# Patient Record
Sex: MALE | Race: WHITE | NOT HISPANIC OR LATINO | Employment: OTHER | URBAN - METROPOLITAN AREA
[De-identification: names, ages, dates, MRNs, and addresses within clinical notes are randomized per-mention and may not be internally consistent; named-entity substitution may affect disease eponyms.]

---

## 2017-10-03 ENCOUNTER — APPOINTMENT (OUTPATIENT)
Dept: CT IMAGING | Facility: HOSPITAL | Age: 77
End: 2017-10-03

## 2017-10-03 ENCOUNTER — APPOINTMENT (OUTPATIENT)
Dept: MRI IMAGING | Facility: HOSPITAL | Age: 77
End: 2017-10-03

## 2017-10-03 ENCOUNTER — HOSPITAL ENCOUNTER (OUTPATIENT)
Facility: HOSPITAL | Age: 77
Setting detail: OBSERVATION
Discharge: HOME HEALTH | End: 2017-10-05
Attending: EMERGENCY MEDICINE | Admitting: INTERNAL MEDICINE

## 2017-10-03 ENCOUNTER — APPOINTMENT (OUTPATIENT)
Dept: GENERAL RADIOLOGY | Facility: HOSPITAL | Age: 77
End: 2017-10-03

## 2017-10-03 DIAGNOSIS — R53.1 RIGHT SIDED WEAKNESS: ICD-10-CM

## 2017-10-03 DIAGNOSIS — Z74.09 IMPAIRED MOBILITY AND ADLS: ICD-10-CM

## 2017-10-03 DIAGNOSIS — M51.34 DDD (DEGENERATIVE DISC DISEASE), THORACIC: ICD-10-CM

## 2017-10-03 DIAGNOSIS — S19.80XA BLUNT TRAUMA OF NECK, INITIAL ENCOUNTER: ICD-10-CM

## 2017-10-03 DIAGNOSIS — G20 PARKINSON'S DISEASE (HCC): ICD-10-CM
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
## 2017-10-03 DIAGNOSIS — R53.1 WEAKNESS: ICD-10-CM

## 2017-10-03 DIAGNOSIS — G20 PARKINSON DISEASE (HCC): ICD-10-CM

## 2017-10-03 DIAGNOSIS — R40.4 SPELL OF ALTERED CONSCIOUSNESS: Primary | ICD-10-CM

## 2017-10-03 DIAGNOSIS — Z74.09 IMPAIRED FUNCTIONAL MOBILITY, BALANCE, GAIT, AND ENDURANCE: ICD-10-CM

## 2017-10-03 DIAGNOSIS — M50.30 DDD (DEGENERATIVE DISC DISEASE), CERVICAL: ICD-10-CM

## 2017-10-03 DIAGNOSIS — Z78.9 IMPAIRED MOBILITY AND ADLS: ICD-10-CM

## 2017-10-03 DIAGNOSIS — R55 SYNCOPE AND COLLAPSE: ICD-10-CM

## 2017-10-03 PROBLEM — R00.1 SINUS BRADYCARDIA: Status: ACTIVE | Noted: 2017-10-03

## 2017-10-03 PROBLEM — I44.0 FIRST DEGREE ATRIOVENTRICULAR BLOCK: Status: ACTIVE | Noted: 2017-10-03

## 2017-10-03 PROBLEM — R29.6 FALLS: Status: ACTIVE | Noted: 2017-10-03

## 2017-10-03 PROBLEM — G20.A1 PARKINSON'S DISEASE: Status: ACTIVE | Noted: 2017-10-03

## 2017-10-03 PROBLEM — S61.412S: Status: ACTIVE | Noted: 2017-10-03

## 2017-10-03 PROBLEM — G45.9 TIA (TRANSIENT ISCHEMIC ATTACK): Status: ACTIVE | Noted: 2017-10-03

## 2017-10-03 LAB
ALT SERPL W P-5'-P-CCNC: 22 U/L (ref 7–40)
APTT PPP: 25.4 SECONDS (ref 24–31)
AST SERPL-CCNC: 23 U/L (ref 0–33)
BASOPHILS # BLD AUTO: 0.02 10*3/MM3 (ref 0–0.2)
BASOPHILS NFR BLD AUTO: 0.3 % (ref 0–1)
BUN BLDA-MCNC: 15 MG/DL (ref 8–26)
CA-I BLDA-SCNC: 1.24 MMOL/L (ref 1.2–1.32)
CHLORIDE BLDA-SCNC: 100 MMOL/L (ref 98–109)
CO2 BLDA-SCNC: 29 MMOL/L (ref 24–29)
CREAT BLDA-MCNC: 1.1 MG/DL (ref 0.6–1.3)
DEPRECATED RDW RBC AUTO: 44.8 FL (ref 37–54)
EOSINOPHIL # BLD AUTO: 0.11 10*3/MM3 (ref 0–0.3)
EOSINOPHIL NFR BLD AUTO: 1.7 % (ref 0–3)
ERYTHROCYTE [DISTWIDTH] IN BLOOD BY AUTOMATED COUNT: 13.4 % (ref 11.3–14.5)
GLUCOSE BLDC GLUCOMTR-MCNC: 110 MG/DL (ref 70–130)
GLUCOSE BLDC GLUCOMTR-MCNC: 167 MG/DL (ref 70–130)
HBA1C MFR BLD: 4.8 % (ref 4.8–5.6)
HCT VFR BLD AUTO: 43.3 % (ref 38.9–50.9)
HCT VFR BLDA CALC: 43 % (ref 38–51)
HGB BLD-MCNC: 14.6 G/DL (ref 13.1–17.5)
HGB BLDA-MCNC: 14.6 G/DL (ref 12–17)
HOLD SPECIMEN: NORMAL
HOLD SPECIMEN: NORMAL
IMM GRANULOCYTES # BLD: 0.01 10*3/MM3 (ref 0–0.03)
IMM GRANULOCYTES NFR BLD: 0.2 % (ref 0–0.6)
INR PPP: 1.2 (ref 0.8–1.2)
LYMPHOCYTES # BLD AUTO: 1.52 10*3/MM3 (ref 0.6–4.8)
LYMPHOCYTES NFR BLD AUTO: 24.1 % (ref 24–44)
MCH RBC QN AUTO: 31 PG (ref 27–31)
MCHC RBC AUTO-ENTMCNC: 33.7 G/DL (ref 32–36)
MCV RBC AUTO: 91.9 FL (ref 80–99)
MONOCYTES # BLD AUTO: 0.44 10*3/MM3 (ref 0–1)
MONOCYTES NFR BLD AUTO: 7 % (ref 0–12)
NEUTROPHILS # BLD AUTO: 4.22 10*3/MM3 (ref 1.5–8.3)
NEUTROPHILS NFR BLD AUTO: 66.7 % (ref 41–71)
PLATELET # BLD AUTO: 187 10*3/MM3 (ref 150–450)
PMV BLD AUTO: 9.3 FL (ref 6–12)
POTASSIUM BLDA-SCNC: 3.5 MMOL/L (ref 3.5–4.9)
PROTHROMBIN TIME: 14.1 SECONDS (ref 12.8–15.2)
RBC # BLD AUTO: 4.71 10*6/MM3 (ref 4.2–5.76)
SODIUM BLDA-SCNC: 143 MMOL/L (ref 138–146)
TROPONIN I SERPL-MCNC: 0 NG/ML (ref 0–0.07)
TROPONIN I SERPL-MCNC: 0 NG/ML (ref 0–0.07)
WBC NRBC COR # BLD: 6.32 10*3/MM3 (ref 3.5–10.8)
WHOLE BLOOD HOLD SPECIMEN: NORMAL
WHOLE BLOOD HOLD SPECIMEN: NORMAL

## 2017-10-03 PROCEDURE — 70496 CT ANGIOGRAPHY HEAD: CPT

## 2017-10-03 PROCEDURE — 84443 ASSAY THYROID STIM HORMONE: CPT | Performed by: FAMILY MEDICINE

## 2017-10-03 PROCEDURE — 84484 ASSAY OF TROPONIN QUANT: CPT | Performed by: FAMILY MEDICINE

## 2017-10-03 PROCEDURE — 85610 PROTHROMBIN TIME: CPT

## 2017-10-03 PROCEDURE — 99220 PR INITIAL OBSERVATION CARE/DAY 70 MINUTES: CPT | Performed by: FAMILY MEDICINE

## 2017-10-03 PROCEDURE — 71010 HC CHEST PA OR AP: CPT

## 2017-10-03 PROCEDURE — 83036 HEMOGLOBIN GLYCOSYLATED A1C: CPT | Performed by: FAMILY MEDICINE

## 2017-10-03 PROCEDURE — 83735 ASSAY OF MAGNESIUM: CPT | Performed by: FAMILY MEDICINE

## 2017-10-03 PROCEDURE — 93010 ELECTROCARDIOGRAM REPORT: CPT | Performed by: INTERNAL MEDICINE

## 2017-10-03 PROCEDURE — 84450 TRANSFERASE (AST) (SGOT): CPT | Performed by: EMERGENCY MEDICINE

## 2017-10-03 PROCEDURE — 84460 ALANINE AMINO (ALT) (SGPT): CPT | Performed by: EMERGENCY MEDICINE

## 2017-10-03 PROCEDURE — G0378 HOSPITAL OBSERVATION PER HR: HCPCS

## 2017-10-03 PROCEDURE — 82962 GLUCOSE BLOOD TEST: CPT

## 2017-10-03 PROCEDURE — 70498 CT ANGIOGRAPHY NECK: CPT

## 2017-10-03 PROCEDURE — 93005 ELECTROCARDIOGRAM TRACING: CPT | Performed by: EMERGENCY MEDICINE

## 2017-10-03 PROCEDURE — 84484 ASSAY OF TROPONIN QUANT: CPT

## 2017-10-03 PROCEDURE — 85014 HEMATOCRIT: CPT

## 2017-10-03 PROCEDURE — 80047 BASIC METABLC PNL IONIZED CA: CPT

## 2017-10-03 PROCEDURE — 93005 ELECTROCARDIOGRAM TRACING: CPT | Performed by: FAMILY MEDICINE

## 2017-10-03 PROCEDURE — 72125 CT NECK SPINE W/O DYE: CPT

## 2017-10-03 PROCEDURE — 85730 THROMBOPLASTIN TIME PARTIAL: CPT | Performed by: EMERGENCY MEDICINE

## 2017-10-03 PROCEDURE — 85025 COMPLETE CBC W/AUTO DIFF WBC: CPT | Performed by: EMERGENCY MEDICINE

## 2017-10-03 PROCEDURE — 36415 COLL VENOUS BLD VENIPUNCTURE: CPT

## 2017-10-03 PROCEDURE — 99285 EMERGENCY DEPT VISIT HI MDM: CPT

## 2017-10-03 PROCEDURE — 0042T HC CT CEREBRAL PERFUSION W/WO CONTRAST: CPT

## 2017-10-03 PROCEDURE — 70551 MRI BRAIN STEM W/O DYE: CPT

## 2017-10-03 PROCEDURE — 72128 CT CHEST SPINE W/O DYE: CPT

## 2017-10-03 PROCEDURE — 82550 ASSAY OF CK (CPK): CPT | Performed by: FAMILY MEDICINE

## 2017-10-03 PROCEDURE — 70450 CT HEAD/BRAIN W/O DYE: CPT

## 2017-10-03 PROCEDURE — 0 IOPAMIDOL PER 1 ML: Performed by: EMERGENCY MEDICINE

## 2017-10-03 RX ORDER — LORAZEPAM 2 MG/ML
1 INJECTION INTRAMUSCULAR ONCE
Status: DISCONTINUED | OUTPATIENT
Start: 2017-10-03 | End: 2017-10-05 | Stop reason: HOSPADM

## 2017-10-03 RX ORDER — BACITRACIN ZINC AND POLYMYXIN B SULFATE 500; 10000 [USP'U]/G; [USP'U]/G
OINTMENT TOPICAL EVERY 8 HOURS SCHEDULED
Status: DISCONTINUED | OUTPATIENT
Start: 2017-10-04 | End: 2017-10-05 | Stop reason: HOSPADM

## 2017-10-03 RX ORDER — ASPIRIN 81 MG/1
324 TABLET, CHEWABLE ORAL ONCE
Status: COMPLETED | OUTPATIENT
Start: 2017-10-03 | End: 2017-10-03

## 2017-10-03 RX ORDER — ACETAMINOPHEN 650 MG/1
650 SUPPOSITORY RECTAL EVERY 4 HOURS PRN
Status: DISCONTINUED | OUTPATIENT
Start: 2017-10-03 | End: 2017-10-05 | Stop reason: HOSPADM

## 2017-10-03 RX ORDER — ASPIRIN 81 MG/1
81 TABLET, CHEWABLE ORAL DAILY
Status: DISCONTINUED | OUTPATIENT
Start: 2017-10-04 | End: 2017-10-05 | Stop reason: HOSPADM

## 2017-10-03 RX ORDER — ATORVASTATIN CALCIUM 40 MG/1
80 TABLET, FILM COATED ORAL NIGHTLY
Status: DISCONTINUED | OUTPATIENT
Start: 2017-10-03 | End: 2017-10-05 | Stop reason: HOSPADM

## 2017-10-03 RX ORDER — SODIUM CHLORIDE 0.9 % (FLUSH) 0.9 %
10 SYRINGE (ML) INJECTION AS NEEDED
Status: DISCONTINUED | OUTPATIENT
Start: 2017-10-03 | End: 2017-10-05 | Stop reason: HOSPADM

## 2017-10-03 RX ORDER — SODIUM CHLORIDE 0.9 % (FLUSH) 0.9 %
1-10 SYRINGE (ML) INJECTION AS NEEDED
Status: DISCONTINUED | OUTPATIENT
Start: 2017-10-03 | End: 2017-10-05 | Stop reason: HOSPADM

## 2017-10-03 RX ORDER — ACETAMINOPHEN 325 MG/1
650 TABLET ORAL EVERY 4 HOURS PRN
Status: DISCONTINUED | OUTPATIENT
Start: 2017-10-03 | End: 2017-10-05 | Stop reason: HOSPADM

## 2017-10-03 RX ADMIN — ASPIRIN 81 MG 324 MG: 81 TABLET ORAL at 21:00

## 2017-10-03 RX ADMIN — IOPAMIDOL 115 ML: 755 INJECTION, SOLUTION INTRAVENOUS at 16:10

## 2017-10-03 RX ADMIN — ATORVASTATIN CALCIUM 80 MG: 40 TABLET, FILM COATED ORAL at 23:21

## 2017-10-03 NOTE — ED PROVIDER NOTES
Subjective   HPI Comments: Jhoan Gagnon is a 77 y.o.male who presents to the ED with c/o neurologic problem. His time at last known normal was today at 1449. 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 He called a neighbor for help who reported normal mentation at the time. Upon EMS arrival the patient was on the ground. He was disoriented and confused with significant slurred speech and right sided weakness per EMS. There was evidence of prior emesis on scene. His last recorded blood pressure was 193/127. His heart rate was 68. Pupils were 2 mm. Upon arrival in CT, EMS report that his weakness had improved. It is apparent that the patient recently started new medication for Parkinson's management. He sees Dr. Holley, neurology. He is tremulous at baseline. Other than complaints of neck pain after stumbling out of his truck, there are no other known complaints at this time.      Patient is a 77 y.o. male presenting with neurologic complaint.   History provided by:  Patient and EMS personnel  Neurologic Problem   The patient's primary symptoms include a loss of balance, slurred speech and weakness. This is a new problem. The current episode started today. The neurological problem developed suddenly. The last time the patient was known to be well was 10/3/2017 2:49 PM.  The problem has been gradually improving since onset. There was no focality noted. Associated symptoms include confusion, nausea and neck pain. Pertinent negatives include no abdominal pain, back pain, chest pain, dizziness, fever, headaches, shortness of breath or vomiting. Past treatments include nothing.       Review of Systems   Constitutional: Negative for chills and fever.   HENT: Negative for congestion, rhinorrhea, sore throat and trouble swallowing.    Respiratory: Negative for cough and shortness of breath.    Cardiovascular: Negative for chest pain and leg swelling.   Gastrointestinal: Positive for  nausea. Negative for abdominal pain, diarrhea and vomiting.   Musculoskeletal: Positive for neck pain. Negative for back pain.   Neurological: Positive for tremors, speech difficulty, weakness and loss of balance. Negative for dizziness, numbness and headaches.   Psychiatric/Behavioral: Positive for confusion.   All other systems reviewed and are negative.      Past Medical History:   Diagnosis Date   • Cancer        No Known Allergies    No past surgical history on file.    Family History   Problem Relation Age of Onset   • Heart disease Mother    • Cancer Father    • Stroke Father        Social History     Social History   • Marital status:      Spouse name: N/A   • Number of children: N/A   • Years of education: N/A     Social History Main Topics   • Smoking status: Never Smoker   • Smokeless tobacco: Never Used   • Alcohol use No   • Drug use: No   • Sexual activity: Not on file     Other Topics Concern   • Not on file     Social History Narrative   • No narrative on file         Objective   Physical Exam   Constitutional: He is oriented to person, place, and time. He appears well-developed and well-nourished. No distress.   Alert, oriented. No acute distress until he moved, during which time the patient reports neck pain.   HENT:   Head: Normocephalic and atraumatic.   Nose: Nose normal.   Mouth/Throat: Oropharynx is clear and moist.   Eyes: Conjunctivae are normal. No scleral icterus.   Neck: Normal range of motion. Neck supple.   Cardiovascular: Normal rate, regular rhythm and normal heart sounds.  Exam reveals no gallop and no friction rub.    No murmur heard.  Pulmonary/Chest: Effort normal and breath sounds normal. No respiratory distress. He has no wheezes. He has no rales.   Abdominal: Soft. Bowel sounds are normal. He exhibits no distension and no mass. There is no tenderness. There is no rebound and no guarding.   Musculoskeletal: Normal range of motion. He exhibits tenderness. He exhibits no  edema.   Neck is tender on the posterior aspect with no mass, rash, or stepoff. TL spine is non-tender. No point tenderness.     Neurological: He is alert and oriented to person, place, and time. He displays no tremor. A cranial nerve deficit is present.   Very substle right facial droop which may be the natural state of his face. Moderate tremors consistent with Parkinson's disease. Could lift all extremities against gravity. Very subtle weakness on the right compared to the left. Difficult to assess given Parkinson's tremor.   Skin: Skin is warm and dry. No erythema.   Psychiatric: He has a normal mood and affect. His behavior is normal.   Nursing note and vitals reviewed.      Critical Care  Performed by: LEONARDO BUTT  Authorized by: LEONARDO BUTT   Total critical care time: 45 minutes  Critical care was necessary to treat or prevent imminent or life-threatening deterioration of the following conditions: CNS failure or compromise.               ED Course  ED Course   Comment By Time   Dr. Butt is at the bedside reevaluating the patient. No evidence of cervical fracture on CT. Gwen Jensen 10/03 1640   Dr. Butt is at the bedside reevaluating the patient. CT cervical spine shows degenerative disc disease but no evidence of  acute cervical spine trauma and no gross evidence of cervical spine  stenosis. Gwen Jensen 10/03 1937   Dr. Butt paged the hospitalist on call for admission. Gwen Jensen 10/03 1939   Dr. Butt discussed the case in detail with the hospitalist who will admit. Gwen Jensen 10/03 1943     Recent Results (from the past 24 hour(s))   POC Protime / INR    Collection Time: 10/03/17  4:04 PM   Result Value Ref Range    Protime 14.1 12.8 - 15.2 seconds    INR 1.2 0.8 - 1.2   POC CHEM 8    Collection Time: 10/03/17  4:05 PM   Result Value Ref Range    Glucose 167 (H) 70 - 130 mg/dL    BUN, Arterial 15 8 - 26 mg/dL    Creatinine 1.10 0.60 - 1.30 mg/dL    Sodium 143  138 - 146 mmol/L    Potassium 3.5 3.5 - 4.9 mmol/L    Chloride 100 98 - 109 mmol/L    Total CO2 29 24 - 29 mmol/L    Hemoglobin 14.6 12.0 - 17.0 g/dL    Hematocrit 43 38 - 51 %    Ionized Calcium 1.24 1.20 - 1.32 mmol/L   POC Troponin, Rapid    Collection Time: 10/03/17  4:06 PM   Result Value Ref Range    Troponin I 0.00 0.00 - 0.07 ng/mL   aPTT    Collection Time: 10/03/17  4:12 PM   Result Value Ref Range    PTT 25.4 24.0 - 31.0 seconds   AST    Collection Time: 10/03/17  4:12 PM   Result Value Ref Range    AST (SGOT) 23 0 - 33 U/L   ALT    Collection Time: 10/03/17  4:12 PM   Result Value Ref Range    ALT (SGPT) 22 7 - 40 U/L   Light Blue Top    Collection Time: 10/03/17  4:12 PM   Result Value Ref Range    Extra Tube hold for add-on    Green Top (Gel)    Collection Time: 10/03/17  4:12 PM   Result Value Ref Range    Extra Tube Hold for add-ons.    Lavender Top    Collection Time: 10/03/17  4:12 PM   Result Value Ref Range    Extra Tube hold for add-on    Gold Top - SST    Collection Time: 10/03/17  4:12 PM   Result Value Ref Range    Extra Tube Hold for add-ons.    CBC Auto Differential    Collection Time: 10/03/17  4:12 PM   Result Value Ref Range    WBC 6.32 3.50 - 10.80 10*3/mm3    RBC 4.71 4.20 - 5.76 10*6/mm3    Hemoglobin 14.6 13.1 - 17.5 g/dL    Hematocrit 43.3 38.9 - 50.9 %    MCV 91.9 80.0 - 99.0 fL    MCH 31.0 27.0 - 31.0 pg    MCHC 33.7 32.0 - 36.0 g/dL    RDW 13.4 11.3 - 14.5 %    RDW-SD 44.8 37.0 - 54.0 fl    MPV 9.3 6.0 - 12.0 fL    Platelets 187 150 - 450 10*3/mm3    Neutrophil % 66.7 41.0 - 71.0 %    Lymphocyte % 24.1 24.0 - 44.0 %    Monocyte % 7.0 0.0 - 12.0 %    Eosinophil % 1.7 0.0 - 3.0 %    Basophil % 0.3 0.0 - 1.0 %    Immature Grans % 0.2 0.0 - 0.6 %    Neutrophils, Absolute 4.22 1.50 - 8.30 10*3/mm3    Lymphocytes, Absolute 1.52 0.60 - 4.80 10*3/mm3    Monocytes, Absolute 0.44 0.00 - 1.00 10*3/mm3    Eosinophils, Absolute 0.11 0.00 - 0.30 10*3/mm3    Basophils, Absolute 0.02 0.00 - 0.20  10*3/mm3    Immature Grans, Absolute 0.01 0.00 - 0.03 10*3/mm3   POC Troponin, Rapid    Collection Time: 10/03/17  7:21 PM   Result Value Ref Range    Troponin I 0.00 0.00 - 0.07 ng/mL     Note: In addition to lab results from this visit, the labs listed above may include labs taken at another facility or during a different encounter within the last 24 hours. Please correlate lab times with ED admission and discharge times for further clarification of the services performed during this visit.    MRI Brain Without Contrast   Final Result   Abnormal     Mild chronic microvascular ischemic changes otherwise unremarkable study    without a supra-, infratentorial mass or abnormality; no evidence of    hemorrhagic or ischemic infarct and no hydrocephalus.         THIS DOCUMENT HAS BEEN ELECTRONICALLY SIGNED BY LORNA HUITRON MD      CT Cervical Spine Without Contrast   Preliminary Result   Cervical spine degenerative disc disease but no evidence of   acute cervical spine trauma and no gross evidence of cervical spine   stenosis.       D:  10/03/2017   E:  10/03/2017                  CT Head Without Contrast Stroke Protocol    (Results Pending)   CT Cerebral Perfusion With & Without Contrast    (Results Pending)   CT Angiogram Head With & Without Contrast    (Results Pending)   CT Angiogram Neck With & Without Contrast    (Results Pending)   XR Chest 1 View    (Results Pending)   CT Thoracic Spine Without Contrast    (Results Pending)     Vitals:    10/03/17 1715 10/03/17 1930 10/03/17 1931 10/03/17 1945   BP: 116/83 160/80  158/81   BP Location:       Patient Position:       Pulse: 61 61 58    Resp:       Temp:       TempSrc:       SpO2: 96% 96% 97% 90%   Weight:       Height:         Medications   sodium chloride 0.9 % flush 10 mL (not administered)   LORazepam (ATIVAN) injection 1 mg (1 mg Intravenous Not Given 10/3/17 1922)   aspirin chewable tablet 324 mg (not administered)   iopamidol (ISOVUE-370) 76 % injection 150 mL  (115 mL Intravenous Given 10/3/17 1610)     ECG/EMG Results (last 24 hours)     ** No results found for the last 24 hours. **                       MDM  Number of Diagnoses or Management Options  Blunt trauma of neck, initial encounter:   DDD (degenerative disc disease), cervical:   DDD (degenerative disc disease), thoracic:   Parkinson disease:   Right sided weakness:   Spell of altered consciousness:   Syncope and collapse:   Diagnosis management comments:         I have reviewed all available studies at the bedside with the patient and his family.  I've also personally discussed his CT perfusion plain CT of the head and neck with Dr. Mckeon of radiology and the CT suite.    This is a gentleman who presented with collapse and right-sided weakness that had improved by the time he got here to the hospital.  His called his stroke alert.  His exam was difficult given his fairly pronounced parkinsonism.  Thankfully his perfusion scan and CTA A's were unrevealing the Dr. Mckeon did think he had perhaps some slight decreased perfusion in the watershed area however his symptoms were improving and I did not feel he was a candidate for TPA.  Subsequently he is improved back to his baseline and this was discussed with the daughter at the bedside he feels his face looks the way it normally does and his voice sounds weight normally does and his tremor and strength are at their baseline.    Patient was complaining of neck pain was quite severe when he arrived he was tender in his lower cervical and upper thoracic spine.  CT shows fairly advanced degenerative changes but no fracture.    I suspect this is a neck contusion.    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  He had episodes similar though not as severe a week or 2 ago.  Dr. Andres also increased one of his Parkinson's medicines 1-2 weeks  ago and certainly this could be a parkinsonian motor related issue as well.  He is in sinus rhythm here with a reasonable blood pressure.    I've given the patient aspirin here and I think the best course is to admit him to the hospital for telemetry and serial neurologic exams and perhaps neurologic consultation and echo in the morning.    I spoke Dr. Caballero, on-call hospital medicine, he'll admit the patient.    All are agreeable with the plan       Amount and/or Complexity of Data Reviewed  Clinical lab tests: reviewed  Tests in the radiology section of CPT®: reviewed  Tests in the medicine section of CPT®: reviewed        Final diagnoses:   Spell of altered consciousness   Right sided weakness   Syncope and collapse   Parkinson disease   DDD (degenerative disc disease), cervical   DDD (degenerative disc disease), thoracic   Blunt trauma of neck, initial encounter       Documentation assistance provided by tori Jensen.  Information recorded by the scribe was done at my direction and has been verified and validated by me.     Gwen Jensen  10/03/17 1724       Parker Butt MD  10/03/17 2023

## 2017-10-04 ENCOUNTER — APPOINTMENT (OUTPATIENT)
Dept: GENERAL RADIOLOGY | Facility: HOSPITAL | Age: 77
End: 2017-10-04

## 2017-10-04 ENCOUNTER — APPOINTMENT (OUTPATIENT)
Dept: CARDIOLOGY | Facility: HOSPITAL | Age: 77
End: 2017-10-04

## 2017-10-04 LAB
ANION GAP SERPL CALCULATED.3IONS-SCNC: 5 MMOL/L (ref 3–11)
ARTICHOKE IGE QN: 104 MG/DL (ref 0–130)
BH CV ECHO MEAS - AO ROOT AREA (BSA CORRECTED): 1.8
BH CV ECHO MEAS - AO ROOT AREA: 10.8 CM^2
BH CV ECHO MEAS - AO ROOT DIAM: 3.7 CM
BH CV ECHO MEAS - BSA(HAYCOCK): 2.1 M^2
BH CV ECHO MEAS - BSA: 2.1 M^2
BH CV ECHO MEAS - BZI_BMI: 31 KILOGRAMS/M^2
BH CV ECHO MEAS - BZI_METRIC_HEIGHT: 172.7 CM
BH CV ECHO MEAS - BZI_METRIC_WEIGHT: 92.5 KG
BH CV ECHO MEAS - CONTRAST EF (2CH): 56.4 ML/M^2
BH CV ECHO MEAS - CONTRAST EF 4CH: 56.6 ML/M^2
BH CV ECHO MEAS - EDV(CUBED): 91.1 ML
BH CV ECHO MEAS - EDV(MOD-SP2): 55 ML
BH CV ECHO MEAS - EDV(MOD-SP4): 106 ML
BH CV ECHO MEAS - EDV(TEICH): 92.4 ML
BH CV ECHO MEAS - EF(CUBED): 63 %
BH CV ECHO MEAS - EF(MOD-SP2): 56.4 %
BH CV ECHO MEAS - EF(MOD-SP4): 56.6 %
BH CV ECHO MEAS - EF(TEICH): 54.7 %
BH CV ECHO MEAS - ESV(CUBED): 33.7 ML
BH CV ECHO MEAS - ESV(MOD-SP2): 24 ML
BH CV ECHO MEAS - ESV(MOD-SP4): 46 ML
BH CV ECHO MEAS - ESV(TEICH): 41.9 ML
BH CV ECHO MEAS - FS: 28.2 %
BH CV ECHO MEAS - IVS/LVPW: 0.95
BH CV ECHO MEAS - IVSD: 1.7 CM
BH CV ECHO MEAS - LA DIMENSION: 4.1 CM
BH CV ECHO MEAS - LA/AO: 1.1
BH CV ECHO MEAS - LAT PEAK E' VEL: 5.4 CM/SEC
BH CV ECHO MEAS - LV DIASTOLIC VOL/BSA (35-75): 51.4 ML/M^2
BH CV ECHO MEAS - LV MASS(C)D: 355.6 GRAMS
BH CV ECHO MEAS - LV MASS(C)DI: 172.5 GRAMS/M^2
BH CV ECHO MEAS - LV MAX PG: 7.4 MMHG
BH CV ECHO MEAS - LV MEAN PG: 4 MMHG
BH CV ECHO MEAS - LV SYSTOLIC VOL/BSA (12-30): 22.3 ML/M^2
BH CV ECHO MEAS - LV V1 MAX: 136 CM/SEC
BH CV ECHO MEAS - LV V1 MEAN: 98.2 CM/SEC
BH CV ECHO MEAS - LV V1 VTI: 34.5 CM
BH CV ECHO MEAS - LVIDD: 4.5 CM
BH CV ECHO MEAS - LVIDS: 3.2 CM
BH CV ECHO MEAS - LVLD AP2: 7.5 CM
BH CV ECHO MEAS - LVLD AP4: 8.5 CM
BH CV ECHO MEAS - LVLS AP2: 5.5 CM
BH CV ECHO MEAS - LVLS AP4: 7.8 CM
BH CV ECHO MEAS - LVOT AREA (M): 3.5 CM^2
BH CV ECHO MEAS - LVOT AREA: 3.5 CM^2
BH CV ECHO MEAS - LVOT DIAM: 2.1 CM
BH CV ECHO MEAS - LVPWD: 1.8 CM
BH CV ECHO MEAS - MED PEAK E' VEL: 3.76 CM/SEC
BH CV ECHO MEAS - MV A MAX VEL: 105 CM/SEC
BH CV ECHO MEAS - MV E MAX VEL: 70.1 CM/SEC
BH CV ECHO MEAS - MV E/A: 0.67
BH CV ECHO MEAS - PA ACC SLOPE: 735 CM/SEC^2
BH CV ECHO MEAS - PA ACC TIME: 0.12 SEC
BH CV ECHO MEAS - PA PR(ACCEL): 25.5 MMHG
BH CV ECHO MEAS - RVDD: 2.6 CM
BH CV ECHO MEAS - SI(CUBED): 27.9 ML/M^2
BH CV ECHO MEAS - SI(LVOT): 58 ML/M^2
BH CV ECHO MEAS - SI(MOD-SP2): 15 ML/M^2
BH CV ECHO MEAS - SI(MOD-SP4): 29.1 ML/M^2
BH CV ECHO MEAS - SI(TEICH): 24.5 ML/M^2
BH CV ECHO MEAS - SV(CUBED): 57.4 ML
BH CV ECHO MEAS - SV(LVOT): 119.5 ML
BH CV ECHO MEAS - SV(MOD-SP2): 31 ML
BH CV ECHO MEAS - SV(MOD-SP4): 60 ML
BH CV ECHO MEAS - SV(TEICH): 50.5 ML
BH CV VAS BP RIGHT ARM: NORMAL MMHG
BUN BLD-MCNC: 12 MG/DL (ref 9–23)
BUN/CREAT SERPL: 12 (ref 7–25)
CALCIUM SPEC-SCNC: 9.4 MG/DL (ref 8.7–10.4)
CHLORIDE SERPL-SCNC: 104 MMOL/L (ref 99–109)
CHOLEST SERPL-MCNC: 164 MG/DL (ref 0–200)
CK SERPL-CCNC: 318 U/L (ref 26–174)
CO2 SERPL-SCNC: 29 MMOL/L (ref 20–31)
CREAT BLD-MCNC: 1 MG/DL (ref 0.6–1.3)
DEPRECATED RDW RBC AUTO: 44.6 FL (ref 37–54)
E/E' RATIO: 15
ERYTHROCYTE [DISTWIDTH] IN BLOOD BY AUTOMATED COUNT: 13.4 % (ref 11.3–14.5)
GFR SERPL CREATININE-BSD FRML MDRD: 72 ML/MIN/1.73
GLUCOSE BLD-MCNC: 86 MG/DL (ref 70–100)
HCT VFR BLD AUTO: 42.3 % (ref 38.9–50.9)
HDLC SERPL-MCNC: 46 MG/DL (ref 40–60)
HGB BLD-MCNC: 14.4 G/DL (ref 13.1–17.5)
LEFT ATRIUM VOLUME INDEX: 11.7 ML/M2
LV EF 2D ECHO EST: 60 %
MAGNESIUM SERPL-MCNC: 2.3 MG/DL (ref 1.3–2.7)
MCH RBC QN AUTO: 31.2 PG (ref 27–31)
MCHC RBC AUTO-ENTMCNC: 34 G/DL (ref 32–36)
MCV RBC AUTO: 91.6 FL (ref 80–99)
PLATELET # BLD AUTO: 183 10*3/MM3 (ref 150–450)
PMV BLD AUTO: 9.4 FL (ref 6–12)
POTASSIUM BLD-SCNC: 3.4 MMOL/L (ref 3.5–5.5)
RBC # BLD AUTO: 4.62 10*6/MM3 (ref 4.2–5.76)
SODIUM BLD-SCNC: 138 MMOL/L (ref 132–146)
TRIGL SERPL-MCNC: 74 MG/DL (ref 0–150)
TROPONIN I SERPL-MCNC: 0.01 NG/ML
TSH SERPL DL<=0.05 MIU/L-ACNC: 1 MIU/ML (ref 0.35–5.35)
WBC NRBC COR # BLD: 6.58 10*3/MM3 (ref 3.5–10.8)

## 2017-10-04 PROCEDURE — 73560 X-RAY EXAM OF KNEE 1 OR 2: CPT

## 2017-10-04 PROCEDURE — 93306 TTE W/DOPPLER COMPLETE: CPT

## 2017-10-04 PROCEDURE — G0378 HOSPITAL OBSERVATION PER HR: HCPCS

## 2017-10-04 PROCEDURE — 97165 OT EVAL LOW COMPLEX 30 MIN: CPT

## 2017-10-04 PROCEDURE — G9165 ATTEN CURRENT STATUS: HCPCS

## 2017-10-04 PROCEDURE — G8978 MOBILITY CURRENT STATUS: HCPCS

## 2017-10-04 PROCEDURE — 99202 OFFICE O/P NEW SF 15 MIN: CPT | Performed by: INTERNAL MEDICINE

## 2017-10-04 PROCEDURE — G8987 SELF CARE CURRENT STATUS: HCPCS

## 2017-10-04 PROCEDURE — G9166 ATTEN GOAL STATUS: HCPCS

## 2017-10-04 PROCEDURE — 97161 PT EVAL LOW COMPLEX 20 MIN: CPT

## 2017-10-04 PROCEDURE — G9167 ATTEN D/C STATUS: HCPCS

## 2017-10-04 PROCEDURE — 99226 PR SBSQ OBSERVATION CARE/DAY 35 MINUTES: CPT | Performed by: INTERNAL MEDICINE

## 2017-10-04 PROCEDURE — 85027 COMPLETE CBC AUTOMATED: CPT | Performed by: NURSE PRACTITIONER

## 2017-10-04 PROCEDURE — G8979 MOBILITY GOAL STATUS: HCPCS

## 2017-10-04 PROCEDURE — G8988 SELF CARE GOAL STATUS: HCPCS

## 2017-10-04 PROCEDURE — 92523 SPEECH SOUND LANG COMPREHEN: CPT

## 2017-10-04 PROCEDURE — 80061 LIPID PANEL: CPT | Performed by: NURSE PRACTITIONER

## 2017-10-04 PROCEDURE — 97530 THERAPEUTIC ACTIVITIES: CPT

## 2017-10-04 PROCEDURE — 93005 ELECTROCARDIOGRAM TRACING: CPT | Performed by: NURSE PRACTITIONER

## 2017-10-04 PROCEDURE — 73130 X-RAY EXAM OF HAND: CPT

## 2017-10-04 PROCEDURE — 93306 TTE W/DOPPLER COMPLETE: CPT | Performed by: INTERNAL MEDICINE

## 2017-10-04 PROCEDURE — 80048 BASIC METABOLIC PNL TOTAL CA: CPT | Performed by: NURSE PRACTITIONER

## 2017-10-04 PROCEDURE — 99215 OFFICE O/P EST HI 40 MIN: CPT | Performed by: PSYCHIATRY & NEUROLOGY

## 2017-10-04 RX ORDER — ROPINIROLE 0.5 MG/1
0.5 TABLET, FILM COATED ORAL 3 TIMES DAILY
COMMUNITY
End: 2017-10-05 | Stop reason: HOSPADM

## 2017-10-04 RX ADMIN — BACITRACIN ZINC AND POLYMYXIN B SULFATE 1 APPLICATION: 500; 10000 OINTMENT TOPICAL at 08:34

## 2017-10-04 RX ADMIN — ATORVASTATIN CALCIUM 80 MG: 40 TABLET, FILM COATED ORAL at 21:43

## 2017-10-04 RX ADMIN — ASPIRIN 81 MG 81 MG: 81 TABLET ORAL at 08:34

## 2017-10-04 RX ADMIN — CARBIDOPA AND LEVODOPA 0.5 TABLET: 25; 100 TABLET ORAL at 18:08

## 2017-10-04 RX ADMIN — ACETAMINOPHEN 650 MG: 325 TABLET, FILM COATED ORAL at 18:08

## 2017-10-04 NOTE — PLAN OF CARE
Problem: Patient Care Overview (Adult)  Goal: Plan of Care Review  Outcome: Ongoing (interventions implemented as appropriate)    10/04/17 0117   Coping/Psychosocial Response Interventions   Plan Of Care Reviewed With patient   Patient Care Overview   Progress no change   Outcome Evaluation   Outcome Summary/Follow up Plan pt admitted to ER for altered consciousness. NIH scale completed q 4. pt ao X 4. hx of parkinsons. multiple wounds from fall. will continue to monitor.        Goal: Discharge Needs Assessment  Outcome: Ongoing (interventions implemented as appropriate)    10/03/17 2112 10/04/17 0117   Current Health   Anticipated Changes Related to Illness --  inability to care for self   Discharge Needs Assessment   Current Discharge Risk --  chronically ill;lack of support system/caregiver;lives alone   Discharge Planning Comments --  pt lives alone in basement of house (renting upstairs to another family to supplement income) basement has 13 stairs   Self-Care   Equipment Currently Used at Home walker, rolling --          Problem: Fall Risk (Adult)  Goal: Identify Related Risk Factors and Signs and Symptoms  Outcome: Ongoing (interventions implemented as appropriate)    10/04/17 0117   Fall Risk   Fall Risk: Related Risk Factors age-related changes;culprit medication(s);fatigue/slow reaction;gait/mobility problems;history of falls;impaired vision;neuro disease/injury;sleep pattern alteration   Fall Risk: Signs and Symptoms presence of risk factors

## 2017-10-04 NOTE — PLAN OF CARE
Problem: Patient Care Overview (Adult)  Goal: Plan of Care Review  Outcome: Ongoing (interventions implemented as appropriate)    10/04/17 1507   Coping/Psychosocial Response Interventions   Plan Of Care Reviewed With patient   Patient Care Overview   Progress progress towards functional goals is fair         Problem: Fall Risk (Adult)  Goal: Identify Related Risk Factors and Signs and Symptoms  Outcome: Ongoing (interventions implemented as appropriate)    10/04/17 1507   Fall Risk   Fall Risk: Related Risk Factors age-related changes;culprit medication(s);gait/mobility problems;history of falls   Fall Risk: Signs and Symptoms presence of risk        10/04/17 1507   Fall Risk   Fall Risk: Related Risk Factors age-related changes;culprit medication(s);gait/mobility problems;history of falls   Fall Risk: Signs and Symptoms presence of risk factors   factors       Goal: Absence of Falls  Outcome: Ongoing (interventions implemented as appropriate)    10/04/17 1507   Fall Risk (Adult)   Absence of Falls making progress toward out       10/04/17 1507   Fall Risk (Adult)   Absence of Falls making progress toward outcome   come

## 2017-10-04 NOTE — PLAN OF CARE
Problem: Patient Care Overview (Adult)  Goal: Plan of Care Review  Outcome: Ongoing (interventions implemented as appropriate)    10/04/17 0900   Coping/Psychosocial Response Interventions   Plan Of Care Reviewed With patient   Patient Care Overview   Progress no change   Outcome Evaluation   Outcome Summary/Follow up Plan Per RN, pt has steristrips on his hand and the RN will apply xeroform and a dry dressing- and will change QD. Skin interventions in place and WOCN will s/o. Call with any needs or concerns.

## 2017-10-04 NOTE — THERAPY EVALUATION
Acute Care - Occupational Therapy Initial Evaluation  [facility]     Patient Name: Jhoan Gagnon  : 1940  MRN: [id number]  Today's Date: 10/4/2017  Onset of Illness/Injury or Date of Surgery Date: 10/03/17  Date of Referral to OT: 10/03/14  Referring Physician: YONG Vera    Admit Date: 10/3/2017       ICD-10-CM ICD-9-CM   1. Spell of altered consciousness R40.4 780.09   2. Right sided weakness R53.1 728.87   3. Syncope and collapse R55 780.2   4. Parkinson disease G20 332.0   5. DDD (degenerative disc disease), cervical M50.30 722.4   6. 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 Blunt trauma of neck, initial encounter S19.80XA 959.09   8. Impaired functional mobility, balance, gait, and endurance Z74.09 V49.89   9. Impaired mobility and ADLs Z74.09 799.89     Patient Active Problem List   Diagnosis   • Parkinson's disease   • Falls   • Weakness   • TIA (transient ischemic attack)   • Spell of altered consciousness   • Skin tear of hand without complication, left, sequela   • Sinus bradycardia   • Syncope and collapse   • First degree atrioventricular block     Past Medical History:   Diagnosis Date   • Broken arms    • Cancer     prostate   • Lower back injury    • Parkinson's disease      Past Surgical History:   Procedure Laterality Date   • CHOLECYSTECTOMY     • PROSTATE SURGERY     • REPLACEMENT TOTAL KNEE BILATERAL            OT ASSESSMENT FLOWSHEET (last 72 hours)      OT Evaluation       10/04/17 1442 10/04/17 1000 10/04/17 0851 10/03/17 2300 10/03/17 2249    Rehab Evaluation    Document Type evaluation  -AN  evaluation  -KM      Subjective Information no complaints;agree to therapy  -AN  agree to therapy;no complaints  -KM      Patient Effort, Rehab Treatment good  -AN  excellent  -KM      Symptoms Noted Comment pt stiff and sore  -AN        General Information    Patient Profile Review yes  -AN  yes  -KM      Onset of Illness/Injury or Date of Surgery Date 10/03/17  -AN   10/03/17  -KM      Referring Physician YONG Vera  -YONG Lopez  -MAT      General Observations Pt side lying L in bed  -AN        Pertinent History Of Current Problem 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 Imaging (-) for CVA.  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 Recent Parikinsons med adjustment, no hemmorhage or infarct per imaging.  -KM      Precautions/Limitations fall precautions  -AN  fall precautions  -KM      Prior Level of Function independent:;gait;transfer;bed mobility;ADL's;home management;driving   works on his farm  -AN  independent:;gait;transfer;bed mobility;ADL's;driving   works on his farm, states he has fallen in the past  -KM      Equipment Currently Used at Home grab bar;walker, rolling   use of RW at night if he gets up to go to bathroom  -AN  walker, rolling;grab bar   states he uses only at night  -KM      Plans/Goals Discussed With patient;agreed upon  -AN  patient;agreed upon  -KM      Risks Reviewed patient:;LOB;dizziness;increased discomfort;change in vital signs;nausea/vomiting  -AN  patient:;LOB  -KM      Benefits Reviewed patient:;improve function;increase independence;increase strength;increase balance  -AN  patient:;improve function  -KM      Barriers to Rehab --   Pt wih Parkinsons  -AN  none identified  -KM      Living Environment    Lives With alone  -AN  alone   supportive family and friends  -KM      Living Arrangements house  -AN  house;other (see comments)   lives in basement, rents upstairs for income  -KM  other (see comments);house   in basement, rents primary residence  -    Home Accessibility stairs (2 railings present)  -AN  stairs (2 railings present)  -KM  stairs (2 railings present)   13 stairs to basement  -    Stair Railings at Home   inside, present at both sides  -KM  inside, present on left side;inside, present on right side  -    Type  of Financial/Environmental Concern     none  -    Transportation Available     car;family or friend will provide  -    Living Environment Comment Pt lives in basement of his house and rents out the upstairs. Pt reports he has friends and family in [place] that can assist him.  -AN        Clinical Impression    Date of Referral to OT 10/03/14  -AN        OT Diagnosis Decreased ADL I  -AN        Impairments Found (describe specific impairments) gait, locomotion, and balance;muscle performance  -AN        Patient/Family Goals Statement Agreeable to OT wants to go home  -AN        Criteria for Skilled Therapeutic Interventions Met yes  -AN        Rehab Potential good, to achieve stated therapy goals  -AN        Therapy Frequency daily  -AN        Anticipated Equipment Needs At Discharge tub bench  -AN        Anticipated Discharge Disposition home with 24/7 care;home with home health  -AN        Functional Level Prior    Ambulation     0-->independent  -    Transferring     0-->independent  -    Toileting     0-->independent  -    Bathing     0-->independent  -    Dressing     0-->independent  -    Eating     0-->independent  -    Communication     0-->understands/communicates without difficulty  -    Swallowing     0-->swallows foods/liquids without difficulty  -    Prior Functional Level Comment     baseline  -    Vital Signs    Pre Systolic BP Rehab --   vitals stable  -AN  --   vitals stable per monitor  -KM      Pain Assessment    Pain Assessment 0-10  -AN  0-10  -KM      Pain Score 3  -AN  3  -KM      Post Pain Score 3  -AN  3  -KM      Pain Type Acute pain  -AN  Acute pain  -KM      Pain Location Shoulder  -AN  Knee  -KM      Pain Orientation Right  -AN  Right  -KM      Pain Intervention(s) Repositioned  -AN  Repositioned;Ambulation/increased activity  -KM      Response to Interventions tolerated  -AN  --   tolerated  -KM      Vision Assessment/Intervention    Visual Impairment WFL  -AN         Cognitive Assessment/Intervention    Current Cognitive/Communication Assessment functional  -AN  functional  -KM      Orientation Status oriented x 4  -AN  oriented x 4  -KM      Follows Commands/Answers Questions 100% of the time  -AN  able to follow single-step instructions;100% of the time  -KM      Personal Safety WNL/WFL  -AN  WNL/WFL  -KM      Personal Safety Interventions fall prevention program maintained;gait belt;muscle strengthening facilitated  -AN        ROM (Range of Motion)    General ROM no range of motion deficits identified  -AN  no range of motion deficits identified  -KM      MMT (Manual Muscle Testing)    General MMT Assessment   no strength deficits identified  -KM      General MMT Assessment Detail pt with R shoulder pain, changes in MMT this date  -AN        Muscle Tone Assessment    Muscle Tone Assessment  Bilateral Upper Extremities;Bilateral Lower Extremities  -DG  Bilateral Upper Extremities;Bilateral Lower Extremities  -     Bilateral Upper Extremities Muscle Tone Assessment  not tested  -DG       Bilateral Lower Extremities Muscle Tone Assessment  --   proper strength  -DG       Bed Mobility, Assessment/Treatment    Bed Mobility, Assistive Device head of bed elevated  -AN        Bed Mob, Supine to Sit, [place] contact guard assist  -AN        Bed Mob, Sit to Supine, [place] minimum assist (75% patient effort)  -AN        Bed Mobility, Comment   --   sitting on eob  -KM      Transfer Assessment/Treatment    Transfers, Sit-Stand [place] contact guard assist  -AN  contact guard assist  -KM      Transfers, Stand-Sit [place] contact guard assist  -AN  contact guard assist  -KM      Transfers, Sit-Stand-Sit, Assist Device   rolling walker  -KM      Toilet Transfer, [place] contact guard assist  -AN        Toilet Transfer, Assistive Device --   grab bars  -AN        Functional Mobility    Functional Mobility- Ind. Level contact guard assist  -AN        Functional  Mobility-Distance (Feet) 20  -AN        Functional Mobility- Safety Issues sequencing ability decreased;step length decreased  -AN        ADL Assessment/Intervention    Additional Documentation Self-Feeding Assessment/Training (Group)  -AN        Lower Body Dressing Assessment/Training    LB Dressing Assess/Train, Clothing Type doffing:;donning:   undergarments  -AN        LB Dressing Assess/Train, Position sitting  -AN        LB Dressing Assess/Train, [place] moderate assist (50% patient effort)  -AN        Toileting Assessment/Training    Toileting Assess/Train, Assistive Device grab bars  -AN        Toileting Assess/Train, Position sitting  -AN        Toileting Assess/Train, Indepen Level supervision required  -AN        Grooming Assessment/Training    Grooming Assess/Train, Position standing;sink side  -AN        Grooming Assess/Train, Indepen Level contact guard assist  -AN        Grooming Assess/Train, Comment wash hands  -AN        Self-Feeding Assessment/Training    Self-Feeding Assess/Train, Comment provided weighted utensils for feeding. Pt reports tremor in R hand, eats with L  -AN        Motor Skills/Interventions    Additional Documentation Balance Skills Training (Group);Fine Motor Coordination Training (Group);Gross Motor Coordination Training (Group)  -AN        Balance Skills Training    Sitting-Level of Assistance Independent  -AN  Independent  -KM      Sitting-Balance Support Feet supported  -AN  Feet supported  -KM      Standing-Level of Assistance Contact guard  -AN  Close supervision  -KM      Static Standing Balance Support   assistive device  -KM      Therapy Exercises    Bilateral Lower Extremities   AROM:;10 reps;sitting;hip flexion;LAQ;ankle pumps/circles  -KM      Gross Motor Coordination Training    Gross Motor Skill, Impairments Detail impaired with R UE tremor  -AN        Fine Motor Coordination Training    Detail (Fine Motor Coordination Training) R impaired  -AN         Positioning and Restraints    Pre-Treatment Position in bed  -AN  in bed  -KM      Post Treatment Position bed  -AN  bed  -KM      In Bed side lying right;call light within reach;encouraged to call for assist;exit alarm on  -AN  sitting EOB;call light within reach;encouraged to call for assist  -KM        10/03/17 2112 10/03/17 2110             General Information    Equipment Currently Used at Home walker, rolling  -HUNG        Living Environment    Lives With alone   pt resides in [facility]  -HUNG        Living Arrangements house  -HUNG        Type of Financial/Environmental Concern none  -HUNG        Transportation Available car;family or friend will provide  -HUNG        Functional Level Prior    Ambulation  0-->independent   occasionally uses walker at night  -HUNG       Transferring  0-->independent  -HUNG       Toileting  0-->independent  -HUNG       Bathing  0-->independent  -HUNG       Dressing  0-->independent  -HUNG       Eating  0-->independent  -HUNG       Communication  0-->understands/communicates without difficulty  -HUNG       Swallowing  0-->swallows foods/liquids without difficulty  -HUNG         User Key  (r) = Recorded By, (t) = Taken By, (c) = Cosigned By    Initials Name Effective Dates    MAT Oliviereen AJIT Barnett, PT 06/19/15 -     AN Trang Hurtado, OT 06/22/15 -     HUNG Judy Rodriguez 05/02/16 -     CHEPE Whitley RN 06/16/16 -     RACHAEL Colón RN 10/17/16 -            Occupational Therapy Education     Title: PT OT SLP Therapies (Active)     Topic: Occupational Therapy (Active)     Point: ADL training (Active)    Description: Instruct learner(s) on proper safety adaptation and remediation techniques during self care or transfers.   Instruct in proper use of assistive devices.    Learning Progress Summary    Learner Readiness Method Response Comment Documented by Status   Patient Acceptance E NR Educated pt on current deficits and needs for assist. AN 10/04/17 8134 Active                      User  Key     Initials Effective Dates Name Provider Type Discipline    AN 06/22/15 -  Trang Hurtado OT Occupational Therapist OT                  OT Recommendation and Plan  Anticipated Equipment Needs At Discharge: tub bench  Anticipated Discharge Disposition: home with 24/7 care, home with home health  Therapy Frequency: daily  Plan of Care Review  Plan Of Care Reviewed With: patient  Outcome Summary/Follow up Plan: Pt reports he is very sore. Pt needs CGA for all mobility and mod for LB ADL's. Continue OT to address deficits. Pt  will need 24/7 supv at this time.          OT Goals       10/04/17 1553          Transfer Training OT LTG    Transfer Training OT LTG, Date Established 10/04/17  -AN      Transfer Training OT LTG, Time to Achieve 1 wk  -AN      Transfer Training OT LTG, Activity Type all transfers  -AN      Transfer Training OT LTG, [place] Level set up required  -AN      Dynamic Standing Balance OT LTG    Dynamic Standing Balance OT LTG, Date Established 10/04/17  -AN      Dynamic Standing Balance OT LTG, Time to Achieve 1 wk  -AN      Dynamic Standing Balance OT LTG, [place] Level supervision required  -AN      Dynamic Standing Balance OT LTG, Assist Device assistive Device  -AN      Bathing OT LTG    Bathing Goal OT LTG, Date Established 10/04/17  -AN      Bathing Goal OT LTG, Time to Achieve 1 wk  -AN      Bathing Goal OT LTG, Activity Type simulates;upper body bathing;lower body bathing  -AN      Bathing Goal OT LTG, [place] Level set up required  -AN      LB Dressing OT LTG    LB Dressing Goal OT LTG, Date Established 10/04/17  -AN      LB Dressing Goal OT LTG, Time to Achieve 1 wk  -AN      LB Dressing Goal OT LTG, [place] Level set up required  -AN        User Key  (r) = Recorded By, (t) = Taken By, (c) = Cosigned By    Initials Name Provider Type    AN Trang Hurtado, OT Occupational Therapist                Outcome Measures       10/04/17 1441 10/04/17 0851       How much help  from another person do you currently need...    Turning from your back to your side while in flat bed without using bedrails?  4  -KM     Moving from lying on back to sitting on the side of a flat bed without bedrails?  4  -KM     Moving to and from a bed to a chair (including a wheelchair)?  3  -KM     Standing up from a chair using your arms (e.g., wheelchair, bedside chair)?  3  -KM     Climbing 3-5 steps with a railing?  3  -KM     To walk in hospital room?  3  -KM     AM-PAC 6 Clicks Score  20  -KM     How much help from another is currently needed...    Putting on and taking off regular lower body clothing? 2  -AN      Bathing (including washing, rinsing, and drying) 2  -AN      Toileting (which includes using toilet bed pan or urinal) 3  -AN      Putting on and taking off regular upper body clothing 3  -AN      Taking care of personal grooming (such as brushing teeth) 3  -AN      Eating meals 4  -AN      Score 17  -AN      Functional Assessment    Outcome Measure Options AM-PAC 6 Clicks Daily Activity (OT)  -AN AM-PAC 6 Clicks Basic Mobility (PT)  -KM       User Key  (r) = Recorded By, (t) = Taken By, (c) = Cosigned By    Initials Name Provider Type    MAT Barnett, PT Physical Therapist    ALEX Hurtado OT Occupational Therapist          Time Calculation:   OT Start Time: 1441    Therapy Charges for Today     Code Description Service Date Service Provider Modifiers Qty    [id number]  OT SELFCARE CURRENT 10/4/2017 AKIN Castellanos  1    [id number]  OT SELFCARE PROJECTED 10/4/2017 AKIN Castellanos  1    [id number]  OT EVAL LOW COMPLEXITY 4 10/4/2017 Trang Hurtado OT GO 1          OT G-codes  OT Functional Scales Options: AM-PAC 6 Clicks Daily Activity (OT)  Score: 17  Functional Limitation: Self care  Self Care Current Status (): At least 40 percent but less than 60 percent impaired, limited or restricted  Self Care Goal Status (): At least 20 percent but less  than 40 percent impaired, limited or restricted    Trang Hurtado, OT  10/4/2017

## 2017-10-04 NOTE — H&P
[facility] Medicine Services  HISTORY AND PHYSICAL    Primary Care Physician: Dr. Wilmer Andres    Subjective     Chief Complaint: Falls/Weakness    History of Present Illness:   Patient is a 77-year-old male presents [facility] emergency department after a fall at home.  Patient reports that he fell out of his truck after becoming nauseated.  Patient has had some generalized weakness and was initially called in as a code 19 due to slurred speech and right sided weakness on EMS arrival.  Patient denies any injury after his fall. Patient was diagnosed about a year ago with Parkinson's disease and followed with Dr. Morse initially.  According to the patient Dr. Andres (PCP) has been managing his Parkinson's medications.  Patient reports that he had a similar episode one week ago after his medications had been increased.  While in the emergency department patient was given 324 mg aspirin.  Patient also had a CTA head and neck, CT perfusion, and MRI brain.  Patient will be admitted to the hospital medicine service for further evaluation and treatment.    Review of Systems   Constitutional: Positive for diaphoresis. Negative for chills and fever.   Respiratory: Negative for shortness of breath and wheezing.    Cardiovascular: Negative for chest pain and palpitations.   Gastrointestinal: Positive for nausea. Negative for abdominal pain.   Genitourinary: Negative for dysuria, frequency and urgency.   Musculoskeletal: Negative for arthralgias and myalgias.   Skin: Negative for color change, pallor, rash and wound.   Neurological: Positive for dizziness, tremors, weakness and light-headedness. Negative for seizures and headaches.   Psychiatric/Behavioral: Negative for agitation and confusion.   All other systems reviewed and are negative.     Otherwise complete 10 system ROS performed and negative except as mentioned in the HPI.    Past Medical History:   Diagnosis Date   • Cancer   "      No past surgical history on file.    Family History   Problem Relation Age of Onset   • Heart disease Mother    • Cancer Father    • Stroke Father        Social History     Social History   • Marital status:      Spouse name: N/A   • Number of children: N/A   • Years of education: N/A     Occupational History   • Not on file.     Social History Main Topics   • Smoking status: Never Smoker   • Smokeless tobacco: Never Used   • Alcohol use No   • Drug use: No   • Sexual activity: Not on file     Other Topics Concern   • Not on file     Social History Narrative   • No narrative on file       Medications:  PT DOES NOT KNOW THE NAMES OF HIS MEDS.     Allergies:  No Known Allergies      Objective     Physical Exam:  Vital Signs: /81  Pulse 58  Temp 98 °F (36.7 °C) (Oral)   Resp 16  Ht 68\" (172.7 cm)  Wt 205 lb (93 kg)  SpO2 90%  BMI 31.17 kg/m2  Physical Exam   Constitutional: He appears well-developed and well-nourished.   HENT:   Head: Normocephalic and atraumatic.   Eyes: EOM are normal. Pupils are equal, round, and reactive to light. No scleral icterus.   Neck: Normal range of motion. Neck supple.   Cardiovascular: Normal rate, regular rhythm, normal heart sounds and intact distal pulses.  Exam reveals no gallop and no friction rub.    No murmur heard.  Pulmonary/Chest: Effort normal and breath sounds normal. No respiratory distress. He has no wheezes. He has no rales. He exhibits no tenderness.   Abdominal: Soft. Bowel sounds are normal. He exhibits no distension. There is no tenderness. There is no rebound and no guarding.   Musculoskeletal: Normal range of motion. He exhibits no edema, tenderness or deformity.   Neurological: He is alert. He displays tremor.   Skin: Skin is warm and dry. No rash noted. No erythema. No pallor.   Psychiatric: He has a normal mood and affect. His behavior is normal. Judgment and thought content normal.   Nursing note and vitals reviewed.    Results " Reviewed:    Results from last 7 days  Lab Units 10/03/17  1612   WBC 10*3/mm3 6.32   HEMOGLOBIN g/dL 14.6   PLATELETS 10*3/mm3 187       Results from last 7 days  Lab Units 10/03/17  1605   CREATININE mg/dL 1.10     Imaging Results (last 24 hours)     Procedure Component Value Units Date/Time    CT Head Without Contrast Stroke Protocol [[id number]] Updated:  10/03/17 1601    CT Cerebral Perfusion With & Without Contrast [[id number]] Updated:  10/03/17 1618    CT Angiogram Head With & Without Contrast [[id number]] Updated:  10/03/17 1618    CT Angiogram Neck With & Without Contrast [[id number]] Updated:  10/03/17 1618    CT Cervical Spine Without Contrast [[id number]] Collected:  10/03/17 1619     Updated:  10/03/17 1619    Narrative:       EXAMINATION: CT CERVICAL SPINE WO CONTRAST-10/03/2017:      INDICATION: Code 19, fall.         TECHNIQUE: Spiral acquisition 2 mm axial images through the cervical  spine with sagittal and coronal 2 mm 2-D reconstructions.     The radiation dose reduction device was turned on for each scan per the  ALARA (As Low as Reasonably Achievable) protocol.     COMPARISON: NONE.     FINDINGS: Cervical vertebral body heights are well maintained. Alignment  appears grossly normal on the sagittal imaging series. There is advanced  C4-5, C5-6 and C6-7 degenerative disc disease. Prevertebral soft tissues  appear normal. Posterior elements appear intact. Coronal images show no  evidence of acute trauma. Axial bone window images show no evidence of  skull base fracture. The dens and ring of C1 appear normally aligned. No  vertebral body fracture or posterior element fracture is seen on the  axial series. Soft tissue axial images show no evidence of paraspinous  hematoma and no gross evidence of cervical spinal stenosis.       Impression:       Cervical spine degenerative disc disease but no evidence of  acute cervical spine trauma and no gross evidence of cervical spine  stenosis.     D:  10/03/2017  E:   10/03/2017             XR Chest 1 View [[id number]] Updated:  10/03/17 1656    CT Thoracic Spine Without Contrast [[id number]] Updated:  10/03/17 1735    MRI Brain Without Contrast [[id number]]  (Abnormal) Collected:  10/03/17 1656     Updated:  10/03/17 1957    Narrative:       EXAM:    MR Head Without Intravenous Contrast    CLINICAL HISTORY:    77 years old, male; Signs and symptoms; Weakness, extremity; Right; Patient   HX: Neurologic problem. His time at last known normal was today at 1449. 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   He called a neighbor for help who reported normal mentation at the time. Upon   ems arrival the patient was on the ground. He was disoriented and confused with   significant slurred speech and right sided weakness per ems. There was evidence   of prior emesis on scene. Upon arrival in CT, ems report that his weakness had   improved. It is apparent that the patient recently started new medication for   parkinson's management. He sees dr. Holley, neurology. He is tremulous at   baseline. Patient says he did fall and hit head when he had his syncopal   episode, face is red around left orbit no HX of surgery to head HX of prostate   cancer; Additional info: CVA eval, transient right weakness    TECHNIQUE:    Magnetic resonance images of the head/brain without intravenous contrast in   multiple planes.    COMPARISON:    CT CEREBRAL PERFUSION W WO CONTRAST 10/3/2017 4:13:44 PM    FINDINGS:    A few nonspecific foci of T2 prolongation within the subcortical and   periventricular white matter without evidence of restricted diffusion in the   supra-or infratentorial brain. Remainder of the brain parenchyma demonstrates   normal signal intensity without an intra- or extra-axial mass or abnormality.   No mass effect or midline shift.       Midline brain structures including the corpus callosum, pituitary gland,   pineal region, and craniovertebral  junction are unremarkable. Ventricles and   sulci are mildly prominent without evidence of hydrocephalus. Intracranial   vessels demonstrate a normal flow-void.  Mild bilateral mastoid effusion.      Impression:         Mild chronic microvascular ischemic changes otherwise unremarkable study   without a supra-, infratentorial mass or abnormality; no evidence of   hemorrhagic or ischemic infarct and no hydrocephalus.      THIS DOCUMENT HAS BEEN ELECTRONICALLY SIGNED BY LORNA HUITRON MD        I have personally reviewed and interpreted available lab data, radiology studies and ECG obtained at time of admission.     Assessment / Plan     Problem List:   Hospital Problem List     * (Principal)Syncope and collapse    First degree atrioventricular block    Parkinson's disease    Falls    Weakness    TIA (transient ischemic attack)    Spell of altered consciousness    Skin tear of hand without complication, left, sequela    Sinus bradycardia          Assessment:  -SYNCOPE WITH COLLAPSE  - Parkinson's Disease  - TIA  -FREQUENT FALLS  -SINUS BRADYCARDIA   -FIRST DEGREE AV BLOCK     Plan:  - Admit to telemety  - VS q4h  - Consult to neurology   - Parkinson's vs TIA  - Imaging as above  - Neuro checks  - NIHSS  - ECHO tomorrow  - PT/OT eval  - Case Management   - Discharge planning need  - Will need outpatient follow up with neurology  - AM Labs  - NPO   - May have cardiac diet if passes dysphagia  - Continue home medications as appropriate   - [facility] CLOSED SO WE WILL GET MED LIST IN THE AM.   - CONSULT NEUROLOGY AND CARDIOLOGY TO ASSIST IN EVALUATION OF SYNCOPAL EPISODE   - CONSIDER EEG     DVT prophylaxis: TEDs/SCDs  Code Status: DNI AND NO CPR   Admission Status: Patient will be admitted to INPATIENT status due to the need for care which can only be reasonably provided in an hospital setting such as aggressive/expedited ancillary services and/or consultation services, the necessity for IV medications, close physician  "monitoring and/or the possible need for procedures.  In such, I feel patient’s risk for adverse outcomes and need for care warrant INPATIENT evaluation and predict the patient’s care encounter to likely last beyond 2 midnights.       Margarita LIZ Vera, YONG 10/03/17 8:51 PM      Brief Attending Note       I have seen and examined the patient, performing an independent face-to-face diagnostic evaluation with plan of care reviewed and developed with the advanced practice clinician (APC).      Brief Summary Statement/HPI:     Jhoan Gagnon is a 77 y.o. male with h/o PD (dx in the past year) and remote h/o Prostate Cancer. Pt is  and lives alone in his basement. And rents the upper level of his home. 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 He had just eaten before leaving his house. 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 And noted he became suddenly nauseated and and very diaphoretic. States he pulled his truck over and tried to call the owner of the store and was not able to do so. He had opened the door of his truck to \"get some fresh air to help relieve his nausea\". Pt then woke up on the ground, found himself laying on the ground, laying under his truck. States he did not have Chest pain, no SOA, no abd pain. Only c/o nausea and dizziness. Some pain in his neck and left forehead after fall. Pt denies focal weakness or numbness. States when he woke up he \"hurt all over\". Was lying under his truck and his teeth were several feet away. He had also vomited.     Had similar episode 1 week ago. After PD meds were increased. BECAME VERY NAUSEATED AND HAD NEAR SYNCOPE AT THAT TIME.     After transport to the floor he developed sudden onset of nausea, but denied dizziness. Glucose 110. EKG repeated. This was not assoc with any medication.     PT WAS ADMITTED AS POSSIBLE TIA AND SYNCOPE WORK UP.       The " patient's allergies, problem list, current medications, family history, past medical history, past surgical history and social history have been reviewed.     Attending Physical Exam:  Vitals reviewed-See APC noted  Gen-no acute distress   HEENT-atraumatic, normocephalic, PERRLA, EOMI, moist mucous membranes present  Neck-No Bruit, no thyroidomegaly. Decreased ROM with extension   CV-RRR, S1 S2 normal, No Murmur, No Gallop  Resp-CTAB, no wheezes or rales; decreased BS.   Abd-soft, NT, ND, +BS; no rebound or guarding  Ext-no edema or clubbing, pedal pulses intact, decreased ROM of the left knee and tender over patella. Tenderness of the left 3rd PIP joint.   Skin-significant sun damage. Skin tear on the dorsum of left hand. abrasion to the left frontal region over brow. And superficial abrasion to the left check.   Neuro-A&Ox3, equal strength in upper and lower extremities; no focal deficits, CN II-XII grossly intact. Pill rolling tremor noted   Psych-appropriate behavior, somewhat depressed, became tearful talking about his wife.       Brief Assessment/Plan :  See above for further detailed assessment and plan developed with APC which I have reviewed and/or edited.       Aletha Lundberg DO  10/03/17  10:28 PM

## 2017-10-04 NOTE — THERAPY EVALUATION
Acute Care - Speech Language Pathology Initial Evaluation  [facility]     Patient Name: Jhoan Gagnon  : 1940  MRN: [id number]  Today's Date: 10/4/2017  Onset of Illness/Injury or Date of Surgery Date: 10/03/17            Admit Date: 10/3/2017     Visit Dx:    ICD-10-CM ICD-9-CM   1. Spell of altered consciousness R40.4 780.09   2. Right sided weakness R53.1 728.87   3. Syncope and collapse R55 780.2   4. Parkinson disease G20 332.0   5. DDD (degenerative disc disease), cervical M50.30 722.4   6. 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 Blunt trauma of neck, initial encounter S19.80XA 959.09   8. Impaired functional mobility, balance, gait, and endurance Z74.09 V49.89   9. Impaired mobility and ADLs Z74.09 799.89     Patient Active Problem List   Diagnosis   • Parkinson's disease   • Falls   • Weakness   • TIA (transient ischemic attack)   • Spell of altered consciousness   • Skin tear of hand without complication, left, sequela   • Sinus bradycardia   • Syncope and collapse   • First degree atrioventricular block     Past Medical History:   Diagnosis Date   • Broken arms    • Cancer     prostate   • Lower back injury    • Parkinson's disease      Past Surgical History:   Procedure Laterality Date   • CHOLECYSTECTOMY     • PROSTATE SURGERY     • REPLACEMENT TOTAL KNEE BILATERAL            SLP EVALUATION (last 72 hours)      SLP Evaluation       10/04/17 1545                Rehab Evaluation    Document Type (P)  evaluation  -AS        Subjective Information (P)  no complaints;agree to therapy  -AS        General Information    Patient Profile Review (P)  yes  -AS        Onset of Illness/Injury (P)  10/03/17  -AS        Subjective Patient Observations (P)  Pt alert and cooperative  -AS        Pertinent History Of Current Problem (P)  admit w/ spell of altered consciousness, Parkinson's disease   -AS        Current Diet Limitations (P)  no diet restrictions  -AS         Precautions/Limitations, Vision (P)  WFL;other (see comments)   for puposes of eval   -AS        Precautions/Limitations, Hearing (P)  WFL;other (see comments)   for purposes of eval   -AS        Prior Level of Function- Communication (P)  functional in all spheres;other (comment)   for purposes of eval   -AS        Prior Level of Function- Swallowing (P)  no diet consistency restrictions  -AS        Plans/Goals Discussed With (P)  patient;agreed upon  -AS        Barriers to Rehab (P)  none identified  -AS        Living Environment    Lives With (P)  alone  -AS        Living Arrangements (P)  house  -AS        Clinical Impression    SLP Diagnosis (P)  Communication/cognitive eval complete. Pt alert, cooperative, though in a lot of pain. Functional receptive/expressive language skills. Functional memory, attention, reading, writing, and orientation skills. Mild problem solving deficits noted. Suspect likely to difficulty concentrating 2' pain level. SLP provided education on cognitive therapy focusing on problem solving skills and pt agreed. SLP to f/u w/ cognitive tx.   -AS        Functional Level At Time Of Evaluation (P)  impaired  -AS        Patient's Goals For Discharge (P)  return home  -AS        Criteria for Skilled Therapeutic Interventions Met (P)  skilled criteria for cognitive linguistic intervention met  -AS        Rehab Potential (P)  good, to achieve stated therapy goals  -AS        Therapy Frequency (P)  5 times/wk  -AS        Predicted Duration of Therapy Intervention (days/wks) (P)  until discharge  -AS        Anticipated Discharge Disposition (P)  other (see comments)   unknown at this time  -AS        Pain Assessment    Pain Assessment (P)  Jiménez-Perez FACES  -AS        Jiménez-Baker FACES Pain Rating (P)  6  -AS        Pain Score (P)  6  -AS        Post Pain Score (P)  6  -AS        Pain Type (P)  Acute pain  -AS        Pain Location (P)  Shoulder  -AS        Pain Intervention(s) (P)  Repositioned   -AS        Cognitive Assessment/Intervention    Current Cognitive/Communication Assessment (P)  functional  -AS        Orientation Status (P)  oriented x 4  -AS        Follows Commands/Answers Questions (P)  100% of the time;able to follow multi-step instructions  -AS        Short/Long Term Memory (P)  intact short term memory;intact long term memory  -AS        Additional Documentation (P)  Cognitive Assessment Intervention (Group)  -AS        Cognitive Assessment Intervention    Behavior/Mood Observations (P)  behavior appropriate to situation, WNL/WFL  -AS        Attention (P)  WNL/WFL  -AS        Pragmatics (P)  WNL/WFL  -AS        Problem Solving (P)  mild impairment  -AS        Problem Solving Interventions (P)  Pt presents w/ difficulty completing complex problem solving tasks.   -AS        Executive Function Skills (P)  WNL/WFL  -AS        Reasoning (P)  WNL/WFL  -AS        Sequencing (P)  WNL/WFL  -AS        Organization (P)  WNL/WFL  -AS        Diffuse Language Characteristics (P)  no concerns, diffuse language characteristics  -AS        Communication Assessment/Intervention    Additional Documentation (P)  Auditory Comprehen Assess/Intervention (Group);Reading Assessment/Intervention (Group);Verbal Expression Assess/Intervention (Group);Writing Assessment/Intervention (Group);Motor Speech Assessment/Intervention (Group)  -AS        Auditory Comprehen Assess/Intervention    Auditory Comprehension (P)  WNL/WFL  -AS        Auditory Comprehen Assess/Intervention    Able to Identify Objects (P)  WNL/WFL;successful, field of 2  -AS        Answers Yes/No Questions (P)  WNL/WFL;successful, complex questions  -AS        Able to Follow Commands (P)  WNL/WFL;success, 4 or more step commands  -AS        Verbal Expression Assess/Intervention    Automatic Speech (P)  WNL/WFL;successful, spontaneous greeting  -AS        Speech Repetition (P)  WNL/WFL;successful, sentence  -AS        Speech Fluency (P)  fluent speech   -AS        Conversational Speech (P)  WNL/WFL;successful, sentence  -AS        Initiation of Phonation (P)  WNL/WFL  -AS        Reading Assessment/Intervention    Reading Skills (P)  WNL/WFL  -AS        Writing Assessment/Intervention    Writing Skills (P)  WNL/WFL;other (see comments)   difficult to read 2' tremor; however, content appropriate  -AS        Writing Skills Comment (P)  Pt able to formulate appropriate sentence   -AS        Motor Speech Assess/Intervention    Motor Speech-Apraxia Observations (P)  no concerns  -AS        Motor Speech- Apraxia (P)  WNL/WFL  -AS        Motor Speech-Dysarthria Observations (P)  no concerns  -AS        Communication Treatment Objective and Progress    Cognitive Linguistic Treatment Objectives (P)  Improve functional problem solving  -AS        Improve functional problem solving    Improve functional problem solving through: (P)  answer questions about ADL problems;determine solutions to complex problems;complete functional math task;80%;without cues  -AS        Status: Improve functional problem solving (P)  New  -AS        Functional Problem Solving Progress (P)  continue to address  -AS          User Key  (r) = Recorded By, (t) = Taken By, (c) = Cosigned By    Initials Name Effective Dates    AS Paige Bolden Speech Therapy Student 08/24/17 -            EDUCATION  The patient has been educated in the following areas:   Cognitive Impairment Communication Impairment.    SLP Recommendation and Plan  SLP Diagnosis: (P) Communication/cognitive eval complete. Pt alert, cooperative, though in a lot of pain. Functional receptive/expressive language skills. Functional memory, attention, reading, writing, and orientation skills. Mild problem solving deficits noted. Suspect likely to difficulty concentrating 2' pain level. SLP provided education on cognitive therapy focusing on problem solving skills and pt agreed. SLP to f/u w/ cognitive tx.      Rehab Potential: (P) good, to  achieve stated therapy goals  Criteria for Skilled Therapeutic Interventions Met: (P) skilled criteria for cognitive linguistic intervention met  Anticipated Discharge Disposition: (P) other (see comments) (unknown at this time)     Therapy Frequency: (P) 5 times/wk  Predicted Duration of Therapy Intervention (days/wks): (P) until discharge    Plan of Care Review  Plan Of Care Reviewed With: (P) patient  Progress: (P)  (eval)  Outcome Summary/Follow up Plan: (P) Communication/cognitive eval complete. Pt alert, cooperative, though in a lot of pain. Functional receptive/expressive language skills. Functional memory, attention, reading, writing, and orientation skills. Mild problem solving deficits noted. Suspect likely to difficulty concentrating 2' pain level. SLP provided education on cognitive therapy focusing on problem solving skills and pt agreed. SLP to f/u w/ cognitive tx.           IP SLP Goals       10/04/17 1635          Cognitive Linguistic- Optimal Participation in Care    Cognitive Linguistic Optimal Participation in Care- SLP, Date Established (P)  10/04/17  -AS      Cognitive Linguistic Optimal Participation in Care- SLP, Time to Achieve (P)  by discharge  -AS      Cognitive Linguistic Optimal Participation in Care- SLP, Date Goal Reviewed (P)  10/04/17  -AS        User Key  (r) = Recorded By, (t) = Taken By, (c) = Cosigned By    Initials Name Provider Type    AS Paige Bolden Speech Therapy Student Speech Therapy Student              Time Calculation:         Time Calculation- SLP       10/04/17 1639          Time Calculation- SLP    SLP Start Time (P)  1545  -AS      SLP Received On (P)  10/04/17  -AS        User Key  (r) = Recorded By, (t) = Taken By, (c) = Cosigned By    Initials Name Provider Type    AS Paige Bolden Speech Therapy Student Speech Therapy Student          Therapy Charges for Today     Code Description Service Date Service Provider Modifiers Qty    [id number]  ST EVAL SPEECH AND  PROD W LANG  4 10/4/2017 Paige Bolden, Speech Therapy Student GN 1                     Paige Bolden, Speech Therapy Student  10/4/2017

## 2017-10-04 NOTE — CONSULTS
Neurology    Referring Provider: YONG Ulloa    Reason for Consultation: syncope      Chief complaint: syncope    Subjective .     History of present illness:  Mr. Gagnon is a pleasant 77-year-old male with a past medical history significant for Parkinson's disease who is admitted to the hospitalist service for an episode of syncope.  He reports that he had just finished eating lunch and was driving when he suddenly felt very nauseated as if you're going to vomit.  He also became very diaphoretic and felt as if he may pass out.  He stopped his truck and open his door to try to get some air when he lost consciousness.  Unsure of the exact duration but he reports when he came to he was underneath his truck and that he had vomited.  This has happened one other previous time, again after eating lunch, when he was working outside and leaned over his equipment and vomited but unclear if he lost consciousness at that time.  Of note he does have a history of Parkinson's disease and has previously been followed by Dr. Coffman and treated with ropinirole.  He states that this has not seemed to help him subjectively.  He denies ever being on Sinemet.  Currently he feels back to baseline.    Review of Systems: Positive for nausea and syncope.Otherwise complete review of systems was discussed with the patient and found to be negative except for that mentioned in history of present illness or in the initial H&P dated 10/03/2017    History  Past Medical History:   Diagnosis Date   • Broken arms    • Cancer     prostate   • Lower back injury    • Parkinson's disease    ,   Past Surgical History:   Procedure Laterality Date   • CHOLECYSTECTOMY     • PROSTATE SURGERY     • REPLACEMENT TOTAL KNEE BILATERAL     ,   Family History   Problem Relation Age of Onset   • Heart disease Mother    • Cancer Father    • Stroke Father    • Stroke Maternal Grandmother    • Cancer Paternal Grandfather    ,   Social History   Substance  "Use Topics   • Smoking status: Never Smoker   • Smokeless tobacco: Never Used   • Alcohol use No   ,   Prescriptions Prior to Admission   Medication Sig Dispense Refill Last Dose   • rOPINIRole (REQUIP) 0.5 MG tablet Take 0.5 mg by mouth 3 (Three) Times a Day. Take 1 hour before bedtime.   10/3/2017 at Unknown time    and Allergies:  Review of patient's allergies indicates no known allergies.    Objective     Vital Signs   Blood pressure 117/65, pulse 65, temperature 98 °F (36.7 °C), temperature source Oral, resp. rate 16, height 68\" (172.7 cm), weight 204 lb 8 oz (92.8 kg), SpO2 95 %.    Physical Exam:      Gen: Sitting on edge of bed with eyes open. In NAD. Appears stated age   Eyes: PERRL, conjuntivae/lids normal   ENT: External canals normal bilaterally. Oropharynx normal.    Neck: Supple. No thyroid enlargement noted   Respiratory: CTA bilaterally. Respirations unlabored   CV: RRR, S1 and S2 nml. Radial pulses 2+ bilaterally.    Skin: No rashes noted on exposed skin. Normal tugor.   MSK: Normal bulk and tone. Nml ROM     Neurologic:   Mental status: Awake, alert, oriented x4. Follows commands. Speech fluent.  Hypomimia noted    CN: PERRL, EOM intact, sensation intact in upper/mid/lower face bilaterally, facial movements symmetric, hearing intact to finger rub bilaterally, palate elevates symmetrically, tongue movements and SCMs strong bilaterally    Motor: Strength full (5/5) throughout in BUE and BLE    Reflexes: 2+ in the upper extremities bilaterally.  1+ patellar reflexes bilaterally    Sensory: Intact to LT throughout   Coordination: Resting tremor noted in the right hand with mild cogwheeling at the right wrist   Gait: Not tested        Results Reviewed:     Labs reviewed  MRI brain personally reviewed.  No acute process seen.  Agree with the report  CT perfusion report reviewed  CTA head and neck reports reviewed  TTE report reviewed  EKG report reviewed                 Assessment/Plan     1.  Syncope = " based on the history, suspicious for a vasovagal response.  Possibly postprandial syncope given the timing after eating a meal.  Recommend continue medical workup and supportive care.    2.  Parkinson's disease = recommend discontinuing ropinirole and starting Sinemet one half tablet 3 times a day with titration as tolerated in the outpatient setting    No further recommendations.  Okay from neuro standpoint for discharge when okay with primary team and cardiology.  Recommend following up in neurology clinic.  Patient is interested in being referred to [facility] neurology, Andrade Garcia or [place]. Otherwise if he requests [facility] neurology, refer to Dr. Lopez.      Pretty Roa MD  10/04/17  3:51 PM

## 2017-10-04 NOTE — PLAN OF CARE
Problem: Patient Care Overview (Adult)  Goal: Plan of Care Review  Outcome: Ongoing (interventions implemented as appropriate)    10/04/17 1635   Coping/Psychosocial Response Interventions   Plan Of Care Reviewed With patient   Patient Care Overview   Progress (eval)   Outcome Evaluation   Outcome Summary/Follow up Plan Communication/cognitive eval complete. Pt alert, cooperative, though in a lot of pain. Functional receptive/expressive language skills. Functional memory, attention, reading, writing, and orientation skills. Mild problem solving deficits noted. Suspect likely to difficulty concentrating 2' pain level. SLP provided education on cognitive therapy focusing on problem solving skills and pt agreed. SLP to f/u w/ cognitive tx.          Problem: Inpatient SLP  Goal: Cognitive-linguistic-Patient will improve Cognitive-linguistic skills to allow optimal participation in care  Outcome: Ongoing (interventions implemented as appropriate)    10/04/17 1635   Cognitive Linguistic- Optimal Participation in Care   Cognitive Linguistic Optimal Participation in Care- SLP, Date Established 10/04/17   Cognitive Linguistic Optimal Participation in Care- SLP, Time to Achieve by discharge   Cognitive Linguistic Optimal Participation in Care- SLP, Date Goal Reviewed 10/04/17

## 2017-10-04 NOTE — THERAPY EVALUATION
Acute Care - Physical Therapy Initial Evaluation  [facility]     Patient Name: Jhoan Gagnon  : 1940  MRN: [id number]  Today's Date: 10/4/2017   Onset of Illness/Injury or Date of Surgery Date: 10/03/17  Date of Referral to PT: 10/03/17  Referring Physician: YONG Vera      Admit Date: 10/3/2017     Visit Dx:    ICD-10-CM ICD-9-CM   1. Spell of altered consciousness R40.4 780.09   2. Right sided weakness R53.1 728.87   3. Syncope and collapse R55 780.2   4. Parkinson disease G20 332.0   5. DDD (degenerative disc disease), cervical M50.30 722.4   6. 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 Blunt trauma of neck, initial encounter S19.80XA 959.09   8. Impaired functional mobility, balance, gait, and endurance Z74.09 V49.89     Patient Active Problem List   Diagnosis   • Parkinson's disease   • Falls   • Weakness   • TIA (transient ischemic attack)   • Spell of altered consciousness   • Skin tear of hand without complication, left, sequela   • Sinus bradycardia   • Syncope and collapse   • First degree atrioventricular block     Past Medical History:   Diagnosis Date   • Broken arms    • Cancer     prostate   • Lower back injury    • Parkinson's disease      Past Surgical History:   Procedure Laterality Date   • CHOLECYSTECTOMY     • PROSTATE SURGERY     • REPLACEMENT TOTAL KNEE BILATERAL            PT ASSESSMENT (last 72 hours)      PT Evaluation       10/04/17 0851 10/03/17 2300    Rehab Evaluation    Document Type evaluation  -KM     Subjective Information agree to therapy;no complaints  -KM     Patient Effort, Rehab Treatment excellent  -KM     General Information    Patient Profile Review yes  -KM     Onset of Illness/Injury or Date of Surgery Date 10/03/17  -KM     Referring Physician YONG Vera  -KM     Pertinent History Of Current Problem Pt was working in his field when he developed visual changes and weakness, nausea. Recent Parikinsons med adjustment, no hemmorhage or  infarct per imaging.  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  -KM     Equipment Currently Used at Home walker, rolling;grab bar   states he uses only at night  -KM     Plans/Goals Discussed With patient;agreed upon  -KM     Risks Reviewed patient:;LOB  -KM     Benefits Reviewed patient:;improve function  -KM     Barriers to Rehab none identified  -KM     Living Environment    Lives With alone   supportive family and friends  -KM     Living Arrangements house;other (see comments)   lives in basement, rents upstairs for income  -KM     Home Accessibility stairs (2 railings present)  -KM     Stair Railings at Home inside, present at both sides  -KM     Clinical Impression    Date of Referral to PT 10/03/17  -KM     PT Diagnosis impaired mobility  -KM     Patient/Family Goals Statement return home at PLOF  -KM     Criteria for Skilled Therapeutic Interventions Met yes  -KM     Rehab Potential good, to achieve stated therapy goals  -KM     Vital Signs    Pre Systolic BP Rehab --   vitals stable per monitor  -KM     Pain Assessment    Pain Assessment 0-10  -KM     Pain Score 3  -KM     Post Pain Score 3  -KM     Pain Type Acute pain  -KM     Pain Location Knee  -KM     Pain Orientation Right  -KM     Pain Intervention(s) Repositioned;Ambulation/increased activity  -KM     Response to Interventions --   tolerated  -KM     Cognitive Assessment/Intervention    Current Cognitive/Communication Assessment functional  -KM     Orientation Status oriented x 4  -KM     Follows Commands/Answers Questions able to follow single-step instructions;100% of the time  -KM     Personal Safety WNL/WFL  -KM     ROM (Range of Motion)    General ROM no range of motion deficits identified  -KM     MMT (Manual Muscle Testing)    General MMT Assessment no strength deficits identified  -KM     Muscle  Tone Assessment    Muscle Tone Assessment  Bilateral Upper Extremities;Bilateral Lower Extremities  -    Bed Mobility, Assessment/Treatment    Bed Mobility, Comment --   sitting on eob  -KM     Transfer Assessment/Treatment    Transfers, Sit-Stand [place] contact guard assist  -KM     Transfers, Stand-Sit [place] contact guard assist  -KM     Transfers, Sit-Stand-Sit, Assist Device rolling walker  -KM     Gait Assessment/Treatment    Gait, [place] Level stand by assist  -KM     Gait, Assistive Device rolling walker  -KM     Gait, Distance (Feet) 400  -KM     Gait, Gait Deviations cedric decreased  -KM     Balance Skills Training    Sitting-Level of Assistance Independent  -KM     Sitting-Balance Support Feet supported  -KM     Standing-Level of Assistance Close supervision  -KM     Static Standing Balance Support assistive device  -KM     Therapy Exercises    Bilateral Lower Extremities AROM:;10 reps;sitting;hip flexion;LAQ;ankle pumps/circles  -KM     Positioning and Restraints    Pre-Treatment Position in bed  -KM     Post Treatment Position bed  -KM     In Bed sitting EOB;call light within reach;encouraged to call for assist  -KM       10/03/17 7064 10/03/17 2112    General Information    Equipment Currently Used at Home  walker, rolling  -HUNG    Living Environment    Lives With  alone   pt resides in [facility]  -    Living Arrangements other (see comments);house   in basement, rents primary residence  -[facility]  -    Home Accessibility stairs (2 railings present)   13 stairs to basement  -     Stair Railings at Home inside, present on left side;inside, present on right side  -     Type of Financial/Environmental Concern none  - none  -    Transportation Available car;family or friend will provide  - car;family or friend will provide  -      User Key  (r) = Recorded By, (t) = Taken By, (c) = Cosigned By    Initials Name Provider Type    MAT Barnett, PT Physical  Therapist    HUNG Rodriguez      Shelly Whitley, RN Registered Nurse          Physical Therapy Education     Title: PT OT SLP Therapies (Done)     Topic: Physical Therapy (Done)     Point: Mobility training (Done)    Learning Progress Summary    Learner Readiness Method Response Comment Documented by Status   Patient Eager [facility] [place] 10/04/17 1016 Done               Point: Home exercise program (Done)    Learning Progress Summary    Learner Readiness Method Response Comment Documented by Status   Patient Eager [facility] [place] 10/04/17 1016 Done               Point: Body mechanics (Done)    Learning Progress Summary    Learner Readiness Method Response Comment Documented by Status   Patient Eager [facility] [place] 10/04/17 1016 Done               Point: Precautions (Done)    Learning Progress Summary    Learner Readiness Method Response Comment Documented by Status   Patient Eager [facility] 10/04/17 1016 Done                      User Key     Initials Effective Dates Name Provider Type Discipline     06/19/15 -  Marilu Barnett PT Physical Therapist PT                PT Recommendation and Plan  Anticipated Discharge Disposition: home with home health  Plan of Care Review  Outcome Summary/Follow up Plan: Pt ambulated 400 ft with r wx and SBA, to benefit from skilled svcs to access safety on stairs and endure indepdence prior to return home. Recommend home health PT followup          IP PT Goals       10/04/17 1012          Transfer Training PT LTG    Transfer Training PT LTG, Date Established 10/04/17  -KM      Transfer Training PT LTG, Time to Achieve 2 wks  -KM      Transfer Training PT LTG, Activity Type bed to chair /chair to bed;sit to stand/stand to sit  -KM      Transfer Training PT LTG, [place] Level independent  -KM      Gait Training PT LTG    Gait Training Goal PT LTG, Date Established 10/04/17  -KM      Gait Training Goal PT LTG, Time to Achieve 2 wks  -KM      Gait Training Goal PT LTG,  [place] Level independent  -KM      Gait Training Goal PT LTG, Assist Device cane, straight  -KM      Gait Training Goal PT LTG, Distance to Achieve 400  -KM      Stair Training PT LTG    Stair Training Goal PT LTG, Date Established 10/04/17  -KM      Stair Training Goal PT LTG, Time to Achieve 2 wks  -KM      Stair Training Goal PT LTG, [place] Level independent  -KM      Stair Training Goal PT LTG, Assist Device 2 handrails  -KM      Stair Training Goal PT LTG, Distance to Achieve 12  -KM        User Key  (r) = Recorded By, (t) = Taken By, (c) = Cosigned By    Initials Name Provider Type    MAT Barnett PT Physical Therapist                Outcome Measures       10/04/17 0851          How much help from another person do you currently need...    Turning from your back to your side while in flat bed without using bedrails? 4  -KM      Moving from lying on back to sitting on the side of a flat bed without bedrails? 4  -KM      Moving to and from a bed to a chair (including a wheelchair)? 3  -KM      Standing up from a chair using your arms (e.g., wheelchair, bedside chair)? 3  -KM      Climbing 3-5 steps with a railing? 3  -KM      To walk in hospital room? 3  -KM      AM-PAC 6 Clicks Score 20  -KM      Functional Assessment    Outcome Measure Options AM-PAC 6 Clicks Basic Mobility (PT)  -KM        User Key  (r) = Recorded By, (t) = Taken By, (c) = Cosigned By    Initials Name Provider Type    MAT Barnett PT Physical Therapist           Time Calculation:         PT Charges       10/04/17 1009          Time Calculation    Start Time 0851  -KM      PT Received On 10/04/17  -KM      PT Goal Re-Cert Due Date 10/14/17  -KM        User Key  (r) = Recorded By, (t) = Taken By, (c) = Cosigned By    Initials Name Provider Type    MAT Barnett PT Physical Therapist          Therapy Charges for Today     Code Description Service Date Service Provider Modifiers Qty    57857710400  HC PT EVAL LOW COMPLEXITY 4 10/4/2017 Marilu Barnett, PT GP 1    85212153836 HC PT MOBILITY CURRENT 10/4/2017 Marilu Barnett, PT GP,  1    94203357865 HC PT MOBILITY PROJECTED 10/4/2017 Marilu Barnett, PT GP,  1          PT G-Codes  Outcome Measure Options: AM-PAC 6 Clicks Basic Mobility (PT)  Score: 20  Functional Limitation: Mobility: Walking and moving around  Mobility: Walking and Moving Around Current Status (): At least 20 percent but less than 40 percent impaired, limited or restricted  Mobility: Walking and Moving Around Goal Status (): 0 percent impaired, limited or restricted      Marilu Barnett, PT  10/4/2017

## 2017-10-04 NOTE — PROGRESS NOTES
Discharge Planning Assessment  [facility]     Patient Name: Jhoan Gagnon  MRN: [id number]  Today's Date: 10/3/2017    Admit Date: 10/3/2017          Discharge Needs Assessment       10/03/17 2112    Living Environment    Lives With alone   pt resides in [facility]    Living Arrangements house    Type of Financial/Environmental Concern none    Transportation Available car;family or friend will provide    Living Environment    Provides Primary Care For no one    Quality Of Family Relationships supportive;involved;helpful    Able to Return to Prior Living Arrangements yes    Living Arrangement Comments Pt resiedes in the Basement of his home and rents out the upstairs living area.    Discharge Needs Assessment    Concerns To Be Addressed denies needs/concerns at this time    Readmission Within The Last 30 Days no previous admission in last 30 days    Anticipated Changes Related to Illness none    Equipment Currently Used at Home walker, rolling    Equipment Needed After Discharge other (see comments)   life line/alert button    Discharge Contact Information if Applicable [phone]            Discharge Plan       10/03/17 2114    Case Management/Social Work Plan    Plan home    Patient/Family In Agreement With Plan yes    Additional Comments CM spoke with pt at bedside. Pt plans to return home reports he has a close friend and his daughter that can assist him as needed. Pt is interested in Life line button, message left with [employer] rep to see pt regarding interest in program. Pt denies further needs at this time. CM will continue to follow.        Discharge Placement     No information found                Demographic Summary       10/03/17 2109    Referral Information    Referral Source admission list    Contact Information    Permission Granted to Share Information With     Primary Care Physician Information    Name Devin Andres            Functional Status       10/03/17 2110     Functional Status Current    Current Functional Level Comment unable to assess    Functional Status Prior    Ambulation 0-->independent   occasionally uses walker at night    Transferring 0-->independent    Toileting 0-->independent    Bathing 0-->independent    Dressing 0-->independent    Eating 0-->independent    Communication 0-->understands/communicates without difficulty    Swallowing 0-->swallows foods/liquids without difficulty    IADL    Medications independent   pt confirms he has prescription drug coverage and denies issues obtaining or affording current medciations    Meal Preparation independent   pt eats out, prepares canned or frozen meals    Housekeeping independent    Laundry independent    Shopping independent    Oral Care independent    Activity Tolerance    Current Activity Limitations other (see comments)   recent falls, Parkinsons    Usual Activity Tolerance moderate    Current Activity Tolerance fair    Employment/Financial    Financial Concerns none   pt confirms he has Medicare and [employer] and denies issues or concerns with coverage            Psychosocial     None            Abuse/Neglect     None            Legal     None            Substance Abuse     None            Patient Forms     None          Judy Rodriguez

## 2017-10-04 NOTE — PLAN OF CARE
Problem: Patient Care Overview (Adult)  Goal: Plan of Care Review  Outcome: Ongoing (interventions implemented as appropriate)    10/04/17 1553   Coping/Psychosocial Response Interventions   Plan Of Care Reviewed With patient   Outcome Evaluation   Outcome Summary/Follow up Plan Pt reports he is very sore. Pt needs CGA for all mobility and mod for LB ADL's. Continue OT to address deficits. Pt will need 24/7 supv at this time.         Problem: Inpatient Occupational Therapy  Goal: Transfer Training Goal 1 LTG- OT  Outcome: Ongoing (interventions implemented as appropriate)    10/04/17 1553   Transfer Training OT LTG   Transfer Training OT LTG, Date Established 10/04/17   Transfer Training OT LTG, Time to Achieve 1 wk   Transfer Training OT LTG, Activity Type all transfers   Transfer Training OT LTG, [place] Level set up required       Goal: Dynamic Standing Balance Goal LTG-OT  Outcome: Ongoing (interventions implemented as appropriate)    10/04/17 1553   Dynamic Standing Balance OT LTG   Dynamic Standing Balance OT LTG, Date Established 10/04/17   Dynamic Standing Balance OT LTG, Time to Achieve 1 wk   Dynamic Standing Balance OT LTG, [place] Level supervision required   Dynamic Standing Balance OT LTG, Assist Device assistive Device       Goal: Bathing Goal LTG- OT  Outcome: Ongoing (interventions implemented as appropriate)    10/04/17 1553   Bathing OT LTG   Bathing Goal OT LTG, Date Established 10/04/17   Bathing Goal OT LTG, Time to Achieve 1 wk   Bathing Goal OT LTG, Activity Type simulates;upper body bathing;lower body bathing   Bathing Goal OT LTG, [place] Level set up required       Goal: LB Dressing Goal LTG- OT  Outcome: Ongoing (interventions implemented as appropriate)    10/04/17 1553   LB Dressing OT LTG   LB Dressing Goal OT LTG, Date Established 10/04/17   LB Dressing Goal OT LTG, Time to Achieve 1 wk   LB Dressing Goal OT LTG, [place] Level set up required

## 2017-10-04 NOTE — CONSULTS
[facility] at [facility]  Cardiovascular Consultation Note        Reason for Consult: Syncope         The patient is a 77-year-old gentleman with no cardiac history but a history of Parkinson's disease who presented to [facility] yesterday after having a syncopal episode.  He was having issues with nausea after eating lunch and taking his new Parkinson's medication.  He fell out of his truck and when he really awoke his dentures were several feet away from him and he had bumped his head.  He had a similar episode without syncope several weeks ago.  These symptoms have started after he was recently uptitrated on his Parkinson's medication.  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  He has not had previous syncopal episodes.     Cardiac risk factors: HLD, advanced age, male gender     Past medical and surgical history, social and family history reviewed in EMR.    REVIEW OF SYSTEMS:   H&P ROS reviewed and pertinent CV ROS as noted in HPI.         Vital Sign Min/Max for last 24 hours  Temp  Min: 97.6 °F (36.4 °C)  Max: 98.4 °F (36.9 °C)   BP  Min: 114/78  Max: 165/90   Pulse  Min: 52  Max: 98   Resp  Min: 16  Max: 20   SpO2  Min: 90 %  Max: 99 %   No Data Recorded      Intake/Output Summary (Last 24 hours) at 10/04/17 1205  Last data filed at 10/04/17 1100   Gross per 24 hour   Intake                0 ml   Output              650 ml   Net             -650 ml           Physical Exam     General: No acute distress. Alert and oriented x 3  HEENT: normocephalic.  Abrasion over the left brow with ecchymosis. Moist mucous membranes   NECK: no carotid bruits. No JVD. Supple   HEART: bradycardic rate. Normal rhythm. No murmurs, rubs, or gallops.   LUNG: Clear to auscultation bilaterally with normal respiratory effort.  ABD: Nontender without hepatosplenomegaly.  EXT: No clubbing cyanosis or edema  NEURO: tremor noted on RUE. Unsteady gait. Normal mood  and affect.     EKG: sinus bradycardia. HR= 55 bpm. No acute ST or T wave changes.     Lab Review:   Labs reviewed in the electronic medical record.  Pertinent findings include:  Lab Results   Component Value Date    GLUCOSE 86 10/04/2017    BUN 12 10/04/2017    CREATININE 1.00 10/04/2017    EGFRIFNONA 72 10/04/2017    BCR 12.0 10/04/2017    K 3.4 (L) 10/04/2017    CO2 29.0 10/04/2017    CALCIUM 9.4 10/04/2017    AST 23 10/03/2017    ALT 22 10/03/2017     Lab Results   Component Value Date    WBC 6.58 10/04/2017    HGB 14.4 10/04/2017    HCT 42.3 10/04/2017    MCV 91.6 10/04/2017     10/04/2017         Results from last 7 days  Lab Units 10/03/17  1612   HEMOGLOBIN A1C % 4.80            Hospital Problem List     * (Principal)Syncope and collapse    Overview Addendum 10/4/2017 12:05 PM by CHICA Sanchez     · Presentation of [facility] ED 10/3/17 following prodrome of nausea, vomiting, and diaphoresis followed by LOC. Occurred after recent PD medication increase.          Parkinson's disease    Falls    Weakness    TIA (transient ischemic attack)    Spell of altered consciousness    Skin tear of hand without complication, left, sequela    Sinus bradycardia    Overview Signed 10/4/2017 11:59 AM by CHICA Sanchez     · HRs stable in 50s at rest and increase to 70s with activity.          First degree atrioventricular block        The patient's symptomatology sounds incompatible to cardiogenic syncope.  The prodrome of nausea and recent titration of his anti-Parkinson's medication points to this is the likely culprit for the episode.  He does have some baseline bradycardia but it appears stable and does augment with activities.  There is been no evidence of heart block on monitor.  I do not believe his bradycardia and first-degree AV block can account for his syncope.  I recommend no further cardiac testing at this time.         · Neurology evaluation with possible discontinuation or titration of anti-Parkinson  medication.  · Thank you for this consultation.  We will see as needed.    Scribed for Gen Rogers MD by Fabiola Castro PA-C. 10/4/2017  12:05 PM     Jose Rogers MD  10/4/2017

## 2017-10-04 NOTE — PLAN OF CARE
Problem: Patient Care Overview (Adult)  Goal: Plan of Care Review  Outcome: Ongoing (interventions implemented as appropriate)    10/04/17 1012   Outcome Evaluation   Outcome Summary/Follow up Plan Pt ambulated 400 ft with r wx and SBA, to benefit from skilled svcs to access safety on stairs and ensure indepdence prior to return home. Recommend home health PT followup         Problem: Inpatient Physical Therapy  Goal: Transfer Training Goal 1 LTG- PT  Outcome: Ongoing (interventions implemented as appropriate)    10/04/17 1012   Transfer Training PT LTG   Transfer Training PT LTG, Date Established 10/04/17   Transfer Training PT LTG, Time to Achieve 2 wks   Transfer Training PT LTG, Activity Type bed to chair /chair to bed;sit to stand/stand to sit   Transfer Training PT LTG, [place] Level independent       Goal: Gait Training Goal LTG- PT  Outcome: Ongoing (interventions implemented as appropriate)    10/04/17 1012   Gait Training PT LTG   Gait Training Goal PT LTG, Date Established 10/04/17   Gait Training Goal PT LTG, Time to Achieve 2 wks   Gait Training Goal PT LTG, [place] Level independent   Gait Training Goal PT LTG, Assist Device cane, straight   Gait Training Goal PT LTG, Distance to Achieve 400       Goal: Stair Training Goal LTG- PT  Outcome: Ongoing (interventions implemented as appropriate)    10/04/17 1012   Stair Training PT LTG   Stair Training Goal PT LTG, Date Established 10/04/17   Stair Training Goal PT LTG, Time to Achieve 2 wks   Stair Training Goal PT LTG, [place] Level independent   Stair Training Goal PT LTG, Assist Device 2 handrails   Stair Training Goal PT LTG, Distance to Achieve 12

## 2017-10-04 NOTE — PROGRESS NOTES
"      HOSPITALIST DAILY PROGRESS NOTE    Chief Complaint: fall and weakness    Subjective   SUBJECTIVE/OVERNIGHT EVENTS   No acute events overnight, patient states that he is just feeling weak, does endorse some soreness on his head. Otherwise has no new complain    Review of Systems:  Gen-no fevers, no chills  CV-no chest pain, no palpitations  Resp-no cough, no dyspnea  GI-no N/V/D, no abd pain    Otherwise complete ROS is negative except as mentioned in the HPI.    Objective   OBJECTIVE   I have reviewed the vital signs.  /87 (BP Location: Right arm, Patient Position: Lying)  Pulse 64  Temp 97.8 °F (36.6 °C) (Oral)   Resp 18  Ht 68\" (172.7 cm)  Wt 204 lb 8 oz (92.8 kg)  SpO2 96%  BMI 31.09 kg/m2    Physical Exam:  Gen-elderly male, in no acute distress, seated in bed comfortably  Head; left sided contusion and brusing  CV-RRR, S1 S2 normal, no m/r/g  Resp-CTAB, no wheezes  Abd-obese, soft, NT, ND, +BS  Ext-no edema  Neuro-A&Ox3, no focal deficits, tremors  Psych-appropriate mood    Results:  I have reviewed the labs, radiology results, and diagnostic studies.      Results from last 7 days  Lab Units 10/03/17  1612 10/03/17  1605   WBC 10*3/mm3 6.32  --    HEMOGLOBIN g/dL 14.6  --    HEMOGLOBIN, POC g/dL  --  14.6   HEMATOCRIT % 43.3  --    HEMATOCRIT POC %  --  43   PLATELETS 10*3/mm3 187  --        Results from last 7 days  Lab Units 10/03/17  1605   CREATININE mg/dL 1.10     Radiology Results:  Imaging Results (last 24 hours)     Procedure Component Value Units Date/Time    CT Head Without Contrast Stroke Protocol [[id number]] Updated:  10/03/17 1601    CT Cerebral Perfusion With & Without Contrast [[id number]] Updated:  10/03/17 1618    CT Angiogram Head With & Without Contrast [[id number]] Updated:  10/03/17 1618    CT Angiogram Neck With & Without Contrast [[id number]] Updated:  10/03/17 1618    XR Chest 1 View [[id number]] Updated:  10/03/17 1656    CT Thoracic Spine Without Contrast [[id number]] " Updated:  10/03/17 1735    MRI Brain Without Contrast [[id number]]  (Abnormal) Collected:  10/03/17 1656     Updated:  10/03/17 1957    Narrative:       EXAM:    MR Head Without Intravenous Contrast    CLINICAL HISTORY:    77 years old, male; Signs and symptoms; Weakness, extremity; Right; Patient   HX: Neurologic problem. His time at last known normal was today at 1449. 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   He called a neighbor for help who reported normal mentation at the time. Upon   ems arrival the patient was on the ground. He was disoriented and confused with   significant slurred speech and right sided weakness per ems. There was evidence   of prior emesis on scene. Upon arrival in CT, ems report that his weakness had   improved. It is apparent that the patient recently started new medication for   parkinson's management. He sees dr. Holley, neurology. He is tremulous at   baseline. Patient says he did fall and hit head when he had his syncopal   episode, face is red around left orbit no HX of surgery to head HX of prostate   cancer; Additional info: CVA eval, transient right weakness    TECHNIQUE:    Magnetic resonance images of the head/brain without intravenous contrast in   multiple planes.    COMPARISON:    CT CEREBRAL PERFUSION W WO CONTRAST 10/3/2017 4:13:44 PM    FINDINGS:    A few nonspecific foci of T2 prolongation within the subcortical and   periventricular white matter without evidence of restricted diffusion in the   supra-or infratentorial brain. Remainder of the brain parenchyma demonstrates   normal signal intensity without an intra- or extra-axial mass or abnormality.   No mass effect or midline shift.       Midline brain structures including the corpus callosum, pituitary gland,   pineal region, and craniovertebral junction are unremarkable. Ventricles and   sulci are mildly prominent without evidence of hydrocephalus. Intracranial   vessels  demonstrate a normal flow-void.  Mild bilateral mastoid effusion.      Impression:         Mild chronic microvascular ischemic changes otherwise unremarkable study   without a supra-, infratentorial mass or abnormality; no evidence of   hemorrhagic or ischemic infarct and no hydrocephalus.      THIS DOCUMENT HAS BEEN ELECTRONICALLY SIGNED BY LORNA HUITRON MD    CT Cervical Spine Without Contrast [[id number]] Collected:  10/03/17 1619     Updated:  10/03/17 2226    Narrative:       EXAMINATION: CT CERVICAL SPINE WO CONTRAST-10/03/2017:      INDICATION: Code 19, fall.         TECHNIQUE: Spiral acquisition 2 mm axial images through the cervical  spine with sagittal and coronal 2 mm 2-D reconstructions.     The radiation dose reduction device was turned on for each scan per the  ALARA (As Low as Reasonably Achievable) protocol.     COMPARISON: NONE.     FINDINGS: Cervical vertebral body heights are well maintained. Alignment  appears grossly normal on the sagittal imaging series. There is advanced  C4-5, C5-6 and C6-7 degenerative disc disease. Prevertebral soft tissues  appear normal. Posterior elements appear intact. Coronal images show no  evidence of acute trauma. Axial bone window images show no evidence of  skull base fracture. The dens and ring of C1 appear normally aligned. No  vertebral body fracture or posterior element fracture is seen on the  axial series. Soft tissue axial images show no evidence of paraspinous  hematoma and no gross evidence of cervical spinal stenosis.       Impression:       Cervical spine degenerative disc disease but no evidence of  acute cervical spine trauma and no gross evidence of cervical spine  stenosis.     D:  10/03/2017  E:  10/03/2017           This report was finalized on 10/3/2017 10:24 PM by DR. Tavares Mckeon MD.       XR Hand 3+ View Left [[id number]]  (Abnormal) Collected:  10/03/17 2246     Updated:  10/04/17 0653    Narrative:       EXAM:    XR Left Hand Complete, 3 or More  Views    CLINICAL HISTORY:    77 years old, male; Parkinson's disease; Other cervical disc degeneration,   unspecified cervical region; Other intervertebral disc degeneration, thoracic   region; Transient alteration of awareness; Weakness; Syncope and collapse;   Other specified injuries of unspecified part of neck, initial encounter; Pain;   Hand; Left; Additional info: Trauma during fall    TECHNIQUE:    Frontal, lateral and oblique views of the left hand.    COMPARISON:    No relevant prior studies available.    FINDINGS:    Diffuse osteopenia, chronic degenerative changes. Otherwise unremarkable   appearing bones and joints without evidence of a fracture line.  No discrete   lytic or blastic lesion. Visualized soft tissues are normal.      Impression:         Osteopenia, chronic degenerative changes without acute bony abnormality or   injury.     THIS DOCUMENT HAS BEEN ELECTRONICALLY SIGNED BY LORNA HUITRON MD    XR Knee 1 or 2 View Left [[id number]]  (Abnormal) Collected:  10/03/17 2246     Updated:  10/04/17 0654    Narrative:       EXAM:    XR Left Knee, 1 or 2 views    CLINICAL HISTORY:    77 years old, male; Parkinson's disease; Other cervical disc degeneration,   unspecified cervical region; Other intervertebral disc degeneration, thoracic   region; Transient alteration of awareness; Weakness; Syncope and collapse;   Other specified injuries of unspecified part of neck, initial encounter; Signs   and symptoms; Other: See notes; Prior surgery; Surgery type: Replacement;   Additional info: Decreased rom and pain after fall.    TECHNIQUE:    Frontal and/or lateral views of the left knee.    COMPARISON:    No relevant prior studies available.    FINDINGS:    Diffuse osteopenia, LEFT knee arthroplasty without hardware complications.    Linear hyperdensities within the cutaneous soft tissues suggestive of varicose   veins.       Remainder of the visualized bones and joints are unremarkable. No discrete   lytic or  blastic lesion.       Impression:         Chronic postoperative changes without acute bony abnormality or injury.     THIS DOCUMENT HAS BEEN ELECTRONICALLY SIGNED BY LORNA HUITRON MD          I have reviewed the medications.    Assessment/Plan   ASSESSMENT/PLAN    Principal Problem:    Syncope and collapse  Active Problems:    Parkinson's disease    Falls    Weakness    TIA (transient ischemic attack)    Spell of altered consciousness    Skin tear of hand without complication, left, sequela    Sinus bradycardia    First degree atrioventricular block      77 yoM with recent dx of parkinson's, presented s/p fall, admitted with suspected syncope.Mechanism and etiology of fall is unknown, TIA  Vs syncope. syncope orthostatic vs cardiogenic, patient has some nausea and vomiting prior, he has been found to be bradycardic with 1st degree AV block.    Plan  - Suspicion for TIA is low, however neurology is consulted, continue stroke w/u, continue ASA and statin  - Cardiology consulted for bradycardia and 1st AV block  - Patient with recent diagnosis of parkinson's, current regimen unknown, nausea and vomiting likely related to rx  - DVT ppx; TEDs/SCDs    Dispo: TBD- He lives alone in his basement, concern for his safety, CM/SW consulted    Davon Wooten MD  10/04/17  8:49 AM

## 2017-10-05 VITALS
RESPIRATION RATE: 16 BRPM | WEIGHT: 204.5 LBS | BODY MASS INDEX: 30.99 KG/M2 | TEMPERATURE: 97.3 F | DIASTOLIC BLOOD PRESSURE: 79 MMHG | HEIGHT: 68 IN | SYSTOLIC BLOOD PRESSURE: 147 MMHG | HEART RATE: 55 BPM | OXYGEN SATURATION: 95 %

## 2017-10-05 PROBLEM — I44.0 FIRST DEGREE ATRIOVENTRICULAR BLOCK: Status: RESOLVED | Noted: 2017-10-03 | Resolved: 2017-10-05

## 2017-10-05 PROBLEM — R40.4 SPELL OF ALTERED CONSCIOUSNESS: Status: RESOLVED | Noted: 2017-10-03 | Resolved: 2017-10-05

## 2017-10-05 PROBLEM — G45.9 TIA (TRANSIENT ISCHEMIC ATTACK): Status: RESOLVED | Noted: 2017-10-03 | Resolved: 2017-10-05

## 2017-10-05 PROBLEM — R55 SYNCOPE AND COLLAPSE: Status: RESOLVED | Noted: 2017-10-03 | Resolved: 2017-10-05

## 2017-10-05 PROCEDURE — 97110 THERAPEUTIC EXERCISES: CPT

## 2017-10-05 PROCEDURE — G0378 HOSPITAL OBSERVATION PER HR: HCPCS

## 2017-10-05 PROCEDURE — 97116 GAIT TRAINING THERAPY: CPT

## 2017-10-05 PROCEDURE — 99217 PR OBSERVATION CARE DISCHARGE MANAGEMENT: CPT | Performed by: NURSE PRACTITIONER

## 2017-10-05 RX ADMIN — ACETAMINOPHEN 650 MG: 325 TABLET, FILM COATED ORAL at 02:13

## 2017-10-05 RX ADMIN — CARBIDOPA AND LEVODOPA 0.5 TABLET: 25; 100 TABLET ORAL at 13:13

## 2017-10-05 RX ADMIN — ASPIRIN 81 MG 81 MG: 81 TABLET ORAL at 08:59

## 2017-10-05 RX ADMIN — ACETAMINOPHEN 650 MG: 325 TABLET, FILM COATED ORAL at 13:12

## 2017-10-05 RX ADMIN — CARBIDOPA AND LEVODOPA 0.5 TABLET: 25; 100 TABLET ORAL at 08:58

## 2017-10-05 RX ADMIN — BACITRACIN ZINC AND POLYMYXIN B SULFATE: 500; 10000 OINTMENT TOPICAL at 08:59

## 2017-10-05 NOTE — PLAN OF CARE
Problem: Patient Care Overview (Adult)  Goal: Plan of Care Review  Outcome: Ongoing (interventions implemented as appropriate)    10/05/17 0564   Coping/Psychosocial Response Interventions   Plan Of Care Reviewed With patient   Patient Care Overview   Progress improving   Outcome Evaluation   Outcome Summary/Follow up Plan pt ambulated in hallway with 1 assist during shift. neuro checks remain stable. will contiinue to monitor

## 2017-10-05 NOTE — THERAPY DISCHARGE NOTE
Acute Care - Occupational Therapy Discharge Summary  [facility]     Patient Name: Jhoan Gagnon  : 1940  MRN: [id number]    Today's Date: 10/5/2017  Onset of Illness/Injury or Date of Surgery Date: 10/03/17    Date of Referral to OT: 10/03/14  Referring Physician: YONG Vera      Admit Date: 10/3/2017        OT Recommendation and Plan    Visit Dx:    ICD-10-CM ICD-9-CM   1. Spell of altered consciousness R40.4 780.09   2. Right sided weakness R53.1 728.87   3. Syncope and collapse R55 780.2   4. Parkinson disease G20 332.0   5. DDD (degenerative disc disease), cervical M50.30 722.4   6. 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 Blunt trauma of neck, initial encounter S19.80XA 959.09   8. Impaired functional mobility, balance, gait, and endurance Z74.09 V49.89   9. Impaired mobility and ADLs Z74.09 799.89   10. Parkinson's disease G20 332.0   11. Fall, initial encounter W19.XXXA E888.9   12. Weakness R53.1 780.79                     OT Goals       10/04/17 1553          Transfer Training OT LTG    Transfer Training OT LTG, Date Established 10/04/17  -AN      Transfer Training OT LTG, Time to Achieve 1 wk  -AN      Transfer Training OT LTG, Activity Type all transfers  -AN      Transfer Training OT LTG, [place] Level set up required  -AN      Dynamic Standing Balance OT LTG    Dynamic Standing Balance OT LTG, Date Established 10/04/17  -AN      Dynamic Standing Balance OT LTG, Time to Achieve 1 wk  -AN      Dynamic Standing Balance OT LTG, [place] Level supervision required  -AN      Dynamic Standing Balance OT LTG, Assist Device assistive Device  -AN      Bathing OT LTG    Bathing Goal OT LTG, Date Established 10/04/17  -AN      Bathing Goal OT LTG, Time to Achieve 1 wk  -AN      Bathing Goal OT LTG, Activity Type simulates;upper body bathing;lower body bathing  -AN      Bathing Goal OT LTG, [place] Level set up required  -AN      LB Dressing OT LTG    LB Dressing Goal  OT LTG, Date Established 10/04/17  -AN      LB Dressing Goal OT LTG, Time to Achieve 1 wk  -AN      LB Dressing Goal OT LTG, [place] Level set up required  -AN        User Key  (r) = Recorded By, (t) = Taken By, (c) = Cosigned By    Initials Name Provider Type    ALEX Hurtado OT Occupational Therapist                Outcome Measures       10/05/17 0953 10/04/17 1441 10/04/17 0851    How much help from another person do you currently need...    Turning from your back to your side while in flat bed without using bedrails? 4  -KM  4  -KM    Moving from lying on back to sitting on the side of a flat bed without bedrails? 4  -KM  4  -KM    Moving to and from a bed to a chair (including a wheelchair)? 4  -KM  3  -KM    Standing up from a chair using your arms (e.g., wheelchair, bedside chair)? 4  -KM  3  -KM    Climbing 3-5 steps with a railing? 4  -KM  3  -KM    To walk in hospital room? 3  -KM  3  -KM    AM-PAC 6 Clicks Score 23  -KM  20  -KM    How much help from another is currently needed...    Putting on and taking off regular lower body clothing?  2  -AN     Bathing (including washing, rinsing, and drying)  2  -AN     Toileting (which includes using toilet bed pan or urinal)  3  -AN     Putting on and taking off regular upper body clothing  3  -AN     Taking care of personal grooming (such as brushing teeth)  3  -AN     Eating meals  4  -AN     Score  17  -AN     Functional Assessment    Outcome Measure Options AM-PAC 6 Clicks Basic Mobility (PT)  -KM AM-PAC 6 Clicks Daily Activity (OT)  -AN AM-PAC 6 Clicks Basic Mobility (PT)  -KM      User Key  (r) = Recorded By, (t) = Taken By, (c) = Cosigned By    Initials Name Provider Type    MAT Barnett, HILLARY Physical Therapist    ALEX Hurtado OT Occupational Therapist              OT Discharge Summary  Reason for Discharge: Discharge from facility  Outcomes Achieved: Refer to plan of care for updates on goals achieved  Discharge Destination: Home  with home health      Judy Burgos, OT  10/5/2017

## 2017-10-05 NOTE — PROGRESS NOTES
Continued Stay Note  [facility]     Patient Name: Jhoan Gagnon  MRN: [id number]  Today's Date: 10/5/2017    Admit Date: 10/3/2017          Discharge Plan       10/05/17 1111    Case Management/Social Work Plan    Plan home with home health    Patient/Family In Agreement With Plan yes    Additional Comments Orders received for home health. Mr. Gagnon has elected to use [facility] for skilled nsg, PT and life alert while under care of home health. PT has assessed Mr. Gagnon on the stairs and have stated he did well. Called referral to [place] with [facility], they anticipate they will see Mr. Gagnon on Saturday. No other needs noted at this time.               Discharge Codes       10/05/17 1111    Discharge Codes    Discharge Codes CC  Home with HHA with [facility]        Expected Discharge Date and Time     Expected Discharge Date Expected Discharge Time    Oct 5, 2017             Martha Byrne RN

## 2017-10-05 NOTE — DISCHARGE SUMMARY
[facility] Medicine Services  DISCHARGE SUMMARY       Date of Admission: 10/3/2017  Date of Discharge:  10/5/2017  Primary Care Physician: Taj De Leon MD  Consulting Physician(s)     Provider Relationship Specialty    Jose Jhoan Rogers MD Consulting Physician Cardiology    Alvaro Chamorro MD Consulting Physician Cardiology    Pretty Roa MD Consulting Physician Neurology        Discharge Diagnoses:  Active Hospital Problems (** Indicates Principal Problem)    Diagnosis Date Noted   • Parkinson's disease [G20] 10/03/2017   • Falls [W19.XXXA] 10/03/2017   • Weakness [R53.1] 10/03/2017   • Skin tear of hand without complication, left, sequela [S61.412S] 10/03/2017   • Sinus bradycardia [R00.1] 10/03/2017     · HRs stable in 50s at rest and increase to 70s with activity.         Resolved Hospital Problems    Diagnosis Date Noted Date Resolved   • **Syncope and collapse [R55] 10/03/2017 10/05/2017     · Presentation of [facility] ED 10/3/17 following prodrome of nausea, vomiting, and diaphoresis followed by LOC. Occurred after recent PD medication increase.      • TIA (transient ischemic attack) [G45.9] 10/03/2017 10/05/2017   • Spell of altered consciousness [R40.4] 10/03/2017 10/05/2017   • First degree atrioventricular block [I44.0] 10/03/2017 10/05/2017     Presenting Problem/History of Present Illness  Spell of altered consciousness [R40.4]     Chief Complaint on Day of Discharge:  Falls and weakness    History of Present Illness on Day of Discharge:   Patient without any new complaints or issues today.  States he feels better and is ready to go home.  No further syncopal episodes.  Denies lightheadedness or dizziness.  No chest pain, palpitations, shortness breath or abdominal pain.  Hospital Course  Patient is a 77 y.o. male without significant past medical history.  Patient presented to [facility] ED after falling at home.  Patient reportedly fell out of his truck after becoming  nauseated.  Patient has had some generalized weakness and was initially called in as a code 19 due to slurred speech and right-sided weakness on EMS arrival.  The patient was diagnosed about a year ago with Parkinson's disease and is followed by Dr. Morse.  According to patient, Dr. Andres (PCP) has been managing his Parkinson's medications.  In ED, CTA head and neck, CT perfusion and MRI brain were unremarkable.  Patient was admitted to the hospital medicine service for further evaluation and management.  Neurology was consulted and stated based on history suspicious for vasovagal response or possibly postprandial syncope given the timing after eating a meal.  For his Parkinson's disease, they recommend discontinuing ropinirole  and starting Sinemet half a tablet 3 times a day with titration as tolerated in the outpatient setting.  Cleared for discharge from neurology, recommended following up with Dr. Lopez in neurology clinic.  Cardiology was consulted for bradycardia and possible first-degree AV block.  They report symptomatology sounds incompatible to cardiogenic syncope.  Cardiology does not feel that these symptoms can account for his syncope.  No further cardiac testing recommended at this time.  Patient has improved clinically and is medically stable and ready for discharge home today.  He does live alone and has stairs in his home.  Physical therapy has worked with patient and made sure he was able to ambulate on stairs.  Patient will be set up with [facility] and they will provide a life alert for patient.  Discharge plans and instructions were reviewed with patient and he verbalized an understanding.  He will follow up with his PCP within 1 week for hospital follow up.      Procedures Performed:  none       Consults:   Consults     Date and Time Order Name Status Description    10/3/2017 0572 Inpatient Consult to Cardiology Completed     10/3/2017 3355 Inpatient Consult to Neurology Completed      10/3/2017 1605 Consult Interventional Neurologist and/or Stroke Team Completed         Pertinent Test Results:    Results from last 7 days  Lab Units 10/04/17  0816 10/03/17  1612 10/03/17  1605   WBC 10*3/mm3 6.58 6.32  --    HEMOGLOBIN g/dL 14.4 14.6  --    HEMOGLOBIN, POC g/dL  --   --  14.6   HEMATOCRIT % 42.3 43.3  --    HEMATOCRIT POC %  --   --  43   PLATELETS 10*3/mm3 183 187  --        Results from last 7 days  Lab Units 10/04/17  0816 10/03/17  1605   SODIUM mmol/L 138  --    POTASSIUM mmol/L 3.4*  --    CHLORIDE mmol/L 104  --    CO2 mmol/L 29.0  --    BUN mg/dL 12  --    CREATININE mg/dL 1.00 1.10   GLUCOSE mg/dL 86  --    CALCIUM mg/dL 9.4  --    Results for JENELLE BAE (MRN [id number]) as of 10/5/2017 11:17   Ref. Range 10/4/2017 08:16   Total Cholesterol Latest Ref Range: 0 - 200 mg/dL 164   HDL Cholesterol Latest Ref Range: 40 - 60 mg/dL 46   LDL Cholesterol  Latest Ref Range: 0 - 130 mg/dL 104   Triglycerides Latest Ref Range: 0 - 150 mg/dL 74   Results for JENELLE BAE (MRN [id number]) as of 10/5/2017 11:17   Ref. Range 10/3/2017 16:12   Hemoglobin A1C Latest Ref Range: 4.80 - 5.60 % 4.80     Imaging Results (last 72 hours)     Procedure Component Value Units Date/Time    MRI Brain Without Contrast [[id number]]  (Abnormal) Collected:  10/03/17 1656     Updated:  10/03/17 1957    Narrative:       EXAM:    MR Head Without Intravenous Contrast    CLINICAL HISTORY:    77 years old, male; Signs and symptoms; Weakness, extremity; Right; Patient   HX: Neurologic problem. His time at last known normal was today at 1449. 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   He called a neighbor for help who reported normal mentation at the time. Upon   ems arrival the patient was on the ground. He was disoriented and confused with   significant slurred speech and right sided weakness per ems. There was evidence   of prior emesis on scene. Upon arrival in CT, ems  report that his weakness had   improved. It is apparent that the patient recently started new medication for   parkinson's management. He sees dr. Holley, neurology. He is tremulous at   baseline. Patient says he did fall and hit head when he had his syncopal   episode, face is red around left orbit no HX of surgery to head HX of prostate   cancer; Additional info: CVA eval, transient right weakness    TECHNIQUE:    Magnetic resonance images of the head/brain without intravenous contrast in   multiple planes.    COMPARISON:    CT CEREBRAL PERFUSION W WO CONTRAST 10/3/2017 4:13:44 PM    FINDINGS:    A few nonspecific foci of T2 prolongation within the subcortical and   periventricular white matter without evidence of restricted diffusion in the   supra-or infratentorial brain. Remainder of the brain parenchyma demonstrates   normal signal intensity without an intra- or extra-axial mass or abnormality.   No mass effect or midline shift.       Midline brain structures including the corpus callosum, pituitary gland,   pineal region, and craniovertebral junction are unremarkable. Ventricles and   sulci are mildly prominent without evidence of hydrocephalus. Intracranial   vessels demonstrate a normal flow-void.  Mild bilateral mastoid effusion.      Impression:         Mild chronic microvascular ischemic changes otherwise unremarkable study   without a supra-, infratentorial mass or abnormality; no evidence of   hemorrhagic or ischemic infarct and no hydrocephalus.      THIS DOCUMENT HAS BEEN ELECTRONICALLY SIGNED BY LORNA HUITRON MD    CT Cervical Spine Without Contrast [[id number]] Collected:  10/03/17 1619     Updated:  10/03/17 2226    Narrative:       EXAMINATION: CT CERVICAL SPINE WO CONTRAST-10/03/2017:      INDICATION: Code 19, fall.         TECHNIQUE: Spiral acquisition 2 mm axial images through the cervical  spine with sagittal and coronal 2 mm 2-D reconstructions.     The radiation dose reduction device was  turned on for each scan per the  ALARA (As Low as Reasonably Achievable) protocol.     COMPARISON: NONE.     FINDINGS: Cervical vertebral body heights are well maintained. Alignment  appears grossly normal on the sagittal imaging series. There is advanced  C4-5, C5-6 and C6-7 degenerative disc disease. Prevertebral soft tissues  appear normal. Posterior elements appear intact. Coronal images show no  evidence of acute trauma. Axial bone window images show no evidence of  skull base fracture. The dens and ring of C1 appear normally aligned. No  vertebral body fracture or posterior element fracture is seen on the  axial series. Soft tissue axial images show no evidence of paraspinous  hematoma and no gross evidence of cervical spinal stenosis.       Impression:       Cervical spine degenerative disc disease but no evidence of  acute cervical spine trauma and no gross evidence of cervical spine  stenosis.     D:  10/03/2017  E:  10/03/2017           This report was finalized on 10/3/2017 10:24 PM by DR. Tavares Mckeon MD.       XR Hand 3+ View Left [[id number]]  (Abnormal) Collected:  10/03/17 2246     Updated:  10/04/17 0653    Narrative:       EXAM:    XR Left Hand Complete, 3 or More Views    CLINICAL HISTORY:    77 years old, male; Parkinson's disease; Other cervical disc degeneration,   unspecified cervical region; Other intervertebral disc degeneration, thoracic   region; Transient alteration of awareness; Weakness; Syncope and collapse;   Other specified injuries of unspecified part of neck, initial encounter; Pain;   Hand; Left; Additional info: Trauma during fall    TECHNIQUE:    Frontal, lateral and oblique views of the left hand.    COMPARISON:    No relevant prior studies available.    FINDINGS:    Diffuse osteopenia, chronic degenerative changes. Otherwise unremarkable   appearing bones and joints without evidence of a fracture line.  No discrete   lytic or blastic lesion. Visualized soft tissues are normal.       Impression:         Osteopenia, chronic degenerative changes without acute bony abnormality or   injury.     THIS DOCUMENT HAS BEEN ELECTRONICALLY SIGNED BY LORNA HUITRON MD    XR Knee 1 or 2 View Left [[id number]]  (Abnormal) Collected:  10/03/17 2246     Updated:  10/04/17 0654    Narrative:       EXAM:    XR Left Knee, 1 or 2 views    CLINICAL HISTORY:    77 years old, male; Parkinson's disease; Other cervical disc degeneration,   unspecified cervical region; Other intervertebral disc degeneration, thoracic   region; Transient alteration of awareness; Weakness; Syncope and collapse;   Other specified injuries of unspecified part of neck, initial encounter; Signs   and symptoms; Other: See notes; Prior surgery; Surgery type: Replacement;   Additional info: Decreased rom and pain after fall.    TECHNIQUE:    Frontal and/or lateral views of the left knee.    COMPARISON:    No relevant prior studies available.    FINDINGS:    Diffuse osteopenia, LEFT knee arthroplasty without hardware complications.    Linear hyperdensities within the cutaneous soft tissues suggestive of varicose   veins.       Remainder of the visualized bones and joints are unremarkable. No discrete   lytic or blastic lesion.       Impression:         Chronic postoperative changes without acute bony abnormality or injury.     THIS DOCUMENT HAS BEEN ELECTRONICALLY SIGNED BY LORNA HUITRON MD    CT Thoracic Spine Without Contrast [[id number]] Collected:  10/04/17 0943     Updated:  10/04/17 1118    Narrative:       EXAMINATION: CT SCAN OF THE THORACIC SPINE - 10/03/2017     HISTORY: Back pain, trauma     TECHNIQUE: CT scan of the thoracic spine was performed at 2 mm intervals  in the axial planes with images reconstructed in the sagittal coronal  projections. The radiation dose reduction device was turned on for each  scan per the ALARA (As Low as Reasonably Achievable) protocol.      COMPARISON: None]      FINDINGS: There is mild diffuse osteopenia.  There are moderate  degenerative changes of the mid and lower thoracic spine. However, there  is no compression fracture. There is no subluxation. There is no blastic  or lytic process. The facets articulate normally. The spinous processes  are intact.       Impression:       There are no acute findings. There is osteopenia and  degenerative change.     D:  10/03/2017  E:  10/04/2017     This report was finalized on 10/4/2017 11:16 AM by Dr. David Le MD.       XR Chest 1 View [[id number]] Collected:  10/04/17 0926     Updated:  10/04/17 2130    Narrative:       EXAMINATION: XR CHEST 1 VW-10/03/2017:      INDICATION: Acute Stroke Protocol (onset < 12 hrs).      COMPARISON: NONE.     FINDINGS: The heart shadow is in the upper range of normal size for  portable film technique. The vasculature appears slightly cephalized as  well. There is no evidence of overt pulmonary edema, lung consolidation,  or pneumothorax. A couple of eventrations are noted at the right  hemidiaphragm.           Impression:       Heart and vasculature in the upper range of normal size. No  clearly acute chest disease is seen.     D:  10/03/2017  E:  10/04/2017     This report was finalized on 10/4/2017 9:28 PM by DR. Tavares Mckeon MD.       CT Head Without Contrast Stroke Protocol [[id number]] Collected:  10/04/17 0917     Updated:  10/04/17 2138    Narrative:       EXAMINATION: CT HEAD WO CONTRAST STROKE PROTOCOL- 10/03/2017     INDICATION: Code 19         TECHNIQUE: 5 mm unenhanced images through the brain     The radiation dose reduction device was turned on for each scan per the  ALARA (As Low as Reasonably Achievable) protocol.     COMPARISON: NONE     FINDINGS: The calvarium appears intact. Included paranasal sinuses and  mastoids appear clear. Soft tissue window images show no evidence of  hemorrhage, contusion, edema, mass or mass effect, acute infarct,  hydrocephalus or abnormal extra-axial collection. A prominent right  parietal sulcus  on axial image 24 may represent a small old infarct, but  may be incidental. No potential old infarct is seen elsewhere.       Impression:       No evidence of acute infarct or other acute intracranial  disease.     NOTE: Exam time is shown as 1557. Study was reviewed and discussed with  Dr. Butt at 1559.     D:  10/03/2017  E:  10/04/2017        This report was finalized on 10/4/2017 9:36 PM by DR. Tavares Mckeon MD.       CT Cerebral Perfusion With & Without Contrast [[id number]] Collected:  10/04/17 0940     Updated:  10/04/17 2149    Narrative:       EXAMINATION: CT CEREBRAL PERFUSION WITH AND WITHOUT CONTRAST-10/03/2017:        INDICATION: Code 19.         TECHNIQUE: Cerebral perfusion analysis was performed using computed  tomography with contrast administration including post processing of  parametric maps with determination of cerebral blood flow, cerebral  blood volume and mean transit time.     The radiation dose reduction device was turned on for each scan per the  ALARA (As Low as Reasonably Achievable) protocol.     COMPARISON: Unenhanced head CT scan of same date.     FINDINGS: Perfusion images show no consistent abnormality of transit  time or time to drain. There may be slightly increased time to drain in  the anterior left watershed region, where there is also slightly  decreased cerebral blood flow. No cerebral blood volume reduction is  seen in this area. This is a minimal, and questionable finding however.       Impression:       No definite perfusion abnormality. Questionable focal slow  flow in the anterior left watershed region, but barely above the level  of artifact. Please correlate with symptoms.     D:  10/03/2017  E:  10/04/2017  1.    This report was finalized on 10/4/2017 9:47 PM by DR. Tavares Mckeon MD.       CT Angiogram Neck With & Without Contrast [[id number]] Collected:  10/04/17 0924     Updated:  10/04/17 2150    Narrative:       EXAMINATION: CT ANGIOGRAM NECK WITH AND WITHOUT  CONTRAST-10/03/2017:      INDICATION: Code 19.         TECHNIQUE: Pre and post-contrast 0.75 mm axial images through the neck  with sagittal and coronal 2-D reconstructions of the carotid arteries.     The radiation dose reduction device was turned on for each scan per the  ALARA (As Low as Reasonably Achievable) protocol.     COMPARISON: NONE.     FINDINGS: Brachiocephalic vessels appear to arise normally from the  aortic arch. Proximal subclavian arteries appear grossly normal.     On the right, no evidence of significant common internal or external  carotid artery stenosis is seen.     On the left, no evidence of hemodynamically significant common internal  or external carotid artery stenosis is seen.     Vertebral arteries appear symmetric and grossly normal. No significant  incidental soft tissue mass or inflammatory change is seen in the neck.       Impression:       No evidence of hemodynamically significant carotid stenosis  in the neck.     D:  10/03/2017  E:  10/04/2017           This report was finalized on 10/4/2017 9:48 PM by DR. Tavares Mckeon MD.       CT Angiogram Head With & Without Contrast [[id number]] Collected:  10/04/17 0922     Updated:  10/04/17 2150    Narrative:       EXAMINATION: CT ANGIOGRAM HEAD WITH AND WITHOUT CONTRAST-10/03/2017:      INDICATION: Code 19.         TECHNIQUE: Pre and post-contrast 0.75 mm axial images through the brain  with axial, sagittal and coronal 2-D reconstructions.     The radiation dose reduction device was turned on for each scan per the  ALARA (As Low as Reasonably Achievable) protocol.     COMPARISON: NONE.     FINDINGS: The distal vertebral arteries, basilar artery, and posterior  cerebral arteries appear normal.     The intracranial portions of the internal carotid arteries, the anterior  middle and cerebral arteries and proximal branches appear normal. There  is no evidence of significant stenosis and no gross evidence of vascular  malformation.        "Impression:       Brain CTA appears within normal limits.     D:  10/03/2017  E:  10/04/2017           This report was finalized on 10/4/2017 9:48 PM by DR. Tavares Mckeon MD.           Condition on Discharge:  stable    Physical Exam on Discharge:/79 (BP Location: Left arm, Patient Position: Lying)  Pulse 55  Temp 97.3 °F (36.3 °C) (Oral)   Resp 16  Ht 68\" (172.7 cm)  Wt 204 lb 8 oz (92.8 kg)  SpO2 95%  BMI 31.09 kg/m2  Physical Exam  Gen-no acute distress  Head-left sided contusion and bruising  CV-RRR, S1 S2 normal, no m/r/g  Resp-CTAB, no wheezes  Abd-soft, NT, ND, +BS  Ext-no edema  Neuro-A&Ox3, no focal deficits  Psych-appropriate mood    Discharge Disposition  Home-Health Care Svc    Discharge Medications   Jhoan Gagnon   Home Medication Instructions AARON:[id number]    Printed on:10/05/17 1136   Medication Information                      carbidopa-levodopa (SINEMET)  MG per tablet  Take 0.5 tablets by mouth 3 (Three) Times a Day With Meals.               Discharge Diet:   Diet Instructions     Diet: Regular       Discharge Diet:  Regular               Discharge Care Plan / Instructions:    Activity at Discharge:   Activity Instructions     Activity as Tolerated                   Follow-up Appointments  No future appointments.  Additional Instructions for the Follow-ups that You Need to Schedule     Ambulatory Referral to Home Health    As directed    Face to Face Visit Date:  10/5/2017   Follow-up Provider for Plan of Care?:  I treated the patient in an acute care facility and will not continue treatment after discharge.   Follow-up Provider:  LM BLANDON   Reason/Clinical Findings:  s/p hospitalization for syncope/collapse. Lives alone.   Describe mobility limitations that make leaving home difficult:  s/p hospitalization for syncope/collapse. Lives alone.   Nursing/Therapeutic Services Requested:   Skilled Nursing Comment - Life Alert while under management of home health  Physical " Therapy  Other      Skilled nursing orders:   Other Comment - life Alert while under management of home health  Cardiopulmonary assessments  Neurovascular assessments      PT orders:   Home safety assessment  Strengthening  Therapeutic exercise      Frequency:  1 Week 1       Discharge Follow-Up With Specified Provider    As directed    To:  Dr. Lopez   Follow Up Details:  1-2 weeks       Discharge Follow-up with PCP    As directed    Follow Up Details:  within 1 week for hospital follow up                 Test Results Pending at Discharge       YONG Miller 10/05/17 11:36 AM    Time: Discharge 40 min    Please note that portions of this note may have been completed with a voice recognition program. Efforts were made to edit the dictations, but occasionally words are mistranscribed.

## 2017-10-05 NOTE — THERAPY TREATMENT NOTE
Acute Care - Physical Therapy Treatment Note  [facility]     Patient Name: Jhoan Gagnon  : 1940  MRN: [id number]  Today's Date: 10/5/2017  Onset of Illness/Injury or Date of Surgery Date: 10/03/17  Date of Referral to PT: 10/03/17  Referring Physician: YONG Vera    Admit Date: 10/3/2017    Visit Dx:    ICD-10-CM ICD-9-CM   1. Spell of altered consciousness R40.4 780.09   2. Right sided weakness R53.1 728.87   3. Syncope and collapse R55 780.2   4. Parkinson disease G20 332.0   5. DDD (degenerative disc disease), cervical M50.30 722.4   6. 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 Blunt trauma of neck, initial encounter S19.80XA 959.09   8. Impaired functional mobility, balance, gait, and endurance Z74.09 V49.89   9. Impaired mobility and ADLs Z74.09 799.89   10. Parkinson's disease G20 332.0   11. Fall, initial encounter W19.XXXA E888.9   12. Weakness R53.1 780.79     Patient Active Problem List   Diagnosis   • Parkinson's disease   • Falls   • Weakness   • Skin tear of hand without complication, left, sequela   • Sinus bradycardia               Adult Rehabilitation Note       10/05/17 0953          Rehab Assessment/Intervention    Discipline physical therapist  -KM      Document Type therapy note (daily note)  -KM      Subjective Information agree to therapy  -KM      Patient Effort, Rehab Treatment good  -KM      Precautions/Limitations fall precautions  -KM      Recorded by [KM] Marilu Barnett, PT      Pain Assessment    Pain Assessment No/denies pain  -KM      Recorded by [KM] Marilu Barnett, PT      Cognitive Assessment/Intervention    Current Cognitive/Communication Assessment functional  -KM      Orientation Status oriented x 4  -KM      Follows Commands/Answers Questions able to follow single-step instructions;100% of the time  -KM      Personal Safety WNL/WFL  -KM      Personal Safety Interventions fall prevention program maintained  -KM      Recorded by  [KM] Marilu Barnett, PT      Bed Mobility, Assessment/Treatment    Bed Mobility, Assistive Device bed rails  -KM      Bed Mob, Supine to Sit, [place] independent  -KM      Recorded by [KM] Marilu Barnett PT      Transfer Assessment/Treatment    Transfers, Sit-Stand [place] supervision required  -KM      Transfers, Stand-Sit [place] supervision required  -KM      Transfers, Sit-Stand-Sit, Assist Device rolling walker  -KM      Recorded by [KM] Marilu Barnett, HILLARY      Gait Assessment/Treatment    Gait, [place] Level stand by assist  -KM      Gait, Assistive Device rolling walker  -KM      Gait, Distance (Feet) 300  -KM      Gait, Gait Deviations cedric decreased  -KM      Recorded by [KM] Marilu Barnett PT      Stairs Assessment/Treatment    Number of Stairs 12  -KM      Stairs, Handrail Location both sides  -KM      Stairs, [place] Level independent  -KM      Stairs, Technique Used step over step (ascending);step to step (descending)  -KM      Recorded by [KM] Marilu Barnett PT      Therapy Exercises    Bilateral Lower Extremities AROM:;10 reps;sitting;ankle pumps/circles;hip flexion;LAQ  -KM      Recorded by [KM] Marilu Barnett PT      Positioning and Restraints    Pre-Treatment Position in bed  -KM      Post Treatment Position bathroom  -KM      Bathroom notified nsg;call light within reach;sitting  -KM      Recorded by [KM] Marilu Barnett, PT        User Key  (r) = Recorded By, (t) = Taken By, (c) = Cosigned By    Initials Name Effective Dates    KM Marilu Barnett PT 06/19/15 -                 IP PT Goals       10/05/17 1027 10/04/17 1012       Transfer Training PT LTG    Transfer Training PT LTG, Date Established  10/04/17  -KM     Transfer Training PT LTG, Time to Achieve  2 wks  -KM     Transfer Training PT LTG, Activity Type  bed to chair /chair to bed;sit to stand/stand to sit  -KM     Transfer Training PT LTG, [place]  Level  independent  -KM     Transfer Training PT LTG, Outcome goal met  -KM      Gait Training PT LTG    Gait Training Goal PT LTG, Date Established  10/04/17  -KM     Gait Training Goal PT LTG, Time to Achieve  2 wks  -KM     Gait Training Goal PT LTG, [place] Level  independent  -KM     Gait Training Goal PT LTG, Assist Device  cane, straight  -KM     Gait Training Goal PT LTG, Distance to Achieve  400  -KM     Gait Training Goal PT LTG, Outcome goal ongoing  -KM      Stair Training PT LTG    Stair Training Goal PT LTG, Date Established  10/04/17  -KM     Stair Training Goal PT LTG, Time to Achieve  2 wks  -KM     Stair Training Goal PT LTG, [place] Level  independent  -KM     Stair Training Goal PT LTG, Assist Device  2 handrails  -KM     Stair Training Goal PT LTG, Distance to Achieve  12  -KM     Stair Training Goal PT LTG, Outcome goal met  -KM        User Key  (r) = Recorded By, (t) = Taken By, (c) = Cosigned By    Initials Name Provider Type    MAT Barnett, PT Physical Therapist          Physical Therapy Education     Title: PT OT SLP Therapies (Active)     Topic: Physical Therapy (Done)     Point: Mobility training (Done)    Learning Progress Summary    Learner Readiness Method Response Comment Documented by Status   Patient Acceptance E [facility] 10/05/17 1030 Done    Eager E [facility] 10/04/17 1016 Done               Point: Home exercise program (Done)    Learning Progress Summary    Learner Readiness Method Response Comment Documented by Status   Patient Acceptance E   KM 10/05/17 1030 Done    Eager E   KM 10/04/17 1016 Done               Point: Body mechanics (Done)    Learning Progress Summary    Learner Readiness Method Response Comment Documented by Status   Patient Acceptance E [facility] 10/05/17 1030 Done    Eager E [facility] 10/04/17 1016 Done               Point: Precautions (Done)    Learning Progress Summary    Learner Readiness Method Response Comment Documented by Status    Patient Acceptance E [facility] 10/05/17 1030 Done    Eager E [facility] 10/04/17 1016 Done                      User Key     Initials Effective Dates Name Provider Type Discipline     06/19/15 -  Marilu Barnett PT Physical Therapist PT                    PT Recommendation and Plan  Anticipated Discharge Disposition: home with home health  Plan of Care Review  Plan Of Care Reviewed With: patient  Progress: improving  Outcome Summary/Follow up Plan: Pt has met stair and transfer goal, approaching d/c status. To benefit from skillee svcs to transition from r wx to st cane use, recommend HHPT          Outcome Measures       10/05/17 0953 10/04/17 1441 10/04/17 0851    How much help from another person do you currently need...    Turning from your back to your side while in flat bed without using bedrails? 4  -KM  4  -KM    Moving from lying on back to sitting on the side of a flat bed without bedrails? 4  -KM  4  -KM    Moving to and from a bed to a chair (including a wheelchair)? 4  -KM  3  -KM    Standing up from a chair using your arms (e.g., wheelchair, bedside chair)? 4  -KM  3  -KM    Climbing 3-5 steps with a railing? 4  -KM  3  -KM    To walk in hospital room? 3  -KM  3  -KM    AM-PAC 6 Clicks Score 23  -KM  20  -KM    How much help from another is currently needed...    Putting on and taking off regular lower body clothing?  2  -AN     Bathing (including washing, rinsing, and drying)  2  -AN     Toileting (which includes using toilet bed pan or urinal)  3  -AN     Putting on and taking off regular upper body clothing  3  -AN     Taking care of personal grooming (such as brushing teeth)  3  -AN     Eating meals  4  -AN     Score  17  -AN     Functional Assessment    Outcome Measure Options AM-PAC 6 Clicks Basic Mobility (PT)  -KM AM-PAC 6 Clicks Daily Activity (OT)  -AN AM-PAC 6 Clicks Basic Mobility (PT)  -KM      User Key  (r) = Recorded By, (t) = Taken By, (c) = Cosigned By    Initials Name Provider Type     KM Marilu Barnett, PT Physical Therapist    ALEX Hurtado, OT Occupational Therapist           Time Calculation:         PT Charges       10/05/17 1142          Time Calculation    Start Time 0953  -KM      PT Received On 10/05/17  -KM      PT Goal Re-Cert Due Date 10/14/17  -KM      Time Calculation- PT    Total Timed Code Minutes- PT 23 minute(s)  -KM        User Key  (r) = Recorded By, (t) = Taken By, (c) = Cosigned By    Initials Name Provider Type    MAT Barnett, PT Physical Therapist          Therapy Charges for Today     Code Description Service Date Service Provider Modifiers Qty    42009807882 HC PT EVAL LOW COMPLEXITY 4 10/4/2017 Marilu Barnett, PT GP 1    72595217335 HC PT MOBILITY CURRENT 10/4/2017 Marilu Barnett, PT GP,  1    21136901905 HC PT MOBILITY PROJECTED 10/4/2017 Marilu Barnett, PT GP, CH 1    26971688654 HC PT THER PROC EA 15 MIN 10/5/2017 Marilu Barnett, PT GP 1    22839705939 HC GAIT TRAINING EA 15 MIN 10/5/2017 Marilu Barnett, PT GP 1          PT G-Codes  Outcome Measure Options: AM-PAC 6 Clicks Basic Mobility (PT)  Score: 20  Functional Limitation: Mobility: Walking and moving around  Mobility: Walking and Moving Around Current Status (): At least 20 percent but less than 40 percent impaired, limited or restricted  Mobility: Walking and Moving Around Goal Status (): 0 percent impaired, limited or restricted    Marilu Barnett, PT  10/5/2017

## 2017-10-05 NOTE — PLAN OF CARE
Problem: Patient Care Overview (Adult)  Goal: Plan of Care Review  Outcome: Ongoing (interventions implemented as appropriate)    10/05/17 1027   Coping/Psychosocial Response Interventions   Plan Of Care Reviewed With patient   Patient Care Overview   Progress improving   Outcome Evaluation   Outcome Summary/Follow up Plan Pt has met stair and transfer goal, approaching d/c status. To benefit from skillee svcs to transition from r wx to st cane use, recommend HHPT         Problem: Inpatient Physical Therapy  Goal: Transfer Training Goal 1 LTG- PT  Outcome: Outcome(s) achieved Date Met:  10/05/17    10/05/17 1027   Transfer Training PT LTG   Transfer Training PT LTG, Outcome goal met       Goal: Gait Training Goal LTG- PT  Outcome: Ongoing (interventions implemented as appropriate)    10/05/17 1027   Gait Training PT LTG   Gait Training Goal PT LTG, Outcome goal ongoing       Goal: Stair Training Goal LTG- PT  Outcome: Outcome(s) achieved Date Met:  10/05/17    10/05/17 1027   Stair Training PT LTG   Stair Training Goal PT LTG, Outcome goal met

## 2017-10-12 ENCOUNTER — OFFICE VISIT (OUTPATIENT)
Dept: NEUROLOGY | Facility: CLINIC | Age: 77
End: 2017-10-12

## 2017-10-12 VITALS
WEIGHT: 204 LBS | DIASTOLIC BLOOD PRESSURE: 72 MMHG | SYSTOLIC BLOOD PRESSURE: 124 MMHG | HEIGHT: 68 IN | BODY MASS INDEX: 30.92 KG/M2

## 2017-10-12 DIAGNOSIS — R55 SYNCOPE AND COLLAPSE: ICD-10-CM

## 2017-10-12 DIAGNOSIS — G20 PARKINSON'S DISEASE (HCC): Primary | ICD-10-CM

## 2017-10-12 DIAGNOSIS — R11.0 NAUSEA: ICD-10-CM

## 2017-10-12 DIAGNOSIS — R27.0 ATAXIA: ICD-10-CM

## 2017-10-12 DIAGNOSIS — R53.1 WEAKNESS: ICD-10-CM

## 2017-10-12 PROCEDURE — 99214 OFFICE O/P EST MOD 30 MIN: CPT | Performed by: PSYCHIATRY & NEUROLOGY

## 2017-10-12 RX ORDER — ONDANSETRON 4 MG/1
4 TABLET, FILM COATED ORAL DAILY PRN
Qty: 30 TABLET | Refills: 1 | Status: SHIPPED | OUTPATIENT
Start: 2017-10-12 | End: 2018-01-25

## 2017-10-12 RX ORDER — ROPINIROLE 0.25 MG/1
TABLET, FILM COATED ORAL
Refills: 0 | COMMUNITY
Start: 2017-08-24 | End: 2017-10-12 | Stop reason: SINTOL

## 2017-10-12 RX ORDER — CARBIDOPA AND LEVODOPA 25; 100 MG/1; MG/1
1 TABLET, EXTENDED RELEASE ORAL 2 TIMES DAILY
Qty: 60 TABLET | Refills: 11 | Status: SHIPPED | OUTPATIENT
Start: 2017-10-12 | End: 2018-01-25 | Stop reason: SINTOL

## 2017-10-12 NOTE — PROGRESS NOTES
Subjective:     Patient ID: Jhoan Gagnon is a 77 y.o. male.    CC:   Chief Complaint   Patient presents with   • Parkinson's Disease       HPI:   History of Present Illness  The following portions of the patient's history were reviewed and updated as appropriate: allergies, current medications, past family history, past medical history, past social history, past surgical history and problem list.     78 y/o WM with several years of progressive stiffness and tremor, worse on the right. Diagnosed with PD by Dr. Coffman last year. He has tolerated Sinemet and ropinirole poorly because of nausea, recently admitted for evaluation of syncope preceded by nausea and emesis, felt to be vasovagal, possibly nedication related. MRI of brain was normal for age. He was seen by neurology and cardiology, discharged on regular Sinemet 25/100 1/2 tab tid that may not have been taking regularly. Concerned about tremor and stiffness. Denies further syncope, focal symptoms.    Past Medical History:   Diagnosis Date   • Broken arms    • Cancer     prostate   • Lower back injury    • Parkinson's disease        Past Surgical History:   Procedure Laterality Date   • CHOLECYSTECTOMY     • PROSTATE SURGERY     • REPLACEMENT TOTAL KNEE BILATERAL         Social History     Social History   • Marital status:      Spouse name: N/A   • Number of children: N/A   • Years of education: N/A     Occupational History   • Not on file.     Social History Main Topics   • Smoking status: Never Smoker   • Smokeless tobacco: Never Used   • Alcohol use No   • Drug use: No   • Sexual activity: Defer      Comment: . wife passed away in 2008     Other Topics Concern   • Not on file     Social History Narrative    Pt Lives Alone        Family History   Problem Relation Age of Onset   • Heart disease Mother    • Cancer Father    • Stroke Father    • Stroke Maternal Grandmother    • Cancer Paternal Grandfather         Review of Systems    Constitutional: Negative for chills, fatigue, fever and unexpected weight change.   HENT: Negative for ear pain, hearing loss, nosebleeds, rhinorrhea and sore throat.    Eyes: Negative for photophobia, pain, discharge, itching and visual disturbance.   Respiratory: Negative for cough, chest tightness, shortness of breath and wheezing.    Cardiovascular: Negative for chest pain, palpitations and leg swelling.   Gastrointestinal: Negative for abdominal pain, blood in stool, constipation, diarrhea, nausea and vomiting.   Genitourinary: Negative for dysuria, frequency, hematuria and urgency.   Musculoskeletal: Positive for gait problem. Negative for arthralgias, back pain, joint swelling, myalgias, neck pain and neck stiffness.   Skin: Negative for rash and wound.   Allergic/Immunologic: Negative for environmental allergies and food allergies.   Neurological: Positive for tremors. Negative for dizziness, seizures, syncope, speech difficulty, weakness, light-headedness, numbness and headaches.   Hematological: Negative for adenopathy. Does not bruise/bleed easily.   Psychiatric/Behavioral: Negative for agitation, confusion, decreased concentration, hallucinations, sleep disturbance and suicidal ideas. The patient is not nervous/anxious.         Objective:    Neurologic Exam     Mental Status   Oriented to person, place, and time.     Cranial Nerves     CN III, IV, VI   Pupils are equal, round, and reactive to light.  Extraocular motions are normal.     Motor Exam     Strength   Strength 5/5 throughout.       Physical Exam   Constitutional: He is oriented to person, place, and time. He appears well-developed and well-nourished.   HENT:   Head: Normocephalic and atraumatic.   Eyes: EOM are normal. Pupils are equal, round, and reactive to light.   Neck: Normal range of motion. Neck supple. Carotid bruit is not present.   Cardiovascular: Normal rate, regular rhythm, normal heart sounds and intact distal pulses.     Pulmonary/Chest: Effort normal and breath sounds normal.   Abdominal: Soft. Bowel sounds are normal.   Musculoskeletal: Normal range of motion.   Neurological: He is alert and oriented to person, place, and time. He has normal strength and normal reflexes. No cranial nerve deficit or sensory deficit. He displays a negative Romberg sign. Coordination and gait normal. He displays no Babinski's sign on the right side. He displays no Babinski's sign on the left side.   Mild rigidity, resting hand tremor worse on the right, slow to get up, gait a bit wide based, hypomimic   Skin: Skin is warm.   Psychiatric: He has a normal mood and affect. His behavior is normal. Thought content normal. Cognition and memory are normal.       Assessment/Plan:       Jhoan was seen today for parkinson's disease.    Diagnoses and all orders for this visit:    Parkinson's disease  -     carbidopa-levodopa ER (SINEMET CR)  MG per tablet; Take 1 tablet by mouth 2 (Two) Times a Day.  -     Ambulatory Referral to Physical Therapy    Ataxia  -     Ambulatory Referral to Physical Therapy    Weakness  -     Ambulatory Referral to Physical Therapy    Syncope and collapse    Nausea  -     ondansetron (ZOFRAN) 4 MG tablet; Take 1 tablet by mouth Daily As Needed for Nausea or Vomiting.           Jay Lopez MD  10/22/2017      \

## 2018-01-25 ENCOUNTER — OFFICE VISIT (OUTPATIENT)
Dept: NEUROLOGY | Facility: CLINIC | Age: 78
End: 2018-01-25

## 2018-01-25 VITALS
HEART RATE: 50 BPM | WEIGHT: 200 LBS | HEIGHT: 68 IN | DIASTOLIC BLOOD PRESSURE: 76 MMHG | BODY MASS INDEX: 30.31 KG/M2 | SYSTOLIC BLOOD PRESSURE: 146 MMHG

## 2018-01-25 DIAGNOSIS — R55 SYNCOPE AND COLLAPSE: ICD-10-CM

## 2018-01-25 DIAGNOSIS — G20 PARKINSON'S DISEASE (HCC): Primary | ICD-10-CM

## 2018-01-25 DIAGNOSIS — R11.0 NAUSEA: ICD-10-CM

## 2018-01-25 PROCEDURE — 99213 OFFICE O/P EST LOW 20 MIN: CPT | Performed by: PSYCHIATRY & NEUROLOGY

## 2018-01-25 RX ORDER — ROPINIROLE 0.5 MG/1
TABLET, FILM COATED ORAL
Refills: 1 | COMMUNITY
Start: 2017-12-19 | End: 2018-01-25 | Stop reason: SINTOL

## 2018-03-05 ENCOUNTER — TELEPHONE (OUTPATIENT)
Dept: NEUROLOGY | Facility: CLINIC | Age: 78
End: 2018-03-05

## 2018-03-05 NOTE — TELEPHONE ENCOUNTER
[facility] FROM DR. BRISENO OFFICE CALLED AND STATED THAT THEY SEEN THE PATIENT TODAY AND THE PATIENT TOOK 3 DOSES OF THE NEUPRO AND QUIT TAKING IT BECAUSE IT MADE HIM SICK. THEY ARE SENDING THE OFFICE NOTE AND WONDERING IF HE SHOULD BE SEEN SOONER THAN BASHIR

## 2018-03-13 ENCOUNTER — OFFICE VISIT (OUTPATIENT)
Dept: NEUROLOGY | Facility: CLINIC | Age: 78
End: 2018-03-13

## 2018-03-13 VITALS
SYSTOLIC BLOOD PRESSURE: 138 MMHG | WEIGHT: 210 LBS | DIASTOLIC BLOOD PRESSURE: 78 MMHG | BODY MASS INDEX: 31.83 KG/M2 | HEART RATE: 70 BPM | OXYGEN SATURATION: 97 % | HEIGHT: 68 IN

## 2018-03-13 DIAGNOSIS — G20 PARKINSON'S DISEASE (HCC): Primary | ICD-10-CM

## 2018-03-13 PROCEDURE — 99214 OFFICE O/P EST MOD 30 MIN: CPT | Performed by: NURSE PRACTITIONER

## 2018-03-13 RX ORDER — RASAGILINE 0.5 MG/1
0.5 TABLET ORAL DAILY
Qty: 30 TABLET | Refills: 5 | Status: SHIPPED | OUTPATIENT
Start: 2018-03-13 | End: 2018-06-13

## 2018-03-13 NOTE — PROGRESS NOTES
Subjective:     Patient ID: Jhoan Gagnon is a 77 y.o. male.    CC:   Chief Complaint   Patient presents with   • Parkinson's Disease       HPI:   History of Present Illness     Mr Gagnon Is here today for follow-up on Parkinson's disease.  When he was seen last a few weeks ago he was placed on Neupro patches due to intolerance to Sinemet and Requip in the past with severe nausea and vomiting.  He tells me today he was unable to tolerate the new medication as well, immediate symptoms included nausea and vomiting which was unbearable to him.  He would like to discuss his prognosis and whether he needs to be on any medication at this time.  He feels [place].  He has tremor in bilateral upper extremities right greater than left however he reports his gait is stable, he denies swallowing difficulties or constipation.  He has no recent falls.  The following portions of the patient's history were reviewed and updated as appropriate: allergies, current medications, past family history, past medical history, past social history, past surgical history and problem list.    Past Medical History:   Diagnosis Date   • Broken arms    • Cancer     prostate   • Lower back injury    • Parkinson's disease        Past Surgical History:   Procedure Laterality Date   • CHOLECYSTECTOMY     • PROSTATE SURGERY     • REPLACEMENT TOTAL KNEE BILATERAL         Social History     Social History   • Marital status:      Spouse name: N/A   • Number of children: N/A   • Years of education: N/A     Occupational History   • Not on file.     Social History Main Topics   • Smoking status: Never Smoker   • Smokeless tobacco: Never Used   • Alcohol use No   • Drug use: No   • Sexual activity: Defer      Comment: . wife passed away in 2008     Other Topics Concern   • Not on file     Social History Narrative    Pt Lives Alone        Family History   Problem Relation Age of Onset   • Heart  disease Mother    • Cancer Father    • Stroke Father    • Stroke Maternal Grandmother    • Cancer Paternal Grandfather         Review of Systems   Constitutional: Negative.  Negative for activity change, chills, fatigue, fever and unexpected weight change.   HENT: Negative.    Respiratory: Negative.  Negative for cough, chest tightness, shortness of breath and wheezing.    Cardiovascular: Negative.  Negative for chest pain, palpitations and leg swelling.   Gastrointestinal: Negative.  Negative for abdominal pain, constipation, diarrhea, nausea and vomiting.        Nausea and vomiting only with medications, resolved after stopping neupro patches   Endocrine: Negative.    Genitourinary: Negative.  Negative for dysuria, frequency, hematuria and urgency.   Musculoskeletal: Negative.    Skin: Negative.    Allergic/Immunologic: Negative.  Negative for environmental allergies and food allergies.   Neurological: Positive for tremors. Negative for dizziness, seizures, syncope, facial asymmetry, speech difficulty, weakness, light-headedness, numbness and headaches.   Hematological: Negative.    Psychiatric/Behavioral: Negative.         Objective:    Neurologic Exam     Mental Status   Oriented to person, place, and time.   Attention: normal. Concentration: normal.   Speech: speech is normal   Level of consciousness: alert  Knowledge: consistent with education.   Able to name object. Able to read. Able to repeat. Able to write. Normal comprehension.     Cranial Nerves   Cranial nerves II through XII intact.     CN III, IV, VI   Extraocular motions are normal.     Motor Exam     Strength   Strength 5/5 throughout. resting hand tremor with mild rigidity     Gait, Coordination, and Reflexes     Tremor   Resting tremor: presentSlightly wide based, no shuffling         Physical Exam   Constitutional: He is oriented to person, place, and time. He appears well-developed and well-nourished. No distress.   HENT:   Head: Normocephalic  and atraumatic.   Eyes: EOM are normal.   Neck: Normal range of motion. Neck supple.   Neurological: He is alert and oriented to person, place, and time. He has normal strength.   Psychiatric: He has a normal mood and affect. His speech is normal and behavior is normal. Judgment and thought content normal.   Vitals reviewed.      Assessment/Plan:       Jhoan was seen today for parkinson's disease.    Diagnoses and all orders for this visit:    Parkinson's disease  -     rasagiline (AZILECT) 0.5 MG tablet; Take 1 tablet by mouth Daily.    I had a long discussion with Mr Gagnon about medications and progession of Parkinson's disease.  He is willing to try another medication; we will start azilect at 0.5 mg daily, if he tolerates this without side effects we may increase to 1 mg if needed.  He will call me with any concerns or problems with the medications.  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  If there any changes before his follow up in 3 months he will notify the office.         Cheri Valerio, APRN  3/14/2018

## 2018-06-13 ENCOUNTER — OFFICE VISIT (OUTPATIENT)
Dept: NEUROLOGY | Facility: CLINIC | Age: 78
End: 2018-06-13

## 2018-06-13 VITALS
WEIGHT: 209 LBS | BODY MASS INDEX: 31.78 KG/M2 | SYSTOLIC BLOOD PRESSURE: 132 MMHG | HEART RATE: 58 BPM | DIASTOLIC BLOOD PRESSURE: 72 MMHG | OXYGEN SATURATION: 98 %

## 2018-06-13 DIAGNOSIS — G20 PARKINSON'S DISEASE (HCC): Primary | ICD-10-CM

## 2018-06-13 PROCEDURE — 99213 OFFICE O/P EST LOW 20 MIN: CPT | Performed by: NURSE PRACTITIONER

## 2018-06-13 NOTE — PROGRESS NOTES
Subjective:     Patient ID: Jhoan Gagnon is a 77 y.o. male.    CC:   Chief Complaint   Patient presents with   • Tremors       HPI:   History of Present Illness     Mr. Gagnon is a 77-year-old patient here today for follow-up on Parkinson's disease.  This was first diagnosed several years ago by his prior neurologist.  In the past year and a half he has tried and failed several medications for his tremor.  He was first placed on Sinemet, he was unable to take this due to nausea which actually caused dehydration and syncope.  On Subsequent follow-up visits he was trialed on ropinirole, Neupro and again on Sinemet which he was unable to tolerate.  When I saw him last we trialed Azilect, he reports he took this for several days and was unable to take this as well due to nausea and vomiting.  He has tried Zofran with the medication as well.  He continues to have stiffness, problems walking and resting tremor right greater than left.  He has had no further syncopal episodes.  The following portions of the patient's history were reviewed and updated as appropriate: allergies, current medications, past family history, past medical history, past social history, past surgical history and problem list.    Past Medical History:   Diagnosis Date   • Broken arms    • Cancer     prostate   • Lower back injury    • Parkinson's disease        Past Surgical History:   Procedure Laterality Date   • CHOLECYSTECTOMY     • PROSTATE SURGERY     • REPLACEMENT TOTAL KNEE BILATERAL         Social History     Social History   • Marital status:      Spouse name: N/A   • Number of children: N/A   • Years of education: N/A     Occupational History   • Not on file.     Social History Main Topics   • Smoking status: Never Smoker   • Smokeless tobacco: Never Used   • Alcohol use No   • Drug use: No   • Sexual activity: Defer      Comment: . wife passed away in 2008     Other Topics Concern   • Not on file     Social History  Narrative    Pt Lives Alone        Family History   Problem Relation Age of Onset   • Heart disease Mother    • Cancer Father    • Stroke Father    • Stroke Maternal Grandmother    • Cancer Paternal Grandfather         Review of Systems   Constitutional: Positive for fatigue.   HENT: Negative.    Eyes: Negative.    Respiratory: Negative.    Cardiovascular: Negative.    Gastrointestinal: Negative.    Endocrine: Negative.    Genitourinary: Negative.    Musculoskeletal: Positive for myalgias.   Skin: Negative.    Allergic/Immunologic: Negative.    Neurological: Positive for tremors. Negative for dizziness, seizures, syncope, facial asymmetry, speech difficulty, weakness, light-headedness, numbness and headaches.   Hematological: Negative.    Psychiatric/Behavioral: Negative.         Objective:    Neurologic Exam     Mental Status   Oriented to person, place, and time.   Attention: normal. Concentration: normal.   Speech: speech is normal   Level of consciousness: alert  Knowledge: consistent with education.   Normal comprehension.   Unable to write today due to tremor     Cranial Nerves   Cranial nerves II through XII intact.     CN III, IV, VI   Pupils are equal, round, and reactive to light.  Extraocular motions are normal.     Motor Exam     Strength   Strength 5/5 throughout. Cogwheel rigidity on the right     Gait, Coordination, and Reflexes     Gait  Gait: shuffling    Coordination   Finger to nose coordination: normal    Tremor   Resting tremor: present    Reflexes   Right brachioradialis: 2+  Left brachioradialis: 2+  Right biceps: 2+  Left biceps: 2+  Right triceps: 2+  Left triceps: 2+  Right patellar: 2+  Left patellar: 2+  Right achilles: 2+  Left achilles: 2+  Right : 2+  Left : 2+      Physical Exam   Constitutional: He is oriented to person, place, and time. He appears well-developed and well-nourished.   HENT:   Head: Normocephalic and atraumatic.   Eyes: EOM are normal. Pupils are equal, round,  and reactive to light.   Neurological: He is alert and oriented to person, place, and time. He has normal strength. He has a normal Finger-Nose-Finger Test. Coordination abnormal.   Reflex Scores:       Tricep reflexes are 2+ on the right side and 2+ on the left side.       Bicep reflexes are 2+ on the right side and 2+ on the left side.       Brachioradialis reflexes are 2+ on the right side and 2+ on the left side.       Patellar reflexes are 2+ on the right side and 2+ on the left side.       Achilles reflexes are 2+ on the right side and 2+ on the left side.  Psychiatric: His speech is normal.   Flat affect   Vitals reviewed.      Assessment/Plan:       Jhoan was seen today for tremors.    Diagnoses and all orders for this visit:    Parkinson's disease  -     Ambulatory Referral to Neurology    Mr. Gagnon has unfortunately been intolerant of several medications used for his Parkinson's disease.  He continues to have symptoms of tremor, shuffling gait and stiffness, this is affecting his daily living.  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  We've had a long discussion about further treatment, I do suggest that he be evaluated at  [facility] with Dr. Bashir, he is agreeable.  We actually called today to get him an appointment and they can see him on June 25 at 10 AM.  Reviewed medications, potential side effects and signs and symptoms to report. Discussed risk versus benefits of treatment plan with patient and/or family-including medications, labs and radiology that may be ordered. Addressed questions and concerns during visit. Patient and/or family verbalized understanding and agree with plan.  EMR Dragon/Transcription Disclaimer:  Much of this encounter note is an electronic transcription of spoken language to printed text. Electronic transcription of spoken language may permit erroneous words or phrases to be inadvertently transcribed. Although I have reviewed the note for  such errors, some may still exist in this documentation.           Cheri Valerio, APRN  6/13/2018

## 2018-08-13 ENCOUNTER — OFFICE VISIT (OUTPATIENT)
Dept: NEUROLOGY | Facility: CLINIC | Age: 78
End: 2018-08-13

## 2018-08-13 VITALS
DIASTOLIC BLOOD PRESSURE: 88 MMHG | BODY MASS INDEX: 33.27 KG/M2 | OXYGEN SATURATION: 98 % | HEIGHT: 67 IN | SYSTOLIC BLOOD PRESSURE: 152 MMHG | WEIGHT: 212 LBS | HEART RATE: 48 BPM

## 2018-08-13 DIAGNOSIS — G20 PARKINSON'S DISEASE (HCC): Primary | ICD-10-CM

## 2018-08-13 PROCEDURE — 99212 OFFICE O/P EST SF 10 MIN: CPT | Performed by: NURSE PRACTITIONER

## 2018-08-13 RX ORDER — BENZTROPINE MESYLATE 2 MG/1
1 TABLET ORAL 3 TIMES DAILY
Refills: 6 | Status: ON HOLD | COMMUNITY
Start: 2018-06-27 | End: 2021-06-30

## 2018-08-13 NOTE — PROGRESS NOTES
Subjective:     Patient ID: Jhoan Gagnon is a 78 y.o. male.    CC:   Chief Complaint   Patient presents with   • Tremors       HPI:   History of Present Illness     Mr Gagnon is here today for follow-up on his Parkinson's disease.  He has been unable to tolerate several medications in the past due to severe GI upset.  When I saw him last we did refer him to Dr. Garcia at  [facility].  He did start him on Cogentin 2 mg 3 times a day, and this was titrated over 2 weeks up to therapeutic dose of 3 times a day.  He has been on this medication for a little over a month, he has not noticed much of an improvement in his tremor however he is been able to tolerate the medicine without GI upset.  He has had no falls, he is otherwise stable    The following portions of the patient's history were reviewed and updated as appropriate: allergies, current medications, past family history, past medical history, past social history, past surgical history and problem list.    Past Medical History:   Diagnosis Date   • Broken arms    • Cancer (CMS/HCC)     prostate   • Lower back injury    • Parkinson's disease (CMS/HCC)        Past Surgical History:   Procedure Laterality Date   • CHOLECYSTECTOMY     • PROSTATE SURGERY     • REPLACEMENT TOTAL KNEE BILATERAL         Social History     Social History   • Marital status:      Spouse name: N/A   • Number of children: N/A   • Years of education: N/A     Occupational History   • Not on file.     Social History Main Topics   • Smoking status: Never Smoker   • Smokeless tobacco: Never Used   • Alcohol use No   • Drug use: No   • Sexual activity: Defer      Comment: . wife passed away in 2008     Other Topics Concern   • Not on file     Social History Narrative    Pt Lives Alone        Family History   Problem Relation Age of Onset   • Heart disease Mother    • Cancer Father    • Stroke Father    • Stroke Maternal Grandmother    • Cancer Paternal Grandfather          Review of Systems   Constitutional: Negative.    HENT: Positive for hearing loss.    Eyes: Negative.    Respiratory: Negative.    Cardiovascular: Negative.    Gastrointestinal: Negative.    Endocrine: Negative.    Genitourinary: Negative.    Musculoskeletal: Negative.    Skin: Negative.    Allergic/Immunologic: Negative.    Neurological: Positive for tremors. Negative for dizziness, seizures, syncope, facial asymmetry, speech difficulty, weakness, light-headedness, numbness and headaches.   Hematological: Negative.    Psychiatric/Behavioral: Negative.         Objective:    Neurologic Exam     Mental Status   Oriented to person, place, and time.   Attention: normal. Concentration: normal.   Speech: speech is normal   Level of consciousness: alert  Knowledge: consistent with education.   Able to read. Able to write. Normal comprehension.     Cranial Nerves   Cranial nerves II through XII intact.     CN III, IV, VI   Pupils are equal, round, and reactive to light.  Extraocular motions are normal.     Motor Exam   Muscle bulk: normal  Overall muscle tone: normal    Strength   Strength 5/5 throughout. No rigidity     Gait, Coordination, and Reflexes     Gait  Gait: normal    Tremor   Resting tremor: present (right hand/arm)Slight stooped gait       Physical Exam   Constitutional: He is oriented to person, place, and time. He appears well-developed and well-nourished.   HENT:   Head: Normocephalic and atraumatic.   Eyes: Pupils are equal, round, and reactive to light. EOM are normal.   Neck: Normal range of motion.   Pulmonary/Chest: Effort normal. No respiratory distress.   Neurological: He is alert and oriented to person, place, and time. He has normal strength. Gait normal.   Psychiatric: He has a normal mood and affect. His speech is normal and behavior is normal. Judgment and thought content normal.   Vitals reviewed.      Assessment/Plan:       Jhoan was seen today for tremors.    Diagnoses and all orders for  this visit:    Parkinson's disease (CMS/[facility])  Comments:  cont cogentin, f/u with Radha    Mr. Gagnon is a very pleasant gentleman here today for follow-up.  He has been seen in the  [facility] clinic, he was started on Cogentin 2 mg 3 times a day, he has been in the therapeutic dose for about 3-4 weeks now.  He reports he's not noticed much of an improvement in his tremor however he's tolerating the medicine.  He has follow-up with Dr. Garcia in 5 months.  He will continue follow-up in  [facility]ment clinic, he does wish to continue follow-up as well.  He will let me know if he has problems with medication, I do recommend he continue current dose.  Reviewed medications, potential side effects and signs and symptoms to report. Discussed risk versus benefits of treatment plan with patient and/or family-including medications, labs and radiology that may be ordered. Addressed questions and concerns during visit. Patient and/or family verbalized understanding and agree with plan.  EMR Dragon/Transcription Disclaimer:  Much of this encounter note is an electronic transcription of spoken language to printed text. Electronic transcription of spoken language may permit erroneous words or phrases to be inadvertently transcribed. Although I have reviewed the note for such errors, some may still exist in this documentation.           Cheri Valerio, APRN  8/13/2018

## 2019-03-18 ENCOUNTER — TRANSCRIBE ORDERS (OUTPATIENT)
Dept: ADMINISTRATIVE | Facility: HOSPITAL | Age: 79
End: 2019-03-18

## 2019-03-18 ENCOUNTER — HOSPITAL ENCOUNTER (OUTPATIENT)
Dept: GENERAL RADIOLOGY | Facility: HOSPITAL | Age: 79
Discharge: HOME | End: 2019-03-18
Admitting: INTERNAL MEDICINE

## 2019-03-18 DIAGNOSIS — M25.511 RIGHT SHOULDER PAIN, UNSPECIFIED CHRONICITY: Primary | ICD-10-CM

## 2019-03-18 PROCEDURE — 73030 X-RAY EXAM OF SHOULDER: CPT

## 2019-05-30 ENCOUNTER — HOSPITAL ENCOUNTER (OUTPATIENT)
Dept: GENERAL RADIOLOGY | Facility: HOSPITAL | Age: 79
Discharge: HOME | End: 2019-05-30
Admitting: INTERNAL MEDICINE

## 2019-05-30 ENCOUNTER — TRANSCRIBE ORDERS (OUTPATIENT)
Dept: ADMINISTRATIVE | Facility: HOSPITAL | Age: 79
End: 2019-05-30

## 2019-05-30 DIAGNOSIS — M25.521 PAIN IN JOINT OF RIGHT ELBOW: Primary | ICD-10-CM

## 2019-05-30 PROCEDURE — 73070 X-RAY EXAM OF ELBOW: CPT

## 2019-11-13 ENCOUNTER — HOSPITAL ENCOUNTER (EMERGENCY)
Facility: HOSPITAL | Age: 79
Discharge: HOME | End: 2019-11-13
Attending: EMERGENCY MEDICINE | Admitting: EMERGENCY MEDICINE

## 2019-11-13 ENCOUNTER — APPOINTMENT (OUTPATIENT)
Dept: GENERAL RADIOLOGY | Facility: HOSPITAL | Age: 79
End: 2019-11-13

## 2019-11-13 ENCOUNTER — APPOINTMENT (OUTPATIENT)
Dept: MRI IMAGING | Facility: HOSPITAL | Age: 79
End: 2019-11-13

## 2019-11-13 VITALS
TEMPERATURE: 97.5 F | OXYGEN SATURATION: 96 % | DIASTOLIC BLOOD PRESSURE: 118 MMHG | BODY MASS INDEX: 29.73 KG/M2 | HEART RATE: 62 BPM | HEIGHT: 66 IN | RESPIRATION RATE: 16 BRPM | SYSTOLIC BLOOD PRESSURE: 134 MMHG | WEIGHT: 185 LBS

## 2019-11-13 DIAGNOSIS — R53.81 DECLINING FUNCTIONAL STATUS: ICD-10-CM

## 2019-11-13 DIAGNOSIS — R53.1 WEAKNESS: ICD-10-CM

## 2019-11-13 DIAGNOSIS — G20 PARKINSON DISEASE (HCC): Primary | ICD-10-CM

## 2019-11-13 DIAGNOSIS — R29.898 LEFT ARM WEAKNESS: ICD-10-CM

## 2019-11-13 LAB
ALBUMIN SERPL-MCNC: 3.7 G/DL (ref 3.5–5.2)
ALBUMIN/GLOB SERPL: 1.1 G/DL
ALP SERPL-CCNC: 90 U/L (ref 39–117)
ALT SERPL W P-5'-P-CCNC: 13 U/L (ref 1–41)
ANION GAP SERPL CALCULATED.3IONS-SCNC: 12 MMOL/L (ref 5–15)
AST SERPL-CCNC: 15 U/L (ref 1–40)
BASOPHILS # BLD AUTO: 0.02 10*3/MM3 (ref 0–0.2)
BASOPHILS NFR BLD AUTO: 0.4 % (ref 0–1.5)
BILIRUB SERPL-MCNC: 2.7 MG/DL (ref 0.2–1.2)
BILIRUB UR QL STRIP: NEGATIVE
BUN BLD-MCNC: 24 MG/DL (ref 8–23)
BUN/CREAT SERPL: 27 (ref 7–25)
CALCIUM SPEC-SCNC: 9.2 MG/DL (ref 8.6–10.5)
CHLORIDE SERPL-SCNC: 103 MMOL/L (ref 98–107)
CLARITY UR: CLEAR
CO2 SERPL-SCNC: 25 MMOL/L (ref 22–29)
COLOR UR: YELLOW
CREAT BLD-MCNC: 0.89 MG/DL (ref 0.76–1.27)
DEPRECATED RDW RBC AUTO: 45.1 FL (ref 37–54)
EOSINOPHIL # BLD AUTO: 0.02 10*3/MM3 (ref 0–0.4)
EOSINOPHIL NFR BLD AUTO: 0.4 % (ref 0.3–6.2)
ERYTHROCYTE [DISTWIDTH] IN BLOOD BY AUTOMATED COUNT: 12.8 % (ref 12.3–15.4)
GFR SERPL CREATININE-BSD FRML MDRD: 82 ML/MIN/1.73
GLOBULIN UR ELPH-MCNC: 3.3 GM/DL
GLUCOSE BLD-MCNC: 90 MG/DL (ref 65–99)
GLUCOSE UR STRIP-MCNC: NEGATIVE MG/DL
HCT VFR BLD AUTO: 43.3 % (ref 37.5–51)
HGB BLD-MCNC: 14.3 G/DL (ref 13–17.7)
HGB UR QL STRIP.AUTO: NEGATIVE
IMM GRANULOCYTES # BLD AUTO: 0.02 10*3/MM3 (ref 0–0.05)
IMM GRANULOCYTES NFR BLD AUTO: 0.4 % (ref 0–0.5)
KETONES UR QL STRIP: ABNORMAL
LEUKOCYTE ESTERASE UR QL STRIP.AUTO: NEGATIVE
LYMPHOCYTES # BLD AUTO: 0.81 10*3/MM3 (ref 0.7–3.1)
LYMPHOCYTES NFR BLD AUTO: 14.4 % (ref 19.6–45.3)
MCH RBC QN AUTO: 31.4 PG (ref 26.6–33)
MCHC RBC AUTO-ENTMCNC: 33 G/DL (ref 31.5–35.7)
MCV RBC AUTO: 95 FL (ref 79–97)
MONOCYTES # BLD AUTO: 0.54 10*3/MM3 (ref 0.1–0.9)
MONOCYTES NFR BLD AUTO: 9.6 % (ref 5–12)
NEUTROPHILS # BLD AUTO: 4.23 10*3/MM3 (ref 1.7–7)
NEUTROPHILS NFR BLD AUTO: 74.8 % (ref 42.7–76)
NITRITE UR QL STRIP: NEGATIVE
NRBC BLD AUTO-RTO: 0 /100 WBC (ref 0–0.2)
NT-PROBNP SERPL-MCNC: 520.6 PG/ML (ref 5–1800)
PH UR STRIP.AUTO: 5.5 [PH] (ref 5–8)
PLATELET # BLD AUTO: 175 10*3/MM3 (ref 140–450)
PMV BLD AUTO: 9.2 FL (ref 6–12)
POTASSIUM BLD-SCNC: 3.9 MMOL/L (ref 3.5–5.2)
PROT SERPL-MCNC: 7 G/DL (ref 6–8.5)
PROT UR QL STRIP: ABNORMAL
RBC # BLD AUTO: 4.56 10*6/MM3 (ref 4.14–5.8)
SODIUM BLD-SCNC: 140 MMOL/L (ref 136–145)
SP GR UR STRIP: >=1.03 (ref 1–1.03)
TROPONIN T SERPL-MCNC: <0.01 NG/ML (ref 0–0.03)
UROBILINOGEN UR QL STRIP: ABNORMAL
WBC NRBC COR # BLD: 5.64 10*3/MM3 (ref 3.4–10.8)

## 2019-11-13 PROCEDURE — 73030 X-RAY EXAM OF SHOULDER: CPT

## 2019-11-13 PROCEDURE — 83880 ASSAY OF NATRIURETIC PEPTIDE: CPT | Performed by: NURSE PRACTITIONER

## 2019-11-13 PROCEDURE — 71045 X-RAY EXAM CHEST 1 VIEW: CPT

## 2019-11-13 PROCEDURE — 93005 ELECTROCARDIOGRAM TRACING: CPT | Performed by: EMERGENCY MEDICINE

## 2019-11-13 PROCEDURE — 81003 URINALYSIS AUTO W/O SCOPE: CPT | Performed by: NURSE PRACTITIONER

## 2019-11-13 PROCEDURE — 73070 X-RAY EXAM OF ELBOW: CPT

## 2019-11-13 PROCEDURE — 99285 EMERGENCY DEPT VISIT HI MDM: CPT

## 2019-11-13 PROCEDURE — 85025 COMPLETE CBC W/AUTO DIFF WBC: CPT | Performed by: NURSE PRACTITIONER

## 2019-11-13 PROCEDURE — 70551 MRI BRAIN STEM W/O DYE: CPT

## 2019-11-13 PROCEDURE — 84484 ASSAY OF TROPONIN QUANT: CPT | Performed by: NURSE PRACTITIONER

## 2019-11-13 PROCEDURE — 80053 COMPREHEN METABOLIC PANEL: CPT | Performed by: NURSE PRACTITIONER

## 2019-11-13 RX ORDER — AMLODIPINE BESYLATE 5 MG/1
5 TABLET ORAL DAILY
COMMUNITY
End: 2022-08-20 | Stop reason: HOSPADM

## 2019-11-13 RX ORDER — AMOXICILLIN 500 MG/1
1000 CAPSULE ORAL AS NEEDED
Status: ON HOLD | COMMUNITY
End: 2021-06-30

## 2019-11-13 RX ORDER — RAMIPRIL 5 MG/1
5 CAPSULE ORAL DAILY
Status: ON HOLD | COMMUNITY
End: 2021-06-30

## 2019-11-13 RX ORDER — DIAZEPAM 5 MG/1
5 TABLET ORAL ONCE
Status: COMPLETED | OUTPATIENT
Start: 2019-11-13 | End: 2019-11-13

## 2019-11-13 RX ORDER — DONEPEZIL HYDROCHLORIDE 10 MG/1
10 TABLET, FILM COATED ORAL NIGHTLY
COMMUNITY

## 2019-11-13 RX ADMIN — DIAZEPAM 5 MG: 5 TABLET ORAL at 11:30

## 2019-11-13 NOTE — DISCHARGE PLACEMENT REQUEST
"Jenelle Gagnon (79 y.o. Male)     Date of Birth Social Security Number Address Home Phone MRN    [id number]  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        Admission Date Admission Type Admitting Provider Attending Provider Department, Room/Bed    11/13/19 Emergency  Pamela Brewer MD [facility], 21/21    Discharge Date Discharge Disposition Discharge Destination                       Attending Provider:  Pamela Brewer MD    Allergies:  No Known Allergies    Isolation:  None   Infection:  None   Code Status:  Prior    Ht:  167.6 cm (66\")   Wt:  83.9 kg (185 lb)    Admission Cmt:  None   Principal Problem:  None                Active Insurance as of 11/13/2019     Primary Coverage     Payor Plan Insurance Group Employer/Plan Group    MEDICARE MEDICARE A & B      Payor Plan Address Payor Plan Phone Number Payor Plan Fax Number Effective Dates    [address] [phone]  6/1/2003 - None Entered    [facility] [id number]       Subscriber Name Subscriber Birth Date Member ID       JENELLE GAGNON 1940 [id number]           Secondary Coverage     Payor Plan Insurance Group Employer/Plan Group    ANTHEM BLUE CROSS [facility] [facility] SUPP [id number]     Payor Plan Address Payor Plan Phone Number Payor Plan Fax Number Effective Dates    [address]   12/1/2016 - None Entered    [facility] [id number]       Subscriber Name Subscriber Birth Date Member ID       JENELLE GAGNON 1940 [id number]                 Emergency Contacts      (Rel.) Home Phone Work Phone Mobile Phone    Anamaria Keenan (Daughter) [phone] -- --    ShlomoAdrianAna (Grandchild) -- -- [phone]            Insurance Information                MEDICARE/MEDICARE A & B Phone: [phone]    Subscriber: Jenelle Gagnon Subscriber#: [id number]    Group#:  Precert#:         LORY SIDHU/LORY GARCIA  SUPP Phone: "     Subscriber: Jhoan Gagnon Subscriber#: [id number]    Group#: [id number] Precert#:           History & Physical     No notes of this type exist for this encounter.        Emergency Department Notes     No notes of this type exist for this encounter.        Hospital Medications (all)       Dose Frequency Start End    diazePAM (VALIUM) tablet 5 mg 5 mg Once 11/13/2019 11/13/2019    Sig - Route: Take 1 tablet by mouth 1 (One) Time. - Oral          Orders (last 24 hrs)     Start     Ordered    11/13/19 1508  Please ambulate pt  [facility]  Once     Comments:  Please ambulate pt    11/13/19 1507    11/13/19 1125  diazePAM (VALIUM) tablet 5 mg  Once      11/13/19 1123    11/13/19 0959  XR Chest 1 View  1 Time Imaging      11/13/19 0959    11/13/19 0956  CBC & Differential  Once      11/13/19 0959    11/13/19 0956  Comprehensive Metabolic Panel  Once      11/13/19 0959    11/13/19 0956  Urinalysis With Microscopic If Indicated (No Culture) - Urine, Clean Catch  Once      11/13/19 0959    11/13/19 0956  BNP  Once      11/13/19 0959    11/13/19 0956  Troponin  Once      11/13/19 0959    11/13/19 0956  MRI Brain Without Contrast  1 Time Imaging      11/13/19 0959    11/13/19 0956  CBC Auto Differential  PROCEDURE ONCE      11/13/19 0959    11/13/19 0954  XR Shoulder 2+ View Left  1 Time Imaging      11/13/19 0959    11/13/19 0937  ECG 12 Lead  Once      11/13/19 0937    11/13/19 0937  XR Elbow 2 View Left  1 Time Imaging      11/13/19 0937    --  amLODIPine (NORVASC) 5 MG tablet  Daily      11/13/19 0952    --  amoxicillin (AMOXIL) 500 MG capsule  As Needed      11/13/19 0952    --  Cyanocobalamin (VITAMIN B-12 IJ)      11/13/19 0953    --  donepezil (ARICEPT) 10 MG tablet  Nightly      11/13/19 0953    --  ramipril (ALTACE) 5 MG capsule  Daily      11/13/19 0953

## 2019-11-13 NOTE — ED PROVIDER NOTES
Subjective   Dr. Andres's office for further evaluation of left arm weakness.  Patient reports that his left arm just does not feel like it wants to work.  He has a history of Parkinson's with a fairly constant tremor on the right side and he uses his left arm for most activities.  He tells me his left arm was weaker yesterday however much weaker this morning when he was trying to shave.  Denies any chest pain or trouble breathing.  He follows Dr. Figueroa gonzalez for his Parkinson's.  He tells me he also has some pain in his left elbow and shoulder.        Cerebrovascular Accident   Primary symptoms do not include headaches, loss of consciousness, fever, nausea or vomiting. Primary symptoms comment: left arm pain and weakness. The symptoms began yesterday.   The symptoms are worsening. Focality: left arm.   Additional symptoms do not include neck stiffness. Medical issues also include hypertension.       Review of Systems   Constitutional: Negative for chills, diaphoresis and fever.   HENT: Negative for congestion and sore throat.    Respiratory: Negative for cough, choking, chest tightness, shortness of breath and wheezing.    Cardiovascular: Negative for chest pain and leg swelling.   Gastrointestinal: Negative for abdominal distention, abdominal pain, anal bleeding, blood in stool, constipation, diarrhea, nausea and vomiting.   Genitourinary: Negative for difficulty urinating, dysuria, flank pain, frequency, hematuria and urgency.   Musculoskeletal: Negative for neck stiffness.   Neurological: Negative for loss of consciousness and headaches.   All other systems reviewed and are negative.      Past Medical History:   Diagnosis Date   • Broken arms    • Cancer (CMS/HCC)     prostate   • Lower back injury    • Parkinson's disease (CMS/HCC)        No Known Allergies    Past Surgical History:   Procedure Laterality Date   • CHOLECYSTECTOMY     • PROSTATE SURGERY     • REPLACEMENT TOTAL KNEE BILATERAL         Family  History   Problem Relation Age of Onset   • Heart disease Mother    • Cancer Father    • Stroke Father    • Stroke Maternal Grandmother    • Cancer Paternal Grandfather        Social History     Socioeconomic History   • Marital status:      Spouse name: Not on file   • Number of children: Not on file   • Years of education: Not on file   • Highest education level: Not on file   Tobacco Use   • Smoking status: Never Smoker   • Smokeless tobacco: Never Used   Substance and Sexual Activity   • Alcohol use: No   • Drug use: No   • Sexual activity: Defer     Partners: Female     Comment: . wife passed away in 2008   Social History Narrative    Pt Lives Alone            Objective   Physical Exam   Constitutional: He is oriented to person, place, and time. He appears well-developed and well-nourished.   HENT:   Head: Normocephalic and atraumatic.   Right Ear: External ear normal.   Left Ear: External ear normal.   Nose: Nose normal.   Mouth/Throat: Oropharynx is clear and moist.   Eyes: Conjunctivae and EOM are normal. Pupils are equal, round, and reactive to light.   Neck: Normal range of motion. Neck supple.   Cardiovascular: Normal rate, regular rhythm, normal heart sounds and intact distal pulses.   Pulmonary/Chest: Effort normal and breath sounds normal.   Abdominal: Soft. Bowel sounds are normal.   Musculoskeletal:   Patient has a fairly constant right arm tremor that is his baseline.  Finger-to-nose is very difficult.  He has limited range of motion in his left arm cannot get it above parallel related to he reports feels stiff or locked up.  There is some pain throughout his left arm.  Pulses intact.   Neurological: He is alert and oriented to person, place, and time.   Skin: Skin is warm and dry.   Psychiatric: He has a normal mood and affect. His behavior is normal. Judgment normal.       Procedures           ED Course  ED Course as of Nov 13 1736 Wed Nov 13, 2019   1206 Awaiting MRI.  [JM]   1419  Patient still has left arm weakness.  And a pretty significant right arm tremor.  He lives alone.  He has difficulty getting around would benefit from admission.  I have paged the hospitalist.  [JM]   1500 POC with [facility]. I spoke with Dr. Hunt who advised me pt does not meet criteria for admission. I have asked the  to see the pt. I have paged Neurology.  [JM]   1508 I spoke with Dr. Cassidy and advised on the MR findings and pt presentation.  [JM]   1511 I spoke with Dr. Andres and he advised pt has an insurance that can get him into a rehab facilitary [facility]. We may be able to send him there today. He or one of his staff will call me back.  [JM]   1522 Nurse attempted to get the patient up to walk.  He is in assist x1.  If he would have tried to walk by himself he would have fallen  [JM]   1610 I spoke with  Bozena.  Since he is an [facility] patient his 3-day waiver is waived.  We are attempting to get him into a facility [facility].  [JM]   1726 Spoke to the family at the bedside made aware the plan of care.  2 things will happen one all will go as planned and the patient will be discharged from the emergency department and will be taken at [facility] by his family.    The less desirable plan patient will be admitted to observation as he cannot walk by himself and he has no one to help take care of him.  Spoke with the family at bedside she is unable to stay with him.    [facility]    Plan B patient is admitted to observation then [facility].    I am assuming that plan a will go through and I have the patient typed up for discharge.  [JM]      ED Course User Index  [JM] Shay Paul APRN        MRI Brain Without Contrast   Final Result   1. No acute intracranial abnormality identified. Specifically, no   convincing evidence of acute infarct.       2. Chronic appearing changes of the brain including mild/moderate burden   of chronic microvascular ischemic change,  similar to 10/03/2017.       3. Nonspecific mild small-volume mastoid fluid noted slightly increased   from prior exam performed in 2017. Clinical correlation required.        D:  11/13/2019   E:  11/13/2019       This report was finalized on 11/13/2019 3:29 PM by Dr. Wilmer Gomez MD.          XR Shoulder 2+ View Left   Preliminary Result   1.  No acute osseous abnormality.       2.  There is mild irregularity to the inferior glenoid with possible   small osseous loose bodies favored degenerative. Consider follow-up   imaging if clinically appropriate.       D:  11/13/2019   E:  11/13/2019                  XR Chest 1 View   Preliminary Result   Chronic changes of the lungs without evidence for active   airspace disease.       D:  11/13/2019   E:  11/13/2019                  XR Elbow 2 View Left   Preliminary Result   Advanced degenerative joint disease with a small joint   effusion noted. There is no convincing evidence for acute osseous   abnormality.       D:  11/13/2019   E:  11/13/2019                  MRI Brain Without Contrast   Final Result   1. No acute intracranial abnormality identified. Specifically, no   convincing evidence of acute infarct.       2. Chronic appearing changes of the brain including mild/moderate burden   of chronic microvascular ischemic change, similar to 10/03/2017.       3. Nonspecific mild small-volume mastoid fluid noted slightly increased   from prior exam performed in 2017. Clinical correlation required.        D:  11/13/2019   E:  11/13/2019       This report was finalized on 11/13/2019 3:29 PM by Dr. Wilmer Gomez MD.          XR Shoulder 2+ View Left   Preliminary Result   1.  No acute osseous abnormality.       2.  There is mild irregularity to the inferior glenoid with possible   small osseous loose bodies favored degenerative. Consider follow-up   imaging if clinically appropriate.       D:  11/13/2019   E:  11/13/2019                  XR Chest 1 View    Preliminary Result   Chronic changes of the lungs without evidence for active   airspace disease.       D:  11/13/2019   E:  11/13/2019                  XR Elbow 2 View Left   Preliminary Result   Advanced degenerative joint disease with a small joint   effusion noted. There is no convincing evidence for acute osseous   abnormality.       D:  11/13/2019   E:  11/13/2019                        [facility]    Final diagnoses:   Parkinson disease (CMS/[facility])   Left arm weakness   Declining functional status   Weakness              Shay Paul APRN  11/13/19 1734       Shay Paul APRN  11/13/19 1736

## 2019-11-13 NOTE — PROGRESS NOTES
Discharge Planning Assessment  [facility]     Patient Name: Jhoan Gagnon  MRN: [id number]  Today's Date: 11/13/2019    Admit Date: 11/13/2019    Discharge Needs Assessment    No documentation.       Discharge Plan     Row Name 11/13/19 1551       Plan    Plan  INITIAL    Plan Comments  PT PRESENTS TO ER FROM MD OFFICE TODAY. DR RAHMAN IS HOPING FOR PLACEMENT FOR THIS PT. HE WILL BE ASSESSED IN ER. PT STATES HE LIVES ALONE WITH NO ASSISTANCE- HE HAS PARKINSONS AND SYMPTOMS ARE BECOMING MORE PRONOUNCED. CM CAN GIVE PRIVATE SITTER LIST AND SET-UP HH. PTS STEP-DAUGHTER, LUCY, ARRIVES AND PROVIDES MORE INFO TO SITUATION. SHE STATES HIS LIVING SITUATION IS NOT MANAGIBLE BY HIMSELF; HE HAS THE FINANCIAL ABILITY TO PAY FOR ASSISTED LIVING AND FAMILY HAS BEEN ENCOURAGING HIM TO GO. CM CALLED SIGNATURE, [phone] TO INQUIRE ABOUT RESPITE CARE AT [facility]; THEN RECEIVED A PC FROM DR. BINGHAM OFFICE, TORIN BOGGS WHO INFORMS CM THAT THIS PT IS ACO AND THEY HAVE A WAIVER TO ENTER SNF QUICKLY. SHE WILL WORK FROM HER END TO SECURE HIS PLACEMENT. THEN RECIEVED CALL BACK FROM LAURYN JUAREZ @[facility] WHO STATES THEY HAVE RESPITE BED UNTIL FRIDAY AT WHICH TIME THEY WILL HAVE AN AVAILABLE BED FOR PT FOR ASSISTED LIVING OR SHORT TERM REHAB. CLINICAL IFO WAS FAXED [phone], AWAITING APPROVAL PT AND LUCY UPDATED AND THEY AGREE TO RESPITE FOR TODAY.    1807- FINALIZED SEVERAL STEPS TO CONFIRM TRANSPORT. FAXED H & P WITH MEDS FROM DR. BINGHAM OFFICE (TORIN MELLO); PT APPROVED BY BRIAN SPOKE WITH CHARGE NURSE TO CONFIRM PT COMING; UPDATED [facility] SNYDER WITH INFO AND DAUGHTER WILL TRANSPORT BY PRIVATE VEHICLE        Destination      No service coordination in this encounter.      Durable Medical Equipment      No service coordination in this encounter.      Dialysis/Infusion      No service coordination in this encounter.      Home Medical Care      No service coordination in this encounter.      Therapy      No service  coordination in this encounter.      Community Resources      No service coordination in this encounter.          Demographic Summary    No documentation.       Functional Status    No documentation.       Psychosocial    No documentation.       Abuse/Neglect    No documentation.       Legal    No documentation.       Substance Abuse    No documentation.       Patient Forms    No documentation.           Bozena Dumont RN

## 2020-01-14 DIAGNOSIS — I48.92 ATRIAL FLUTTER WITH RAPID VENTRICULAR RESPONSE (HCC): Primary | ICD-10-CM

## 2020-01-14 DIAGNOSIS — I48.4 ATYPICAL ATRIAL FLUTTER (HCC): ICD-10-CM

## 2020-03-02 ENCOUNTER — TRANSCRIBE ORDERS (OUTPATIENT)
Dept: ADMINISTRATIVE | Facility: HOSPITAL | Age: 80
End: 2020-03-02

## 2020-03-02 DIAGNOSIS — R94.31 ABNORMAL EKG: Primary | ICD-10-CM

## 2020-03-02 DIAGNOSIS — G45.1 CAROTID ARTERY SYNDROME: ICD-10-CM

## 2021-04-14 ENCOUNTER — TRANSCRIBE ORDERS (OUTPATIENT)
Dept: ADMINISTRATIVE | Facility: HOSPITAL | Age: 81
End: 2021-04-14

## 2021-04-14 DIAGNOSIS — R11.0 NAUSEA: Primary | ICD-10-CM

## 2021-06-01 ENCOUNTER — HOSPITAL ENCOUNTER (OUTPATIENT)
Dept: ULTRASOUND IMAGING | Facility: HOSPITAL | Age: 81
Discharge: HOME | End: 2021-06-01
Admitting: INTERNAL MEDICINE

## 2021-06-01 DIAGNOSIS — R11.0 NAUSEA: ICD-10-CM

## 2021-06-01 PROCEDURE — 76700 US EXAM ABDOM COMPLETE: CPT

## 2021-06-02 ENCOUNTER — TRANSCRIBE ORDERS (OUTPATIENT)
Dept: ADMINISTRATIVE | Facility: HOSPITAL | Age: 81
End: 2021-06-02

## 2021-06-02 DIAGNOSIS — R19.00 INTRA-ABDOMINAL AND PELVIC SWELLING, MASS AND LUMP, UNSPECIFIED SITE: Primary | ICD-10-CM

## 2021-06-02 DIAGNOSIS — R93.5 ABNORMAL FINDINGS ON DIAGNOSTIC IMAGING OF OTHER ABDOMINAL REGIONS, INCLUDING RETROPERITONEUM: ICD-10-CM

## 2021-06-08 DIAGNOSIS — Z12.11 SCREENING FOR COLON CANCER: Primary | ICD-10-CM

## 2021-06-08 RX ORDER — SODIUM, POTASSIUM,MAG SULFATES 17.5-3.13G
1 SOLUTION, RECONSTITUTED, ORAL ORAL TAKE AS DIRECTED
Qty: 354 ML | Refills: 0 | Status: ON HOLD | OUTPATIENT
Start: 2021-06-08 | End: 2021-06-30

## 2021-06-14 ENCOUNTER — HOSPITAL ENCOUNTER (OUTPATIENT)
Dept: CT IMAGING | Facility: HOSPITAL | Age: 81
Discharge: HOME | End: 2021-06-14
Admitting: INTERNAL MEDICINE

## 2021-06-14 DIAGNOSIS — R19.00 INTRA-ABDOMINAL AND PELVIC SWELLING, MASS AND LUMP, UNSPECIFIED SITE: ICD-10-CM

## 2021-06-14 DIAGNOSIS — R93.5 ABNORMAL FINDINGS ON DIAGNOSTIC IMAGING OF OTHER ABDOMINAL REGIONS, INCLUDING RETROPERITONEUM: ICD-10-CM

## 2021-06-14 LAB — CREAT BLDA-MCNC: 1.3 MG/DL (ref 0.6–1.3)

## 2021-06-14 PROCEDURE — 82565 ASSAY OF CREATININE: CPT

## 2021-06-14 PROCEDURE — 25010000002 IOPAMIDOL 61 % SOLUTION: Performed by: INTERNAL MEDICINE

## 2021-06-14 PROCEDURE — 74177 CT ABD & PELVIS W/CONTRAST: CPT

## 2021-06-14 RX ADMIN — IOPAMIDOL 80 ML: 612 INJECTION, SOLUTION INTRAVENOUS at 09:56

## 2021-06-16 ENCOUNTER — OUTSIDE FACILITY SERVICE (OUTPATIENT)
Dept: GASTROENTEROLOGY | Facility: CLINIC | Age: 81
End: 2021-06-16

## 2021-06-16 PROCEDURE — 45378 DIAGNOSTIC COLONOSCOPY: CPT | Performed by: INTERNAL MEDICINE

## 2021-06-22 ENCOUNTER — TELEPHONE (OUTPATIENT)
Dept: GASTROENTEROLOGY | Facility: CLINIC | Age: 81
End: 2021-06-22

## 2021-06-29 ENCOUNTER — APPOINTMENT (OUTPATIENT)
Dept: CT IMAGING | Facility: HOSPITAL | Age: 81
End: 2021-06-29

## 2021-06-29 ENCOUNTER — HOSPITAL ENCOUNTER (INPATIENT)
Facility: HOSPITAL | Age: 81
LOS: 1 days | Discharge: HOME HEALTH | End: 2021-07-03
Attending: EMERGENCY MEDICINE | Admitting: INTERNAL MEDICINE

## 2021-06-29 ENCOUNTER — APPOINTMENT (OUTPATIENT)
Dept: GENERAL RADIOLOGY | Facility: HOSPITAL | Age: 81
End: 2021-06-29

## 2021-06-29 DIAGNOSIS — G20 PARKINSON'S DISEASE (HCC): ICD-10-CM

## 2021-06-29 DIAGNOSIS — K63.89 MESENTERIC MASS: ICD-10-CM

## 2021-06-29 DIAGNOSIS — R55 SYNCOPE, UNSPECIFIED SYNCOPE TYPE: ICD-10-CM

## 2021-06-29 DIAGNOSIS — K92.0 HEMATEMESIS WITH NAUSEA: Primary | ICD-10-CM

## 2021-06-29 DIAGNOSIS — Z74.09 IMPAIRED FUNCTIONAL MOBILITY, BALANCE, GAIT, AND ENDURANCE: ICD-10-CM

## 2021-06-29 DIAGNOSIS — R53.1 WEAKNESS: ICD-10-CM

## 2021-06-29 DIAGNOSIS — R53.1 GENERALIZED WEAKNESS: ICD-10-CM

## 2021-06-29 LAB
ALBUMIN SERPL-MCNC: 3.6 G/DL (ref 3.5–5.2)
ALBUMIN/GLOB SERPL: 1.3 G/DL
ALP SERPL-CCNC: 79 U/L (ref 39–117)
ALT SERPL W P-5'-P-CCNC: 9 U/L (ref 1–41)
ANION GAP SERPL CALCULATED.3IONS-SCNC: 8 MMOL/L (ref 5–15)
APTT PPP: 20.3 SECONDS (ref 22–39)
AST SERPL-CCNC: 18 U/L (ref 1–40)
BASOPHILS # BLD AUTO: 0.02 10*3/MM3 (ref 0–0.2)
BASOPHILS NFR BLD AUTO: 0.6 % (ref 0–1.5)
BILIRUB SERPL-MCNC: 1.6 MG/DL (ref 0–1.2)
BILIRUB UR QL STRIP: NEGATIVE
BUN SERPL-MCNC: 17 MG/DL (ref 8–23)
BUN/CREAT SERPL: 16.8 (ref 7–25)
CALCIUM SPEC-SCNC: 8.4 MG/DL (ref 8.6–10.5)
CHLORIDE SERPL-SCNC: 104 MMOL/L (ref 98–107)
CLARITY UR: CLEAR
CO2 SERPL-SCNC: 25 MMOL/L (ref 22–29)
COLOR UR: ABNORMAL
CREAT SERPL-MCNC: 1.01 MG/DL (ref 0.76–1.27)
D-LACTATE SERPL-SCNC: 1.6 MMOL/L (ref 0.5–2)
DEPRECATED RDW RBC AUTO: 44.3 FL (ref 37–54)
EOSINOPHIL # BLD AUTO: 0.06 10*3/MM3 (ref 0–0.4)
EOSINOPHIL NFR BLD AUTO: 1.7 % (ref 0.3–6.2)
ERYTHROCYTE [DISTWIDTH] IN BLOOD BY AUTOMATED COUNT: 13.2 % (ref 12.3–15.4)
GFR SERPL CREATININE-BSD FRML MDRD: 71 ML/MIN/1.73
GLOBULIN UR ELPH-MCNC: 2.7 GM/DL
GLUCOSE SERPL-MCNC: 109 MG/DL (ref 65–99)
GLUCOSE UR STRIP-MCNC: NEGATIVE MG/DL
HCT VFR BLD AUTO: 39.5 % (ref 37.5–51)
HGB BLD-MCNC: 13 G/DL (ref 13–17.7)
HGB UR QL STRIP.AUTO: NEGATIVE
IMM GRANULOCYTES # BLD AUTO: 0 10*3/MM3 (ref 0–0.05)
IMM GRANULOCYTES NFR BLD AUTO: 0 % (ref 0–0.5)
INR PPP: 1.01 (ref 0.85–1.16)
KETONES UR QL STRIP: ABNORMAL
LEUKOCYTE ESTERASE UR QL STRIP.AUTO: NEGATIVE
LIPASE SERPL-CCNC: 18 U/L (ref 13–60)
LYMPHOCYTES # BLD AUTO: 1.16 10*3/MM3 (ref 0.7–3.1)
LYMPHOCYTES NFR BLD AUTO: 33.8 % (ref 19.6–45.3)
MCH RBC QN AUTO: 30.2 PG (ref 26.6–33)
MCHC RBC AUTO-ENTMCNC: 32.9 G/DL (ref 31.5–35.7)
MCV RBC AUTO: 91.9 FL (ref 79–97)
MONOCYTES # BLD AUTO: 0.3 10*3/MM3 (ref 0.1–0.9)
MONOCYTES NFR BLD AUTO: 8.7 % (ref 5–12)
NEUTROPHILS NFR BLD AUTO: 1.89 10*3/MM3 (ref 1.7–7)
NEUTROPHILS NFR BLD AUTO: 55.2 % (ref 42.7–76)
NITRITE UR QL STRIP: NEGATIVE
NRBC BLD AUTO-RTO: 0 /100 WBC (ref 0–0.2)
NT-PROBNP SERPL-MCNC: 1525 PG/ML (ref 0–1800)
PH UR STRIP.AUTO: 6 [PH] (ref 5–8)
PLATELET # BLD AUTO: 169 10*3/MM3 (ref 140–450)
PMV BLD AUTO: 9.6 FL (ref 6–12)
POTASSIUM SERPL-SCNC: 3.6 MMOL/L (ref 3.5–5.2)
PROCALCITONIN SERPL-MCNC: 0.06 NG/ML (ref 0–0.25)
PROT SERPL-MCNC: 6.3 G/DL (ref 6–8.5)
PROT UR QL STRIP: ABNORMAL
PROTHROMBIN TIME: 13 SECONDS (ref 11.4–14.4)
RBC # BLD AUTO: 4.3 10*6/MM3 (ref 4.14–5.8)
SARS-COV-2 RNA RESP QL NAA+PROBE: NOT DETECTED
SODIUM SERPL-SCNC: 137 MMOL/L (ref 136–145)
SP GR UR STRIP: 1.02 (ref 1–1.03)
TROPONIN T SERPL-MCNC: <0.01 NG/ML (ref 0–0.03)
TROPONIN T SERPL-MCNC: <0.01 NG/ML (ref 0–0.03)
UROBILINOGEN UR QL STRIP: ABNORMAL
WBC # BLD AUTO: 3.43 10*3/MM3 (ref 3.4–10.8)

## 2021-06-29 PROCEDURE — 87040 BLOOD CULTURE FOR BACTERIA: CPT | Performed by: PHYSICIAN ASSISTANT

## 2021-06-29 PROCEDURE — G0378 HOSPITAL OBSERVATION PER HR: HCPCS

## 2021-06-29 PROCEDURE — 87186 SC STD MICRODIL/AGAR DIL: CPT | Performed by: PHYSICIAN ASSISTANT

## 2021-06-29 PROCEDURE — 87150 DNA/RNA AMPLIFIED PROBE: CPT | Performed by: PHYSICIAN ASSISTANT

## 2021-06-29 PROCEDURE — 85730 THROMBOPLASTIN TIME PARTIAL: CPT | Performed by: PHYSICIAN ASSISTANT

## 2021-06-29 PROCEDURE — 83605 ASSAY OF LACTIC ACID: CPT | Performed by: PHYSICIAN ASSISTANT

## 2021-06-29 PROCEDURE — 25010000002 IOPAMIDOL 61 % SOLUTION: Performed by: EMERGENCY MEDICINE

## 2021-06-29 PROCEDURE — U0005 INFEC AGEN DETEC AMPLI PROBE: HCPCS | Performed by: INTERNAL MEDICINE

## 2021-06-29 PROCEDURE — 87077 CULTURE AEROBIC IDENTIFY: CPT | Performed by: PHYSICIAN ASSISTANT

## 2021-06-29 PROCEDURE — 80053 COMPREHEN METABOLIC PANEL: CPT | Performed by: PHYSICIAN ASSISTANT

## 2021-06-29 PROCEDURE — 85610 PROTHROMBIN TIME: CPT | Performed by: PHYSICIAN ASSISTANT

## 2021-06-29 PROCEDURE — 83690 ASSAY OF LIPASE: CPT | Performed by: PHYSICIAN ASSISTANT

## 2021-06-29 PROCEDURE — U0003 INFECTIOUS AGENT DETECTION BY NUCLEIC ACID (DNA OR RNA); SEVERE ACUTE RESPIRATORY SYNDROME CORONAVIRUS 2 (SARS-COV-2) (CORONAVIRUS DISEASE [COVID-19]), AMPLIFIED PROBE TECHNIQUE, MAKING USE OF HIGH THROUGHPUT TECHNOLOGIES AS DESCRIBED BY CMS-2020-01-R: HCPCS | Performed by: INTERNAL MEDICINE

## 2021-06-29 PROCEDURE — 99284 EMERGENCY DEPT VISIT MOD MDM: CPT

## 2021-06-29 PROCEDURE — 84145 PROCALCITONIN (PCT): CPT | Performed by: PHYSICIAN ASSISTANT

## 2021-06-29 PROCEDURE — 83880 ASSAY OF NATRIURETIC PEPTIDE: CPT | Performed by: PHYSICIAN ASSISTANT

## 2021-06-29 PROCEDURE — 93005 ELECTROCARDIOGRAM TRACING: CPT | Performed by: EMERGENCY MEDICINE

## 2021-06-29 PROCEDURE — 74177 CT ABD & PELVIS W/CONTRAST: CPT

## 2021-06-29 PROCEDURE — 85025 COMPLETE CBC W/AUTO DIFF WBC: CPT | Performed by: PHYSICIAN ASSISTANT

## 2021-06-29 PROCEDURE — 71045 X-RAY EXAM CHEST 1 VIEW: CPT

## 2021-06-29 PROCEDURE — 99222 1ST HOSP IP/OBS MODERATE 55: CPT | Performed by: PHYSICIAN ASSISTANT

## 2021-06-29 PROCEDURE — 25010000002 ONDANSETRON PER 1 MG: Performed by: PHYSICIAN ASSISTANT

## 2021-06-29 PROCEDURE — 81003 URINALYSIS AUTO W/O SCOPE: CPT | Performed by: PHYSICIAN ASSISTANT

## 2021-06-29 PROCEDURE — 84484 ASSAY OF TROPONIN QUANT: CPT | Performed by: PHYSICIAN ASSISTANT

## 2021-06-29 RX ORDER — ONDANSETRON 2 MG/ML
4 INJECTION INTRAMUSCULAR; INTRAVENOUS EVERY 6 HOURS PRN
Status: DISCONTINUED | OUTPATIENT
Start: 2021-06-29 | End: 2021-07-03 | Stop reason: HOSPADM

## 2021-06-29 RX ORDER — BENZTROPINE MESYLATE 1 MG/1
2 TABLET ORAL 3 TIMES DAILY
Status: DISCONTINUED | OUTPATIENT
Start: 2021-06-29 | End: 2021-07-03 | Stop reason: HOSPADM

## 2021-06-29 RX ORDER — AMLODIPINE BESYLATE 5 MG/1
5 TABLET ORAL DAILY
Status: DISCONTINUED | OUTPATIENT
Start: 2021-06-29 | End: 2021-06-30

## 2021-06-29 RX ORDER — ACETAMINOPHEN 325 MG/1
650 TABLET ORAL EVERY 4 HOURS PRN
Status: DISCONTINUED | OUTPATIENT
Start: 2021-06-29 | End: 2021-07-03 | Stop reason: HOSPADM

## 2021-06-29 RX ORDER — PANTOPRAZOLE SODIUM 40 MG/10ML
80 INJECTION, POWDER, LYOPHILIZED, FOR SOLUTION INTRAVENOUS ONCE
Status: COMPLETED | OUTPATIENT
Start: 2021-06-29 | End: 2021-06-29

## 2021-06-29 RX ORDER — SODIUM CHLORIDE 9 MG/ML
100 INJECTION, SOLUTION INTRAVENOUS CONTINUOUS
Status: DISCONTINUED | OUTPATIENT
Start: 2021-06-29 | End: 2021-07-01

## 2021-06-29 RX ORDER — DONEPEZIL HYDROCHLORIDE 5 MG/1
10 TABLET, FILM COATED ORAL NIGHTLY
Status: DISCONTINUED | OUTPATIENT
Start: 2021-06-29 | End: 2021-07-03 | Stop reason: HOSPADM

## 2021-06-29 RX ORDER — RAMIPRIL 5 MG/1
5 CAPSULE ORAL DAILY
Status: DISCONTINUED | OUTPATIENT
Start: 2021-06-29 | End: 2021-06-30

## 2021-06-29 RX ORDER — ONDANSETRON 2 MG/ML
4 INJECTION INTRAMUSCULAR; INTRAVENOUS ONCE
Status: COMPLETED | OUTPATIENT
Start: 2021-06-29 | End: 2021-06-29

## 2021-06-29 RX ORDER — SODIUM CHLORIDE 0.9 % (FLUSH) 0.9 %
10 SYRINGE (ML) INJECTION AS NEEDED
Status: DISCONTINUED | OUTPATIENT
Start: 2021-06-29 | End: 2021-07-03 | Stop reason: HOSPADM

## 2021-06-29 RX ORDER — SODIUM CHLORIDE 0.9 % (FLUSH) 0.9 %
10 SYRINGE (ML) INJECTION EVERY 12 HOURS SCHEDULED
Status: DISCONTINUED | OUTPATIENT
Start: 2021-06-29 | End: 2021-07-01

## 2021-06-29 RX ADMIN — SODIUM CHLORIDE 1000 ML: 9 INJECTION, SOLUTION INTRAVENOUS at 13:15

## 2021-06-29 RX ADMIN — RAMIPRIL 5 MG: 5 CAPSULE ORAL at 18:23

## 2021-06-29 RX ADMIN — AMLODIPINE BESYLATE 5 MG: 5 TABLET ORAL at 18:00

## 2021-06-29 RX ADMIN — PANTOPRAZOLE SODIUM 80 MG: 40 INJECTION, POWDER, FOR SOLUTION INTRAVENOUS at 15:52

## 2021-06-29 RX ADMIN — SODIUM CHLORIDE 100 ML/HR: 9 INJECTION, SOLUTION INTRAVENOUS at 18:22

## 2021-06-29 RX ADMIN — DONEPEZIL HYDROCHLORIDE 10 MG: 5 TABLET ORAL at 20:14

## 2021-06-29 RX ADMIN — ONDANSETRON 4 MG: 2 INJECTION INTRAMUSCULAR; INTRAVENOUS at 13:15

## 2021-06-29 RX ADMIN — BENZTROPINE MESYLATE 2 MG: 1 TABLET ORAL at 20:14

## 2021-06-29 RX ADMIN — PANTOPRAZOLE SODIUM 8 MG/HR: 40 INJECTION, POWDER, FOR SOLUTION INTRAVENOUS at 17:59

## 2021-06-29 RX ADMIN — IOPAMIDOL 100 ML: 612 INJECTION, SOLUTION INTRAVENOUS at 14:53

## 2021-06-29 RX ADMIN — PANTOPRAZOLE SODIUM 8 MG/HR: 40 INJECTION, POWDER, FOR SOLUTION INTRAVENOUS at 22:50

## 2021-06-30 LAB
ANION GAP SERPL CALCULATED.3IONS-SCNC: 7 MMOL/L (ref 5–15)
BACTERIA BLD CULT: ABNORMAL
BACTERIA ID TEST ISLT QL CULT: ABNORMAL
BASOPHILS # BLD AUTO: 0.03 10*3/MM3 (ref 0–0.2)
BASOPHILS NFR BLD AUTO: 0.7 % (ref 0–1.5)
BUN SERPL-MCNC: 14 MG/DL (ref 8–23)
BUN/CREAT SERPL: 13.9 (ref 7–25)
CALCIUM SPEC-SCNC: 8.7 MG/DL (ref 8.6–10.5)
CHLORIDE SERPL-SCNC: 109 MMOL/L (ref 98–107)
CO2 SERPL-SCNC: 25 MMOL/L (ref 22–29)
CREAT SERPL-MCNC: 1.01 MG/DL (ref 0.76–1.27)
D-LACTATE SERPL-SCNC: 1 MMOL/L (ref 0.5–2)
DEPRECATED RDW RBC AUTO: 44.9 FL (ref 37–54)
EOSINOPHIL # BLD AUTO: 0.1 10*3/MM3 (ref 0–0.4)
EOSINOPHIL NFR BLD AUTO: 2.2 % (ref 0.3–6.2)
ERYTHROCYTE [DISTWIDTH] IN BLOOD BY AUTOMATED COUNT: 13.2 % (ref 12.3–15.4)
GFR SERPL CREATININE-BSD FRML MDRD: 71 ML/MIN/1.73
GLUCOSE SERPL-MCNC: 80 MG/DL (ref 65–99)
HCT VFR BLD AUTO: 38.8 % (ref 37.5–51)
HCT VFR BLD AUTO: 39.7 % (ref 37.5–51)
HGB BLD-MCNC: 13 G/DL (ref 13–17.7)
HGB BLD-MCNC: 13.2 G/DL (ref 13–17.7)
IMM GRANULOCYTES # BLD AUTO: 0.01 10*3/MM3 (ref 0–0.05)
IMM GRANULOCYTES NFR BLD AUTO: 0.2 % (ref 0–0.5)
LYMPHOCYTES # BLD AUTO: 1.28 10*3/MM3 (ref 0.7–3.1)
LYMPHOCYTES NFR BLD AUTO: 27.8 % (ref 19.6–45.3)
MCH RBC QN AUTO: 30.8 PG (ref 26.6–33)
MCHC RBC AUTO-ENTMCNC: 33.2 G/DL (ref 31.5–35.7)
MCV RBC AUTO: 92.8 FL (ref 79–97)
MONOCYTES # BLD AUTO: 0.41 10*3/MM3 (ref 0.1–0.9)
MONOCYTES NFR BLD AUTO: 8.9 % (ref 5–12)
NEUTROPHILS NFR BLD AUTO: 2.78 10*3/MM3 (ref 1.7–7)
NEUTROPHILS NFR BLD AUTO: 60.2 % (ref 42.7–76)
NRBC BLD AUTO-RTO: 0 /100 WBC (ref 0–0.2)
PLATELET # BLD AUTO: 148 10*3/MM3 (ref 140–450)
PMV BLD AUTO: 9.4 FL (ref 6–12)
POTASSIUM SERPL-SCNC: 3.9 MMOL/L (ref 3.5–5.2)
QT INTERVAL: 460 MS
QT INTERVAL: 470 MS
QTC INTERVAL: 455 MS
QTC INTERVAL: 480 MS
RBC # BLD AUTO: 4.28 10*6/MM3 (ref 4.14–5.8)
SODIUM SERPL-SCNC: 141 MMOL/L (ref 136–145)
WBC # BLD AUTO: 4.61 10*3/MM3 (ref 3.4–10.8)

## 2021-06-30 PROCEDURE — 99233 SBSQ HOSP IP/OBS HIGH 50: CPT | Performed by: INTERNAL MEDICINE

## 2021-06-30 PROCEDURE — 25010000002 CEFTRIAXONE PER 250 MG: Performed by: NURSE PRACTITIONER

## 2021-06-30 PROCEDURE — 97166 OT EVAL MOD COMPLEX 45 MIN: CPT

## 2021-06-30 PROCEDURE — 80048 BASIC METABOLIC PNL TOTAL CA: CPT | Performed by: PHYSICIAN ASSISTANT

## 2021-06-30 PROCEDURE — 85014 HEMATOCRIT: CPT | Performed by: INTERNAL MEDICINE

## 2021-06-30 PROCEDURE — 87040 BLOOD CULTURE FOR BACTERIA: CPT | Performed by: SURGERY

## 2021-06-30 PROCEDURE — 25010000002 VANCOMYCIN 10 G RECONSTITUTED SOLUTION: Performed by: INTERNAL MEDICINE

## 2021-06-30 PROCEDURE — 97535 SELF CARE MNGMENT TRAINING: CPT

## 2021-06-30 PROCEDURE — 97162 PT EVAL MOD COMPLEX 30 MIN: CPT

## 2021-06-30 PROCEDURE — G0378 HOSPITAL OBSERVATION PER HR: HCPCS

## 2021-06-30 PROCEDURE — 93005 ELECTROCARDIOGRAM TRACING: CPT | Performed by: INTERNAL MEDICINE

## 2021-06-30 PROCEDURE — 85018 HEMOGLOBIN: CPT | Performed by: INTERNAL MEDICINE

## 2021-06-30 PROCEDURE — 83605 ASSAY OF LACTIC ACID: CPT | Performed by: SURGERY

## 2021-06-30 PROCEDURE — 85025 COMPLETE CBC W/AUTO DIFF WBC: CPT | Performed by: PHYSICIAN ASSISTANT

## 2021-06-30 PROCEDURE — 93010 ELECTROCARDIOGRAM REPORT: CPT | Performed by: INTERNAL MEDICINE

## 2021-06-30 RX ORDER — TRAZODONE HYDROCHLORIDE 100 MG/1
100 TABLET ORAL NIGHTLY
COMMUNITY
End: 2022-08-20 | Stop reason: HOSPADM

## 2021-06-30 RX ORDER — ROPINIROLE 6 MG/1
6 TABLET, FILM COATED, EXTENDED RELEASE ORAL NIGHTLY
COMMUNITY
End: 2022-07-14 | Stop reason: HOSPADM

## 2021-06-30 RX ORDER — PANTOPRAZOLE SODIUM 40 MG/10ML
40 INJECTION, POWDER, LYOPHILIZED, FOR SOLUTION INTRAVENOUS
Status: DISCONTINUED | OUTPATIENT
Start: 2021-06-30 | End: 2021-07-01

## 2021-06-30 RX ORDER — CYANOCOBALAMIN 1000 UG/ML
1000 INJECTION, SOLUTION INTRAMUSCULAR; SUBCUTANEOUS
COMMUNITY

## 2021-06-30 RX ORDER — MEMANTINE HYDROCHLORIDE 5 MG/1
5 TABLET ORAL 2 TIMES DAILY
COMMUNITY

## 2021-06-30 RX ORDER — ROSUVASTATIN CALCIUM 20 MG/1
20 TABLET, COATED ORAL NIGHTLY
COMMUNITY

## 2021-06-30 RX ORDER — ASPIRIN 81 MG/1
81 TABLET ORAL DAILY
COMMUNITY

## 2021-06-30 RX ORDER — ATROPINE SULFATE 10 MG/ML
1 SOLUTION/ DROPS OPHTHALMIC DAILY PRN
COMMUNITY

## 2021-06-30 RX ADMIN — SODIUM CHLORIDE 100 ML/HR: 9 INJECTION, SOLUTION INTRAVENOUS at 08:28

## 2021-06-30 RX ADMIN — PANTOPRAZOLE SODIUM 8 MG/HR: 40 INJECTION, POWDER, FOR SOLUTION INTRAVENOUS at 03:32

## 2021-06-30 RX ADMIN — SODIUM CHLORIDE 1 G: 900 INJECTION INTRAVENOUS at 05:51

## 2021-06-30 RX ADMIN — PANTOPRAZOLE SODIUM 40 MG: 40 INJECTION, POWDER, FOR SOLUTION INTRAVENOUS at 20:54

## 2021-06-30 RX ADMIN — BENZTROPINE MESYLATE 2 MG: 1 TABLET ORAL at 20:54

## 2021-06-30 RX ADMIN — SODIUM CHLORIDE, PRESERVATIVE FREE 10 ML: 5 INJECTION INTRAVENOUS at 20:55

## 2021-06-30 RX ADMIN — DONEPEZIL HYDROCHLORIDE 10 MG: 5 TABLET ORAL at 20:54

## 2021-06-30 RX ADMIN — SODIUM CHLORIDE, PRESERVATIVE FREE 10 ML: 5 INJECTION INTRAVENOUS at 08:28

## 2021-06-30 RX ADMIN — VANCOMYCIN HYDROCHLORIDE 1750 MG: 100 INJECTION, POWDER, LYOPHILIZED, FOR SOLUTION INTRAVENOUS at 20:57

## 2021-06-30 RX ADMIN — RAMIPRIL 5 MG: 5 CAPSULE ORAL at 08:28

## 2021-06-30 RX ADMIN — AMLODIPINE BESYLATE 5 MG: 5 TABLET ORAL at 08:28

## 2021-06-30 RX ADMIN — BENZTROPINE MESYLATE 2 MG: 1 TABLET ORAL at 08:28

## 2021-07-01 ENCOUNTER — APPOINTMENT (OUTPATIENT)
Dept: CARDIOLOGY | Facility: HOSPITAL | Age: 81
End: 2021-07-01

## 2021-07-01 LAB
ANION GAP SERPL CALCULATED.3IONS-SCNC: 7 MMOL/L (ref 5–15)
ASCENDING AORTA: 4.4 CM
BH CV ECHO MEAS - AO ROOT AREA (BSA CORRECTED): 1.9
BH CV ECHO MEAS - AO ROOT AREA: 10.9 CM^2
BH CV ECHO MEAS - AO ROOT DIAM: 3.7 CM
BH CV ECHO MEAS - ASC AORTA: 4.4 CM
BH CV ECHO MEAS - BSA(HAYCOCK): 2.1 M^2
BH CV ECHO MEAS - BSA: 2 M^2
BH CV ECHO MEAS - BZI_BMI: 29.7 KILOGRAMS/M^2
BH CV ECHO MEAS - BZI_METRIC_HEIGHT: 172 CM
BH CV ECHO MEAS - BZI_METRIC_WEIGHT: 88 KG
BH CV ECHO MEAS - EDV(CUBED): 167.2 ML
BH CV ECHO MEAS - EDV(MOD-SP2): 80.6 ML
BH CV ECHO MEAS - EDV(MOD-SP4): 70.9 ML
BH CV ECHO MEAS - EDV(TEICH): 148 ML
BH CV ECHO MEAS - EF(CUBED): 79.8 %
BH CV ECHO MEAS - EF(MOD-SP2): 54.2 %
BH CV ECHO MEAS - EF(MOD-SP4): 54.3 %
BH CV ECHO MEAS - EF(TEICH): 71.6 %
BH CV ECHO MEAS - ESV(CUBED): 33.8 ML
BH CV ECHO MEAS - ESV(MOD-SP2): 36.9 ML
BH CV ECHO MEAS - ESV(MOD-SP4): 32.4 ML
BH CV ECHO MEAS - ESV(TEICH): 42 ML
BH CV ECHO MEAS - FS: 41.3 %
BH CV ECHO MEAS - IVS/LVPW: 0.95
BH CV ECHO MEAS - IVSD: 1 CM
BH CV ECHO MEAS - LA DIMENSION: 4 CM
BH CV ECHO MEAS - LA/AO: 1.1
BH CV ECHO MEAS - LAD MAJOR: 6.2 CM
BH CV ECHO MEAS - LAT PEAK E' VEL: 7.7 CM/SEC
BH CV ECHO MEAS - LATERAL E/E' RATIO: 9.9
BH CV ECHO MEAS - LV DIASTOLIC VOL/BSA (35-75): 35.2 ML/M^2
BH CV ECHO MEAS - LV MASS(C)D: 229.8 GRAMS
BH CV ECHO MEAS - LV MASS(C)DI: 114.3 GRAMS/M^2
BH CV ECHO MEAS - LV SYSTOLIC VOL/BSA (12-30): 16.1 ML/M^2
BH CV ECHO MEAS - LVIDD: 5.5 CM
BH CV ECHO MEAS - LVIDS: 3.2 CM
BH CV ECHO MEAS - LVLD AP2: 7.2 CM
BH CV ECHO MEAS - LVLD AP4: 8.1 CM
BH CV ECHO MEAS - LVLS AP2: 6.6 CM
BH CV ECHO MEAS - LVLS AP4: 8 CM
BH CV ECHO MEAS - LVOT AREA (M): 3.8 CM^2
BH CV ECHO MEAS - LVOT AREA: 3.8 CM^2
BH CV ECHO MEAS - LVOT DIAM: 2.2 CM
BH CV ECHO MEAS - LVPWD: 1.1 CM
BH CV ECHO MEAS - MED PEAK E' VEL: 5.1 CM/SEC
BH CV ECHO MEAS - MEDIAL E/E' RATIO: 14.9
BH CV ECHO MEAS - MV A MAX VEL: 103.3 CM/SEC
BH CV ECHO MEAS - MV DEC SLOPE: 152.5 CM/SEC^2
BH CV ECHO MEAS - MV DEC TIME: 0.5 SEC
BH CV ECHO MEAS - MV E MAX VEL: 76.3 CM/SEC
BH CV ECHO MEAS - MV E/A: 0.74
BH CV ECHO MEAS - MV MAX PG: 3.4 MMHG
BH CV ECHO MEAS - MV MEAN PG: 1.1 MMHG
BH CV ECHO MEAS - MV V2 MAX: 91.7 CM/SEC
BH CV ECHO MEAS - MV V2 MEAN: 48 CM/SEC
BH CV ECHO MEAS - MV V2 VTI: 38.9 CM
BH CV ECHO MEAS - PA ACC TIME: 0.14 SEC
BH CV ECHO MEAS - PA MAX PG: 2.9 MMHG
BH CV ECHO MEAS - PA PR(ACCEL): 15.6 MMHG
BH CV ECHO MEAS - PA V2 MAX: 85.7 CM/SEC
BH CV ECHO MEAS - RAP SYSTOLE: 3 MMHG
BH CV ECHO MEAS - RVSP: 30 MMHG
BH CV ECHO MEAS - SI(CUBED): 66.3 ML/M^2
BH CV ECHO MEAS - SI(MOD-SP2): 21.7 ML/M^2
BH CV ECHO MEAS - SI(MOD-SP4): 19.1 ML/M^2
BH CV ECHO MEAS - SI(TEICH): 52.7 ML/M^2
BH CV ECHO MEAS - SV(CUBED): 133.4 ML
BH CV ECHO MEAS - SV(MOD-SP2): 43.7 ML
BH CV ECHO MEAS - SV(MOD-SP4): 38.5 ML
BH CV ECHO MEAS - SV(TEICH): 106 ML
BH CV ECHO MEAS - TAPSE (>1.6): 2.4 CM
BH CV ECHO MEAS - TR MAX PG: 27 MMHG
BH CV ECHO MEAS - TR MAX VEL: 257.8 CM/SEC
BH CV ECHO MEASUREMENTS AVERAGE E/E' RATIO: 11.92
BH CV VAS BP LEFT ARM: NORMAL MMHG
BH CV XLRA - RV BASE: 3.6 CM
BH CV XLRA - RV LENGTH: 8.6 CM
BH CV XLRA - RV MID: 3.2 CM
BH CV XLRA - TDI S': 10.8 CM/SEC
BH CV XLRA MEAS LEFT DIST CCA EDV: 18 CM/SEC
BH CV XLRA MEAS LEFT DIST CCA PSV: 81 CM/SEC
BH CV XLRA MEAS LEFT DIST ICA EDV: 28 CM/SEC
BH CV XLRA MEAS LEFT DIST ICA PSV: 116 CM/SEC
BH CV XLRA MEAS LEFT ICA/CCA RATIO: 0.73
BH CV XLRA MEAS LEFT MID CCA EDV: 16 CM/SEC
BH CV XLRA MEAS LEFT MID CCA PSV: 93 CM/SEC
BH CV XLRA MEAS LEFT MID ICA EDV: 15 CM/SEC
BH CV XLRA MEAS LEFT MID ICA PSV: 59 CM/SEC
BH CV XLRA MEAS LEFT PROX CCA EDV: 24 CM/SEC
BH CV XLRA MEAS LEFT PROX CCA PSV: 127 CM/SEC
BH CV XLRA MEAS LEFT PROX ECA EDV: 20 CM/SEC
BH CV XLRA MEAS LEFT PROX ECA PSV: 168 CM/SEC
BH CV XLRA MEAS LEFT PROX ICA EDV: 18 CM/SEC
BH CV XLRA MEAS LEFT PROX ICA PSV: 75 CM/SEC
BH CV XLRA MEAS LEFT PROX SCLA EDV: 13 CM/SEC
BH CV XLRA MEAS LEFT PROX SCLA PSV: 144 CM/SEC
BH CV XLRA MEAS LEFT VERTEBRAL A EDV: 12 CM/SEC
BH CV XLRA MEAS LEFT VERTEBRAL A PSV: 48 CM/SEC
BH CV XLRA MEAS RIGHT DIST CCA EDV: 21 CM/SEC
BH CV XLRA MEAS RIGHT DIST CCA PSV: 96 CM/SEC
BH CV XLRA MEAS RIGHT DIST ICA EDV: 31 CM/SEC
BH CV XLRA MEAS RIGHT DIST ICA PSV: 99 CM/SEC
BH CV XLRA MEAS RIGHT ICA/CCA RATIO: 1.07
BH CV XLRA MEAS RIGHT MID CCA EDV: 22 CM/SEC
BH CV XLRA MEAS RIGHT MID CCA PSV: 103 CM/SEC
BH CV XLRA MEAS RIGHT MID ICA EDV: 18 CM/SEC
BH CV XLRA MEAS RIGHT MID ICA PSV: 78 CM/SEC
BH CV XLRA MEAS RIGHT PROX CCA EDV: 21.6 CM/SEC
BH CV XLRA MEAS RIGHT PROX CCA PSV: 105 CM/SEC
BH CV XLRA MEAS RIGHT PROX ECA EDV: 20 CM/SEC
BH CV XLRA MEAS RIGHT PROX ECA PSV: 126 CM/SEC
BH CV XLRA MEAS RIGHT PROX ICA EDV: 24 CM/SEC
BH CV XLRA MEAS RIGHT PROX ICA PSV: 103 CM/SEC
BH CV XLRA MEAS RIGHT PROX SCLA PSV: 70 CM/SEC
BH CV XLRA MEAS RIGHT VERTEBRAL A EDV: 12 CM/SEC
BH CV XLRA MEAS RIGHT VERTEBRAL A PSV: 42 CM/SEC
BUN SERPL-MCNC: 11 MG/DL (ref 8–23)
BUN/CREAT SERPL: 10.9 (ref 7–25)
CALCIUM SPEC-SCNC: 8.7 MG/DL (ref 8.6–10.5)
CHLORIDE SERPL-SCNC: 111 MMOL/L (ref 98–107)
CO2 SERPL-SCNC: 24 MMOL/L (ref 22–29)
CREAT SERPL-MCNC: 1.01 MG/DL (ref 0.76–1.27)
D-LACTATE SERPL-SCNC: 1.6 MMOL/L (ref 0.5–2)
DEPRECATED RDW RBC AUTO: 44 FL (ref 37–54)
ERYTHROCYTE [DISTWIDTH] IN BLOOD BY AUTOMATED COUNT: 13.2 % (ref 12.3–15.4)
GFR SERPL CREATININE-BSD FRML MDRD: 71 ML/MIN/1.73
GLUCOSE SERPL-MCNC: 90 MG/DL (ref 65–99)
HCT VFR BLD AUTO: 37.3 % (ref 37.5–51)
HCT VFR BLD AUTO: 38.9 % (ref 37.5–51)
HCT VFR BLD AUTO: 41.5 % (ref 37.5–51)
HGB BLD-MCNC: 12.7 G/DL (ref 13–17.7)
HGB BLD-MCNC: 13 G/DL (ref 13–17.7)
HGB BLD-MCNC: 13.4 G/DL (ref 13–17.7)
LEFT ATRIUM VOLUME INDEX: 31.7 ML/M^2
LEFT ATRIUM VOLUME: 63.8 ML
MAXIMAL PREDICTED HEART RATE: 137 BPM
MAXIMAL PREDICTED HEART RATE: 137 BPM
MCH RBC QN AUTO: 30.8 PG (ref 26.6–33)
MCHC RBC AUTO-ENTMCNC: 34 G/DL (ref 31.5–35.7)
MCV RBC AUTO: 90.5 FL (ref 79–97)
PLATELET # BLD AUTO: 143 10*3/MM3 (ref 140–450)
PMV BLD AUTO: 9.4 FL (ref 6–12)
POTASSIUM SERPL-SCNC: 3.8 MMOL/L (ref 3.5–5.2)
QT INTERVAL: 426 MS
QTC INTERVAL: 403 MS
RBC # BLD AUTO: 4.12 10*6/MM3 (ref 4.14–5.8)
RIGHT ARM BP: NORMAL MMHG
SODIUM SERPL-SCNC: 142 MMOL/L (ref 136–145)
STRESS TARGET HR: 116 BPM
STRESS TARGET HR: 116 BPM
WBC # BLD AUTO: 3.87 10*3/MM3 (ref 3.4–10.8)

## 2021-07-01 PROCEDURE — 25010000002 VANCOMYCIN PER 500 MG: Performed by: INTERNAL MEDICINE

## 2021-07-01 PROCEDURE — 93306 TTE W/DOPPLER COMPLETE: CPT | Performed by: INTERNAL MEDICINE

## 2021-07-01 PROCEDURE — G0378 HOSPITAL OBSERVATION PER HR: HCPCS

## 2021-07-01 PROCEDURE — 93306 TTE W/DOPPLER COMPLETE: CPT

## 2021-07-01 PROCEDURE — 83605 ASSAY OF LACTIC ACID: CPT | Performed by: INTERNAL MEDICINE

## 2021-07-01 PROCEDURE — 85014 HEMATOCRIT: CPT | Performed by: INTERNAL MEDICINE

## 2021-07-01 PROCEDURE — 93880 EXTRACRANIAL BILAT STUDY: CPT | Performed by: INTERNAL MEDICINE

## 2021-07-01 PROCEDURE — 87040 BLOOD CULTURE FOR BACTERIA: CPT | Performed by: INTERNAL MEDICINE

## 2021-07-01 PROCEDURE — 85018 HEMOGLOBIN: CPT | Performed by: INTERNAL MEDICINE

## 2021-07-01 PROCEDURE — 80048 BASIC METABOLIC PNL TOTAL CA: CPT | Performed by: INTERNAL MEDICINE

## 2021-07-01 PROCEDURE — 93880 EXTRACRANIAL BILAT STUDY: CPT

## 2021-07-01 PROCEDURE — 85027 COMPLETE CBC AUTOMATED: CPT | Performed by: INTERNAL MEDICINE

## 2021-07-01 PROCEDURE — 25010000002 VANCOMYCIN 10 G RECONSTITUTED SOLUTION

## 2021-07-01 PROCEDURE — 99233 SBSQ HOSP IP/OBS HIGH 50: CPT | Performed by: INTERNAL MEDICINE

## 2021-07-01 RX ORDER — PANTOPRAZOLE SODIUM 40 MG/1
40 TABLET, DELAYED RELEASE ORAL
Status: DISCONTINUED | OUTPATIENT
Start: 2021-07-01 | End: 2021-07-03 | Stop reason: HOSPADM

## 2021-07-01 RX ORDER — PANTOPRAZOLE SODIUM 40 MG/1
40 TABLET, DELAYED RELEASE ORAL 2 TIMES DAILY
Qty: 60 TABLET | Refills: 0 | Status: CANCELLED | OUTPATIENT
Start: 2021-07-01 | End: 2021-07-31

## 2021-07-01 RX ADMIN — SODIUM CHLORIDE, PRESERVATIVE FREE 10 ML: 5 INJECTION INTRAVENOUS at 08:37

## 2021-07-01 RX ADMIN — VANCOMYCIN HYDROCHLORIDE 750 MG: 750 INJECTION, SOLUTION INTRAVENOUS at 09:44

## 2021-07-01 RX ADMIN — DONEPEZIL HYDROCHLORIDE 10 MG: 5 TABLET ORAL at 20:36

## 2021-07-01 RX ADMIN — VANCOMYCIN HYDROCHLORIDE 1250 MG: 100 INJECTION, POWDER, LYOPHILIZED, FOR SOLUTION INTRAVENOUS at 21:56

## 2021-07-01 RX ADMIN — PANTOPRAZOLE SODIUM 40 MG: 40 INJECTION, POWDER, FOR SOLUTION INTRAVENOUS at 08:36

## 2021-07-01 RX ADMIN — BENZTROPINE MESYLATE 2 MG: 1 TABLET ORAL at 20:36

## 2021-07-01 RX ADMIN — BENZTROPINE MESYLATE 2 MG: 1 TABLET ORAL at 08:37

## 2021-07-01 RX ADMIN — BENZTROPINE MESYLATE 2 MG: 1 TABLET ORAL at 16:32

## 2021-07-01 RX ADMIN — PANTOPRAZOLE SODIUM 40 MG: 40 TABLET, DELAYED RELEASE ORAL at 16:32

## 2021-07-01 RX ADMIN — SODIUM CHLORIDE, PRESERVATIVE FREE 10 ML: 5 INJECTION INTRAVENOUS at 20:36

## 2021-07-01 NOTE — DISCHARGE SUMMARY
[facility] Medicine Services  DISCHARGE SUMMARY    Patient Name: Jhoan Gagnon  : 1940  MRN: [id number]    Date of Admission: 2021 11:54 AM  Date of Discharge:  7/3/2021  Primary Care Physician: Wilmer Andres MD    Consults     Date and Time Order Name Status Description    2021 12:34 AM Inpatient Infectious Diseases Consult Completed     2021 12:34 AM Inpatient General Surgery Consult Completed           Hospital Course     Presenting Problem:   Hematemesis with nausea [K92.0]    Active Hospital Problems    Diagnosis  POA   • Positive blood culture [R78.81]  Yes   • Hematemesis with nausea [K92.0]  Yes   • Syncope [R55]  Yes      Resolved Hospital Problems   No resolved problems to display.          Hospital Course:  Jhoan Gagnon is a 81 y.o. male With a PMH of Parkinson disease, prostate cancer s/p surgery, and chronic low back pain who was admitted on  from his PCP's office for nausea, vomiting, lethargy, and syncopal episode.  Echocardiogram showed EF of 52%, no mention of diastolic dysfunction, and no mention of significant valvular heart disease.  Carotid duplex with no significant plaques visualized.   His PCP Dr. Andres contacted me via Epic chat on  and was concerned about hematemesis prior to admission and recommended GI consult and EGD. Given that he was tolerating a regular diet, did not have any more episodes of vomiting or hematemesis, had 2 bowel movements with no report of melena or bright red blood per rectum,  H&H was stable at 13.0 and increased to 13.8 with stable vital signs I did not feel that patient was actively bleeding and required a GI evaluation at the time he was under my care. I will start him on a PPI daily and he can follow up outpatient with his primary care doctor.   He had been complaining of cough and had CXR that did not show any acute intrathoracic abnormality.   His blood cultures from  were growing  strep mitis x2. He had NATALY on July 2 that as negative for vegetations. He was afebrile, without leukocytosis, and normal procalcitonin. Repeat blood cultures from 6/30 were NGTD. I discussed with Dr. Castle and he will be discharged with amoxicillin 500 mg TID x2 weeks. I called his daughter, Anamaria, and discussed plan of care with her.    ADDENDUM: The patient was scheduled to discharge home on 7/2 but it was cancelled because the patient's family was concerned that he may need rehab.  Patient adamant today that he is going to discharge home and has no interest in rehab.  He is alert and oriented x 4 and able to make his own decisions.  Case management spoke with the patient as well as his granddaughter about the current plan and all verbalized understanding.      Discharge Follow Up Recommendations for outpatient labs/diagnostics:  Follow-up with PCP in 1 to 2 weeks for PUD  Follow up with Dr. Castle in 2 weeks     Day of Discharge     HPI:   No acute events overnight. Has cough, has been ongoing for 6 months, denies fever or chills overnight. Denies n/v. Hasn't eaten since dinner last night, but tolerated that well.      Review of Systems  Gen- No fevers, chills  CV- No chest pain, palpitations  Resp- No cough, dyspnea    Vital Signs:   Temp:  [97.9 °F (36.6 °C)-98.7 °F (37.1 °C)] 98.4 °F (36.9 °C)  Heart Rate:  [46-64] 60  Resp:  [18-20] 18  BP: (122-191)/() 144/78     Physical Exam:  Constitutional: No acute distress, awake, alert  HENT: NCAT, mucous membranes moist  Respiratory: Clear to auscultation bilaterally, respiratory effort normal on room air   Cardiovascular: RRR, no murmurs  Gastrointestinal: Positive bowel sounds, soft, nontender, nondistended  Psychiatric: Appropriate affect, cooperative  Neurologic: Oriented x 3, strength symmetric in all extremities, Cranial Nerves grossly intact to confrontation, speech clear  Skin: No rashes    Pertinent  and/or Most Recent Results     LAB RESULTS:      Lab  07/02/21  0907 07/02/21  0005 07/01/21  1609 07/01/21  0827 07/01/21 0519 06/30/21 2009 06/30/21  0328 06/29/21  1201   WBC 3.95  --   --   --  3.87  --  4.61 3.43   HEMOGLOBIN 13.8 13.2 13.4 13.0 12.7*  --  13.2 13.0   HEMATOCRIT 40.2 39.3 41.5 38.9 37.3*  --  39.7 39.5   PLATELETS 160  --   --   --  143  --  148 169   NEUTROS ABS 2.28  --   --   --   --   --  2.78 1.89   IMMATURE GRANS (ABS) 0.01  --   --   --   --   --  0.01 0.00   LYMPHS ABS 1.11  --   --   --   --   --  1.28 1.16   MONOS ABS 0.41  --   --   --   --   --  0.41 0.30   EOS ABS 0.11  --   --   --   --   --  0.10 0.06   MCV 89.7  --   --   --  90.5  --  92.8 91.9   PROCALCITONIN  --   --   --   --   --   --   --  0.06   LACTATE  --   --  1.6  --   --  1.0  --  1.6   PROTIME  --   --   --   --   --   --   --  13.0   APTT  --   --   --   --   --   --   --  20.3*         Lab 07/02/21  1405 07/01/21 0519 06/30/21  0328 06/29/21  1201   SODIUM 143 142 141 137   POTASSIUM 4.1 3.8 3.9 3.6   CHLORIDE 109* 111* 109* 104   CO2 25.0 24.0 25.0 25.0   ANION GAP 9.0 7.0 7.0 8.0   BUN 13 11 14 17   CREATININE 1.03 1.01 1.01 1.01   GLUCOSE 83 90 80 109*   CALCIUM 8.7 8.7 8.7 8.4*         Lab 06/29/21  1201   TOTAL PROTEIN 6.3   ALBUMIN 3.60   GLOBULIN 2.7   ALT (SGPT) 9   AST (SGOT) 18   BILIRUBIN 1.6*   ALK PHOS 79   LIPASE 18         Lab 06/29/21  1508 06/29/21  1201   PROBNP  --  1,525.0   TROPONIN T <0.010 <0.010   PROTIME  --  13.0   INR  --  1.01                 Brief Urine Lab Results  (Last result in the past 365 days)      Color   Clarity   Blood   Leuk Est   Nitrite   Protein   CREAT   Urine HCG        06/29/21 1418 Dark Yellow Clear Negative Negative Negative Trace             Microbiology Results (last 10 days)     Procedure Component Value - Date/Time    Blood Culture - Blood, Hand, Right [[id number]] Collected: 06/30/21 2011    Lab Status: Preliminary result Specimen: Blood from Hand, Right Updated: 07/01/21 2030     Blood Culture No growth at 24  hours    Blood Culture - Blood, Arm, Right [[id number]] Collected: 06/30/21 2010    Lab Status: Preliminary result Specimen: Blood from Arm, Right Updated: 07/01/21 2030     Blood Culture No growth at 24 hours    COVID PRE-OP / PRE-PROCEDURE SCREENING ORDER (NO ISOLATION) - Swab, Nasopharynx [[id number]]  (Normal) Collected: 06/29/21 2247    Lab Status: Final result Specimen: Swab from Nasopharynx Updated: 06/29/21 2349    Narrative:      The following orders were created for panel order COVID PRE-OP / PRE-PROCEDURE SCREENING ORDER (NO ISOLATION) - Swab, Nasopharynx.  Procedure                               Abnormality         Status                     ---------                               -----------         ------                     COVID-19,CEPHEID,CHRISTINE IN-...[[id number]]  Normal              Final result                 Please view results for these tests on the individual orders.    COVID-19,CEPHEID,CHRISTINE IN-HOUSE(OR EMERGENT/ADD-ON),NP SWAB IN TRANSPORT MEDIA 3-4 HR TAT - Swab, Nasopharynx [[id number]]  (Normal) Collected: 06/29/21 2247    Lab Status: Final result Specimen: Swab from Nasopharynx Updated: 06/29/21 2349     COVID19 Not Detected    Narrative:      Fact sheet for providers: https://www.fda.gov/media/204037/download     Fact sheet for patients: https://www.fda.gov/media/849661/download  Fact sheet for providers: https://www.fda.gov/media/274167/download     Fact sheet for patients: https://www.fda.gov/media/611434/download    Blood Culture - Blood, Arm, Right [[id number]]  (Abnormal)  (Susceptibility) Collected: 06/29/21 1210    Lab Status: Final result Specimen: Blood from Arm, Right Updated: 07/02/21 0710     Blood Culture Staphylococcus epidermidis     Isolated from Anaerobic Bottle     Blood Culture Streptococcus mitis / oralis     Isolated from Anaerobic Bottle     Gram Stain Anaerobic Bottle Gram positive cocci in chains      Anaerobic Bottle Gram positive cocci in groups    Susceptibility       Staphylococcus epidermidis Streptococcus mitis / oralis      AMY AMY      Ceftriaxone  Susceptible      Oxacillin Resistant       Penicillin G  Susceptible      Vancomycin Susceptible Susceptible                 Linear View                   Blood Culture - Blood, Arm, Left [[id number]]  (Abnormal) Collected: 06/29/21 1210    Lab Status: Final result Specimen: Blood from Arm, Left Updated: 07/02/21 0710     Blood Culture Streptococcus mitis / oralis     Gram Stain Anaerobic Bottle Gram positive cocci in chains    Narrative:      Refer to previous blood culture collected on 6/29/2021 at 1210 for AMY's.       Blood Culture ID, PCR - Blood, Arm, Right [[id number]]  (Abnormal) Collected: 06/29/21 1210    Lab Status: Final result Specimen: Blood from Arm, Right Updated: 06/30/21 0508     BCID, PCR Staphylococcus spp, not aureus. Identification by BCID PCR.     BCID, PCR 2 Streptococcus spp, not A, B, or pneumoniae. Identification by BCID PCR.          Adult Transesophageal Echo (NATALY) W/ Cont if Necessary Per Protocol    Result Date: 7/2/2021  · Left ventricular ejection fraction appears to be 56 - 60%. Left ventricular systolic function is normal. · Left ventricular wall thickness is consistent with mild concentric hypertrophy. · No echocardiographic findings concerning for endocarditis.      Adult Transthoracic Echo Complete W/ Cont if Necessary Per Protocol    Result Date: 7/1/2021  · Left ventricular ejection fraction appears to be greater than 70%. Left ventricular systolic function is hyperdynamic (EF > 70%). · Left ventricular diastolic function is consistent with (grade Ia w/high LAP) impaired relaxation. · Estimated right ventricular systolic pressure from tricuspid regurgitation is normal (<35 mmHg). Calculated right ventricular systolic pressure from tricuspid regurgitation is 30 mmHg. · Mild dilation of the ascending aorta is present. Ascending aorta = 4.4 cm      CT Abdomen Pelvis With Contrast    Result Date:  6/29/2021  EXAMINATION: CT ABDOMEN PELVIS W CONTRAST-  INDICATION: nausea, vomiting, recent concern of mesenteric mass on CT  TECHNIQUE: Axial IV contrast-enhanced CT of the abdomen and pelvis with multiplanar reconstruction  The radiation dose reduction device was turned on for each scan per the ALARA (As Low as Reasonably Achievable) protocol.  COMPARISON: 6/14/2021  FINDINGS: The lung bases are grossly clear. The body wall soft tissues are unremarkable. There is no evidence of acute fracture or aggressive osseous lesion. The liver, spleen, pancreas and bilateral adrenal glands demonstrate homogeneous enhancement without evidence of suspicious focal lesion. The kidneys demonstrate symmetric nephrogram and contrast excretion without evidence of hydronephrosis or contour deforming mass. 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 There is associated displacement of bowel segments and also compatible with malrotation, there is a preponderance of small bowel loops on the right, with somewhat abnormal course of the duodenum. There is no evidence of associated mechanical bowel obstruction or suspicious bowel wall thickening. There is no new solid enhancing component to suggest definite neoplastic process. There is no free fluid or pneumoperitoneum. Normal caliber atherosclerotic abdominal aorta. No bulky retroperitoneal adenopathy. The pelvic viscera are unremarkable.      Redemonstrated and grossly unchanged large area of somewhat walled off and edematous mesenteric fat in the mid abdomen. 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 There is no new associated inflammatory change or focal fluid collection to suggest progression or abscess development and there is no solid enhancing component to suggest progressive neoplastic involvement. There is no evidence of mechanical bowel obstruction.   This report was finalized on 6/29/2021 3:24 PM by Juan Ramon Ortega.      XR Chest 1 View    Result Date: 7/2/2021   EXAMINATION: XR CHEST 1 VW-  INDICATION: cough; K92.0-Hematemesis; R53.1-Weakness; K63.89-Other specified diseases of intestine; Z74.09-Other reduced mobility  COMPARISON: 06/29/2021  FINDINGS: Lateral chest reveals cardiac silhouette to be within normal limits. The lung fields are grossly clear. No focal right opacification present. No pleural effusion or pneumothorax. Degenerative changes seen within the spine. Pulmonary vascularity is within normal limits. Mild elevation seen of the right hemidiaphragm.         Chronic changes in with the lung fields with no evidence of acute parenchymal disease.       XR Chest 1 View    Result Date: 6/29/2021  EXAMINATION: XR CHEST 1 VW-  INDICATION: generalized weakness  COMPARISON: 11/13/2019  FINDINGS: Unchanged aeration with no new focal opacity. No effusion or pneumothorax. Unchanged degree of cardiac enlargement.      Mild chronic changes of the lung fields without evidence of acute cardiopulmonary abnormality.  This report was finalized on 6/29/2021 1:50 PM by Juan Ramon Ortega.      Duplex Carotid Ultrasound CAR    Result Date: 7/1/2021  · Proximal right internal carotid artery plaque without significant stenosis. · Right internal carotid artery stenosis of 0-49%. · Proximal left internal carotid artery plaque without significant stenosis. · Left internal carotid artery stenosis of 0-49%.        Results for orders placed during the hospital encounter of 06/29/21    Duplex Carotid Ultrasound CAR    Interpretation Summary  · Proximal right internal carotid  artery plaque without significant stenosis.  · Right internal carotid artery stenosis of 0-49%.  · Proximal left internal carotid artery plaque without significant stenosis.  · Left internal carotid artery stenosis of 0-49%.      Results for orders placed during the hospital encounter of 06/29/21    Duplex Carotid Ultrasound CAR    Interpretation Summary  · Proximal right internal carotid artery plaque without significant stenosis.  · Right internal carotid artery stenosis of 0-49%.  · Proximal left internal carotid artery plaque without significant stenosis.  · Left internal carotid artery stenosis of 0-49%.      Results for orders placed during the hospital encounter of 06/29/21    Adult Transesophageal Echo (NATALY) W/ Cont if Necessary Per Protocol    Interpretation Summary  · Left ventricular ejection fraction appears to be 56 - 60%. Left ventricular systolic function is normal.  · Left ventricular wall thickness is consistent with mild concentric hypertrophy.  · No echocardiographic findings concerning for endocarditis.      Plan for Follow-up of Pending Labs/Results:   Pending Labs     Order Current Status    Blood Culture - Blood, Arm, Right In process    Blood Culture - Blood, Hand, Right In process    Blood Culture - Blood, Arm, Right Preliminary result    Blood Culture - Blood, Hand, Right Preliminary result        Discharge Details        Discharge Medications      New Medications      Instructions Start Date   amoxicillin 500 MG capsule  Commonly known as: AMOXIL   1,000 mg, Oral, 3 Times Daily      pantoprazole 40 MG EC tablet  Commonly known as: PROTONIX   40 mg, Oral, Daily         Continue These Medications      Instructions Start Date   amLODIPine 5 MG tablet  Commonly known as: NORVASC   5 mg, Oral, Daily      aspirin 81 MG EC tablet   81 mg, Oral, Daily      atropine 1 % ophthalmic solution   1 drop, Daily PRN, Under tongue as needed       cyanocobalamin 1000 MCG/ML injection   1,000 mcg,  Intramuscular, Every 28 Days      donepezil 10 MG tablet  Commonly known as: ARICEPT   10 mg, Oral, Nightly      memantine 5 MG tablet  Commonly known as: NAMENDA   5 mg, Oral, 2 Times Daily      rOPINIRole XL 6 MG tablet sustained-release 24 hour 24 hr tablet  Commonly known as: REQUIP XL   6 mg, Oral, Nightly      rosuvastatin 20 MG tablet  Commonly known as: CRESTOR   20 mg, Oral, Nightly      traZODone 100 MG tablet  Commonly known as: DESYREL   100 mg, Oral, Nightly             No Known Allergies      Discharge Disposition:Home or Self Care Home     Diet:  Hospital:  Diet Order   Procedures   • Diet Regular          CODE STATUS:    Code Status and Medical Interventions:   Ordered at: 06/29/21 1741     Limited Support to NOT Include:    Intubation     Level Of Support Discussed With:    Patient     Code Status:    No CPR     Medical Interventions (Level of Support Prior to Arrest):    Limited       No future appointments.    Additional Instructions for the Follow-ups that You Need to Schedule     Discharge Follow-up with PCP   As directed       Currently Documented PCP:    Wilmer Andres MD    PCP Phone Number:    [phone]     Follow Up Details: in 1-2 weeks if needed         Discharge Follow-up with Specified Provider: Dr. Castle; 2 Weeks   As directed      To: Dr. Castle    Follow Up: 2 Weeks    Follow Up Details: positive blood cultures                     Susie Centeno MD  07/02/21      Time Spent on Discharge:  I spent  35  minutes on this discharge activity which included: face-to-face encounter with the patient, reviewing the data in the system, coordination of the care with the nursing staff as well as consultants, documentation, and entering orders.

## 2021-07-01 NOTE — PLAN OF CARE
VSS on RA, SB on tele, no c/o pain, no c/o NV, no episodes of emesis, rested well during shift, will continue to monitor

## 2021-07-01 NOTE — CONSULTS
INFECTIOUS DISEASE CONSULT/INITIAL HOSPITAL VISIT    Jhoan Gagnon  1940  [id number]    Date of Consult: 7/1/2021    Admission Date: 6/29/2021      Requesting Provider: No ref. provider found  Evaluating Physician: Jhoan Castle MD    Reason for Consultation: Positive blood cultures    History of present illness:    Patient is a 81 y.o. 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 Andres's office patient had extreme nausea patient had diaphoresis and passed out EMS was activated and patient presents emergency room was found to have hematemesis.  Patient had CT scan of abdomen pelvis concerning for edematous mesenteric fat.  General surgery was consulted for the abnormality patient not found to have an acute abdomen and is being managed medically.    Patient has a right-sided pacemaker as well as bilateral total knee replacements.    Blood cultures from admission are positive requesting of coagulase-negative staph, streptococcal species.    Patient was started on vancomycin.    Had a pacemaker placed at  this year. Denies pain in generator site.        Past Medical History:   Diagnosis Date   • Broken arms    • Cancer (CMS/HCC)     prostate   • Lower back injury    • Parkinson's disease (CMS/HCC)        Past Surgical History:   Procedure Laterality Date   • CHOLECYSTECTOMY     • PROSTATE SURGERY     • REPLACEMENT TOTAL KNEE BILATERAL         Family History   Problem Relation Age of Onset   • Heart disease Mother    • Cancer Father    • Stroke Father    • Stroke Maternal Grandmother    • Cancer Paternal Grandfather        Social History     Socioeconomic History   • Marital status:      Spouse name: Not on file   • Number of children: Not on file   • Years of education: Not on file   • Highest education level: Not on file   Tobacco Use   • Smoking status: Never Smoker   • Smokeless tobacco: Never Used   Substance and Sexual Activity   • Alcohol use: No   • Drug use: No    • Sexual activity: Defer     Partners: Female     Comment: . wife passed away in 2008       No Known Allergies      Medication:    Current Facility-Administered Medications:   •  [START ON 7/2/2021] !vancomycin trough 7/2 0730.Hold 0800 dose until pharmacist reviews result., , Does not apply, Once, Kartik Verdugo MD  •  acetaminophen (TYLENOL) tablet 650 mg, 650 mg, Oral, Q4H PRN, Harper Murillo PA-C  •  benztropine (COGENTIN) tablet 2 mg, 2 mg, Oral, TID, Harper Murillo PA-C, 2 mg at 07/01/21 0837  •  donepezil (ARICEPT) tablet 10 mg, 10 mg, Oral, Nightly, Harper Murillo PA-C, 10 mg at 06/30/21 2054  •  ondansetron (ZOFRAN) injection 4 mg, 4 mg, Intravenous, Q6H PRN, Harper Murillo PA-C  •  pantoprazole (PROTONIX) injection 40 mg, 40 mg, Intravenous, BID AC, Kartik Verdugo MD, 40 mg at 07/01/21 0836  •  Pharmacy to dose vancomycin, , Does not apply, Continuous PRN, Kartik Verdugo MD  •  [COMPLETED] Insert peripheral IV, , , Once **AND** sodium chloride 0.9 % flush 10 mL, 10 mL, Intravenous, PRN, Gal Wong PA  •  sodium chloride 0.9 % flush 10 mL, 10 mL, Intravenous, Q12H, Harper Murillo PA-C, 10 mL at 07/01/21 0837  •  sodium chloride 0.9 % flush 10 mL, 10 mL, Intravenous, PRN, Harper Murillo PA-C  •  sodium chloride 0.9 % infusion, 100 mL/hr, Intravenous, Continuous, Harper Murillo PA-C, Last Rate: 100 mL/hr at 06/30/21 0828, 100 mL/hr at 06/30/21 0828  •  vancomycin in dextrose 5% 150 mL (VANCOCIN) IVPB 750 mg, 750 mg, Intravenous, Q12H, Kartik Verdugo MD    Antibiotics:  Anti-Infectives (From admission, onward)    Ordered     Dose/Rate Route Frequency Start Stop    06/30/21 1815  vancomycin in dextrose 5% 150 mL (VANCOCIN) IVPB 750 mg     Ordering Provider: Kartik Verdugo MD    750 mg  over 60 Minutes Intravenous Every 12 Hours 07/01/21 0800 07/08/21 0759    06/30/21 1815  vancomycin 1750 mg/500 mL 0.9% NS IVPB ([facility])     Ordering Provider: Kartik Verdugo MD    20 mg/kg × 88.1 kg  over 120  Minutes Intravenous Once 21  Pharmacy to dose vancomycin     Ordering Provider: Kartik Verdugo MD     Does not apply Continuous PRN 21            Review of Systems:  Remarkable for nausea, vomiting, urgency, hematemesis      Physical Exam:   Vital Signs  Temp (24hrs), Av.2 °F (36.8 °C), Min:97.7 °F (36.5 °C), Max:98.5 °F (36.9 °C)    Temp  Min: 97.7 °F (36.5 °C)  Max: 98.5 °F (36.9 °C)  BP  Min: 96/61  Max: 140/75  Pulse  Min: 41  Max: 58  Resp  Min: 16  Max: 16  SpO2  Min: 98 %  Max: 100 %    GENERAL: Awake and alert, in no acute distress.   HEENT: Normocephalic, atraumatic.  PERRL. EOMI. No conjunctival injection. No icterus. Oropharynx clear without evidence of thrush or exudate. No evidence of peridontal disease.      HEART: RRR; No murmur  LUNGS: Clear to auscultation bilaterally   ABDOMEN: Soft, nontender, nondistended.   EXT:  No cyanosis, clubbing or edema. No cord.  :  Without Stapleton catheter.  MSK: Bilateral total knee replacements incision clean dry and intact  SKIN: Warm and dry without cutaneous eruptions on Inspection/palpation.    NEURO: Oriented to PPT. Shaking of right hand at rest  PSYCHIATRIC: Normal insight and judgement. Cooperative with PE    Laboratory Data    Results from last 7 days   Lab Units 21  0827 21  0519 21  2320 21  0328 21  1201   WBC 10*3/mm3  --  3.87  --  4.61 3.43   HEMOGLOBIN g/dL 13.0 12.7* 13.0 13.2 13.0   HEMATOCRIT % 38.9 37.3* 38.8 39.7 39.5   PLATELETS 10*3/mm3  --  143  --  148 169     Results from last 7 days   Lab Units 21  0519   SODIUM mmol/L 142   POTASSIUM mmol/L 3.8   CHLORIDE mmol/L 111*   CO2 mmol/L 24.0   BUN mg/dL 11   CREATININE mg/dL 1.01   GLUCOSE mg/dL 90   CALCIUM mg/dL 8.7     Results from last 7 days   Lab Units 21  1201   ALK PHOS U/L 79   BILIRUBIN mg/dL 1.6*   ALT (SGPT) U/L 9   AST (SGOT) U/L 18             Results from last 7 days    Lab Units 06/30/21 2009   LACTATE mmol/L 1.0             Estimated Creatinine Clearance: 59.8 mL/min (by C-G formula based on SCr of 1.01 mg/dL).      Microbiology:  Blood Culture   Date Value Ref Range Status   06/29/2021 Gram Positive Cocci (C)  Preliminary   06/29/2021 Gram Positive Cocci (C)  Preliminary   06/29/2021 Gram Positive Cocci (C)  Preliminary     BCID, PCR   Date Value Ref Range Status   06/29/2021 (C) No organism detected by BCID PCR. Final    Staphylococcus spp, not aureus. Identification by BCID PCR.     BCID, PCR 2   Date Value Ref Range Status   06/29/2021 (C) No organism detected by BCID PCR. Final    Streptococcus spp, not A, B, or pneumoniae. Identification by BCID PCR.     No results found for: CULTURES, HSVCX, URCX  No results found for: EYECULTURE, GCCX, HSVCULTURE, LABHSV  No results found for: LEGIONELLA, MRSACX, MUMPSCX, MYCOPLASCX  No results found for: NOCARDIACX, STOOLCX  No results found for: THROATCX, UNSTIMCULT, URINECX, CULTURE, VZVCULTUR  No results found for: VIRALCULTU, WOUNDCX        Radiology:  Imaging Results (Last 72 Hours)     Procedure Component Value Units Date/Time    CT Abdomen Pelvis With Contrast [[id number]] Collected: 06/29/21 1511     Updated: 06/29/21 1527    Narrative:      EXAMINATION: CT ABDOMEN PELVIS W CONTRAST-      INDICATION: nausea, vomiting, recent concern of mesenteric mass on CT     TECHNIQUE: Axial IV contrast-enhanced CT of the abdomen and pelvis with  multiplanar reconstruction     The radiation dose reduction device was turned on for each scan per the  ALARA (As Low as Reasonably Achievable) protocol.     COMPARISON: 6/14/2021     FINDINGS: The lung bases are grossly clear. The body wall soft tissues  are unremarkable. There is no evidence of acute fracture or aggressive  osseous lesion. The liver, spleen, pancreas and bilateral adrenal glands  demonstrate homogeneous enhancement without evidence of suspicious focal  lesion. The kidneys  demonstrate symmetric nephrogram and contrast  excretion without evidence of hydronephrosis or contour deforming mass.  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 There is  associated displacement of bowel segments and also compatible with  malrotation, there is a preponderance of small bowel loops on the right,  with somewhat abnormal course of the duodenum. There is no evidence of  associated mechanical bowel obstruction or suspicious bowel wall  thickening. There is no new solid enhancing component to suggest  definite neoplastic process. There is no free fluid or pneumoperitoneum.  Normal caliber atherosclerotic abdominal aorta. No bulky retroperitoneal  adenopathy. The pelvic viscera are unremarkable.       Impression:      Redemonstrated and grossly unchanged large area of somewhat  walled off and edematous mesenteric fat in the mid abdomen. 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 There is no new associated  inflammatory change or focal fluid collection to suggest progression or  abscess development and there is no solid enhancing component to suggest  progressive neoplastic involvement. There is no evidence of mechanical  bowel obstruction.        This report was finalized on 6/29/2021 3:24 PM by Juan Ramon Ortega.       XR Chest 1 View [[id number]] Collected: 06/29/21 1349     Updated: 06/29/21 1353    Narrative:      EXAMINATION: XR CHEST 1 VW-      INDICATION: generalized weakness      COMPARISON: 11/13/2019     FINDINGS: Unchanged aeration with no new focal opacity.  No effusion or  pneumothorax. Unchanged degree of cardiac enlargement.       Impression:      Mild chronic changes of the lung fields without evidence of  acute cardiopulmonary abnormality.     This report was finalized on 6/29/2021 1:50 PM by Juan Ramon Ortega.               Impression:   Polymicrobial bacteremia of unclear significance  Endovascular hardware including pacemaker  Bilateral total knee replacements  Hematemesis    PLAN/RECOMMENDATIONS:   Thank you for asking us to see Jhoan Gagnon, I recommend the following:  Vancomycin or daptomycin would be reasonable at this time covering for colitis negative staph, streptococcal species.    In setting of endovascular hardware I would repeat blood cultures and obtain transesophageal echocardiogram looking for vegetation of intracardiac leads.    I would continue IV antibiotics at this time    Cont vanco per pharm dosing aim for AUC of 400-600           Jhoan Castle MD  7/1/2021  09:32 EDT

## 2021-07-01 NOTE — PLAN OF CARE
Goal Outcome Evaluation:  Plan of Care Reviewed With: patient         Pt VSS on RA. No c/o pain this shift. Echo and carotid duplex completed this AM. Pt eating dinner at this time with no complaints. Call light in reach.

## 2021-07-01 NOTE — PROGRESS NOTES
"Pharmacy Consult-Vancomycin Dosing  Jhoan Gagnon is a  81 y.o. male receiving vancomycin therapy.     Indication:bacteremia  Consulting Provider: Hospital medicine  ID Consult: yes, Dr. Castle    Goal AUC: 400 - 600 mg/L*hr    Current Antimicrobial Therapy  Anti-Infectives (From admission, onward)      Ordered     Dose/Rate Route Frequency Start Stop    07/01/21 1203  vancomycin 1250 mg/250 mL 0.9% NS IVPB ([facility])     Ordering Provider: Ana Braswell, PharmD    15 mg/kg × 88 kg  over 90 Minutes Intravenous Every 24 Hours Scheduled 07/01/21 2200 07/08/21 2159 06/30/21 1815  vancomycin 1750 mg/500 mL 0.9% NS IVPB ([facility])     Ordering Provider: Kartik Verdugo MD    20 mg/kg × 88.1 kg  over 120 Minutes Intravenous Once 06/30/21 2000 06/30/21 2257 06/30/21 1759  Pharmacy to dose vancomycin     Ana Braswell, PharmD reviewed the order on 07/01/21 1204.   Ordering Provider: Kartik Verdugo MD     Does not apply Continuous PRN 06/30/21 1757 07/07/21 1756          Allergies  Allergies as of 06/29/2021    (No Known Allergies)     Labs    Results from last 7 days   Lab Units 07/01/21  0519 06/30/21  0328 06/29/21  1201   BUN mg/dL 11 14 17   CREATININE mg/dL 1.01 1.01 1.01     Results from last 7 days   Lab Units 07/01/21  0519 06/30/21  0328 06/29/21  1201   WBC 10*3/mm3 3.87 4.61 3.43     Evaluation of Dosing     Last Dose Received in the ED/Outside Facility:   Is Patient on Dialysis or Renal Replacement:     Ht - 172.7 cm (67.99\")  Wt - 88 kg (194 lb)    Estimated Creatinine Clearance: 59.7 mL/min (by C-G formula based on SCr of 1.01 mg/dL).    Intake & Output (last 3 days)         06/28 0701 - 06/29 0700 06/29 0701 - 06/30 0700 06/30 0701 - 07/01 0700 07/01 0701 - 07/02 0700    P.O.  597 592     Total Intake(mL/kg)  597 (6.8) 592 (6.7)     Urine (mL/kg/hr)  975 1375 (0.7)     Total Output  975 1375     Net  -122 -300             Urine Unmeasured Occurrence    1 x    Stool Unmeasured Occurrence    1 x      "     Microbiology and Radiology  Microbiology Results (last 10 days)       Procedure Component Value - Date/Time    COVID PRE-OP / PRE-PROCEDURE SCREENING ORDER (NO ISOLATION) - Swab, Nasopharynx [[id number]]  (Normal) Collected: 06/29/21 2247    Lab Status: Final result Specimen: Swab from Nasopharynx Updated: 06/29/21 2349    Narrative:      The following orders were created for panel order COVID PRE-OP / PRE-PROCEDURE SCREENING ORDER (NO ISOLATION) - Swab, Nasopharynx.  Procedure                               Abnormality         Status                     ---------                               -----------         ------                     COVID-19,CEPHEID,CHRISTINE IN-...[[id number]]  Normal              Final result                 Please view results for these tests on the individual orders.    COVID-19,CEPHEID,CHRISTINE IN-HOUSE(OR EMERGENT/ADD-ON),NP SWAB IN TRANSPORT MEDIA 3-4 HR TAT - Swab, Nasopharynx [[id number]]  (Normal) Collected: 06/29/21 2247    Lab Status: Final result Specimen: Swab from Nasopharynx Updated: 06/29/21 2349     COVID19 Not Detected    Narrative:      Fact sheet for providers: https://www.fda.gov/media/550874/download     Fact sheet for patients: https://www.fda.gov/media/155742/download  Fact sheet for providers: https://www.fda.gov/media/417428/download     Fact sheet for patients: https://www.fda.gov/media/000489/download    Blood Culture - Blood, Arm, Right [[id number]]  (Abnormal) Collected: 06/29/21 1210    Lab Status: Preliminary result Specimen: Blood from Arm, Right Updated: 07/01/21 0852     Blood Culture Gram Positive Cocci     Isolated from Anaerobic Bottle     Blood Culture Gram Positive Cocci     Isolated from Anaerobic Bottle     Gram Stain Anaerobic Bottle Gram positive cocci in chains      Anaerobic Bottle Gram positive cocci in groups    Blood Culture - Blood, Arm, Left [[id number]]  (Abnormal) Collected: 06/29/21 1210    Lab Status: Preliminary result Specimen: Blood from Arm, Left  "Updated: 07/01/21 0854     Blood Culture Gram Positive Cocci     Gram Stain Anaerobic Bottle Gram positive cocci in chains    Blood Culture ID, PCR - Blood, Arm, Right [[id number]]  (Abnormal) Collected: 06/29/21 1210    Lab Status: Final result Specimen: Blood from Arm, Right Updated: 06/30/21 0508     BCID, PCR Staphylococcus spp, not aureus. Identification by BCID PCR.     BCID, PCR 2 Streptococcus spp, not A, B, or pneumoniae. Identification by BCID PCR.          Reported Vancomycin Levels           [facility] AUC Calculation:    Current AUC:    369 mg/L*hr    Predicted Steady State AUC on Current Dose: 513 mg/L*hr  _________________________________    Predicted Steady State AUC on New Dose:   441 mg/L*hr    Assessment/Plan:     Pharmacy to dose vancomycin for bacteremia in 80 yo male, crcl 60, ht 68\", wt 88 kg.  Will load with 20mg/kg and follow with 750 mg q12h(per [facility]). A trough is ordered for 7/2 prior to 4th dose.    1. Pharmacy to dose vancomycin for bacteremia - colitis negative staph, streptococcal species in setting of endovascular hardware.  ID plans to repeat blood cultures and obtain transesophageal echocardiogram looking for vegetation of intracardiac leads.  2. Patient received a loading dose of vancomycin 1750 mg IV x1 on 6/30 at 2057, then vancomycin 750 mg IV on 7/1 at 0944.  3. Patient is 81 years of age.  Recommend to update maintenance dose to vancomycin 1250 mg IV q24h (~ 14.2 mg/kg).  4. Vancomycin trough scheduled for 7/2 at 2100 prior to the fourth dose due 7/2 at 2200. (Goal AUC = 400 to 600 mg/L*hr).  5. Pharmacy will monitor renal function, cultures and sensitivities, and clinical status, and adjust regimen as necessary.    Thank you for consult,    Ana Braswell, PharmD   07/01/21  12:15 EDT      "

## 2021-07-01 NOTE — PROGRESS NOTES
[facility] Medicine Services  PROGRESS NOTE    Patient Name: Jhoan Gagnon  : 1940  MRN: [id number]    Date of Admission: 2021  Primary Care Physician: Wilmer Andres MD    Subjective   Subjective     CC:  Follow up for blood cultures     HPI:  No acute events overnight.  Patient denies any vomiting, nausea, he has about to eat lunch, and feels ready to go home.       ROS:  Gen- No fevers, chills  CV- No chest pain, palpitations  Resp- No cough, dyspnea    Objective   Objective     Vital Signs:   Temp:  [97.7 °F (36.5 °C)-98.5 °F (36.9 °C)] 98 °F (36.7 °C)  Heart Rate:  [47-58] 47  Resp:  [16] 16  BP: (118-160)/(62-81) 149/81  Total (NIH Stroke Scale): 2     Physical Exam:  Constitutional: No acute distress, awake, alert  HENT: NCAT, mucous membranes moist  Respiratory: Clear to auscultation bilaterally, respiratory effort normal on room air  Cardiovascular: RRR, no murmurs  Gastrointestinal: Positive bowel sounds, soft, nontender, nondistended  Psychiatric: Appropriate affect, cooperative  Neurologic: Oriented x 3, strength symmetric in all extremities, Cranial Nerves grossly intact to confrontation, speech clear, has tremors right hand  Skin: No rashes    Results Reviewed:  Results from last 7 days   Lab Units 21  0827 21  0519 21  2320 21  0328 21  1201   WBC 10*3/mm3  --  3.87  --  4.61 3.43   HEMOGLOBIN g/dL 13.0 12.7* 13.0 13.2 13.0   HEMATOCRIT % 38.9 37.3* 38.8 39.7 39.5   PLATELETS 10*3/mm3  --  143  --  148 169   INR   --   --   --   --  1.01   PROCALCITONIN ng/mL  --   --   --   --  0.06     Results from last 7 days   Lab Units 21  0519 21  0328 21  1508 21  1201   SODIUM mmol/L 142 141  --  137   POTASSIUM mmol/L 3.8 3.9  --  3.6   CHLORIDE mmol/L 111* 109*  --  104   CO2 mmol/L 24.0 25.0  --  25.0   BUN mg/dL 11 14  --  17   CREATININE mg/dL 1.01 1.01  --  1.01   GLUCOSE mg/dL 90 80  --  109*   CALCIUM  mg/dL 8.7 8.7  --  8.4*   ALT (SGPT) U/L  --   --   --  9   AST (SGOT) U/L  --   --   --  18   TROPONIN T ng/mL  --   --  <0.010 <0.010   PROBNP pg/mL  --   --   --  1,525.0     Estimated Creatinine Clearance: 59.7 mL/min (by C-G formula based on SCr of 1.01 mg/dL).    Microbiology Results Abnormal     Procedure Component Value - Date/Time    Blood Culture - Blood, Arm, Left [[id number]]  (Abnormal) Collected: 06/29/21 1210    Lab Status: Preliminary result Specimen: Blood from Arm, Left Updated: 07/01/21 0854     Blood Culture Gram Positive Cocci     Gram Stain Anaerobic Bottle Gram positive cocci in chains    Blood Culture - Blood, Arm, Right [[id number]]  (Abnormal) Collected: 06/29/21 1210    Lab Status: Preliminary result Specimen: Blood from Arm, Right Updated: 07/01/21 0852     Blood Culture Gram Positive Cocci     Isolated from Anaerobic Bottle     Blood Culture Gram Positive Cocci     Isolated from Anaerobic Bottle     Gram Stain Anaerobic Bottle Gram positive cocci in chains      Anaerobic Bottle Gram positive cocci in groups    Blood Culture ID, PCR - Blood, Arm, Right [[id number]]  (Abnormal) Collected: 06/29/21 1210    Lab Status: Final result Specimen: Blood from Arm, Right Updated: 06/30/21 0508     BCID, PCR Staphylococcus spp, not aureus. Identification by BCID PCR.     BCID, PCR 2 Streptococcus spp, not A, B, or pneumoniae. Identification by BCID PCR.    COVID PRE-OP / PRE-PROCEDURE SCREENING ORDER (NO ISOLATION) - Swab, Nasopharynx [[id number]]  (Normal) Collected: 06/29/21 2247    Lab Status: Final result Specimen: Swab from Nasopharynx Updated: 06/29/21 6123    Narrative:      The following orders were created for panel order COVID PRE-OP / PRE-PROCEDURE SCREENING ORDER (NO ISOLATION) - Swab, Nasopharynx.  Procedure                               Abnormality         Status                     ---------                               -----------         ------                     COVID-19,CEPHEID,CHRISTINE  IN-...[[id number]]  Normal              Final result                 Please view results for these tests on the individual orders.    COVID-19,CEPHEID,CHRISTINE IN-HOUSE(OR EMERGENT/ADD-ON),NP SWAB IN TRANSPORT MEDIA 3-4 HR TAT - Swab, Nasopharynx [[id number]]  (Normal) Collected: 06/29/21 2247    Lab Status: Final result Specimen: Swab from Nasopharynx Updated: 06/29/21 8122     COVID19 Not Detected    Narrative:      Fact sheet for providers: https://www.fda.gov/media/595894/download     Fact sheet for patients: https://www.fda.gov/media/688042/download  Fact sheet for providers: https://www.fda.gov/media/003144/download     Fact sheet for patients: https://www.fda.gov/media/617680/download          Imaging Results (Last 24 Hours)     ** No results found for the last 24 hours. **          Results for orders placed during the hospital encounter of 10/03/17    Adult Transthoracic Echo Complete W/ Cont if Necessary Per Protocol (With Agitated Saline)    Interpretation Summary  · Left atrial cavity size is borderline dilated.  · Left ventricular wall thickness is consistent with mild concentric hypertrophy.  · Left ventricular systolic function is normal. Estimated EF = 60%.  · Left ventricular diastolic dysfunction (grade I) consistent with impaired relaxation.  · Trace mitral regurgitation, trace tricuspid regurgitation.  · Minimally abnormal bubble study with delayed finding of very few bubbles, this may be due to a tiny PFO or pulmonary AV malformations, clinical correlation is recommended.      I have reviewed the medications:  Scheduled Meds:benztropine, 2 mg, Oral, TID  donepezil, 10 mg, Oral, Nightly  pantoprazole, 40 mg, Oral, BID AC      Continuous Infusions:Pharmacy to dose vancomycin,       PRN Meds:.•  acetaminophen  •  ondansetron  •  Pharmacy to dose vancomycin  •  [COMPLETED] Insert peripheral IV **AND** sodium chloride  •  sodium chloride    Assessment/Plan   Assessment & Plan     Active Hospital Problems     Diagnosis  POA   • Hematemesis with nausea [K92.0]  Yes   • Syncope [R55]  Unknown      Resolved Hospital Problems   No resolved problems to display.        Brief Hospital Course to date:  Jhoan Gagnon is a 81 y.o. male Jhoan Gagnon is a 81 y.o. male With a PMH of Parkinson disease, prostate cancer s/p surgery, and chronic low back pain who was admitted on June 29 from his PCP's office for nausea, vomiting, and lethargy.    Patient and problems new to me today    Syncope  -Echocardiogram showed EF of 52%, no mention of diastolic dysfunction, and no mention of significant valvular heart disease.   -Carotid duplex with no significant plaques visualized  -Orthostatic blood pressure negative     Hematemesis, nausea, vomiting  -Has not had any episodes here, had 2 polyps morning, unclear if he had any melena or bright red blood per rectum  -H&H stable at 13  -Continue PPI twice daily continue regular diet    Blood cultures positive  -ID following, continue Vanco in the setting of patient with hardware  -Follow-up ID recommendations    Parkinson disease-continue Aricept and Cogentin  Hypertension-holding meds setting of syncope  Bradycardia-holding Norvasc      DVT prophylaxis:  Mechanical DVT prophylaxis orders are present.       Disposition: I expect the patient to be discharged pending work-up for positive blood cultures    CODE STATUS:   Code Status and Medical Interventions:   Ordered at: 06/29/21 3202     Limited Support to NOT Include:    Intubation     Level Of Support Discussed With:    Patient     Code Status:    No CPR     Medical Interventions (Level of Support Prior to Arrest):    Limited       Susie Centeno MD  07/01/21

## 2021-07-01 NOTE — CONSULTS
Patient Name:  Jhoan Gagnon  YOB: 1940  [id number]       Patient Care Team:  Wilmer Andres MD as PCP - General (Internal Medicine)  Wilmer Garcia MD as Consulting Physician (Gastroenterology)      General Surgery Consult Note     Date of Consultation: 06/30/21    Consulting Physician : Kartik Verdugo MD    Reason for Consult : Abnormality on CT scan    Subjective     I have been asked to see  Jhoan Gagnon , a 81 y.o. male in consultation for abnormality on CT scan. He has a known history of Parkinson's disease, prostate cancer, low back pain who presented to our hospital from his primary care physician's office.  The patient states he was in for a routine appointment and he felt extremely nauseated and sick to his stomach but did not have a vomiting.  He had an episode of diaphoresis and passed out.  911 was called and he was taken to the hospital.  He had a small amount of hematemesis with a small amount of bright red blood by report.    During his ER evaluation he was found to have a large mesenteric fat collection in the mid abdomen.  There is some question of congenital mild malrotation.  He was started on Protonix and admitted to the floor.  Since that time he has been feeling better.  Due to the abnormality on his CT scan we have been asked to see him for possible surgical management.      Allergy: No Known Allergies    Medications:  [START ON 7/2/2021] Pharmacy Consult, , Does not apply, Once  benztropine, 2 mg, Oral, TID  donepezil, 10 mg, Oral, Nightly  pantoprazole, 40 mg, Intravenous, BID AC  sodium chloride, 10 mL, Intravenous, Q12H  vancomycin, 20 mg/kg, Intravenous, Once  [START ON 7/1/2021] vancomycin, 750 mg, Intravenous, Q12H      Pharmacy to dose vancomycin,   sodium chloride, 100 mL/hr, Last Rate: 100 mL/hr (06/30/21 0828)      No current facility-administered medications on file prior to encounter.     Current Outpatient Medications on File Prior to  Encounter   Medication Sig   • amLODIPine (NORVASC) 5 MG tablet Take 5 mg by mouth Daily.   • aspirin 81 MG EC tablet Take 81 mg by mouth Daily.   • atropine 1 % ophthalmic solution 1 drop Daily As Needed. Under tongue as needed   • cyanocobalamin 1000 MCG/ML injection Inject 1,000 mcg into the appropriate muscle as directed by prescriber Every 28 (Twenty-Eight) Days.   • donepezil (ARICEPT) 10 MG tablet Take 10 mg by mouth Every Night.   • memantine (NAMENDA) 5 MG tablet Take 5 mg by mouth 2 (Two) Times a Day.   • rOPINIRole XL (REQUIP XL) 6 MG tablet sustained-release 24 hour 24 hr tablet Take 6 mg by mouth Every Night.   • rosuvastatin (CRESTOR) 20 MG tablet Take 20 mg by mouth Every Night.   • traZODone (DESYREL) 100 MG tablet Take 100 mg by mouth Every Night.   • [DISCONTINUED] amoxicillin (AMOXIL) 500 MG capsule Take 1,000 mg by mouth As Needed (prior to dentist).   • [DISCONTINUED] benztropine (COGENTIN) 2 MG tablet Take 1 tablet by mouth 3 (Three) Times a Day.   • [DISCONTINUED] Cyanocobalamin (VITAMIN B-12 IJ) Inject 1,000 mcg as directed.   • [DISCONTINUED] ramipril (ALTACE) 5 MG capsule Take 5 mg by mouth Daily.   • [DISCONTINUED] sodium-potassium-magnesium sulfates (Suprep Bowel Prep Kit) 17.5-3.13-1.6 GM/177ML solution oral solution Take 1 bottle by mouth Take As Directed. Follow instructions that were mailed to your home. If you didn't receive these call [phone].       PMHx:   Past Medical History:   Diagnosis Date   • Broken arms    • Cancer (CMS/HCC)     prostate   • Lower back injury    • Parkinson's disease (CMS/HCC)        Past Surgical History:  Past Surgical History:   Procedure Laterality Date   • CHOLECYSTECTOMY     • PROSTATE SURGERY     • REPLACEMENT TOTAL KNEE BILATERAL     - Placement of a Deep Brain Stimulator      Family History: Noncontributory     Social History: Pt lives in [place] .    Tobacco use: Denies     EtOH use : Denies    Illicit drug use: Denies      Review of  "Systems        Constitutional: No fevers, chills or malaise   Eyes: Denies visual changes    Cardiovascular: Denies chest pain, palpitations   Pulmonary: Denies cough or shortness of breath   Abdominal/ GI: See HPI    Genitourinary: Denies dysuria or hematuria   Musculoskeletal: Denies any but chronic joint aches, pains or deformities   Psychiatric: No recent mood changes   Neurologic: No paresthesias or loss of function          Objective     Physical Exam:      Vital Signs  /62 (BP Location: Right arm, Patient Position: Lying)   Pulse 50   Temp 98.1 °F (36.7 °C) (Oral)   Resp 16   Ht 172.7 cm (68\")   Wt 88.1 kg (194 lb 3.2 oz)   SpO2 99%   BMI 29.53 kg/m²     Intake/Output Summary (Last 24 hours) at 6/30/2021 2009  Last data filed at 6/30/2021 1800  Gross per 24 hour   Intake 711 ml   Output 1275 ml   Net -564 ml         Physical Exam:    Head: Normocephalic, atraumatic.   Eyes: Pupils equal, round, react to light and accommodation.   Mouth: Oral mucosa without lesions,   Neck: No masses, lymphadenopathy or carotid bruits bilaterally   CV: Rhythm  and rate regular , no  murmurs, rubs or gallops  Lungs: Clear  to auscultation bilaterally   Abdomen: Bowel sounds positive  , soft, nontender nondistended  Groin : No obvious hernias bilaterally   Extremities:  No cyanosis, clubbing or edema bilaterally   Lymphatics: No abnormal lymphadenopathy appreciated   Neurologic: No gross deficits .  Mild resting tremor      Results Review: I have personally reviewed all of the recent lab and imaging results available at this time.  Laboratory exam reveals white count of 4000 with a hemoglobin of 13.2 and a platelet count of 148 patient metabolic panel essentially normal.    CT scan of the abdomen pelvis was personally viewed by me as well as a final dictated and edited report.  Is also compared to the previous CT scan from 6/14/2021.  In essence this shows fatty infiltration of the mesentery of some of the small " bowel with widely patent vessels.  There is no evidence of congenital malrotation as the cecum is in the appropriate position in the duodenum traverses the midline.  There is no bowel obstruction.  There is no free air or free fluid.  There is no evidence of focal mass.  No free air or free fluid.  There are no abdominal wall hernias of note.           Assessment/Plan     Assessment and Plan:    Problem List Items Addressed This Visit        Gastrointestinal Abdominal     Hematemesis with nausea - Primary      Other Visit Diagnoses     Generalized weakness        Mesenteric mass    - This is NOT a mesenteric mass nor does represent omental infarction.  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  This is a benign finding and poses no risk to the patient whatsoever.  He does not have malrotation.  There is no need for surgical intervention, and likely has nothing to do with his presenting symptoms.  He does not require surgical follow-up for this.  He may be discharged home when felt safe by the medical staff.  I will sign off, please feel free to call with any questions you may have.    Impaired functional mobility, balance, gait, and endurance               Active Hospital Problems    Diagnosis  POA   • Hematemesis with nausea [K92.0]  Yes   • Syncope [R55]  Unknown      Resolved Hospital Problems   No resolved problems to display.            I discussed the patient's findings and my recommendations with the patient and/or family, as well as the primary team     Ron Myrick MD  06/30/21  20:09 EDT

## 2021-07-02 ENCOUNTER — APPOINTMENT (OUTPATIENT)
Dept: CARDIOLOGY | Facility: HOSPITAL | Age: 81
End: 2021-07-02

## 2021-07-02 ENCOUNTER — APPOINTMENT (OUTPATIENT)
Dept: GENERAL RADIOLOGY | Facility: HOSPITAL | Age: 81
End: 2021-07-02

## 2021-07-02 PROBLEM — R78.81 POSITIVE BLOOD CULTURE: Status: ACTIVE | Noted: 2021-07-02

## 2021-07-02 LAB
ANION GAP SERPL CALCULATED.3IONS-SCNC: 9 MMOL/L (ref 5–15)
BACTERIA SPEC AEROBE CULT: ABNORMAL
BASOPHILS # BLD AUTO: 0.03 10*3/MM3 (ref 0–0.2)
BASOPHILS NFR BLD AUTO: 0.8 % (ref 0–1.5)
BH CV ECHO MEAS - BSA(HAYCOCK): 2.1 M^2
BH CV ECHO MEAS - BSA: 2 M^2
BH CV ECHO MEAS - BZI_BMI: 29.5 KILOGRAMS/M^2
BH CV ECHO MEAS - BZI_METRIC_HEIGHT: 172.7 CM
BH CV ECHO MEAS - BZI_METRIC_WEIGHT: 88 KG
BH CV VAS BP LEFT ARM: NORMAL MMHG
BUN SERPL-MCNC: 13 MG/DL (ref 8–23)
BUN/CREAT SERPL: 12.6 (ref 7–25)
CALCIUM SPEC-SCNC: 8.7 MG/DL (ref 8.6–10.5)
CHLORIDE SERPL-SCNC: 109 MMOL/L (ref 98–107)
CO2 SERPL-SCNC: 25 MMOL/L (ref 22–29)
CREAT SERPL-MCNC: 1.03 MG/DL (ref 0.76–1.27)
DEPRECATED RDW RBC AUTO: 43.3 FL (ref 37–54)
EOSINOPHIL # BLD AUTO: 0.11 10*3/MM3 (ref 0–0.4)
EOSINOPHIL NFR BLD AUTO: 2.8 % (ref 0.3–6.2)
ERYTHROCYTE [DISTWIDTH] IN BLOOD BY AUTOMATED COUNT: 13.2 % (ref 12.3–15.4)
GFR SERPL CREATININE-BSD FRML MDRD: 69 ML/MIN/1.73
GLUCOSE SERPL-MCNC: 83 MG/DL (ref 65–99)
GRAM STN SPEC: ABNORMAL
HCT VFR BLD AUTO: 39.3 % (ref 37.5–51)
HCT VFR BLD AUTO: 40.2 % (ref 37.5–51)
HGB BLD-MCNC: 13.2 G/DL (ref 13–17.7)
HGB BLD-MCNC: 13.8 G/DL (ref 13–17.7)
IMM GRANULOCYTES # BLD AUTO: 0.01 10*3/MM3 (ref 0–0.05)
IMM GRANULOCYTES NFR BLD AUTO: 0.3 % (ref 0–0.5)
ISOLATED FROM: ABNORMAL
ISOLATED FROM: ABNORMAL
LYMPHOCYTES # BLD AUTO: 1.11 10*3/MM3 (ref 0.7–3.1)
LYMPHOCYTES NFR BLD AUTO: 28.1 % (ref 19.6–45.3)
MAXIMAL PREDICTED HEART RATE: 137 BPM
MCH RBC QN AUTO: 30.8 PG (ref 26.6–33)
MCHC RBC AUTO-ENTMCNC: 34.3 G/DL (ref 31.5–35.7)
MCV RBC AUTO: 89.7 FL (ref 79–97)
MONOCYTES # BLD AUTO: 0.41 10*3/MM3 (ref 0.1–0.9)
MONOCYTES NFR BLD AUTO: 10.4 % (ref 5–12)
NEUTROPHILS NFR BLD AUTO: 2.28 10*3/MM3 (ref 1.7–7)
NEUTROPHILS NFR BLD AUTO: 57.6 % (ref 42.7–76)
NRBC BLD AUTO-RTO: 0 /100 WBC (ref 0–0.2)
PLATELET # BLD AUTO: 160 10*3/MM3 (ref 140–450)
PMV BLD AUTO: 9.6 FL (ref 6–12)
POTASSIUM SERPL-SCNC: 4.1 MMOL/L (ref 3.5–5.2)
RBC # BLD AUTO: 4.48 10*6/MM3 (ref 4.14–5.8)
SODIUM SERPL-SCNC: 143 MMOL/L (ref 136–145)
STRESS TARGET HR: 116 BPM
VANCOMYCIN TROUGH SERPL-MCNC: 9.9 MCG/ML (ref 5–20)
WBC # BLD AUTO: 3.95 10*3/MM3 (ref 3.4–10.8)

## 2021-07-02 PROCEDURE — 99232 SBSQ HOSP IP/OBS MODERATE 35: CPT | Performed by: INTERNAL MEDICINE

## 2021-07-02 PROCEDURE — 85025 COMPLETE CBC W/AUTO DIFF WBC: CPT | Performed by: INTERNAL MEDICINE

## 2021-07-02 PROCEDURE — 93321 DOPPLER ECHO F-UP/LMTD STD: CPT | Performed by: INTERNAL MEDICINE

## 2021-07-02 PROCEDURE — 93312 ECHO TRANSESOPHAGEAL: CPT

## 2021-07-02 PROCEDURE — 93321 DOPPLER ECHO F-UP/LMTD STD: CPT

## 2021-07-02 PROCEDURE — 85018 HEMOGLOBIN: CPT | Performed by: INTERNAL MEDICINE

## 2021-07-02 PROCEDURE — 85014 HEMATOCRIT: CPT | Performed by: INTERNAL MEDICINE

## 2021-07-02 PROCEDURE — 93325 DOPPLER ECHO COLOR FLOW MAPG: CPT

## 2021-07-02 PROCEDURE — 80202 ASSAY OF VANCOMYCIN: CPT

## 2021-07-02 PROCEDURE — 25010000002 MIDAZOLAM PER 1 MG: Performed by: INTERNAL MEDICINE

## 2021-07-02 PROCEDURE — 25010000002 VANCOMYCIN 10 G RECONSTITUTED SOLUTION: Performed by: INTERNAL MEDICINE

## 2021-07-02 PROCEDURE — 93312 ECHO TRANSESOPHAGEAL: CPT | Performed by: INTERNAL MEDICINE

## 2021-07-02 PROCEDURE — 93325 DOPPLER ECHO COLOR FLOW MAPG: CPT | Performed by: INTERNAL MEDICINE

## 2021-07-02 PROCEDURE — 71045 X-RAY EXAM CHEST 1 VIEW: CPT

## 2021-07-02 PROCEDURE — 80048 BASIC METABOLIC PNL TOTAL CA: CPT | Performed by: INTERNAL MEDICINE

## 2021-07-02 RX ORDER — NALOXONE HYDROCHLORIDE 0.4 MG/ML
INJECTION, SOLUTION INTRAMUSCULAR; INTRAVENOUS; SUBCUTANEOUS
Status: DISCONTINUED
Start: 2021-07-02 | End: 2021-07-02 | Stop reason: WASHOUT

## 2021-07-02 RX ORDER — PANTOPRAZOLE SODIUM 40 MG/1
40 TABLET, DELAYED RELEASE ORAL DAILY
Qty: 30 TABLET | Refills: 0 | Status: SHIPPED | OUTPATIENT
Start: 2021-07-02 | End: 2021-08-01

## 2021-07-02 RX ORDER — MIDAZOLAM HYDROCHLORIDE 1 MG/ML
INJECTION INTRAMUSCULAR; INTRAVENOUS
Status: DISCONTINUED
Start: 2021-07-02 | End: 2021-07-03 | Stop reason: HOSPADM

## 2021-07-02 RX ORDER — AMOXICILLIN 500 MG/1
1000 CAPSULE ORAL 3 TIMES DAILY
Qty: 84 CAPSULE | Refills: 0 | Status: SHIPPED | OUTPATIENT
Start: 2021-07-02 | End: 2021-07-16

## 2021-07-02 RX ORDER — MIDAZOLAM HYDROCHLORIDE 1 MG/ML
INJECTION INTRAMUSCULAR; INTRAVENOUS
Status: COMPLETED | OUTPATIENT
Start: 2021-07-02 | End: 2021-07-02

## 2021-07-02 RX ORDER — FENTANYL CITRATE 50 UG/ML
INJECTION, SOLUTION INTRAMUSCULAR; INTRAVENOUS
Status: DISCONTINUED
Start: 2021-07-02 | End: 2021-07-02 | Stop reason: WASHOUT

## 2021-07-02 RX ORDER — GUAIFENESIN 600 MG/1
600 TABLET, EXTENDED RELEASE ORAL EVERY 12 HOURS SCHEDULED
Status: DISCONTINUED | OUTPATIENT
Start: 2021-07-02 | End: 2021-07-03 | Stop reason: HOSPADM

## 2021-07-02 RX ORDER — FLUMAZENIL 0.1 MG/ML
INJECTION INTRAVENOUS
Status: DISCONTINUED
Start: 2021-07-02 | End: 2021-07-02 | Stop reason: WASHOUT

## 2021-07-02 RX ADMIN — GUAIFENESIN 600 MG: 600 TABLET, EXTENDED RELEASE ORAL at 20:57

## 2021-07-02 RX ADMIN — PANTOPRAZOLE SODIUM 40 MG: 40 TABLET, DELAYED RELEASE ORAL at 06:22

## 2021-07-02 RX ADMIN — METHOHEXITAL SODIUM 30 MG: 500 INJECTION, POWDER, LYOPHILIZED, FOR SOLUTION INTRAMUSCULAR; INTRAVENOUS; RECTAL at 11:55

## 2021-07-02 RX ADMIN — VANCOMYCIN HYDROCHLORIDE 1500 MG: 100 INJECTION, POWDER, LYOPHILIZED, FOR SOLUTION INTRAVENOUS at 23:13

## 2021-07-02 RX ADMIN — BENZTROPINE MESYLATE 2 MG: 1 TABLET ORAL at 08:21

## 2021-07-02 RX ADMIN — MIDAZOLAM 2 MG: 1 INJECTION INTRAMUSCULAR; INTRAVENOUS at 12:01

## 2021-07-02 RX ADMIN — METHOHEXITAL SODIUM 20 MG: 500 INJECTION, POWDER, LYOPHILIZED, FOR SOLUTION INTRAMUSCULAR; INTRAVENOUS; RECTAL at 11:56

## 2021-07-02 RX ADMIN — GUAIFENESIN 600 MG: 600 TABLET, EXTENDED RELEASE ORAL at 11:13

## 2021-07-02 RX ADMIN — BENZTROPINE MESYLATE 2 MG: 1 TABLET ORAL at 20:57

## 2021-07-02 RX ADMIN — PANTOPRAZOLE SODIUM 40 MG: 40 TABLET, DELAYED RELEASE ORAL at 16:53

## 2021-07-02 RX ADMIN — DONEPEZIL HYDROCHLORIDE 10 MG: 5 TABLET ORAL at 20:57

## 2021-07-02 RX ADMIN — MIDAZOLAM 2 MG: 1 INJECTION INTRAMUSCULAR; INTRAVENOUS at 11:55

## 2021-07-02 RX ADMIN — BENZTROPINE MESYLATE 2 MG: 1 TABLET ORAL at 16:53

## 2021-07-02 NOTE — CASE MANAGEMENT/SOCIAL WORK
Continued Stay Note  [facility]     Patient Name: Jhoan Gagnon  MRN: [id number]  Today's Date: 7/2/2021    Admit Date: 6/29/2021    Discharge Plan     Row Name 07/02/21 1636       Plan    Plan  Home with home health vs skilled rehab    Patient/Family in Agreement with Plan  yes    Plan Comments  Spoke with patient's daughter by phone to get information for discharge planning. She states patient has 14 steps to go up and down at home. He has been to [facility] in the past and done well, and she would like to see him go there again. Patient states he prefers to go home, but his granddaughter/POA has called and is trying to convince him to go to rehab. If the plan is rehab, CM will make a referral to [facility] on Monday. Otherwise, CM will arrange HH. CM will continue to follow.    Final Discharge Disposition Code  30 - still a patient    Row Name 07/02/21 1542       Plan    Plan  TBD    Patient/Family in Agreement with Plan  yes    Plan Comments  Met with patient in the room to discuss the discharge plan. Patient had a TTE today and still seemed groggy from anasthesia. PT and OT have recommended home with home health. Patient states he does not have a preference for a HH provider. Observed patient in the [place] getting onto the [facility], and he looks like he might benefit from rehab. He met inpatient status today under Medicare guidelines. Patient has ID following for positive BC. Will await recommendations of ID. CM will continue to follow.    Final Discharge Disposition Code  30 - still a patient        Discharge Codes    No documentation.       Expected Discharge Date and Time     Expected Discharge Date Expected Discharge Time    Jul 2, 2021             Carmela Montejo RN

## 2021-07-02 NOTE — PLAN OF CARE
Goal Outcome Evaluation:  Plan of Care Reviewed With: patient           Outcome Summary: Pt VSS on RA. NATALY performed today. No c/o pain this shift. IV removed per protocol.

## 2021-07-02 NOTE — CASE MANAGEMENT/SOCIAL WORK
Continued Stay Note  [facility]     Patient Name: Jhoan Gagnon  MRN: [id number]  Today's Date: 7/2/2021    Admit Date: 6/29/2021    Discharge Plan     Row Name 07/02/21 1542       Plan    Plan  TBD    Patient/Family in Agreement with Plan  yes    Plan Comments  Met with patient in the room to discuss the discharge plan. Patient had a TTE today and still seemed groggy from anasthesia. PT and OT have recommended home with home health. Patient states he does not have a preference for a  provider. Observed patient in the [place] getting onto the [facility] p/t TTE, and he looks like he might benefit from rehab. He does have known Parkinson's. Patient met inpatient status today under Medicare guidelines. Patient has ID following for positive BC. Will await recommendations of ID. CM will continue to follow.    Final Discharge Disposition Code  30 - still a patient        Discharge Codes    No documentation.       Expected Discharge Date and Time     Expected Discharge Date Expected Discharge Time    Jul 3, 2021             Carmela Montejo RN

## 2021-07-02 NOTE — PROGRESS NOTES
[facility] Medicine Services  PROGRESS NOTE    Patient Name: Jhoan Gagnon  : 1940  MRN: [id number]    Date of Admission: 2021  Primary Care Physician: Wilmer Andres MD    Subjective   Subjective     CC:  Follow up for positive blood cultures     HPI:  No acute events overnight. Has cough, has been ongoing for 6 months, denies fever or chills overnight. Denies n/v.    ROS:  CV- No chest pain, palpitations  Resp- No cough, dyspnea    Objective   Objective     Vital Signs:   Temp:  [97.9 °F (36.6 °C)-98.8 °F (37.1 °C)] 98.4 °F (36.9 °C)  Heart Rate:  [46-62] 46  Resp:  [16-20] 18  BP: (135-154)/(67-80) 135/69  Total (NIH Stroke Scale): 2     Physical Exam:  Constitutional: No acute distress, awake, alert  HENT: NCAT, mucous membranes moist  Respiratory: Clear to auscultation bilaterally, respiratory effort normal   Cardiovascular: RRR, no murmurs, rubs, or gallops  Gastrointestinal: Positive bowel sounds, soft, nontender, nondistended  Psychiatric: Appropriate affect, cooperative  Neurologic: Oriented x 3, strength symmetric in all extremities, Cranial Nerves grossly intact to confrontation, speech clear  Skin: No rashes    Results Reviewed:  Results from last 7 days   Lab Units 21  0907 21  0005 21  1609 21  0519 21  0328 21  1201   WBC 10*3/mm3 3.95  --   --  3.87 4.61 3.43   HEMOGLOBIN g/dL 13.8 13.2 13.4 12.7* 13.2 13.0   HEMATOCRIT % 40.2 39.3 41.5 37.3* 39.7 39.5   PLATELETS 10*3/mm3 160  --   --  143 148 169   INR   --   --   --   --   --  1.01   PROCALCITONIN ng/mL  --   --   --   --   --  0.06     Results from last 7 days   Lab Units 21  0519 21  0328 21  1508 21  1201   SODIUM mmol/L 142 141  --  137   POTASSIUM mmol/L 3.8 3.9  --  3.6   CHLORIDE mmol/L 111* 109*  --  104   CO2 mmol/L 24.0 25.0  --  25.0   BUN mg/dL 11 14  --  17   CREATININE mg/dL 1.01 1.01  --  1.01   GLUCOSE mg/dL 90 80  --  109*    CALCIUM mg/dL 8.7 8.7  --  8.4*   ALT (SGPT) U/L  --   --   --  9   AST (SGOT) U/L  --   --   --  18   TROPONIN T ng/mL  --   --  <0.010 <0.010   PROBNP pg/mL  --   --   --  1,525.0     Estimated Creatinine Clearance: 59.7 mL/min (by C-G formula based on SCr of 1.01 mg/dL).    Microbiology Results Abnormal     Procedure Component Value - Date/Time    Blood Culture - Blood, Arm, Left [[id number]]  (Abnormal) Collected: 06/29/21 1210    Lab Status: Final result Specimen: Blood from Arm, Left Updated: 07/02/21 0710     Blood Culture Streptococcus mitis / oralis     Gram Stain Anaerobic Bottle Gram positive cocci in chains    Narrative:      Refer to previous blood culture collected on 6/29/2021 at 1210 for AMY's.       Blood Culture - Blood, Arm, Right [[id number]]  (Abnormal)  (Susceptibility) Collected: 06/29/21 1210    Lab Status: Final result Specimen: Blood from Arm, Right Updated: 07/02/21 0710     Blood Culture Staphylococcus epidermidis     Isolated from Anaerobic Bottle     Blood Culture Streptococcus mitis / oralis     Isolated from Anaerobic Bottle     Gram Stain Anaerobic Bottle Gram positive cocci in chains      Anaerobic Bottle Gram positive cocci in groups    Susceptibility      Staphylococcus epidermidis Streptococcus mitis / oralis      AMY AMY      Ceftriaxone  Susceptible      Oxacillin Resistant       Penicillin G  Susceptible      Vancomycin Susceptible Susceptible                 Linear View                   Blood Culture - Blood, Hand, Right [[id number]] Collected: 06/30/21 2011    Lab Status: Preliminary result Specimen: Blood from Hand, Right Updated: 07/01/21 2030     Blood Culture No growth at 24 hours    Blood Culture - Blood, Arm, Right [[id number]] Collected: 06/30/21 2010    Lab Status: Preliminary result Specimen: Blood from Arm, Right Updated: 07/01/21 2030     Blood Culture No growth at 24 hours    Blood Culture ID, PCR - Blood, Arm, Right [[id number]]  (Abnormal) Collected: 06/29/21  1210    Lab Status: Final result Specimen: Blood from Arm, Right Updated: 06/30/21 0508     BCID, PCR Staphylococcus spp, not aureus. Identification by BCID PCR.     BCID, PCR 2 Streptococcus spp, not A, B, or pneumoniae. Identification by BCID PCR.    COVID PRE-OP / PRE-PROCEDURE SCREENING ORDER (NO ISOLATION) - Swab, Nasopharynx [[id number]]  (Normal) Collected: 06/29/21 2247    Lab Status: Final result Specimen: Swab from Nasopharynx Updated: 06/29/21 2349    Narrative:      The following orders were created for panel order COVID PRE-OP / PRE-PROCEDURE SCREENING ORDER (NO ISOLATION) - Swab, Nasopharynx.  Procedure                               Abnormality         Status                     ---------                               -----------         ------                     COVID-19,CEPHEID,CHRISTINE IN-...[[id number]]  Normal              Final result                 Please view results for these tests on the individual orders.    COVID-19,CEPHEID,CHRISTINE IN-HOUSE(OR EMERGENT/ADD-ON),NP SWAB IN TRANSPORT MEDIA 3-4 HR TAT - Swab, Nasopharynx [[id number]]  (Normal) Collected: 06/29/21 2247    Lab Status: Final result Specimen: Swab from Nasopharynx Updated: 06/29/21 2349     COVID19 Not Detected    Narrative:      Fact sheet for providers: https://www.fda.gov/media/513821/download     Fact sheet for patients: https://www.fda.gov/media/035729/download  Fact sheet for providers: https://www.fda.gov/media/692839/download     Fact sheet for patients: https://www.fda.gov/media/573683/download          Imaging Results (Last 24 Hours)     ** No results found for the last 24 hours. **          Results for orders placed during the hospital encounter of 06/29/21    Adult Transthoracic Echo Complete W/ Cont if Necessary Per Protocol    Interpretation Summary  · Left ventricular ejection fraction appears to be greater than 70%. Left ventricular systolic function is hyperdynamic (EF > 70%).  · Left ventricular diastolic function is  consistent with (grade Ia w/high LAP) impaired relaxation.  · Estimated right ventricular systolic pressure from tricuspid regurgitation is normal (<35 mmHg). Calculated right ventricular systolic pressure from tricuspid regurgitation is 30 mmHg.  · Mild dilation of the ascending aorta is present. Ascending aorta = 4.4 cm      I have reviewed the medications:  Scheduled Meds:Pharmacy Consult, , Does not apply, Once  benztropine, 2 mg, Oral, TID  donepezil, 10 mg, Oral, Nightly  pantoprazole, 40 mg, Oral, BID AC  vancomycin, 15 mg/kg, Intravenous, Q24H      Continuous Infusions:Pharmacy to dose vancomycin,       PRN Meds:.acetaminophen  •  ondansetron  •  Pharmacy to dose vancomycin  •  [COMPLETED] Insert peripheral IV **AND** sodium chloride  •  sodium chloride    Assessment/Plan   Assessment & Plan     Active Hospital Problems    Diagnosis  POA   • Hematemesis with nausea [K92.0]  Yes   • Syncope [R55]  Unknown      Resolved Hospital Problems   No resolved problems to display.        Brief Hospital Course to date:  Jhoan Gagnon is a 81 y.o. male  With a PMH of Parkinson disease, prostate cancer s/p surgery, and chronic low back pain who was admitted on June 29 from his PCP's office for nausea, vomiting, and lethargy.    Blood cultures positive for staph epidermidis/strep mitis/oralis    -had teeth cleaned 10 days ago   -ID following, continue Vanco in the setting of patient with hardware  -ID recommendations - NATALY ordered   -repeat blood cultures from 6/30 are NG at 24 hours, repeat blood cultures pending      Cough   -check CXR     Syncope  -likely vasovagal, Echocardiogram showed EF of 52%, no mention of diastolic dysfunction, and no mention of significant valvular heart disease.   -Carotid duplex with no significant plaques visualized  -Orthostatic blood pressure negative      Hematemesis, nausea, vomiting - resolved   -Has not had any episodes here, had 2 polyps morning, unclear if he had any melena or  bright red blood per rectum  -H&H stable at 11 d/c H&H  -Continue PPI twice daily continue regular diet    Parkinson disease-continue Aricept and Cogentin  Hypertension-holding meds setting of syncope  Bradycardia-holding Norvasc        DVT prophylaxis:  Mechanical DVT prophylaxis orders are present.         Disposition: I expect the patient to be discharged pending work-up for positive blood cultures        CODE STATUS:   Code Status and Medical Interventions:   Ordered at: 06/29/21 1744     Limited Support to NOT Include:    Intubation     Level Of Support Discussed With:    Patient     Code Status:    No CPR     Medical Interventions (Level of Support Prior to Arrest):    Limited       Susie Centeno MD  07/02/21

## 2021-07-02 NOTE — CONSULTS
INFECTIOUS DISEASE CONSULT/INITIAL HOSPITAL VISIT    Jhoan Gagnon  1940  [id number]    Date of Consult: 7/2/2021    Admission Date: 6/29/2021      Requesting Provider: No ref. 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 Andres's office patient had extreme nausea patient had diaphoresis and passed out EMS was activated and patient presents emergency room was found to have hematemesis.  Patient had CT scan of abdomen pelvis concerning for edematous mesenteric fat.  General surgery was consulted for the abnormality patient not found to have an acute abdomen and is being managed medically.    Patient has a right-sided pacemaker as well as bilateral total knee replacements.    Blood cultures from admission are positive requesting of coagulase-negative staph, streptococcal species.    Patient was started on vancomycin.    Had a neurostimulator placed at  this year. Denies pain in generator site.    7/2/2021 patient is afebrile hemodynamic stable feels better had NATALY today shows no endocarditis patient resented some dental work has upper dentures denies any active dental pain.  Again denies pain in the neurostimulator pocket site.  Is not systemically ill wants to go home            Past Medical History:   Diagnosis Date   • Broken arms    • Cancer (CMS/HCC)     prostate   • Lower back injury    • Parkinson's disease (CMS/HCC)        Past Surgical History:   Procedure Laterality Date   • CHOLECYSTECTOMY     • PROSTATE SURGERY     • REPLACEMENT TOTAL KNEE BILATERAL         Family History   Problem Relation Age of Onset   • Heart disease Mother    • Cancer Father    • Stroke Father    • Stroke Maternal Grandmother    • Cancer Paternal Grandfather        Social History     Socioeconomic History   • Marital status:       Spouse name: Not on file   • Number of children: Not on file   • Years of education: Not on file   • Highest education level: Not on file   Tobacco Use   • Smoking status: Never Smoker   • Smokeless tobacco: Never Used   Substance and Sexual Activity   • Alcohol use: No   • Drug use: No   • Sexual activity: Defer     Partners: Female     Comment: . wife passed away in 2008       No Known Allergies      Medication:    Current Facility-Administered Medications:   •  ! Nursing: Vancomycin trough due on 7/2 at 2100. Please hold 7/2 2200 dose until notified by Pharmacy., , Does not apply, Once, Ana Braswell, PharmD  •  acetaminophen (TYLENOL) tablet 650 mg, 650 mg, Oral, Q4H PRN, Harper Murillo PA-C  •  benztropine (COGENTIN) tablet 2 mg, 2 mg, Oral, TID, Harper Murillo PA-C, 2 mg at 07/02/21 0821  •  donepezil (ARICEPT) tablet 10 mg, 10 mg, Oral, Nightly, Harper Murillo PA-C, 10 mg at 07/01/21 2036  •  guaiFENesin (MUCINEX) 12 hr tablet 600 mg, 600 mg, Oral, Q12H, Susie Centeno MD, 600 mg at 07/02/21 1113  •  methohexital (BREVITAL) 50 MG/5ML injection solution prefilled syringe  - ADS Override Pull, , , ,   •  midazolam (VERSED) 2 MG/2ML injection  - ADS Override Pull, , , ,   •  ondansetron (ZOFRAN) injection 4 mg, 4 mg, Intravenous, Q6H PRN, Harper Murillo PA-C  •  pantoprazole (PROTONIX) EC tablet 40 mg, 40 mg, Oral, BID AC, Susie Centeno MD, 40 mg at 07/02/21 0622  •  Pharmacy to dose vancomycin, , Does not apply, Continuous PRN, Kartik Verdugo MD  •  [COMPLETED] Insert peripheral IV, , , Once **AND** sodium chloride 0.9 % flush 10 mL, 10 mL, Intravenous, PRN, Gal Wong, PA  •  sodium chloride 0.9 % flush 10 mL, 10 mL, Intravenous, PRN, Harper Murillo PA-C, 10 mL at 07/01/21 2036  •  vancomycin 1250 mg/250 mL 0.9% NS IVPB ([facility]), 15 mg/kg, Intravenous, Q24H, Ana Braswell, PharmD, Stopped at 07/02/21 0257    Antibiotics:  Anti-Infectives (From admission, onward)    Ordered     Dose/Rate Route  Frequency Start Stop    21 1203  vancomycin 1250 mg/250 mL 0.9% NS IVPB ([facility])     Ordering Provider: Ana Braswell PharmD    15 mg/kg × 88 kg  over 90 Minutes Intravenous Every 24 Hours Scheduled 21 2200 21 21521 1815  vancomycin 1750 mg/500 mL 0.9% NS IVPB ([facility])     Ordering Provider: Kartik Verdugo MD    20 mg/kg × 88.1 kg  over 120 Minutes Intravenous Once 21 175  Pharmacy to dose vancomycin     Ana Braswell PharmD reviewed the order on 21 1204.   Ordering Provider: Kartik Verdugo MD     Does not apply Continuous PRN 21 175            Review of Systems:  Remarkable for nausea, vomiting, urgency, hematemesis      Physical Exam:   Vital Signs  Temp (24hrs), Av.3 °F (36.8 °C), Min:97.9 °F (36.6 °C), Max:98.7 °F (37.1 °C)    Temp  Min: 97.9 °F (36.6 °C)  Max: 98.7 °F (37.1 °C)  BP  Min: 122/101  Max: 191/117  Pulse  Min: 46  Max: 64  Resp  Min: 18  Max: 20  SpO2  Min: 97 %  Max: 100 %    GENERAL: Awake and alert, in no acute distress.   HEENT: Normocephalic, atraumatic.  PERRL. EOMI. No conjunctival injection. No icterus. Oropharynx clear without evidence of thrush or exudate. No evidence of peridontal disease.      HEART: RRR; No murmur  LUNGS: Clear to auscultation bilaterally   ABDOMEN: Soft, nontender, nondistended.   EXT:  No cyanosis, clubbing or edema. No cord.  :  Without Stapleton catheter.  MSK: Bilateral total knee replacements incision clean dry and intact  SKIN: Generator site in right chest wall is clean dry intact without erythema or tenderness  NEURO: Oriented to PPT. Shaking of right hand at rest  PSYCHIATRIC: Normal insight and judgement. Cooperative with PE    Laboratory Data    Results from last 7 days   Lab Units 21  0907 21  0005 21  1609 21  0519 21  0328   WBC 10*3/mm3 3.95  --   --  3.87 4.61   HEMOGLOBIN g/dL 13.8 13.2 13.4 12.7* 13.2   HEMATOCRIT % 40.2  39.3 41.5 37.3* 39.7   PLATELETS 10*3/mm3 160  --   --  143 148     Results from last 7 days   Lab Units 07/02/21  1405   SODIUM mmol/L 143   POTASSIUM mmol/L 4.1   CHLORIDE mmol/L 109*   CO2 mmol/L 25.0   BUN mg/dL 13   CREATININE mg/dL 1.03   GLUCOSE mg/dL 83   CALCIUM mg/dL 8.7     Results from last 7 days   Lab Units 06/29/21  1201   ALK PHOS U/L 79   BILIRUBIN mg/dL 1.6*   ALT (SGPT) U/L 9   AST (SGOT) U/L 18             Results from last 7 days   Lab Units 07/01/21  1609   LACTATE mmol/L 1.6             Estimated Creatinine Clearance: 58.6 mL/min (by C-G formula based on SCr of 1.03 mg/dL).      Microbiology:  Blood Culture   Date Value Ref Range Status   06/29/2021 Gram Positive Cocci (C)  Preliminary   06/29/2021 Gram Positive Cocci (C)  Preliminary   06/29/2021 Gram Positive Cocci (C)  Preliminary     BCID, PCR   Date Value Ref Range Status   06/29/2021 (C) No organism detected by BCID PCR. Final    Staphylococcus spp, not aureus. Identification by BCID PCR.     BCID, PCR 2   Date Value Ref Range Status   06/29/2021 (C) No organism detected by BCID PCR. Final    Streptococcus spp, not A, B, or pneumoniae. Identification by BCID PCR.     No results found for: CULTURES, HSVCX, URCX  No results found for: EYECULTURE, GCCX, HSVCULTURE, LABHSV  No results found for: LEGIONELLA, MRSACX, MUMPSCX, MYCOPLASCX  No results found for: NOCARDIACX, STOOLCX  No results found for: THROATCX, UNSTIMCULT, URINECX, CULTURE, VZVCULTUR  No results found for: VIRALCULTU, WOUNDCX        Radiology:  Imaging Results (Last 72 Hours)     Procedure Component Value Units Date/Time    XR Chest 1 View [[id number]] Collected: 07/02/21 1453     Updated: 07/02/21 1454    Narrative:         EXAMINATION: XR CHEST 1 VW-      INDICATION: cough; K92.0-Hematemesis; R53.1-Weakness; K63.89-Other  specified diseases of intestine; Z74.09-Other reduced mobility      COMPARISON: 06/29/2021     FINDINGS: Lateral chest reveals cardiac silhouette to be  within normal  limits. The lung fields are grossly clear. No focal right opacification  present. No pleural effusion or pneumothorax. Degenerative changes seen  within the spine. Pulmonary vascularity is within normal limits. Mild  elevation seen of the right hemidiaphragm.           Impression:      Chronic changes in with the lung fields with no evidence of  acute parenchymal disease.                  Impression:   Polymicrobial bacteremia of unclear significance  Neurostimulator hardware   Bilateral total knee replacements  Hematemesis    PLAN/RECOMMENDATIONS:   Thank you for asking us to see Jhoan Gagnon, I recommend the following:     Absent fever and near normal procalcitonin level suggest against active infection however strep mitis and strep paralysis are oral inocencio and can conceivably cause endocarditis and probably aspiration pneumonia.      Reasonable to offer oral antibiotics with amoxicillin 500 mg 3 times a day for a 2-week course    And to follow-up patient as an outpatient in office    Follow-up repeat blood cultures yesterday        Discussed with hospitalist  DC/CM time 20 minutes  Discussed with [facility] office    Jhoan Castle MD  7/2/2021  15:38 EDT

## 2021-07-02 NOTE — PLAN OF CARE
Goal Outcome Evaluation:  Plan of Care Reviewed With: patient         Pt VSS on RA. NATALY performed today. Pt now fully A/O from anesthesia post NATALY. Pt discharge orders canceled due to pt needing possible home health or considering going to [facility] for rehab. Pt sitting up in bed, eating his dinner with no complaints at this time.

## 2021-07-03 VITALS
HEART RATE: 55 BPM | BODY MASS INDEX: 29.4 KG/M2 | OXYGEN SATURATION: 94 % | HEIGHT: 68 IN | SYSTOLIC BLOOD PRESSURE: 132 MMHG | TEMPERATURE: 97.6 F | WEIGHT: 194 LBS | DIASTOLIC BLOOD PRESSURE: 79 MMHG | RESPIRATION RATE: 17 BRPM

## 2021-07-03 PROCEDURE — 97110 THERAPEUTIC EXERCISES: CPT

## 2021-07-03 PROCEDURE — 99232 SBSQ HOSP IP/OBS MODERATE 35: CPT | Performed by: NURSE PRACTITIONER

## 2021-07-03 PROCEDURE — 97530 THERAPEUTIC ACTIVITIES: CPT

## 2021-07-03 RX ADMIN — GUAIFENESIN 600 MG: 600 TABLET, EXTENDED RELEASE ORAL at 08:18

## 2021-07-03 RX ADMIN — PANTOPRAZOLE SODIUM 40 MG: 40 TABLET, DELAYED RELEASE ORAL at 08:18

## 2021-07-03 RX ADMIN — BENZTROPINE MESYLATE 2 MG: 1 TABLET ORAL at 08:18

## 2021-07-03 NOTE — DISCHARGE INSTR - APPOINTMENTS
Patient will need to go for a discharge appointment at [facility] on 7/9 at 9:00 am.

## 2021-07-03 NOTE — PLAN OF CARE
Goal Outcome Evaluation:  Patient resting comfortably at this time no c/o dizziness. No hematemesis noted.     NSR per telemetry. VSS

## 2021-07-03 NOTE — THERAPY TREATMENT NOTE
Patient Name: Jhoan Gagnon  : 1940    MRN: [id number]                              Today's Date: 7/3/2021       Admit Date: 2021    Visit Dx:     ICD-10-CM ICD-9-CM   1. Hematemesis with nausea  K92.0 578.0     787.02   2. Generalized weakness  R53.1 780.79   3. 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 Impaired functional mobility, balance, gait, and endurance  Z74.09 V49.89     Patient Active Problem List   Diagnosis   • Parkinson's disease (CMS/HCC)   • Falls   • Weakness   • Skin tear of hand without complication, left, sequela   • Sinus bradycardia   • Ataxia   • Syncope   • Nausea   • Hematemesis with nausea   • Positive blood culture     Past Medical History:   Diagnosis Date   • Broken arms    • Cancer (CMS/HCC)     prostate   • Lower back injury    • Parkinson's disease (CMS/HCC)      Past Surgical History:   Procedure Laterality Date   • CHOLECYSTECTOMY     • PROSTATE SURGERY     • REPLACEMENT TOTAL KNEE BILATERAL       General Information     Row Name 21 1300          Physical Therapy Time and Intention    Document Type  therapy note (daily note)  -     Mode of Treatment  physical therapy  -     Row Name 21 1300          General Information    Patient Profile Reviewed  yes  -       User Key  (r) = Recorded By, (t) = Taken By, (c) = Cosigned By    Initials Name Provider Type    LS Carmencita Brasher, PT Physical Therapist        Mobility     Row Name 21 1300          Bed Mobility    Bed Mobility  supine-sit  -LS     Supine-Sit [place] (Bed Mobility)  independent  -LS     Comment (Bed Mobility)  indep supine to sit from flat bed surface without bedrail  -     Row Name 21 1300          Sit-Stand Transfer    Sit-Stand [place] (Transfers)  supervision  -     Assistive Device (Sit-Stand Transfers)  cane, straight  -     Row Name 21 1300          Gait/Stairs (Locomotion)    [place] Level (Gait)  standby assist  -     Assistive Device (Gait)   cane, straight  -LS     Distance in Feet (Gait)  580  -LS     Deviations/Abnormal Patterns (Gait)  gait speed decreased;bilateral deviations;stride length decreased;base of support, narrow  -LS       User Key  (r) = Recorded By, (t) = Taken By, (c) = Cosigned By    Initials Name Provider Type    Carmencita Ospina PT Physical Therapist        Obj/Interventions     Row Name 07/03/21 1300          Motor Skills    Therapeutic Exercise  shoulder;knee;hip;ankle  -LS     Row Name 07/03/21 1300          Shoulder (Therapeutic Exercise)    Shoulder (Therapeutic Exercise)  AROM (active range of motion)  -LS     Shoulder AROM (Therapeutic Exercise)  bilateral;flexion;aBduction;10 repetitions;2 sets  -     Row Name 07/03/21 1300          Hip (Therapeutic Exercise)    Hip (Therapeutic Exercise)  strengthening exercise  -LS     Hip Strengthening (Therapeutic Exercise)  right;left;marching while seated;10 repetitions;2 sets  -     Row Name 07/03/21 1300          Knee (Therapeutic Exercise)    Knee (Therapeutic Exercise)  strengthening exercise  -LS     Knee Strengthening (Therapeutic Exercise)  right;left;SLR (straight leg raise);LAQ (long arc quad);10 repetitions;2 sets  -     Row Name 07/03/21 1300          Ankle (Therapeutic Exercise)    Ankle (Therapeutic Exercise)  AROM (active range of motion)  -LS     Ankle AROM (Therapeutic Exercise)  bilateral;dorsiflexion;plantarflexion;10 repetitions;2 sets  -     Row Name 07/03/21 1300          Balance    Balance Interventions  standing;supported;weight shifting activity standing with SC  -LS       User Key  (r) = Recorded By, (t) = Taken By, (c) = Cosigned By    Initials Name Provider Type    Carmencita Ospina PT Physical Therapist        Goals/Plan     Row Name 07/03/21 1300          Bed Mobility Goal 1 (PT)    Activity/Assistive Device (Bed Mobility Goal 1, PT)  sit to supine;supine to sit  -LS     [place] Level/Cues Needed (Bed Mobility Goal 1, PT)   modified independence  -LS     Time Frame (Bed Mobility Goal 1, PT)  long term goal (LTG);2 weeks  -LS     Row Name 07/03/21 1300          Transfer Goal 1 (PT)    Activity/Assistive Device (Transfer Goal 1, PT)  sit-to-stand/stand-to-sit;bed-to-chair/chair-to-bed;walker, rolling  -LS     [place] Level/Cues Needed (Transfer Goal 1, PT)  set-up required  -LS     Time Frame (Transfer Goal 1, PT)  long term goal (LTG);2 weeks  -LS     Row Name 07/03/21 1300          Gait Training Goal 1 (PT)    Activity/Assistive Device (Gait Training Goal 1, PT)  gait (walking locomotion);decrease fall risk;improve balance and speed;walker, rolling  -LS     [place] Level (Gait Training Goal 1, PT)  modified independence  -LS     Distance (Gait Training Goal 1, PT)  400  -LS     Time Frame (Gait Training Goal 1, PT)  long term goal (LTG);2 weeks  -LS     Row Name 07/03/21 1341          Stairs Goal 1 (PT)    Activity/Assistive Device (Stairs Goal 1, PT)  ascending stairs;descending stairs with a HR  -LS     [place] Level/Cues Needed (Stairs Goal 1, PT)  modified independence  -LS     Number of Stairs (Stairs Goal 1, PT)  10  -LS     Time Frame (Stairs Goal 1, PT)  long term goal (LTG);10 days  -LS     Progress/Outcome (Stairs Goal 1, PT)  goal ongoing  -LS       User Key  (r) = Recorded By, (t) = Taken By, (c) = Cosigned By    Initials Name Provider Type    Carmencita Ospina, PT Physical Therapist        Clinical Impression     Row Name 07/03/21 1300          Pain    Additional Documentation  Pain Scale: Numbers Pre/Post-Treatment (Group)  -LS     Row Name 07/03/21 1300          Pain Scale: Numbers Pre/Post-Treatment    Pretreatment Pain Rating  0/10 - no pain  -LS     Posttreatment Pain Rating  0/10 - no pain  -     Row Name 07/03/21 1300          Plan of Care Review    Plan of Care Reviewed With  patient  -LS     Outcome Summary  Improving mobility. Pt increased walking distance and walked with less assistance.  Assess on stairs.  -     Row Name 07/03/21 1300          Positioning and Restraints    Pre-Treatment Position  in bed  -LS     Post Treatment Position  chair  -LS     In Chair  notified nsg;sitting;call light within reach;encouraged to call for assist;waffle cushion;legs elevated  -LS       User Key  (r) = Recorded By, (t) = Taken By, (c) = Cosigned By    Initials Name Provider Type    Carmencita Ospina, HILLARY Physical Therapist        Outcome Measures     Row Name 07/03/21 1300          How much help from another person do you currently need...    Moving from lying on back to sitting on the side of a flat bed without bedrails?  4  -LS     Moving to and from a bed to a chair (including a wheelchair)?  4  -LS     Standing up from a chair using your arms (e.g., wheelchair, bedside chair)?  3  -LS     Climbing 3-5 steps with a railing?  3  -LS     To walk in hospital room?  3  -LS     Row Name 07/03/21 1300          Functional Assessment    Outcome Measure Options  AM-PAC 6 Clicks Basic Mobility (PT)  -       User Key  (r) = Recorded By, (t) = Taken By, (c) = Cosigned By    Initials Name Provider Type    Carmencita Ospina, HILLARY Physical Therapist        Physical Therapy Education                 Title: PT OT SLP Therapies (Done)     Topic: Physical Therapy (Done)     Point: Mobility training (Resolved)     Learning Progress Summary           Patient Acceptance, E, VU,DU by  at 6/30/2021 1124                   Point: Home exercise program (Done)     Learning Progress Summary           Patient Acceptance, E, VU,NR by ELIZA at 7/3/2021 1300    Comment: benefits of activity    Acceptance, E, VU,DU by  at 6/30/2021 1124                   Point: Body mechanics (Resolved)     Learning Progress Summary           Patient Acceptance, E, VU,DU by  at 6/30/2021 1124                   Point: Precautions (Resolved)     Learning Progress Summary           Patient Acceptance, E, VU,DU by  at 6/30/2021 1124                                User Key     Initials Effective Dates Name Provider Type Discipline    [facility] 06/16/21 -  Rebekah Chávez PT Physical Therapist PT     06/16/21 -  Carmencita Brasher PT Physical Therapist PT              PT Recommendation and Plan     Plan of Care Reviewed With: patient  Outcome Summary: Improving mobility. Pt increased walking distance and walked with less assistance. Assess on stairs.     Time Calculation:   PT Charges     Row Name 07/03/21 1300             Time Calculation    Start Time  1300  -LS      PT Received On  07/03/21  -      PT Goal Re-Cert Due Date  07/10/21  -         Timed Charges    66207 - PT Therapeutic Exercise Minutes  10  -LS      06652 - PT Therapeutic Activity Minutes  15  -LS         Total Minutes    Timed Charges Total Minutes  25  -LS       Total Minutes  25  -LS        User Key  (r) = Recorded By, (t) = Taken By, (c) = Cosigned By    Initials Name Provider Type    LS Carmencita Brasher, H[place]ARY Physical Therapist        Therapy Charges for Today     Code Description Service Date Service Provider Modifiers Qty    10564951073 HC PT THER PROC EA 15 MIN 7/3/2021 Carmencita Brasher, PT GP 1    33814788316 HC PT THERAPEUTIC ACT EA 15 MIN 7/3/2021 Carmencita Brasher, PT GP 1          PT G-Codes  Outcome Measure Options: AM-PAC 6 Clicks Basic Mobility (PT)  AM-PAC 6 Clicks Score (PT): 17  AM-PAC 6 Clicks Score (OT): 21    Carmencita Brasher PT  7/3/2021

## 2021-07-03 NOTE — CASE MANAGEMENT/SOCIAL WORK
Case Management Discharge Note      Final Note: I have met with Mr. Gagnon today at his bedside to discuss his ongoing discharge plan which includes recommendation for short term rehab for best likelihood of returning to his previous level of functioning.  Mr. Gagnon verbalizes understanding however declines rehab at this time.  He states that he wishes to return home and has a friend that may transport him when he is ready to be discharged.  I have placed orders for Home Health services.  Mr. Gagnon is agreeable to this.  I have called and faxed a referral to [facility] who state they are reviewing the referral.  I have spoken to Ana Keenan, Ms. Gagnon's granddaughter and given her an update on the discharge plan.  She is tearful and states she knows he needs rehab.  Case Management will follow up on this pending home health referral.  Mr. Gagnon denies having any other discharge needs at this time.      07/04/21  08:09 EDT  I received a call this morning from Marylin with [facility] who states that the referral was forwarded to [facility] and that it has been accepted.     Selected Continued Care - Admitted Since 6/29/2021     Destination    No services have been selected for the patient.              Durable Medical Equipment    No services have been selected for the patient.              Dialysis/Infusion    No services have been selected for the patient.              Home Medical Care    No services have been selected for the patient.              Therapy    No services have been selected for the patient.              Community Resources    No services have been selected for the patient.              Community & DME    No services have been selected for the patient.                       Final Discharge Disposition Code: 06 - home with home health care

## 2021-07-03 NOTE — DISCHARGE PLACEMENT REQUEST
"[employer]  [phone]      Jenelle Gagnon (81 y.o. Male)     Date of Birth Social Security Number Address Home Phone MRN    1940  [address] [facility] [phone] [id number]    [employer]abapt[employer] Marital Status          Jew        Admission Date Admission Type Admitting Provider Attending Provider Department, Room/Bed    6/29/21 Emergency Susie Centeno MD Anciro, Audree, MD [address], [address]/1    Discharge Date Discharge Disposition Discharge Destination         Home-Health Care [facility]              Attending Provider: Susie Centeno MD    Allergies: No Known Allergies    Isolation: None   Infection: None   Code Status: No CPR    Ht: 172.7 cm (67.99\")   Wt: 88 kg (194 lb)    Admission Cmt: None   Principal Problem: None                Active Insurance as of 6/29/2021     Primary Coverage     Payor Plan Insurance Group Employer/Plan Group    MEDICARE MEDICARE A & B      Payor Plan Address Payor Plan Phone Number Payor Plan Fax Number Effective Dates    [address] [phone]  6/1/2003 - None Entered    [facility] [id number]       Subscriber Name Subscriber Birth Date Member ID       JENELLE GAGNON 1940 [id number]           Secondary Coverage     Payor Plan Insurance Group Employer/Plan Group    [facility] SUPP [id number]     Payor Plan Address Payor Plan Phone Number Payor Plan Fax Number Effective Dates    [address]   12/1/2016 - None Entered    [facility] [id number]       Subscriber Name Subscriber Birth Date Member ID       JENELLE GAGNON 1940 [id number]                 Emergency Contacts      (Rel.) Home Phone Work Phone Mobile Phone    Anamaria Keenan (Daughter) [phone] -- [phone]    Ana Keenan (Grandchild) -- -- [phone]               History & Physical      Harper Murillo PA-C at 06/29/21 1746              [facility] Medicine Services  HISTORY AND " PHYSICAL    Patient Name: Jhoan Gagnon  : 1940  MRN: [id number]  Primary Care Physician: Wilmer Andres MD  Date of admission: 2021    Subjective   Subjective     Chief Complaint:  nausea    HPI:  Jhoan Gagnon is a 80 y.o. male with past medical history significant for Parkinson's disease, prostate cancer status post prostate surgery, lower back pain who presented to [facility] today from primary care doctor's office, Dr. Andres.  According to the patient the patient was in clinic for routine appointment when he was feeling extremely nauseated.  He states that the entire day he felt nauseated and sick to his stomach however the pain was most severe at his doctor's appointment.  He was sitting in the chair and Dr. Andres stated he wanted to get blood work.  At that time the patient felt very diaphoretic and passed out.  Doctor who called 911 and the paramedics arrived.  He also had an episode of hematemesis with a small amount of bright red blood.    According to ER note patient had recent colonoscopy.  Also had recent CT abdomen which showed ill-defined mass.  CT scan in the ER revealed grossly unchanged large walled off edematous mesenteric fat in the mid abdomen.  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 No inflammatory signs, no fluid collection and no solid component to suggest neoplasm.  He was started on Protonix in ER and admitted for further.      COVID Details:    Symptoms:    [x] NONE [] Fever []  Cough [] Shortness of breath [] Change in taste/smell      The patient has a COVID HM Topic on their chart, and they are fully vaccinated.      Review of Systems   Constitutional: Negative for chills, fatigue and fever.   Respiratory: Negative for cough and shortness of breath.    Cardiovascular: Negative for chest pain, palpitations and leg swelling.   Gastrointestinal: Positive for nausea and vomiting. Negative for  abdominal pain, anal bleeding, constipation, diarrhea and rectal pain.   Genitourinary: Positive for urgency. Negative for discharge, dysuria, hematuria, penile swelling and scrotal swelling.   Musculoskeletal: Negative for back pain and myalgias.   Neurological: Positive for syncope. Negative for dizziness, tremors, speech difficulty, weakness and numbness.   All other systems reviewed and are negative.         Personal History     Past Medical History:   Diagnosis Date   • Broken arms    • Cancer (CMS/HCC)     prostate   • Lower back injury    • Parkinson's disease (CMS/HCC)        Past Surgical History:   Procedure Laterality Date   • CHOLECYSTECTOMY     • PROSTATE SURGERY     • REPLACEMENT TOTAL KNEE BILATERAL         Family History: family history includes Cancer in his father and paternal grandfather; Heart disease in his mother; Stroke in his father and maternal grandmother. Otherwise pertinent FHx was reviewed and unremarkable.     Social History:  reports that he has never smoked. He has never used smokeless tobacco. He reports that he does not drink alcohol and does not use drugs.  Social History     Social History Narrative    Pt Lives Alone        Medications:  Cyanocobalamin, amLODIPine, amoxicillin, benztropine, donepezil, ramipril, and sodium-potassium-magnesium sulfates    No Known Allergies    Objective   Objective     Vital Signs:   Temp:  [97.5 °F (36.4 °C)] 97.5 °F (36.4 °C)  Heart Rate:  [50-63] 50  Resp:  [16-18] 16  BP: (113-135)/(65-84) 135/84    Physical Exam  Vitals and nursing note reviewed.   Constitutional:       Appearance: Normal appearance.   HENT:      Head: Normocephalic.      Right Ear: External ear normal.      Left Ear: External ear normal.   Cardiovascular:      Rate and Rhythm: Normal rate and regular rhythm.      Pulses: Normal pulses.   Pulmonary:      Effort: Pulmonary effort is normal.      Breath sounds: No wheezing or rhonchi.   Abdominal:      General: Abdomen is flat.       Tenderness: There is no abdominal tenderness. There is no guarding or rebound.      Hernia: No hernia is present.   Musculoskeletal:         General: Normal range of motion.   Skin:     General: Skin is warm and dry.   Neurological:      General: No focal deficit present.      Mental Status: He is alert.      Comments: 5/5 strength in bilateral upper and lower extremities.   Psychiatric:         Mood and Affect: Mood normal.         Thought Content: Thought content normal.            Results Reviewed:  I have personally reviewed most recent indicated data and agree with findings including:  [x]  Laboratory  [x]  Radiology  []  EKG/Telemetry  []  Pathology  []  Cardiac/Vascular Studies  []  Old records  []  Other:    Most pertinent findings include:      LAB RESULTS:      Lab 06/29/21  1201   WBC 3.43   HEMOGLOBIN 13.0   HEMATOCRIT 39.5   PLATELETS 169   NEUTROS ABS 1.89   IMMATURE GRANS (ABS) 0.00   LYMPHS ABS 1.16   MONOS ABS 0.30   EOS ABS 0.06   MCV 91.9   PROCALCITONIN 0.06   LACTATE 1.6   PROTIME 13.0   APTT 20.3*         Lab 06/29/21  1201   SODIUM 137   POTASSIUM 3.6   CHLORIDE 104   CO2 25.0   ANION GAP 8.0   BUN 17   CREATININE 1.01   GLUCOSE 109*   CALCIUM 8.4*         Lab 06/29/21  1201   TOTAL PROTEIN 6.3   ALBUMIN 3.60   GLOBULIN 2.7   ALT (SGPT) 9   AST (SGOT) 18   BILIRUBIN 1.6*   ALK PHOS 79   LIPASE 18         Lab 06/29/21  1508 06/29/21  1201   PROBNP  --  1,525.0   TROPONIN T <0.010 <0.010   PROTIME  --  13.0   INR  --  1.01                 Brief Urine Lab Results  (Last result in the past 365 days)      Color   Clarity   Blood   Leuk Est   Nitrite   Protein   CREAT   Urine HCG        06/29/21 1418 Dark Yellow Clear Negative Negative Negative Trace             Microbiology Results (last 10 days)     ** No results found for the last 240 hours. **          CT Abdomen Pelvis With Contrast    Result Date: 6/29/2021  EXAMINATION: CT ABDOMEN PELVIS W CONTRAST-  INDICATION: nausea, vomiting, recent  concern of mesenteric mass on CT  TECHNIQUE: Axial IV contrast-enhanced CT of the abdomen and pelvis with multiplanar reconstruction  The radiation dose reduction device was turned on for each scan per the ALARA (As Low as Reasonably Achievable) protocol.  COMPARISON: 6/14/2021  FINDINGS: The lung bases are grossly clear. The body wall soft tissues are unremarkable. There is no evidence of acute fracture or aggressive osseous lesion. The liver, spleen, pancreas and bilateral adrenal glands demonstrate homogeneous enhancement without evidence of suspicious focal lesion. The kidneys demonstrate symmetric nephrogram and contrast excretion without evidence of hydronephrosis or contour deforming mass. 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 There is associated displacement of bowel segments and also compatible with malrotation, there is a preponderance of small bowel loops on the right, with somewhat abnormal course of the duodenum. There is no evidence of associated mechanical bowel obstruction or suspicious bowel wall thickening. There is no new solid enhancing component to suggest definite neoplastic process. There is no free fluid or pneumoperitoneum. Normal caliber atherosclerotic abdominal aorta. No bulky retroperitoneal adenopathy. The pelvic viscera are unremarkable.      Impression: Redemonstrated and grossly unchanged large area of somewhat walled off and edematous mesenteric fat in the mid abdomen. 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 There is no new associated  inflammatory change or focal fluid collection to suggest progression or abscess development and there is no solid enhancing component to suggest progressive neoplastic involvement. There is no evidence of mechanical bowel obstruction.   This report was finalized on 6/29/2021 3:24 PM by Juan Ramon Ortega.      XR Chest 1 View    Result Date: 6/29/2021  EXAMINATION: XR CHEST 1 VW-  INDICATION: generalized weakness  COMPARISON: 11/13/2019  FINDINGS: Unchanged aeration with no new focal opacity. No effusion or pneumothorax. Unchanged degree of cardiac enlargement.      Impression: Mild chronic changes of the lung fields without evidence of acute cardiopulmonary abnormality.  This report was finalized on 6/29/2021 1:50 PM by Juan Ramon Ortega.        Results for orders placed during the hospital encounter of 10/03/17    Adult Transthoracic Echo Complete W/ Cont if Necessary Per Protocol (With Agitated Saline)    Interpretation Summary  · Left atrial cavity size is borderline dilated.  · Left ventricular wall thickness is consistent with mild concentric hypertrophy.  · Left ventricular systolic function is normal. Estimated EF = 60%.  · Left ventricular diastolic dysfunction (grade I) consistent with impaired relaxation.  · Trace mitral regurgitation, trace tricuspid regurgitation.  · Minimally abnormal bubble study with delayed finding of very few bubbles, this may be due to a tiny PFO or pulmonary AV malformations, clinical correlation is recommended.      Assessment/Plan   Assessment & Plan       Syncope    Hematemesis with nausea        Jhoan Gagnon is a 80 y.o. male with past medical history significant for Parkinson's disease, prostate cancer status post prostate surgery, lower back pain who presented to [facility] today from primary care doctor's office, Dr. Andres.  According to the patient the patient was in clinic for routine appointment when he was feeling extremely nauseated, vomited and had  syncope. CT scan in the ER revealed grossly unchanged large walled off edematous mesenteric fat in the mid abdomen.  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 No inflammatory signs, no fluid collection and no solid component to suggest neoplasm.    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  - Zofran and phenergan  - fluids  - Dr. Myrick in AM  -obtain recent colonoscopy report    Parkinson's Disease  -Aricept 10mg   -Cogentin 2mg TID    HTN  -Norvasc 5mg   -Ramipril    Self reported Dysphagia  -SLP, PT, OT    Vasovagal syncope  -possible orthostatic hypotension as well    Patient clearly states he is DNR and DNI.      Updated daughter Anamaria.    DVT prophylaxis:  mechanical    CODE STATUS:    Code Status and Medical Interventions:   Ordered at: 21 1744     Limited Support to NOT Include:    Intubation     Level Of Support Discussed With:    Patient     Code Status:    No CPR     Medical Interventions (Level of Support Prior to Arrest):    Limited       This note has been completed as part of a split-shared workflow.     Signature: Electronically signed by Harper Murillo PA-C, 21, 5:54 PM EDT                Electronically signed by Harper Murillo PA-C at 21 1826          Physical Therapy Notes (most recent note)      Carmencita Brasher, PT at 21 1343  Version 1 of 1       Goal Outcome Evaluation:  Plan of Care Reviewed With: patient           Outcome Summary: Improving mobility. Pt increased walking distance and walked with less assistance. Assess on stairs.    Electronically signed by Carmencita Brasher PT at 21 1343          Occupational Therapy Notes (most recent note)      Malena Ferguson, OT at 21 0805          Patient Name: Jhoan Gagnon  : 1940    MRN: [id number]                              Today's Date:  6/30/2021       Admit Date: 6/29/2021    Visit Dx:     ICD-10-CM ICD-9-CM   1. Hematemesis with nausea  K92.0 578.0     787.02   2. Generalized weakness  R53.1 780.79   3. Mesenteric mass  K63.89 568.89     Patient Active Problem List   Diagnosis   • Parkinson's disease (CMS/HCC)   • Falls   • Weakness   • Skin tear of hand without complication, left, sequela   • Sinus bradycardia   • Ataxia   • Syncope   • Nausea   • Hematemesis with nausea     Past Medical History:   Diagnosis Date   • Broken arms    • Cancer (CMS/HCC)     prostate   • Lower back injury    • Parkinson's disease (CMS/HCC)      Past Surgical History:   Procedure Laterality Date   • CHOLECYSTECTOMY     • PROSTATE SURGERY     • REPLACEMENT TOTAL KNEE BILATERAL       General Information     Row Name 06/30/21 0901          OT Time and Intention    Document Type  evaluation  -KF     Mode of Treatment  occupational therapy  -     Row Name 06/30/21 0901          General Information    Patient Profile Reviewed  yes  -KF     Prior Level of Function  independent:;all household mobility;transfer;bed mobility;ADL's;driving;shopping;yard work with adaptive environmental strategies, FWW long distances and PM, SPC in AM in home  -KF     Existing Precautions/Restrictions  fall  -KF     Barriers to Rehab  previous functional deficit  -     Row Name 06/30/21 0901          Living Environment    Lives With  alone dtr lives 3 miles away  -     Row Name 06/30/21 0901          Home Main Entrance    Number of Stairs, Main Entrance  other (see comments) 13  -KF     Stair Railings, Main Entrance  railings on both sides of stairs;railings safe and in good condition  -     Row Name 06/30/21 0901          Stairs Within Home, Primary    Stairs, Within Home, Primary  states uses walk in shower, has grab bars throughout bathroom and high commode for toileting  -KF     Number of Stairs, Within Home, Primary  none  -     Row Name 06/30/21 0901          Cognition     Orientation Status (Cognition)  oriented x 4  -     Row Name 06/30/21 0901          Safety Issues, Functional Mobility    Safety Issues Affecting Function (Mobility)  insight into deficits/self-awareness;awareness of need for assistance  -     Impairments Affecting Function (Mobility)  motor control;coordination;balance  -       User Key  (r) = Recorded By, (t) = Taken By, (c) = Cosigned By    Initials Name Provider Type     Malena Ferguson, OT Occupational Therapist          Mobility/ADL's     Row Name 06/30/21 0904          Bed Mobility    Comment (Bed Mobility)  sitting EOB upon arrival  -     Row Name 06/30/21 0904          Transfers    Transfers  sit-stand transfer;bed-chair transfer  -     Comment (Transfers)  no dizziness throughout STS x5 reps for dressing and toileting tasks once to chair; CGA initially progressed to SBA without LOB  -     Bed-Chair [place] (Transfers)  contact guard;standby assist  -     Assistive Device (Bed-Chair Transfers)  walker, front-wheeled  -     Sit-Stand [place] (Transfers)  standby assist  -     Row Name 06/30/21 0904          Sit-Stand Transfer    Assistive Device (Sit-Stand Transfers)  walker, front-wheeled  -     Row Name 06/30/21 0904          Functional Mobility    Functional Mobility- Ind. Level  contact guard assist  -     Functional Mobility- Device  rolling walker  -     Functional Mobility-Distance (Feet)  14  -     Functional Mobility- Safety Issues  step length decreased  -     Functional Mobility- Comment  no LOB throughout even with backwards walking in room to navigate smaller environment and barriers within room, no dizziness throughout shuffling gait which Pt states is baseline  -     Row Name 06/30/21 0904          Activities of Daily Living    BADL Assessment/Intervention  lower body dressing;feeding;grooming;toileting  -[facility] Name 06/30/21 0904          Lower Body Dressing Assessment/Training    [place]  Level (Lower Body Dressing)  doff;pants/bottoms;don;minimum assist (75% patient effort)  -KF     Position (Lower Body Dressing)  unsupported sitting;supported standing  -KF     Comment (Lower Body Dressing)  doffed pullup brief and donned brief good seq cues and assist only for adjustment of velcro in standing, uses pullup depends at home  -KF     Row Name 06/30/21 0904          Self-Feeding Assessment/Training    [place] Level (Feeding)  feeding skills;prepare tray/open items;scoop food and bring to mouth;set up  -KF     Position (Self-Feeding)  edge of bed sitting  -KF     Comment (Feeding)  no spillage with regular utensils with RUE despite mild tremors which are baseline per Pt report  -KF     Row Name 06/30/21 0904          Grooming Assessment/Training    [place] Level (Grooming)  wash face, hands;set up  -KF     Position (Grooming)  supported standing  -KF     Comment (Grooming)  able to reach across midline and reach ADL items during standing at chair on both R and L sides for hygiene tasks no LOB  -KF     Row Name 06/30/21 0904          Toileting Assessment/Training    [place] Level (Toileting)  adjust/manage clothing;change pad/brief;minimum assist (75% patient effort);perform perineal hygiene;standby assist  -KF     Position (Toileting)  supported standing  -KF       User Key  (r) = Recorded By, (t) = Taken By, (c) = Cosigned By    Initials Name Provider Type    KF Malena Ferguson, OT Occupational Therapist        Obj/Interventions     Row Name 06/30/21 0908          Sensory Assessment (Somatosensory)    Sensory Assessment (Somatosensory)  UE sensation intact  -KF     Row Name 06/30/21 0908          Vision Assessment/Intervention    Visual Impairment/Limitations  WFL;corrective lenses for reading  -KF     Row Name 06/30/21 0908          Range of Motion Comprehensive    General Range of Motion  bilateral upper extremity ROM WFL  -KF     Row Name 06/30/21 0908          Strength  Comprehensive (MMT)    General Manual Muscle Testing (MMT) Assessment  upper extremity strength deficits identified  -KF     Comment, General Manual Muscle Testing (MMT) Assessment  grossly WFL for ADL completion 4/ 5  -KF     Row Name 06/30/21 0908          Motor Skills    Motor Skills  coordination  -KF     Coordination  minimal impairment;bilateral;upper extremity;fine motor deficit;gross motor deficit greater in R vs L UE  -KF     Row Name 06/30/21 0908          Balance    Balance Assessment  sitting static balance;sitting dynamic balance;standing static balance;standing dynamic balance  -KF     Static Sitting Balance  WFL;unsupported  -KF     Dynamic Sitting Balance  WFL;unsupported  -KF     Static Standing Balance  WFL;supported  -KF     Dynamic Standing Balance  mild impairment;supported  -KF     Balance Interventions  standing;sit to stand;dynamic reaching;occupation based/functional task;weight shifting activity;UE activity with balance activity  -KF     Comment, Balance  good balance despite multiple balance challenges in standing for ADL completion  -KF       User Key  (r) = Recorded By, (t) = Taken By, (c) = Cosigned By    Initials Name Provider Type    KF Malena Ferguson, OT Occupational Therapist        Goals/Plan     Row Name 06/30/21 0914          Transfer Goal 1 (OT)    Activity/Assistive Device (Transfer Goal 1, OT)  commode;walker, rolling  -KF     [place] Level/Cues Needed (Transfer Goal 1, OT)  supervision required  -KF     Time Frame (Transfer Goal 1, OT)  long term goal (LTG);10 days  -KF     Progress/Outcome (Transfer Goal 1, OT)  goal ongoing  -KF     Row Name 06/30/21 0914          Grooming Goal 1 (OT)    Activity/Device (Grooming Goal 1, OT)  hair care;oral care;wash face, hands  -KF     [place] (Grooming Goal 1, OT)  modified independence  -KF     Time Frame (Grooming Goal 1, OT)  long term goal (LTG);10 days  -KF     Progress/Outcome (Grooming Goal 1, OT)  goal ongoing  -KF      Row Name 06/30/21 0914          Therapy Assessment/Plan (OT)    Planned Therapy Interventions (OT)  activity tolerance training;BADL retraining;ROM/therapeutic exercise;patient/caregiver education/training;occupation/activity based interventions;transfer/mobility retraining;functional balance retraining;neuromuscular control/coordination retraining  -KF       User Key  (r) = Recorded By, (t) = Taken By, (c) = Cosigned By    Initials Name Provider Type    KF Malena Ferguson, OT Occupational Therapist        Clinical Impression     Row Name 06/30/21 0910          Pain Assessment    Additional Documentation  Pain Scale: Numbers Pre/Post-Treatment (Group)  -KF     Row Name 06/30/21 0910          Pain Scale: Numbers Pre/Post-Treatment    Pretreatment Pain Rating  0/10 - no pain  -KF     Posttreatment Pain Rating  0/10 - no pain  -KF     Pre/Posttreatment Pain Comment  tolerated  -KF     Pain Intervention(s)  Ambulation/increased activity;Repositioned  -KF     Row Name 06/30/21 0910          Plan of Care Review    Plan of Care Reviewed With  patient  -KF     Progress  improving  -     Outcome Summary  OT eval completed Pt presents with deficits in coordination and balance compared to baseline, completed transfers SBA, CGA with FWW 14ft progressed to SBA, [place] LBD, SBA toilet hygiene; recom IPOT d/c home with assist  -     Row Name 06/30/21 0910          Therapy Assessment/Plan (OT)    Rehab Potential (OT)  good, to achieve stated therapy goals  -     Criteria for Skilled Therapeutic Interventions Met (OT)  yes;meets criteria;skilled treatment is necessary  -KF     Therapy Frequency (OT)  daily  -KF     Row Name 06/30/21 0910          Therapy Plan Review/Discharge Plan (OT)    Equipment Needs Upon Discharge (OT)  -- none anticipated  -KF     Anticipated Discharge Disposition (OT)  home with assist  -KF     [facility] Name 06/30/21 0910          Vital Signs    Pre Systolic BP Rehab  112  -KF     Pre Treatment Diastolic  BP  68  -KF     Post Systolic BP Rehab  113  -KF     Post Treatment Diastolic BP  56  -KF     Pretreatment Heart Rate (beats/min)  (S) 51 RN notified HR low throughout  -KF     Posttreatment Heart Rate (beats/min)  (S) 49  -KF     O2 Delivery Pre Treatment  room air  -KF     Pre Patient Position  Sitting  -KF     Intra Patient Position  Standing  -KF     Post Patient Position  Sitting  -KF     Row Name 06/30/21 0910          Positioning and Restraints    Pre-Treatment Position  in bed  -KF     Post Treatment Position  chair  -KF     In Chair  notified nsg;reclined;sitting;call light within reach;encouraged to call for assist;with nsg;waffle cushion;legs elevated RN deferred exit  -KF       User Key  (r) = Recorded By, (t) = Taken By, (c) = Cosigned By    Initials Name Provider Type    Malena Chan OT Occupational Therapist        Outcome Measures     Row Name 06/30/21 0916          How much help from another is currently needed...    Putting on and taking off regular lower body clothing?  3  -KF     Bathing (including washing, rinsing, and drying)  3  -KF     Toileting (which includes using toilet bed pan or urinal)  4  -KF     Putting on and taking off regular upper body clothing  4  -KF     Taking care of personal grooming (such as brushing teeth)  3  -KF     Eating meals  4  -KF     AM-PAC 6 Clicks Score (OT)  21  -KF     Row Name 06/30/21 0916          Functional Assessment    Outcome Measure Options  AM-PAC 6 Clicks Daily Activity (OT)  -KF       User Key  (r) = Recorded By, (t) = Taken By, (c) = Cosigned By    Initials Name Provider Type    Malena Chan OT Occupational Therapist        Occupational Therapy Education                 Title: PT OT SLP Therapies (Done)     Topic: Occupational Therapy (Done)     Point: ADL training (Done)     Description:   Instruct learner(s) on proper safety adaptation and remediation techniques during self care or transfers.   Instruct in proper use of  assistive devices.              Learning Progress Summary           Patient Acceptance, E,TB,D, VU,DU by KF at 6/30/2021 0917    Comment: ADL compensatory strategies and activity pacing in standing    Acceptance, E,V, VU by AS at 6/29/2021 1659    Comment: PT WATCHED CALL DONT FALL VIDEO                   Point: Home exercise program (Done)     Description:   Instruct learner(s) on appropriate technique for monitoring, assisting and/or progressing therapeutic exercises/activities.              Learning Progress Summary           Patient Acceptance, E,TB,D, VU,DU by KF at 6/30/2021 0917    Comment: ADL compensatory strategies and activity pacing in standing    Acceptance, E,V, VU by AS at 6/29/2021 1659    Comment: PT WATCHED CALL DONT FALL VIDEO                   Point: Precautions (Done)     Description:   Instruct learner(s) on prescribed precautions during self-care and functional transfers.              Learning Progress Summary           Patient Acceptance, E,TB,D, VU,DU by KF at 6/30/2021 0917    Comment: ADL compensatory strategies and activity pacing in standing    Acceptance, E,V, VU by AS at 6/29/2021 1659    Comment: PT WATCHED CALL DONT FALL VIDEO                   Point: Body mechanics (Done)     Description:   Instruct learner(s) on proper positioning and spine alignment during self-care, functional mobility activities and/or exercises.              Learning Progress Summary           Patient Acceptance, E,TB,D, VU,DU by  at 6/30/2021 0917    Comment: ADL compensatory strategies and activity pacing in standing    Acceptance, E,V, VU by AS at 6/29/2021 1659    Comment: PT WATCHED CALL DONT FALL VIDEO                               User Key     Initials Effective Dates Name Provider Type Discipline     06/16/21 -  Malena Ferguson OT Occupational Therapist OT    AS 12/07/20 -  Paige Brown, RN Registered Nurse Nurse              OT Recommendation and Plan  Planned Therapy Interventions (OT):  activity tolerance training, BADL retraining, ROM/therapeutic exercise, patient/caregiver education/training, occupation/activity based interventions, transfer/mobility retraining, functional balance retraining, neuromuscular control/coordination retraining  Therapy Frequency (OT): daily  Plan of Care Review  Plan of Care Reviewed With: patient  Progress: improving  Outcome Summary: OT eval completed Pt presents with deficits in coordination and balance compared to baseline, completed transfers SBA, CGA with FWW 14ft progressed to SBA, Lucila LBD, SBA toilet hygiene; recom IPOT d/c home with assist     Time Calculation:   Time Calculation- OT     Row Name 21             Time Calculation- OT    OT Start Time  08  -KF      OT Received On  21  -      OT Goal Re-Cert Due Date  07/10/21  -KF         Timed Charges    65585 - OT Self Care/Mgmt Minutes  15  -KF         Untimed Charges    OT Eval/Re-eval Minutes  46  -KF         Total Minutes    Timed Charges Total Minutes  15  -KF      Untimed Charges Total Minutes  46  -KF       Total Minutes  61  -KF        User Key  (r) = Recorded By, (t) = Taken By, (c) = Cosigned By    Initials Name Provider Type    KF Malena Ferguson OT Occupational Therapist        Therapy Charges for Today     Code Description Service Date Service Provider Modifiers Qty    47101714195 HC OT SELF CARE/MGMT/TRAIN EA 15 MIN 2021 Malena Ferguson OT GO 1    72594059366 HC OT EVAL MOD COMPLEXITY 4 2021 Malena Ferguson OT GO 1               Malena Ferguson OT  2021    Electronically signed by Malena Ferguson OT at 21 0920       [address] [address]  Phone:  [phone]  Fax:  [phone] Date: Jul 3, 2021      Ambulatory Referral to Home Health     Patient:  Jhoan Gagnon MRN:  [id number]   [address] [facility] [address] :  1940  SSN:    Phone: [phone]  Sex:  M      INSURANCE PAYOR PLAN GROUP # SUBSCRIBER ID   Primary:  Secondary:    MEDICARE  [employer]HE[employer] BLUE CROSS [id number]  [id number]    [id number]  [id number]      Referring Provider Information:  [facility] Phone: [phone] Fax: [phone]      Referral Information:   # Visits:  999 Referral Type: Home Health [42]   Urgency:  Routine Referral Reason: Specialty Services Required   Start Date: Jul 3, 2021 End Date:  To be determined by Insurer   Diagnosis: Impaired functional mobility, balance, gait, and endurance (Z74.09 [ICD-10-CM] V49.89 [ICD-9-CM])  Syncope, unspecified syncope type (R55 [ICD-10-CM] 780.2 [ICD-9-CM])  Weakness (R53.1 [ICD-10-CM] 780.79 [ICD-9-CM])  Parkinson's disease (CMS/[facility]) (G20 [ICD-10-CM] 332.0 [ICD-9-CM])      Refer to Dept:   Refer to Provider:   Refer to Facility:       Face to Face Visit Date: 7/3/2021  Follow-up provider for Plan of Care? I treated the patient in an acute care facility and will not continue treatment after discharge.  Follow-up provider: NITIN RAHMAN [[id number]]  Reason/Clinical Findings: Parkinsons disease, low back pain, syncope  Describe mobility limitations that make leaving home difficult: Impaired functional Mobility  Nursing/Therapeutic Services Requested: Physical Therapy  Nursing/Therapeutic Services Requested: Occupational Therapy  Nursing/Therapeutic Services Requested: Skilled Nursing  Skilled nursing orders: Medication education  PT orders: Therapeutic exercise  PT orders: Transfer training  PT orders: Strengthening  PT orders: Home safety assessment  Occupational orders: Activities of daily living  Occupational orders: Strengthening  Occupational orders: Home safety assessment  Frequency: 1 Week 1     This document serves as a request of services and does not constitute Insurance authorization or approval of services.  To determine eligibility, please contact the members Insurance carrier to verify and review coverage.     If you have  medical questions regarding this request for services. Please contact [address] at [phone] during normal business hours.       Authorizing Provider:Susie Centeno MD  Authorizing Provider's NPI: [id number]  Order Entered By: Hien Penny RN 7/3/2021  3:27 PM     Electronically signed by: Susie Centeno MD 7/3/2021  3:27 PM

## 2021-07-03 NOTE — PROGRESS NOTES
[facility] Medicine Services  PROGRESS NOTE    Patient Name: Jhoan Gagnon  : 1940  MRN: [id number]    Date of Admission: 2021  Primary Care Physician: Wilmer Andres MD    Subjective   Subjective     CC:  Follow up for positive blood cultures, recent nausea/vomiting hematemesis.     HPI:  Up on the side of bed eating lunch. Feels well today. Reports that he isn't interested in rehab and will be discharging home.  He lives alone but has family that check in on him.      ROS:  Gen- No fevers, chills  CV- No chest pain, palpitations  Resp- No cough, dyspnea  GI- No N/V/D, abd pain        Objective   Objective     Vital Signs:   Temp:  [97.5 °F (36.4 °C)-98.6 °F (37 °C)] 97.6 °F (36.4 °C)  Heart Rate:  [46-57] 55  Resp:  [17-20] 17  BP: (131-183)/(70-89) 132/79  Total (NIH Stroke Scale): 2     Physical Exam:  Constitutional: No acute distress, awake, alert, up on side of bed, no family at bedside  HENT: NCAT, mucous membranes moist  Respiratory: Clear to auscultation bilaterally, respiratory effort normal on room air  Cardiovascular: RRR, no murmurs, rubs, or gallops  Gastrointestinal: Positive bowel sounds, soft, nontender, nondistended  Psychiatric: flat affect, cooperative  Neurologic: Oriented x 3, strength symmetric in all extremities, Cranial Nerves grossly intact to confrontation, speech clear, resting tremors right arm  Skin: No rashes noted to exposed skin, right chest neurostimulator noted.     Results Reviewed:  Results from last 7 days   Lab Units 21  0907 21  0005 21  1609 21  0519 21  0328 21  1201   WBC 10*3/mm3 3.95  --   --  3.87 4.61 3.43   HEMOGLOBIN g/dL 13.8 13.2 13.4 12.7* 13.2 13.0   HEMATOCRIT % 40.2 39.3 41.5 37.3* 39.7 39.5   PLATELETS 10*3/mm3 160  --   --  143 148 169   INR   --   --   --   --   --  1.01   PROCALCITONIN ng/mL  --   --   --   --   --  0.06     Results from last 7 days   Lab Units 21  7449  07/01/21  0519 06/30/21  0328 06/29/21  1508 06/29/21  1201   SODIUM mmol/L 143 142 141  --  137   POTASSIUM mmol/L 4.1 3.8 3.9  --  3.6   CHLORIDE mmol/L 109* 111* 109*  --  104   CO2 mmol/L 25.0 24.0 25.0  --  25.0   BUN mg/dL 13 11 14  --  17   CREATININE mg/dL 1.03 1.01 1.01  --  1.01   GLUCOSE mg/dL 83 90 80  --  109*   CALCIUM mg/dL 8.7 8.7 8.7  --  8.4*   ALT (SGPT) U/L  --   --   --   --  9   AST (SGOT) U/L  --   --   --   --  18   TROPONIN T ng/mL  --   --   --  <0.010 <0.010   PROBNP pg/mL  --   --   --   --  1,525.0     Estimated Creatinine Clearance: 58.6 mL/min (by C-G formula based on SCr of 1.03 mg/dL).    Microbiology Results Abnormal     Procedure Component Value - Date/Time    Blood Culture - Blood, Hand, Right [[id number]] Collected: 07/01/21 2130    Lab Status: Preliminary result Specimen: Blood from Hand, Right Updated: 07/02/21 2231     Blood Culture No growth at 24 hours    Blood Culture - Blood, Hand, Right [[id number]] Collected: 06/30/21 2011    Lab Status: Preliminary result Specimen: Blood from Hand, Right Updated: 07/02/21 2030     Blood Culture No growth at 2 days    Blood Culture - Blood, Arm, Right [[id number]] Collected: 06/30/21 2010    Lab Status: Preliminary result Specimen: Blood from Arm, Right Updated: 07/02/21 2030     Blood Culture No growth at 2 days    Blood Culture - Blood, Arm, Right [[id number]] Collected: 07/01/21 1609    Lab Status: Preliminary result Specimen: Blood from Arm, Right Updated: 07/02/21 1700     Blood Culture No growth at 24 hours    Blood Culture - Blood, Arm, Left [[id number]]  (Abnormal) Collected: 06/29/21 1210    Lab Status: Final result Specimen: Blood from Arm, Left Updated: 07/02/21 0710     Blood Culture Streptococcus mitis / oralis     Gram Stain Anaerobic Bottle Gram positive cocci in chains    Narrative:      Refer to previous blood culture collected on 6/29/2021 at 1210 for AMY's.       Blood Culture - Blood, Arm, Right [[id number]]  (Abnormal)   (Susceptibility) Collected: 06/29/21 1210    Lab Status: Final result Specimen: Blood from Arm, Right Updated: 07/02/21 0710     Blood Culture Staphylococcus epidermidis     Isolated from Anaerobic Bottle     Blood Culture Streptococcus mitis / oralis     Isolated from Anaerobic Bottle     Gram Stain Anaerobic Bottle Gram positive cocci in chains      Anaerobic Bottle Gram positive cocci in groups    Susceptibility      Staphylococcus epidermidis Streptococcus mitis / oralis      AMY AMY      Ceftriaxone  Susceptible      Oxacillin Resistant       Penicillin G  Susceptible      Vancomycin Susceptible Susceptible                 Linear View                   Blood Culture ID, PCR - Blood, Arm, Right [[id number]]  (Abnormal) Collected: 06/29/21 1210    Lab Status: Final result Specimen: Blood from Arm, Right Updated: 06/30/21 0508     BCID, PCR Staphylococcus spp, not aureus. Identification by BCID PCR.     BCID, PCR 2 Streptococcus spp, not A, B, or pneumoniae. Identification by BCID PCR.    COVID PRE-OP / PRE-PROCEDURE SCREENING ORDER (NO ISOLATION) - Swab, Nasopharynx [[id number]]  (Normal) Collected: 06/29/21 2247    Lab Status: Final result Specimen: Swab from Nasopharynx Updated: 06/29/21 2349    Narrative:      The following orders were created for panel order COVID PRE-OP / PRE-PROCEDURE SCREENING ORDER (NO ISOLATION) - Swab, Nasopharynx.  Procedure                               Abnormality         Status                     ---------                               -----------         ------                     COVID-19,CEPHEID,CHRISTINE IN-...[[id number]]  Normal              Final result                 Please view results for these tests on the individual orders.    COVID-19,CEPHEID,CHRISTINE IN-HOUSE(OR EMERGENT/ADD-ON),NP SWAB IN TRANSPORT MEDIA 3-4 HR TAT - Swab, Nasopharynx [[id number]]  (Normal) Collected: 06/29/21 2247    Lab Status: Final result Specimen: Swab from Nasopharynx Updated: 06/29/21 2349     COVID19 Not  Detected    Narrative:      Fact sheet for providers: https://www.fda.gov/media/648315/download     Fact sheet for patients: https://www.fda.gov/media/281474/download  Fact sheet for providers: https://www.fda.gov/media/231975/download     Fact sheet for patients: https://www.fda.gov/media/884605/download          Imaging Results (Last 24 Hours)     Procedure Component Value Units Date/Time    XR Chest 1 View [[id number]] Collected: 07/02/21 1453     Updated: 07/03/21 1118    Narrative:         EXAMINATION: XR CHEST 1 VW - 07/02/2021     INDICATION: K92.0-Hematemesis; R53.1-Weakness; K63.89-Other specified  diseases of intestine; Z74.09-Other reduced mobility. Cough.     COMPARISON: 06/29/2021     FINDINGS: Portable chest reveals cardiac silhouette to be within normal  limits. The lung fields are grossly clear. No focal parenchymal  opacification is present. No pleural effusion or pneumothorax.  Degenerative change is seen within the spine. The pulmonary vascularity  is within normal limits. Mild elevation seen of the right hemidiaphragm.          Impression:      Chronic changes within the lung fields with no evidence of  acute parenchymal disease.     DICTATED:   07/02/2021  EDITED/ls :   07/03/2021     This report was finalized on 7/3/2021 11:15 AM by Dr. Heike Paulson MD.             Results for orders placed during the hospital encounter of 06/29/21    Adult Transesophageal Echo (NATALY) W/ Cont if Necessary Per Protocol    Interpretation Summary  · Left ventricular ejection fraction appears to be 56 - 60%. Left ventricular systolic function is normal.  · Left ventricular wall thickness is consistent with mild concentric hypertrophy.  · No echocardiographic findings concerning for endocarditis.      I have reviewed the medications:  Scheduled Meds:benztropine, 2 mg, Oral, TID  donepezil, 10 mg, Oral, Nightly  guaiFENesin, 600 mg, Oral, Q12H  methohexital, , ,   midazolam, , ,   pantoprazole, 40 mg, Oral, BID  AC  vancomycin, 1,500 mg, Intravenous, Q24H      Continuous Infusions:Pharmacy to dose vancomycin,       PRN Meds:.•  acetaminophen  •  ondansetron  •  Pharmacy to dose vancomycin  •  [COMPLETED] Insert peripheral IV **AND** sodium chloride  •  sodium chloride    Assessment/Plan   Assessment & Plan     Active Hospital Problems    Diagnosis  POA   • Positive blood culture [R78.81]  Yes   • Hematemesis with nausea [K92.0]  Yes   • Syncope [R55]  Yes      Resolved Hospital Problems   No resolved problems to display.        Brief Hospital Course to date:  Jhoan Gagnon is a 81 y.o. male  With a PMH of Parkinson disease, prostate cancer s/p surgery, and chronic low back pain who was admitted on June 29 from his PCP's office for nausea, vomiting, and lethargy.    Blood cultures positive for staph epidermidis/strep mitis/oralis    -had teeth cleaned 10 days ago   -ID following, continue Vanco in the setting of patient with hardware  -ID recommendations - NATALY ordered   -repeat blood cultures from 6/30 no growth to date.       Cough   - CXR- no evidence of active disease.     Syncope  -likely vasovagal, Echocardiogram showed EF of 52%, no mention of diastolic dysfunction, and no mention of significant valvular heart disease.   -Carotid duplex with no significant plaques visualized  -Orthostatic blood pressure negative      Hematemesis, nausea, vomiting - resolved   -Has not had any episodes here, had 2 polyps morning, unclear if he had any melena or bright red blood per rectum  -H&H stable at 11 d/c H&H  -Continue PPI twice daily continue regular diet    Parkinson disease-continue Aricept and Cogentin. Neurostimulator in place.   Hypertension-holding meds setting of syncope  Bradycardia-will decrease coreg to 12.5 mg BID. Follow up with PCP to assess need to increase back to normal dose.         DVT prophylaxis:  Mechanical DVT prophylaxis orders are present.         Disposition: I expect the patient to be discharged   home with home health today.         CODE STATUS:   Code Status and Medical Interventions:   Ordered at: 06/29/21 1744     Limited Support to NOT Include:    Intubation     Level Of Support Discussed With:    Patient     Code Status:    No CPR     Medical Interventions (Level of Support Prior to Arrest):    Limited       YONG Kaplan  07/03/21

## 2021-07-03 NOTE — PLAN OF CARE
Goal Outcome Evaluation:  Plan of Care Reviewed With: patient           Outcome Summary: Improving mobility. Pt increased walking distance and walked with less assistance. Assess on stairs.

## 2021-07-05 LAB
BACTERIA SPEC AEROBE CULT: NORMAL
BACTERIA SPEC AEROBE CULT: NORMAL

## 2021-07-06 LAB
BACTERIA SPEC AEROBE CULT: NORMAL
BACTERIA SPEC AEROBE CULT: NORMAL

## 2021-09-07 ENCOUNTER — HOSPITAL ENCOUNTER (OUTPATIENT)
Dept: GENERAL RADIOLOGY | Facility: HOSPITAL | Age: 81
Discharge: HOME | End: 2021-09-07
Admitting: INTERNAL MEDICINE

## 2021-09-07 ENCOUNTER — TRANSCRIBE ORDERS (OUTPATIENT)
Dept: ADMINISTRATIVE | Facility: HOSPITAL | Age: 81
End: 2021-09-07

## 2021-09-07 DIAGNOSIS — M25.551 RIGHT HIP PAIN: Primary | ICD-10-CM

## 2021-09-07 DIAGNOSIS — M54.50 LOW BACK PAIN, UNSPECIFIED BACK PAIN LATERALITY, UNSPECIFIED CHRONICITY, UNSPECIFIED WHETHER SCIATICA PRESENT: ICD-10-CM

## 2021-09-07 PROCEDURE — 72110 X-RAY EXAM L-2 SPINE 4/>VWS: CPT

## 2021-09-07 PROCEDURE — 73502 X-RAY EXAM HIP UNI 2-3 VIEWS: CPT

## 2022-07-09 ENCOUNTER — HOSPITAL ENCOUNTER (INPATIENT)
Facility: HOSPITAL | Age: 82
LOS: 3 days | Discharge: NURSING, REHAB OR LONG-TERM CARE | End: 2022-07-14
Attending: EMERGENCY MEDICINE | Admitting: HOSPITALIST

## 2022-07-09 ENCOUNTER — APPOINTMENT (OUTPATIENT)
Dept: CT IMAGING | Facility: HOSPITAL | Age: 82
End: 2022-07-09

## 2022-07-09 ENCOUNTER — APPOINTMENT (OUTPATIENT)
Dept: GENERAL RADIOLOGY | Facility: HOSPITAL | Age: 82
End: 2022-07-09

## 2022-07-09 DIAGNOSIS — R53.1 WEAKNESS: ICD-10-CM

## 2022-07-09 DIAGNOSIS — R47.1 DYSARTHRIA: ICD-10-CM

## 2022-07-09 DIAGNOSIS — R41.82 ALTERED MENTAL STATUS, UNSPECIFIED ALTERED MENTAL STATUS TYPE: Primary | ICD-10-CM

## 2022-07-09 DIAGNOSIS — R41.89 UNRESPONSIVE EPISODE: ICD-10-CM

## 2022-07-09 PROBLEM — D72.829 LEUKOCYTOSIS: Status: ACTIVE | Noted: 2022-07-09

## 2022-07-09 LAB
ALBUMIN SERPL-MCNC: 3.9 G/DL (ref 3.5–5.2)
ALBUMIN/GLOB SERPL: 1.4 G/DL
ALP SERPL-CCNC: 93 U/L (ref 39–117)
ALT SERPL W P-5'-P-CCNC: 12 U/L (ref 1–41)
ANION GAP SERPL CALCULATED.3IONS-SCNC: 9 MMOL/L (ref 5–15)
AST SERPL-CCNC: 25 U/L (ref 1–40)
BASOPHILS # BLD AUTO: 0.03 10*3/MM3 (ref 0–0.2)
BASOPHILS NFR BLD AUTO: 0.6 % (ref 0–1.5)
BILIRUB SERPL-MCNC: 1.5 MG/DL (ref 0–1.2)
BILIRUB UR QL STRIP: NEGATIVE
BUN SERPL-MCNC: 17 MG/DL (ref 8–23)
BUN/CREAT SERPL: 14 (ref 7–25)
CALCIUM SPEC-SCNC: 9 MG/DL (ref 8.6–10.5)
CHLORIDE SERPL-SCNC: 106 MMOL/L (ref 98–107)
CLARITY UR: CLEAR
CO2 SERPL-SCNC: 27 MMOL/L (ref 22–29)
COLOR UR: YELLOW
CREAT BLDA-MCNC: 1.2 MG/DL (ref 0.6–1.3)
CREAT SERPL-MCNC: 1.21 MG/DL (ref 0.76–1.27)
DEPRECATED RDW RBC AUTO: 43.1 FL (ref 37–54)
EGFRCR SERPLBLD CKD-EPI 2021: 59 ML/MIN/1.73
EOSINOPHIL # BLD AUTO: 0.12 10*3/MM3 (ref 0–0.4)
EOSINOPHIL NFR BLD AUTO: 2.6 % (ref 0.3–6.2)
ERYTHROCYTE [DISTWIDTH] IN BLOOD BY AUTOMATED COUNT: 13.1 % (ref 12.3–15.4)
FLUAV SUBTYP SPEC NAA+PROBE: NOT DETECTED
FLUBV RNA ISLT QL NAA+PROBE: NOT DETECTED
GLOBULIN UR ELPH-MCNC: 2.8 GM/DL
GLUCOSE BLDC GLUCOMTR-MCNC: 88 MG/DL (ref 70–130)
GLUCOSE SERPL-MCNC: 137 MG/DL (ref 65–99)
GLUCOSE UR STRIP-MCNC: NEGATIVE MG/DL
HCT VFR BLD AUTO: 40.4 % (ref 37.5–51)
HGB BLD-MCNC: 13.9 G/DL (ref 13–17.7)
HGB UR QL STRIP.AUTO: NEGATIVE
HOLD SPECIMEN: NORMAL
IMM GRANULOCYTES # BLD AUTO: 0 10*3/MM3 (ref 0–0.05)
IMM GRANULOCYTES NFR BLD AUTO: 0 % (ref 0–0.5)
INR PPP: 0.9 (ref 0.8–1.2)
KETONES UR QL STRIP: ABNORMAL
LEUKOCYTE ESTERASE UR QL STRIP.AUTO: NEGATIVE
LYMPHOCYTES # BLD AUTO: 2.2 10*3/MM3 (ref 0.7–3.1)
LYMPHOCYTES NFR BLD AUTO: 46.8 % (ref 19.6–45.3)
MAGNESIUM SERPL-MCNC: 2.1 MG/DL (ref 1.6–2.4)
MCH RBC QN AUTO: 30.9 PG (ref 26.6–33)
MCHC RBC AUTO-ENTMCNC: 34.4 G/DL (ref 31.5–35.7)
MCV RBC AUTO: 89.8 FL (ref 79–97)
MONOCYTES # BLD AUTO: 0.52 10*3/MM3 (ref 0.1–0.9)
MONOCYTES NFR BLD AUTO: 11.1 % (ref 5–12)
NEUTROPHILS NFR BLD AUTO: 1.83 10*3/MM3 (ref 1.7–7)
NEUTROPHILS NFR BLD AUTO: 38.9 % (ref 42.7–76)
NITRITE UR QL STRIP: NEGATIVE
NRBC BLD AUTO-RTO: 0 /100 WBC (ref 0–0.2)
PH UR STRIP.AUTO: 5.5 [PH] (ref 5–8)
PLATELET # BLD AUTO: 177 10*3/MM3 (ref 140–450)
PMV BLD AUTO: 9.5 FL (ref 6–12)
POTASSIUM SERPL-SCNC: 3.8 MMOL/L (ref 3.5–5.2)
PROT SERPL-MCNC: 6.7 G/DL (ref 6–8.5)
PROT UR QL STRIP: NEGATIVE
PROTHROMBIN TIME: 11.2 SECONDS (ref 12.8–15.2)
QT INTERVAL: 400 MS
QT INTERVAL: 404 MS
QTC INTERVAL: 448 MS
QTC INTERVAL: 452 MS
RBC # BLD AUTO: 4.5 10*6/MM3 (ref 4.14–5.8)
SARS-COV-2 RNA PNL SPEC NAA+PROBE: NOT DETECTED
SODIUM SERPL-SCNC: 142 MMOL/L (ref 136–145)
SP GR UR STRIP: 1.04 (ref 1–1.03)
TROPONIN T SERPL-MCNC: <0.01 NG/ML (ref 0–0.03)
TROPONIN T SERPL-MCNC: <0.01 NG/ML (ref 0–0.03)
TSH SERPL DL<=0.05 MIU/L-ACNC: 1.84 UIU/ML (ref 0.27–4.2)
UROBILINOGEN UR QL STRIP: ABNORMAL
WBC NRBC COR # BLD: 4.7 10*3/MM3 (ref 3.4–10.8)
WHOLE BLOOD HOLD COAG: NORMAL
WHOLE BLOOD HOLD SPECIMEN: NORMAL

## 2022-07-09 PROCEDURE — 87636 SARSCOV2 & INF A&B AMP PRB: CPT | Performed by: EMERGENCY MEDICINE

## 2022-07-09 PROCEDURE — 84484 ASSAY OF TROPONIN QUANT: CPT

## 2022-07-09 PROCEDURE — 82565 ASSAY OF CREATININE: CPT

## 2022-07-09 PROCEDURE — 93005 ELECTROCARDIOGRAM TRACING: CPT

## 2022-07-09 PROCEDURE — 99285 EMERGENCY DEPT VISIT HI MDM: CPT

## 2022-07-09 PROCEDURE — G0378 HOSPITAL OBSERVATION PER HR: HCPCS

## 2022-07-09 PROCEDURE — 70498 CT ANGIOGRAPHY NECK: CPT

## 2022-07-09 PROCEDURE — 82962 GLUCOSE BLOOD TEST: CPT

## 2022-07-09 PROCEDURE — 80053 COMPREHEN METABOLIC PANEL: CPT

## 2022-07-09 PROCEDURE — 84443 ASSAY THYROID STIM HORMONE: CPT | Performed by: INTERNAL MEDICINE

## 2022-07-09 PROCEDURE — 85025 COMPLETE CBC W/AUTO DIFF WBC: CPT

## 2022-07-09 PROCEDURE — 84484 ASSAY OF TROPONIN QUANT: CPT | Performed by: INTERNAL MEDICINE

## 2022-07-09 PROCEDURE — 83735 ASSAY OF MAGNESIUM: CPT

## 2022-07-09 PROCEDURE — 93005 ELECTROCARDIOGRAM TRACING: CPT | Performed by: EMERGENCY MEDICINE

## 2022-07-09 PROCEDURE — 85610 PROTHROMBIN TIME: CPT

## 2022-07-09 PROCEDURE — 87040 BLOOD CULTURE FOR BACTERIA: CPT | Performed by: INTERNAL MEDICINE

## 2022-07-09 PROCEDURE — 70450 CT HEAD/BRAIN W/O DYE: CPT

## 2022-07-09 PROCEDURE — 81003 URINALYSIS AUTO W/O SCOPE: CPT

## 2022-07-09 PROCEDURE — 99223 1ST HOSP IP/OBS HIGH 75: CPT | Performed by: INTERNAL MEDICINE

## 2022-07-09 PROCEDURE — 0 IOPAMIDOL PER 1 ML: Performed by: EMERGENCY MEDICINE

## 2022-07-09 PROCEDURE — 70496 CT ANGIOGRAPHY HEAD: CPT

## 2022-07-09 PROCEDURE — 0042T HC CT CEREBRAL PERFUSION W/WO CONTRAST: CPT

## 2022-07-09 PROCEDURE — 4A03X5D MEASUREMENT OF ARTERIAL FLOW, INTRACRANIAL, EXTERNAL APPROACH: ICD-10-PCS | Performed by: STUDENT IN AN ORGANIZED HEALTH CARE EDUCATION/TRAINING PROGRAM

## 2022-07-09 PROCEDURE — 71045 X-RAY EXAM CHEST 1 VIEW: CPT

## 2022-07-09 PROCEDURE — P9612 CATHETERIZE FOR URINE SPEC: HCPCS

## 2022-07-09 RX ORDER — ONDANSETRON 2 MG/ML
4 INJECTION INTRAMUSCULAR; INTRAVENOUS EVERY 6 HOURS PRN
Status: DISCONTINUED | OUTPATIENT
Start: 2022-07-09 | End: 2022-07-14 | Stop reason: HOSPADM

## 2022-07-09 RX ORDER — ACETAMINOPHEN 160 MG/5ML
650 SOLUTION ORAL EVERY 4 HOURS PRN
Status: DISCONTINUED | OUTPATIENT
Start: 2022-07-09 | End: 2022-07-14 | Stop reason: HOSPADM

## 2022-07-09 RX ORDER — ROPINIROLE 2 MG/1
2 TABLET, FILM COATED ORAL EVERY 8 HOURS SCHEDULED
Status: DISCONTINUED | OUTPATIENT
Start: 2022-07-09 | End: 2022-07-14 | Stop reason: HOSPADM

## 2022-07-09 RX ORDER — MEMANTINE HYDROCHLORIDE 10 MG/1
5 TABLET ORAL EVERY 12 HOURS SCHEDULED
Status: DISCONTINUED | OUTPATIENT
Start: 2022-07-09 | End: 2022-07-14 | Stop reason: HOSPADM

## 2022-07-09 RX ORDER — TRAZODONE HYDROCHLORIDE 100 MG/1
100 TABLET ORAL NIGHTLY
Status: DISCONTINUED | OUTPATIENT
Start: 2022-07-09 | End: 2022-07-14 | Stop reason: HOSPADM

## 2022-07-09 RX ORDER — ACETAMINOPHEN 650 MG/1
650 SUPPOSITORY RECTAL EVERY 4 HOURS PRN
Status: DISCONTINUED | OUTPATIENT
Start: 2022-07-09 | End: 2022-07-14 | Stop reason: HOSPADM

## 2022-07-09 RX ORDER — ONDANSETRON 4 MG/1
4 TABLET, FILM COATED ORAL EVERY 6 HOURS PRN
Status: DISCONTINUED | OUTPATIENT
Start: 2022-07-09 | End: 2022-07-14 | Stop reason: HOSPADM

## 2022-07-09 RX ORDER — SODIUM CHLORIDE 9 MG/ML
75 INJECTION, SOLUTION INTRAVENOUS CONTINUOUS
Status: DISCONTINUED | OUTPATIENT
Start: 2022-07-09 | End: 2022-07-13

## 2022-07-09 RX ORDER — SODIUM CHLORIDE 0.9 % (FLUSH) 0.9 %
10 SYRINGE (ML) INJECTION EVERY 12 HOURS SCHEDULED
Status: DISCONTINUED | OUTPATIENT
Start: 2022-07-09 | End: 2022-07-14 | Stop reason: HOSPADM

## 2022-07-09 RX ORDER — ASPIRIN 300 MG/1
300 SUPPOSITORY RECTAL DAILY
Status: DISCONTINUED | OUTPATIENT
Start: 2022-07-10 | End: 2022-07-14 | Stop reason: HOSPADM

## 2022-07-09 RX ORDER — CYANOCOBALAMIN 1000 UG/ML
1000 INJECTION, SOLUTION INTRAMUSCULAR; SUBCUTANEOUS
Status: DISCONTINUED | OUTPATIENT
Start: 2022-07-09 | End: 2022-07-14 | Stop reason: HOSPADM

## 2022-07-09 RX ORDER — SODIUM CHLORIDE 0.9 % (FLUSH) 0.9 %
10 SYRINGE (ML) INJECTION AS NEEDED
Status: DISCONTINUED | OUTPATIENT
Start: 2022-07-09 | End: 2022-07-14 | Stop reason: HOSPADM

## 2022-07-09 RX ORDER — ATORVASTATIN CALCIUM 40 MG/1
80 TABLET, FILM COATED ORAL NIGHTLY
Status: DISCONTINUED | OUTPATIENT
Start: 2022-07-09 | End: 2022-07-14 | Stop reason: HOSPADM

## 2022-07-09 RX ORDER — SODIUM CHLORIDE 9 MG/ML
100 INJECTION, SOLUTION INTRAVENOUS CONTINUOUS
Status: ACTIVE | OUTPATIENT
Start: 2022-07-09 | End: 2022-07-10

## 2022-07-09 RX ORDER — ACETAMINOPHEN 325 MG/1
650 TABLET ORAL EVERY 4 HOURS PRN
Status: DISCONTINUED | OUTPATIENT
Start: 2022-07-09 | End: 2022-07-14 | Stop reason: HOSPADM

## 2022-07-09 RX ORDER — ASPIRIN 325 MG
325 TABLET ORAL DAILY
Status: DISCONTINUED | OUTPATIENT
Start: 2022-07-10 | End: 2022-07-14 | Stop reason: HOSPADM

## 2022-07-09 RX ORDER — DONEPEZIL HYDROCHLORIDE 10 MG/1
10 TABLET, FILM COATED ORAL NIGHTLY
Status: DISCONTINUED | OUTPATIENT
Start: 2022-07-09 | End: 2022-07-14 | Stop reason: HOSPADM

## 2022-07-09 RX ADMIN — DONEPEZIL HYDROCHLORIDE 10 MG: 10 TABLET, FILM COATED ORAL at 22:43

## 2022-07-09 RX ADMIN — SODIUM CHLORIDE 100 ML/HR: 9 INJECTION, SOLUTION INTRAVENOUS at 22:45

## 2022-07-09 RX ADMIN — Medication 10 ML: at 22:46

## 2022-07-09 RX ADMIN — IOPAMIDOL 120 ML: 755 INJECTION, SOLUTION INTRAVENOUS at 17:39

## 2022-07-09 RX ADMIN — ATORVASTATIN CALCIUM 80 MG: 40 TABLET, FILM COATED ORAL at 22:43

## 2022-07-09 RX ADMIN — Medication 10 ML: at 22:42

## 2022-07-09 RX ADMIN — ROPINIROLE HYDROCHLORIDE 2 MG: 2 TABLET, FILM COATED ORAL at 22:44

## 2022-07-09 RX ADMIN — MEMANTINE 5 MG: 10 TABLET ORAL at 22:44

## 2022-07-09 RX ADMIN — SODIUM CHLORIDE 75 ML/HR: 9 INJECTION, SOLUTION INTRAVENOUS at 20:57

## 2022-07-09 RX ADMIN — TRAZODONE HYDROCHLORIDE 100 MG: 100 TABLET ORAL at 22:42

## 2022-07-09 NOTE — ED PROVIDER NOTES
[place]    EMERGENCY DEPARTMENT ENCOUNTER      Pt Name: Jhoan Gagnon  MRN: [id number]  YOB: 1940  Date of evaluation: 7/9/2022  Provider: Nain Reeyz DO    CHIEF COMPLAINT       Chief Complaint   Patient presents with   • Loss of Consciousness     Patient found on road in running vehicle; EMS reports HR 50s, hx Parkinsons, pacemaker         HISTORY OF PRESENT ILLNESS  (Location/Symptom, Timing/Onset, Context/Setting, Quality, Duration, Modifying Factors, Severity.)   Jhoan Gagnon is a 82 y.o. male who presents to the emergency department via [facility] secondary to a unresponsive episode.  The patient was found minimally responsive in his running vehicle on the side of the road by a bystander.  EMS got there the patient was sitting in his car, slumped over to the side, minimally responsive, found to have a heart rate in the 50s to 60s, was given 1 dose of atropine which improved his heart rate into the 70s.  They noticed blood pressures been stable in route, blood glucose of 129.  Dispatch was able to reach out to family, has a history of Parkinson's, has a pacemaker but not take any medications on a daily basis.  History is otherwise somewhat limited prior to arrival to the ED.  EMS notes no acute ischemic changes on patient's initial EKG, patient does not provide any additional history which limits my HPI at this time.      Nursing notes were reviewed.    REVIEW OF SYSTEMS    (2-9 systems for level 4, 10 or more for level 5)   ROS:  Unable to assess secondary to patient's altered state      PAST MEDICAL HISTORY     Past Medical History:   Diagnosis Date   • Broken arms    • Cancer (HCC)     prostate   • Lower back injury    • Parkinson's disease (HCC)          SURGICAL HISTORY       Past Surgical History:   Procedure Laterality Date   • CHOLECYSTECTOMY     • PROSTATE SURGERY     • REPLACEMENT TOTAL KNEE BILATERAL           CURRENT MEDICATIONS       Current  Facility-Administered Medications:   •  acetaminophen (TYLENOL) tablet 650 mg, 650 mg, Oral, Q4H PRN **OR** acetaminophen (TYLENOL) 160 MG/5ML solution 650 mg, 650 mg, Oral, Q4H PRN **OR** acetaminophen (TYLENOL) suppository 650 mg, 650 mg, Rectal, Q4H PRN, WilliTata fitzpatricksie G, DO  •  aspirin tablet 325 mg, 325 mg, Oral, Daily **OR** aspirin suppository 300 mg, 300 mg, Rectal, Daily, Queenie Escalante, APRN  •  atorvastatin (LIPITOR) tablet 80 mg, 80 mg, Oral, Nightly, Queenie Escalante, APRN, 80 mg at 07/09/22 2243  •  cyanocobalamin injection 1,000 mcg, 1,000 mcg, Intramuscular, Q28 Days, Chelsea Márquez G, DO  •  donepezil (ARICEPT) tablet 10 mg, 10 mg, Oral, Nightly, Chelsea Márquez G, DO, 10 mg at 07/09/22 2243  •  memantine (NAMENDA) tablet 5 mg, 5 mg, Oral, Q12H, Willi, Chelsea G, DO, 5 mg at 07/09/22 2244  •  ondansetron (ZOFRAN) tablet 4 mg, 4 mg, Oral, Q6H PRN **OR** ondansetron (ZOFRAN) injection 4 mg, 4 mg, Intravenous, Q6H PRN, WilliTata fitzpatricksie G, DO  •  rOPINIRole (REQUIP) tablet 2 mg, 2 mg, Oral, Q8H, WilliChelsea fitzpatrick G, DO, 2 mg at 07/09/22 2244  •  sodium chloride 0.9 % flush 10 mL, 10 mL, Intravenous, PRN, Nette Márqueze G, DO  •  sodium chloride 0.9 % flush 10 mL, 10 mL, Intravenous, Q12H, Queenie Escalante, APRN, 10 mL at 07/09/22 2246  •  sodium chloride 0.9 % flush 10 mL, 10 mL, Intravenous, PRN, Queenie Escalante, APRN  •  sodium chloride 0.9 % flush 10 mL, 10 mL, Intravenous, Q12H, Willi, Chelsea G, DO, 10 mL at 07/09/22 2242  •  sodium chloride 0.9 % flush 10 mL, 10 mL, Intravenous, PRN, Willi, Chelsea G, DO  •  sodium chloride 0.9 % infusion, 75 mL/hr, Intravenous, Continuous, Willi, Chelsea G, DO, Last Rate: 75 mL/hr at 07/09/22 2057, 75 mL/hr at 07/09/22 2057  •  sodium chloride 0.9 % infusion, 100 mL/hr, Intravenous, Continuous, Willi, Chelsea G, DO, Last Rate: 100 mL/hr at 07/09/22 2245, 100 mL/hr at 07/09/22 2245  •  traZODone (DESYREL) tablet 100 mg, 100 mg, Oral, Nightly, Willi, Chelsea G, DO, 100 mg at  "07/09/22 2242    ALLERGIES     Patient has no known allergies.    FAMILY HISTORY       Family History   Problem Relation Age of Onset   • Heart disease Mother    • Cancer Father    • Stroke Father    • Stroke Maternal Grandmother    • Cancer Paternal Grandfather           SOCIAL HISTORY       Social History     Socioeconomic History   • Marital status:    Tobacco Use   • Smoking status: Never Smoker   • Smokeless tobacco: Never Used   Vaping Use   • Vaping Use: Never used   Substance and Sexual Activity   • Alcohol use: No   • Drug use: No   • Sexual activity: Defer     Partners: Female     Comment: . wife passed away in 2008         PHYSICAL EXAM    (up to 7 for level 4, 8 or more for level 5)     Vitals:    07/09/22 2202 07/09/22 2300 07/09/22 2356 07/10/22 0000   BP: 115/76  135/78    BP Location: Left arm      Patient Position: Lying  Lying    Pulse: 60 60 59 63   Resp: 18  18    Temp: 97.4 °F (36.3 °C)  97.7 °F (36.5 °C)    TempSrc: Oral  Oral    SpO2: 96% 96% 97% 96%   Weight:       Height: 172.7 cm (67.99\")          Physical Exam  General : Patient is awake, eyes are open, trying to answer questions but is very slow to respond, is not fully conversational on arrival.  HEENT: Pupils are equally round, EOMI, conjunctivae clear, there is no injection no icterus.    Neck: Neck is supple, full range of motion, trachea midline  Cardiac: Heart regular rate, rhythm  Lungs: Lungs are clear to auscultation, there is no wheezing, rhonchi, or rales. There is no use of accessory muscles  Chest wall: There is no tenderness to palpation over the chest wall or over ribs  Abdomen: Abdomen is soft, nontender, nondistended. There are no firm or pulsatile masses, no rebound rigidity or guarding  Musculoskeletal: No peripheral edema, muscle tone is intact bilaterally.  Patient able to hold his arms off the bed, individually hold his legs off the bed 4-5 strength bilaterally.    Neuro: Patient is not conversational " upon arrival, trying to answer questions but very slow to respond, is able to hold his arms off the bed without any unilateral weakness,  strength is intact and equal.  Has 4-5 strength in his bilateral lower extremities, no flaccid paralysis appreciated.  Patient not really following commands well which limits my neurological examination.  Dermatology: Skin is warm and dry  Psych: Unable to assess      DIAGNOSTIC RESULTS     EKG: All EKGs are interpreted by the Emergency Department Physician who either signs or Co-signs this chart in the absence of a cardiologist.    ECG 12 Lead   Final Result   Test Reason : ams   Blood Pressure :   */*   mmHG   Vent. Rate :  77 BPM     Atrial Rate :  77 BPM      P-R Int : 294 ms          QRS Dur :  86 ms       QT Int : 400 ms       P-R-T Axes :   * -77  28 degrees      QTc Int : 452 ms      Sinus rhythm with 1st degree AV block   Left axis deviation   Abnormal ECG   When compared with ECG of 09-JUL-2022 17:06, (Unconfirmed)   No significant change was found   Confirmed by SHEA ROY MD ([id number]) on 7/9/2022 5:15:51 PM      Referred By: CHLOE           Confirmed By: SHEA ROY MD      ECG 12 Lead   Final Result   Test Reason : Weak/Dizzy/AMS protocol   Blood Pressure :   */*   mmHG   Vent. Rate :  74 BPM     Atrial Rate :  74 BPM      P-R Int : 260 ms          QRS Dur :  86 ms       QT Int : 404 ms       P-R-T Axes :   * -32  17 degrees      QTc Int : 448 ms      Sinus rhythm with 1st degree AV block   significant motion artifact at baseline   no stemi   Left axis deviation   Abnormal ECG   When compared with ECG of 30-JUN-2021 18:13,   premature ventricular complexes are no longer present   VA interval has increased      Confirmed by SHEA ROY MD ([id number]) on 7/9/2022 5:10:11 PM      Referred By: EMD           Confirmed By: SHEA ROY MD      ECG 12 Lead    (Results Pending)       RADIOLOGY:   Non-plain film images such as CT, Ultrasound and MRI are read by the  radiologist. Plain radiographic images are visualized and preliminarily interpreted by the emergency physician with the below findings:      [] Radiologist's Report Reviewed:  XR Chest 1 View   Final Result   No acute cardiopulmonary findings.       This report was finalized on 7/9/2022 6:19 PM by Sergio Ramsey MD.          CT Angiogram Head w AI Analysis of LVO   Final Result   Symmetric cerebral perfusion without core infarct or ischemic tissue at   risk.       Normal CTA of the head and neck.       This report was finalized on 7/9/2022 6:18 PM by Sergio Ramsey MD.          CT CEREBRAL PERFUSION WITH & WITHOUT CONTRAST   Final Result   Symmetric cerebral perfusion without core infarct or ischemic tissue at   risk.       Normal CTA of the head and neck.       This report was finalized on 7/9/2022 6:18 PM by Sergio Ramsey MD.          CT Angiogram Neck   Final Result   Symmetric cerebral perfusion without core infarct or ischemic tissue at   risk.       Normal CTA of the head and neck.       This report was finalized on 7/9/2022 6:18 PM by Sergio Ramsey MD.          CT Head Without Contrast Stroke Protocol   Final Result   No acute intracranial findings.       Findings discussed with stroke navigator by Dr. Sergio Ramsey at the CT   scanner at 5:25 PM 7/9/2022.       This report was finalized on 7/9/2022 5:33 PM by Sergio Ramsey MD.          MRI Brain Without Contrast    (Results Pending)         ED BEDSIDE ULTRASOUND:   Performed by ED Physician - none    LABS:    I have reviewed and interpreted all of the currently available lab results from this visit (if applicable):  Results for orders placed or performed during the hospital encounter of 07/09/22   COVID-19 and FLU A/B PCR - Swab, Nasopharynx    Specimen: Nasopharynx; Swab   Result Value Ref Range    COVID19 Not Detected Not Detected - Ref. Range    Influenza A PCR Not Detected Not Detected    Influenza B PCR Not Detected Not Detected   Comprehensive  Metabolic Panel    Specimen: Blood   Result Value Ref Range    Glucose 137 (H) 65 - 99 mg/dL    BUN 17 8 - 23 mg/dL    Creatinine 1.21 0.76 - 1.27 mg/dL    Sodium 142 136 - 145 mmol/L    Potassium 3.8 3.5 - 5.2 mmol/L    Chloride 106 98 - 107 mmol/L    CO2 27.0 22.0 - 29.0 mmol/L    Calcium 9.0 8.6 - 10.5 mg/dL    Total Protein 6.7 6.0 - 8.5 g/dL    Albumin 3.90 3.50 - 5.20 g/dL    ALT (SGPT) 12 1 - 41 U/L    AST (SGOT) 25 1 - 40 U/L    Alkaline Phosphatase 93 39 - 117 U/L    Total Bilirubin 1.5 (H) 0.0 - 1.2 mg/dL    Globulin 2.8 gm/dL    A/G Ratio 1.4 g/dL    BUN/Creatinine Ratio 14.0 7.0 - 25.0    Anion Gap 9.0 5.0 - 15.0 mmol/L    eGFR 59.0 (L) >60.0 mL/min/1.73   Troponin    Specimen: Blood   Result Value Ref Range    Troponin T <0.010 0.000 - 0.030 ng/mL   Magnesium    Specimen: Blood   Result Value Ref Range    Magnesium 2.1 1.6 - 2.4 mg/dL   Urinalysis With Microscopic If Indicated (No Culture) - Urine, Catheter    Specimen: Urine, Catheter   Result Value Ref Range    Color, UA Yellow Yellow, Straw    Appearance, UA Clear Clear    pH, UA 5.5 5.0 - 8.0    Specific Gravity, UA 1.036 (H) 1.001 - 1.030    Glucose, UA Negative Negative    Ketones, UA Trace (A) Negative    Bilirubin, UA Negative Negative    Blood, UA Negative Negative    Protein, UA Negative Negative    Leuk Esterase, UA Negative Negative    Nitrite, UA Negative Negative    Urobilinogen, UA 0.2 E.U./dL 0.2 - 1.0 E.U./dL   CBC Auto Differential    Specimen: Blood   Result Value Ref Range    WBC 4.70 3.40 - 10.80 10*3/mm3    RBC 4.50 4.14 - 5.80 10*6/mm3    Hemoglobin 13.9 13.0 - 17.7 g/dL    Hematocrit 40.4 37.5 - 51.0 %    MCV 89.8 79.0 - 97.0 fL    MCH 30.9 26.6 - 33.0 pg    MCHC 34.4 31.5 - 35.7 g/dL    RDW 13.1 12.3 - 15.4 %    RDW-SD 43.1 37.0 - 54.0 fl    MPV 9.5 6.0 - 12.0 fL    Platelets 177 140 - 450 10*3/mm3    Neutrophil % 38.9 (L) 42.7 - 76.0 %    Lymphocyte % 46.8 (H) 19.6 - 45.3 %    Monocyte % 11.1 5.0 - 12.0 %    Eosinophil % 2.6  "0.3 - 6.2 %    Basophil % 0.6 0.0 - 1.5 %    Immature Grans % 0.0 0.0 - 0.5 %    Neutrophils, Absolute 1.83 1.70 - 7.00 10*3/mm3    Lymphocytes, Absolute 2.20 0.70 - 3.10 10*3/mm3    Monocytes, Absolute 0.52 0.10 - 0.90 10*3/mm3    Eosinophils, Absolute 0.12 0.00 - 0.40 10*3/mm3    Basophils, Absolute 0.03 0.00 - 0.20 10*3/mm3    Immature Grans, Absolute 0.00 0.00 - 0.05 10*3/mm3    nRBC 0.0 0.0 - 0.2 /100 WBC   Troponin    Specimen: Blood   Result Value Ref Range    Troponin T <0.010 0.000 - 0.030 ng/mL   TSH    Specimen: Blood   Result Value Ref Range    TSH 1.840 0.270 - 4.200 uIU/mL   POC Protime / INR    Specimen: Blood   Result Value Ref Range    Protime 11.2 (L) 12.8 - 15.2 seconds    INR 0.9 0.8 - 1.2   POC Creatinine    Specimen: Blood   Result Value Ref Range    Creatinine 1.20 0.60 - 1.30 mg/dL   POC Glucose Once    Specimen: Blood   Result Value Ref Range    Glucose 88 70 - 130 mg/dL   ECG 12 Lead   Result Value Ref Range    QT Interval 404 ms    QTC Interval 448 ms   ECG 12 Lead   Result Value Ref Range    QT Interval 400 ms    QTC Interval 452 ms   Green Top (Gel)   Result Value Ref Range    Extra Tube Hold for add-ons.    Lavender Top   Result Value Ref Range    Extra Tube hold for add-on    Gold Top - SST   Result Value Ref Range    Extra Tube Hold for add-ons.    Gray Top   Result Value Ref Range    Extra Tube Hold for add-ons.    Light Blue Top   Result Value Ref Range    Extra Tube Hold for add-ons.         All other labs were within normal range or not returned as of this dictation.      EMERGENCY DEPARTMENT COURSE and DIFFERENTIAL DIAGNOSIS/MDM:   Vitals:    Vitals:    07/09/22 2202 07/09/22 2300 07/09/22 2356 07/10/22 0000   BP: 115/76  135/78    BP Location: Left arm      Patient Position: Lying  Lying    Pulse: 60 60 59 63   Resp: 18  18    Temp: 97.4 °F (36.3 °C)  97.7 °F (36.5 °C)    TempSrc: Oral  Oral    SpO2: 96% 96% 97% 96%   Weight:       Height: 172.7 cm (67.99\")               Patient " presents for evaluation of altered state was found minimally responsive in his running vehicle by EMS prior to arrival.  On arrival the patient is opening his eyes, he is following most commands but he is not conversational, is unable to hold a conversation upon arrival, his vital signs are stable, no acute ischemic changes on EKG, blood sugar is normal.  With his unresponsive state we did move forward with CT scan imaging, will evaluate with stroke neurology team.  We did not appreciate any significant abnormality on either CT scan imaging, angiography or perfusion.  With his symptoms we will plan on admission to the hospital, neurochecks, reevaluations.  Case discussed with our hospitalist team for admission.      MEDICATIONS ADMINISTERED IN ED:  Medications   sodium chloride 0.9 % flush 10 mL (has no administration in time range)   sodium chloride 0.9 % flush 10 mL (10 mL Intravenous Given 7/9/22 2246)   sodium chloride 0.9 % flush 10 mL (has no administration in time range)   atorvastatin (LIPITOR) tablet 80 mg (80 mg Oral Given 7/9/22 2243)   aspirin tablet 325 mg (has no administration in time range)     Or   aspirin suppository 300 mg (has no administration in time range)   sodium chloride 0.9 % infusion (75 mL/hr Intravenous New Bag 7/9/22 2057)   cyanocobalamin injection 1,000 mcg (1,000 mcg Intramuscular Not Given 7/9/22 2246)   donepezil (ARICEPT) tablet 10 mg (10 mg Oral Given 7/9/22 2243)   memantine (NAMENDA) tablet 5 mg (5 mg Oral Given 7/9/22 2244)   rOPINIRole (REQUIP) tablet 2 mg (2 mg Oral Given 7/9/22 2244)   traZODone (DESYREL) tablet 100 mg (100 mg Oral Given 7/9/22 2242)   sodium chloride 0.9 % flush 10 mL (10 mL Intravenous Given 7/9/22 2242)   sodium chloride 0.9 % flush 10 mL (has no administration in time range)   sodium chloride 0.9 % infusion (100 mL/hr Intravenous New Bag 7/9/22 2245)   acetaminophen (TYLENOL) tablet 650 mg (has no administration in time range)     Or   acetaminophen  (TYLENOL) 160 MG/5ML solution 650 mg (has no administration in time range)     Or   acetaminophen (TYLENOL) suppository 650 mg (has no administration in time range)   ondansetron (ZOFRAN) tablet 4 mg (has no administration in time range)     Or   ondansetron (ZOFRAN) injection 4 mg (has no administration in time range)   iopamidol (ISOVUE-370) 76 % injection 120 mL (120 mL Intravenous Given 7/9/22 2825)       PROCEDURES:  Procedures    CRITICAL CARE TIME    Total Critical Care time was 40 minutes, excluding separately reportable procedures.  Altered mental state, unresponsive episode, stroke work-up and evaluation, multiple neurochecks, discussions with consultants. There was a high probability of clinically significant/life threatening deterioration in the patient's condition which required my urgent intervention.      FINAL IMPRESSION      1. Altered mental status, unspecified altered mental status type    2. Weakness    3. Unresponsive episode          DISPOSITION/PLAN     ED Disposition     ED Disposition   Decision to Admit    Condition   --    Comment   Level of Care: Telemetry [5]   Diagnosis: Altered mental status, unspecified altered mental status type [[id number]]   Admitting Physician: HOANG WISDOM [[id number]]   Attending Physician: HOANG WISDOM [[id number]]   Bed Request Comments: tele                   Comment: Please note this report has been produced using speech recognition software.      Nain Reyez DO  Attending Emergency Physician               Nain Reyez DO  07/10/22 0216

## 2022-07-09 NOTE — CONSULTS
Stroke Consult Note    Patient Name: Jhoan Gagnon   MRN: [id number]  Age: 82 y.o.  Sex: male  : 1940    Primary Care Physician: Wilmer Andres MD  Referring Physician:  Dr. Reyez     TIME STROKE TEAM CALLED: 1710 EST     TIME PATIENT SEEN: 171 EST    Handedness: right  Race:      Chief Complaint/Reason for Consultation: AMS, found down     HPI:   Jhoan Gagnon is a 82 year old male with known medical diagnoses of PD, prostate cancer, frequent falls, bradycardia, fainting episodes. He presents to [facility] ED via EMS after being found slumped over in his car close to his home. Patient is altered so he is not a great historian of events. He lives alone. Last known well is uncertain and as such he is not a candidate for thrombolytic therapy.  On arrival he was taken urgently for advanced imaging.  CT head without contrast was negative for acute intracranial abnormality but does evidence his Parkinson's stimulator.  CTA of the head neck is negative for large vessel occlusion or flow-limiting stenosis.  CT perfusion is normal.  Patient becomes more alert as our exam and work-up progress.  He is following commands and is able to answer all questions but has a very soft voice.  Patient has a tremor on the right arm and leg which she states is baseline for him.  He reports that he has been feeling generally more weak for the last couple days.  He does not remember the events leading up to being slumped over in the car.  He cannot tell me where he was going or coming from.  I called his daughter to discuss his medical history and any information she may be able to give.  She reports that she has not talked to him since earlier in the week.  She does confirm that at baseline his right side has more pronounced Parkinson's tremors and weakness.  She restates that he has to often lift his right leg into the car when he goes to drive.  Her description of the patient is very similar to his current state  minus his lethargy.  She reports that handful of years ago he experienced a very similar episode and was worked up for stroke which was negative.  Patient will be admitted for full stroke evaluation.  According to his chart he only takes a baby aspirin and is not on anticoagulation.    Last Known Normal Date/Time: Unclear    Review of Systems   Unable to perform ROS: Mental status change      Past Medical History:   Diagnosis Date   • Broken arms    • Cancer (CMS/HCC)     prostate   • Lower back injury    • Parkinson's disease (CMS/HCC)      Past Surgical History:   Procedure Laterality Date   • CHOLECYSTECTOMY     • PROSTATE SURGERY     • REPLACEMENT TOTAL KNEE BILATERAL       Family History   Problem Relation Age of Onset   • Heart disease Mother    • Cancer Father    • Stroke Father    • Stroke Maternal Grandmother    • Cancer Paternal Grandfather      Social History     Socioeconomic History   • Marital status:    Tobacco Use   • Smoking status: Never Smoker   • Smokeless tobacco: Never Used   Substance and Sexual Activity   • Alcohol use: No   • Drug use: No   • Sexual activity: Defer     Partners: Female     Comment: . wife passed away in 2008     No Known Allergies  Prior to Admission medications    Medication Sig Start Date End Date Taking? Authorizing Provider   amLODIPine (NORVASC) 5 MG tablet Take 5 mg by mouth Daily.    Mary Adames MD   aspirin 81 MG EC tablet Take 81 mg by mouth Daily.    Mary Adames MD   atropine 1 % ophthalmic solution 1 drop Daily As Needed. Under tongue as needed    Mary Adames MD   cyanocobalamin 1000 MCG/ML injection Inject 1,000 mcg into the appropriate muscle as directed by prescriber Every 28 (Twenty-Eight) Days.    Mary Adames MD   donepezil (ARICEPT) 10 MG tablet Take 10 mg by mouth Every Night.    Mary Adames MD   memantine (NAMENDA) 5 MG tablet Take 5 mg by mouth 2 (Two) Times a Day.    Mary Adames MD    rOPINIRole XL (REQUIP XL) 6 MG tablet sustained-release 24 hour 24 hr tablet Take 6 mg by mouth Every Night.    Provider, MD Mary   rosuvastatin (CRESTOR) 20 MG tablet Take 20 mg by mouth Every Night.    Provider, MD Mary   traZODone (DESYREL) 100 MG tablet Take 100 mg by mouth Every Night.    Provider, MD Mary         Temp:  [97.9 °F (36.6 °C)] 97.9 °F (36.6 °C)  Heart Rate:  [73] 73  Resp:  [18] 18  BP: (149)/(82) 149/82  Neurological Exam  Mental Status  Awake and drowsy. Oriented only to person and time. Oriented to person, place, and time. Speech: Patient's speech is very soft and somewhat mumbled but he and his daughter report this is baseline from his PD. No aphasia . Attention and concentration are normal. Fund of knowledge is appropriate for level of education.    Cranial Nerves  CN II: Right visual acuity: counts fingers. Left visual acuity: counts fingers.  CN III, IV, VI: Extraocular movements intact bilaterally. Normal lids and orbits bilaterally. Pupils equal round and reactive to light bilaterally.  CN V: Facial sensation is normal.  CN VII:  Right: There is central facial weakness.  Left: There is no facial weakness.  CN VIII: Hearing is normal to speech .  CN XI: Shoulder shrug strength is normal.  CN XII: Tongue midline without atrophy or fasciculations.    Motor  Normal muscle bulk throughout. No fasciculations present. Normal muscle tone. Strength is 5/5 in all four extremities except as noted.  No drift in bilateral upper extremities.  Left lower extremity 5/5 with no drift.  Right lower extremity 4/5 with mild drift..    Sensory  Light touch abnormality: Patient has no sensation in the right lower extremity at baseline per daughter.     Coordination    Finger-to-nose, rapid alternating movements and heel-to-shin normal bilaterally without dysmetria.  No obvious dysmetria .    Gait    Not assessed due to patient condition.      Physical Exam  Vitals and nursing note  reviewed.   Constitutional:       General: He is awake. He is not in acute distress.     Appearance: He is not ill-appearing.      Comments: Elderly  gentleman   HENT:      Head: Normocephalic and atraumatic.      Nose: Nose normal.      Mouth/Throat:      Mouth: Mucous membranes are dry.   Eyes:      General: Lids are normal.      Extraocular Movements: Extraocular movements intact.      Pupils: Pupils are equal, round, and reactive to light.   Cardiovascular:      Rate and Rhythm: Regular rhythm. Bradycardia present.      Pulses: Normal pulses.   Pulmonary:      Effort: Pulmonary effort is normal. No respiratory distress.   Skin:     General: Skin is warm and dry.      Findings: Bruising present.   Neurological:      General: No focal deficit present.      Mental Status: He is oriented to person, place, and time.      Cranial Nerves: Cranial nerve deficit present.      Motor: Weakness present.      Coordination: Coordination is intact.   Psychiatric:      Comments: Drowsy but cooperative         Acute Stroke Data    Thrombolytic Inclusion / Exclusion Criteria    Time: 17:32 EDT  Person Administering Scale: YONG Ramos    Inclusion Criteria  [x]   18 years of age or greater   [x]   Onset of symptoms < 4.5 hours before beginning treatment (stroke onset = time patient was last seen well or without symptoms).   [x]   Diagnosis of acute ischemic stroke causing measurable disabling deficit (Complete Hemianopia, Any Aphasia, Visual or Sensory Extinction, Any weakness limiting sustained effort against gravity)   [x]   Any remaining deficit considered potentially disabling in view of patient and practitioner   Exclusion criteria (Do not proceed with Alteplase if any are checked under exclusion criteria)  [x]   Onset unknown or GREATER than 4.5 hours   []   ICH on CT/MRI   []   CT demonstrates hypodensity representing acute or subacute infarct   []   Significant head trauma or prior stroke in the  previous 3 months   []   Symptoms suggestive of subarachnoid hemorrhage   []   History of un-ruptured intracranial aneurysm GREATER than 10 mm   []   Recent intracranial or intraspinal surgery within the last 3 months   []   Arterial puncture at a non-compressible site in the previous 7 days   []   Active internal bleeding   []   Acute bleeding tendency   []   Platelet count LESS than 100,000 for known hematological diseases such as leukemia, thrombocytopenia or chronic cirrhosis   []   Current use of anticoagulant with INR GREATER than 1.7 or PT GREATER than 15 seconds, aPTT GREATER than 40 seconds   []   Heparin received within 48 hours, resulting in abnormally elevated aPTT GREATER than upper limit of normal   []   Current use of direct thrombin inhibitors or direct factor Xa inhibitors in the past 48 hours   []   Elevated blood pressure refractory to treatment (systolic GREATER than 185 mm/Hg or diastolic  GREATER than 110 mm/Hg   []   Suspected infective endocarditis and aortic arch dissection   []   Current use of therapeutic treatment dose of low-molecular-weight heparin (LMWH) within the previous 24 hours   []   Structural GI malignancy or bleed   Relative exclusion for all patients  []   Only minor non-disabling symptoms   []   Pregnancy   []   Seizure at onset with postictal residual neurological impairments   []   Major surgery or previous trauma within past 14 days   []   History of previous spontaneous ICH, intracranial neoplasm, or AV malformation   []   Postpartum (within previous 14 days)   []   Recent GI or urinary tract hemorrhage (within previous 21 days)   []   Recent acute MI (within previous 3 months)   []   History of un-ruptured intracranial aneurysm LESS than 10 mm   []   History of ruptured intracranial aneurysm   []   Blood glucose LESS than 50 mg/dL (2.7 mmol/L)   []   Dural puncture within the last 7 days   []   Known GREATER than 10 cerebral microbleeds   Additional exclusions for  patients with symptoms onset between 3 and 4.5 hours.  []   Age > 80.   []   On any anticoagulants regardless of INR  >>> Warfarin (Coumadin), Heparin, Enoxaparin (Lovenox), fondaparinux (Arixtra), bivalirudin (Angiomax), Argatroban, dabigatran (Pradaxa), rivaroxaban (Xarelto), or apixaban (Eliquis)   []   Severe stroke (NIHSS > 25).   []   History of BOTH diabetes and previous ischemic stroke.   []   The risks and benefits have been discussed with the patient or family related to the administration of IV thrombolytic therapy for stroke symptoms.   []   I have discussed and reviewed the patient's case and imaging with the attending prior to IV thrombolytic therapy.   Not given due to unknown last known well  Time IV thrombolytic administered       Hospital Meds:  Scheduled-    Infusions-     PRNs- •  sodium chloride    Functional Status Prior to Current Stroke/Ananya Score:  2?    NIH Stroke Scale  Time: 17:32 EDT  Person Administering Scale: YONG Ramos    1a  Level of consciousness: 1=not alert but arousable by minor stimulation to obey, answer or respond   1b. LOC questions:  0=Answers both questions correctly   1c. LOC commands: 0=Performs both tasks correctly   2.  Best Gaze: 0=normal   3.  Visual: 0=No visual loss   4. Facial Palsy: 1=Minor paralysis (flattened nasolabial fold, asymmetric on smiling)   5a.  Motor left arm: 0=No drift, limb holds 90 (or 45) degrees for full 10 seconds   5b.  Motor right arm: 0=No drift, limb holds 90 (or 45) degrees for full 10 seconds   6a. motor left le=No drift, limb holds 90 (or 45) degrees for full 10 seconds   6b  Motor right le=Drift, limb holds 90 (or 45) degrees but drifts down before full 10 seconds: does not hit bed   7. Limb Ataxia: 0=Absent   8.  Sensory: 0=Normal; no sensory loss   9. Best Language:  0=No aphasia, normal   10. Dysarthria: 1=Mild to moderate, patient slurs at least some words and at worst, can be understood with some difficulty    11. Extinction and Inattention: 0=No abnormality    Total:   4       Results Reviewed:  I have personally reviewed current lab, radiology, and data and agree with results.    Results for orders placed during the hospital encounter of 06/29/21    Adult Transesophageal Echo ([facility]) W/ Cont if Necessary Per Protocol    Interpretation Summary  · Left ventricular ejection fraction appears to be 56 - 60%. Left ventricular systolic function is normal.  · Left ventricular wall thickness is consistent with mild concentric hypertrophy.  · No echocardiographic findings concerning for endocarditis.     CT Angiogram Neck    Result Date: 7/9/2022  Symmetric cerebral perfusion without core infarct or ischemic tissue at risk.  Normal CTA of the head and neck.  This report was finalized on 7/9/2022 6:18 PM by Sergio Ramsey MD.      XR Chest 1 View    Result Date: 7/9/2022  No acute cardiopulmonary findings.  This report was finalized on 7/9/2022 6:19 PM by Sergio Ramsey MD.      CT Head Without Contrast Stroke Protocol    Result Date: 7/9/2022  No acute intracranial findings.  Findings discussed with stroke navigator by Dr. Sergio Ramsey at the CT scanner at 5:25 PM 7/9/2022.  This report was finalized on 7/9/2022 5:33 PM by Sergio Ramsey MD.      CT Angiogram Head w AI Analysis of LVO    Result Date: 7/9/2022  Symmetric cerebral perfusion without core infarct or ischemic tissue at risk.  Normal CTA of the head and neck.  This report was finalized on 7/9/2022 6:18 PM by Sergio Ramsey MD.      CT CEREBRAL PERFUSION WITH & WITHOUT CONTRAST    Result Date: 7/9/2022  Symmetric cerebral perfusion without core infarct or ischemic tissue at risk.  Normal CTA of the head and neck.  This report was finalized on 7/9/2022 6:18 PM by Sergio Ramsey MD.          Assessment/Plan:    This is a 82 year old male with known medical diagnoses of PD, prostate cancer, frequent falls, bradycardia, fainting episodes. He presents to [facility] ED via  EMS after being found slumped over in his car close to his home.  He is not a candidate for thrombolytic therapy due to last known well being unknown.  He is also returning to what appears to be close to his baseline.    Antiplatelet PTA: ASA 81 mg   Anticoagulant PTA: none         1. Altered mental status, generalized weakness right greater than left.  -Initiate TIA/CVA without thrombolytic therapy standing order set  -CT head without negative for acute intracranial abnormality  -CTA head and neck negative for large vessel occlusion and CTP normal  -MRI brain without contrast has been ordered.  He will have to be cleared as he has a stimulator for his Parkinson's disease  -TTE, hemoglobin A1c, fasting lipid panel  -Increase his home aspirin to 325  -recommend high intensity statin for secondary stroke prevention   -PT/OT/SLP consulted   -activity as tolerated   -orthostatic blood pressures  -gentle hydration   -check labs and UA        Discussed plan of care with patient, daughter, ED provider. Stroke     Queenie Escalante, APRN  July 9, 2022  17:32 EDT

## 2022-07-09 NOTE — H&P
[facility] Medicine Services  HISTORY AND PHYSICAL    Patient Name: Jhoan Gagnon  : 1940  MRN: [id number]  Primary Care Physician: Wilmer Andres MD  Date of admission: 2022      Subjective   Subjective     Chief Complaint:  AMS    HPI:  Jhoan Gagnon is a 82 y.o. male with a PMH significant for Parkinson's disease s/p neurostimulator, prostate cancer s/p surgery, chronic low back pain who comes to the ED tonight due to altered mental status.  Patient provides majority of HPI some assistance from staff daughter at bedside.  Patient says that he had went to the pharmacy today and was driving home when he became acutely ill.  He says that he had acute onset nausea, vomiting, presyncope.  He pulled his car over to the side of the road.  Daughter says that she was told patient was slumped over, someone pulled over to offer assistance.  He was asked if he was okay, he said no and asked for the person to call an ambulance.  Patient denies chest pain, shortness of breath, new focal deficit, recent illness.  Patient had a similar presentation to [facility] 2021 where he was found to have blood cultures positive for Streptococcus mitis.  He was evaluated by infectious disease, discharged on oral amoxicillin.        Review of Systems   Constitutional: Negative.    HENT: Negative.    Eyes: Negative.    Respiratory: Negative.    Cardiovascular: Negative.    Gastrointestinal: Positive for nausea and vomiting. Negative for diarrhea.   Endocrine: Negative.    Genitourinary: Negative.    Musculoskeletal: Negative.    Skin: Negative.    Allergic/Immunologic: Negative.    Neurological: Positive for dizziness and light-headedness.   Hematological: Negative.    Psychiatric/Behavioral: Positive for confusion.        All other systems reviewed and are negative.     Personal History     Past Medical History:   Diagnosis Date   • Broken arms    • Cancer (HCC)     prostate   • Lower back  injury    • Parkinson's disease (HCC)              Past Surgical History:   Procedure Laterality Date   • CHOLECYSTECTOMY     • PROSTATE SURGERY     • REPLACEMENT TOTAL KNEE BILATERAL         Family History: family history includes Cancer in his father and paternal grandfather; Heart disease in his mother; Stroke in his father and maternal grandmother. Otherwise pertinent FHx was reviewed and unremarkable.     Social History:  reports that he has never smoked. He has never used smokeless tobacco. He reports that he does not drink alcohol and does not use drugs.  Social History     Social History Narrative    Pt Lives Alone        Medications:  Available home medication information reviewed.  Medications Prior to Admission   Medication Sig Dispense Refill Last Dose   • amLODIPine (NORVASC) 5 MG tablet Take 5 mg by mouth Daily.      • aspirin 81 MG EC tablet Take 81 mg by mouth Daily.      • atropine 1 % ophthalmic solution 1 drop Daily As Needed. Under tongue as needed      • cyanocobalamin 1000 MCG/ML injection Inject 1,000 mcg into the appropriate muscle as directed by prescriber Every 28 (Twenty-Eight) Days.      • donepezil (ARICEPT) 10 MG tablet Take 10 mg by mouth Every Night.      • memantine (NAMENDA) 5 MG tablet Take 5 mg by mouth 2 (Two) Times a Day.      • rOPINIRole XL (REQUIP XL) 6 MG tablet sustained-release 24 hour 24 hr tablet Take 6 mg by mouth Every Night.      • rosuvastatin (CRESTOR) 20 MG tablet Take 20 mg by mouth Every Night.      • traZODone (DESYREL) 100 MG tablet Take 100 mg by mouth Every Night.          No Known Allergies    Objective   Objective     Vital Signs:   Temp:  [97.4 °F (36.3 °C)-97.9 °F (36.6 °C)] 97.4 °F (36.3 °C)  Heart Rate:  [60-79] 60  Resp:  [18] 18  BP: (115-153)/(76-88) 115/76  Total (NIH Stroke Scale): 3    Physical Exam   Constitutional: Awake, alert  Eyes: PERRLA, sclerae anicteric, no conjunctival injection  HENT: NCAT, mucous membranes moist  Neck: Supple, no  thyromegaly, no lymphadenopathy, trachea midline  Respiratory: Clear to auscultation bilaterally, nonlabored respirations   Cardiovascular: RRR, no murmurs, rubs, or gallops, palpable pedal pulses bilaterally  Gastrointestinal: Positive bowel sounds, soft, nontender, nondistended  Musculoskeletal: No bilateral ankle edema, no clubbing or cyanosis to extremities  Psychiatric: Appropriate affect, cooperative  Neurologic: Oriented x 3, strength symmetric in all extremities, Cranial Nerves grossly intact to confrontation, speech clear  Skin: No rashes      Result Review:  I have personally reviewed the results from the time of this admission to 7/9/2022 22:19 EDT and agree with these findings:  [x]  Laboratory list / accordion  [x]  Microbiology  [x]  Radiology  [x]  EKG/Telemetry   []  Cardiology/Vascular   []  Pathology  [x]  Old records  []  Other:        LAB RESULTS:      Lab 07/09/22  1723 07/09/22  1707   WBC  --  4.70   HEMOGLOBIN  --  13.9   HEMATOCRIT  --  40.4   PLATELETS  --  177   NEUTROS ABS  --  1.83   IMMATURE GRANS (ABS)  --  0.00   LYMPHS ABS  --  2.20   MONOS ABS  --  0.52   EOS ABS  --  0.12   MCV  --  89.8   PROTIME 11.2*  --    INR 0.9  --          Lab 07/09/22  1707 07/09/22  1254   SODIUM 142  --    POTASSIUM 3.8  --    CHLORIDE 106  --    CO2 27.0  --    ANION GAP 9.0  --    BUN 17  --    CREATININE 1.21 1.20   EGFR 59.0*  --    GLUCOSE 137*  --    CALCIUM 9.0  --    MAGNESIUM 2.1  --          Lab 07/09/22  1707   TOTAL PROTEIN 6.7   ALBUMIN 3.90   GLOBULIN 2.8   ALT (SGPT) 12   AST (SGOT) 25   BILIRUBIN 1.5*   ALK PHOS 93         Lab 07/09/22  1707   TROPONIN T <0.010                 UA    Urinalysis 7/9/22   Specific [place], UA 1.036 (A)   Ketones, UA Trace (A)   Blood, UA Negative   Leukocytes, UA Negative   Nitrite, UA Negative   (A) Abnormal value              Microbiology Results (last 10 days)     Procedure Component Value - Date/Time    COVID PRE-OP / PRE-PROCEDURE SCREENING ORDER (NO  ISOLATION) - Swab, Nasopharynx [[id number]]  (Normal) Collected: 07/09/22 1827    Lab Status: Final result Specimen: Swab from Nasopharynx Updated: 07/09/22 1912    Narrative:      The following orders were created for panel order COVID PRE-OP / PRE-PROCEDURE SCREENING ORDER (NO ISOLATION) - Swab, Nasopharynx.  Procedure                               Abnormality         Status                     ---------                               -----------         ------                     COVID-19 and FLU A/B PCR...[[id number]]  Normal              Final result                 Please view results for these tests on the individual orders.    COVID-19 and FLU A/B PCR - Swab, Nasopharynx [[id number]]  (Normal) Collected: 07/09/22 1827    Lab Status: Final result Specimen: Swab from Nasopharynx Updated: 07/09/22 1912     COVID19 Not Detected     Influenza A PCR Not Detected     Influenza B PCR Not Detected    Narrative:      Fact sheet for providers: https://www.fda.gov/media/324442/download    Fact sheet for patients: https://www.fda.gov/media/275944/download    Test performed by PCR.          CT Angiogram Neck    Result Date: 7/9/2022  DATE OF EXAM: 7/9/2022 5:25 PM  PROCEDURE: CT CEREBRAL PERFUSION W WO CONTRAST-, CT ANGIOGRAM NECK-, CT ANGIOGRAM HEAD W AI ANALYSIS OF LVO-  INDICATIONS: Neuro deficit, acute, stroke suspected  COMPARISON: Same-day noncontrast CT of the head; CTA head and neck, CT cerebral perfusion 10/3/2017  TECHNIQUE: Routine transaxial cuts were obtained through the head without administration of contrast. Routine transaxial cuts were then obtained through the head following the intravenous administration of 120 mL of Isovue 370. Core blood volume, core blood flow, mean transit time, and Tmax images were obtained utilizing the Rapid software protocol. A limited CT angiogram of the head was also performed to measure the blood vessel density.  CTA of the head and CTA of the neck was performed after the  intravenous administration of above contrast. Reconstructed coronal and sagittal images were also obtained. In addition, a 3-D volume rendered image was obtained after post processing. Automated exposure control and iterative reconstruction methods were used. AI analysis of LVO was utilized for the CTA Head imaging portion of the study.  The radiation dose reduction device was turned on for each scan per the ALARA (As Low as Reasonably Achievable) protocol.  Stenosis measurement was performed by the NASCET or similar method.   FINDINGS:  CT cerebral perfusion: Grossly symmetric cerebral perfusion without core infarct or ischemic tissue at risk.  CTA head: No abrupt cut off/large vessel occlusion, flow-limiting stenosis, dissection, or aneurysm. Mild atherosclerosis within the carotid siphons without luminal irregularity or flow-limiting stenosis. Minimal atherosclerosis of the left V4 segment. The cerebral veins and dural venous sinuses are grossly patent.  CTA neck: No abrupt cut off/large vessel occlusion, flow-limiting stenosis, dissection, or aneurysm within the cervical carotid or cervical vertebral arteries. There is mild atherosclerosis of the bilateral carotid bifurcations without significant luminal irregularity or narrowing. There is mild atherosclerosis of the aortic arch and origin of the aortic arch branch vessels, without flow-limiting stenosis or luminal irregularity.  Extravascular findings: Homogeneous attenuation of the thyroid gland. Partially imaged upper lungs are grossly clear. Unremarkable appearance of bilateral deep brain stimulator leads with pulse generator in the subcutaneous tissues of the right chest. Multilevel degenerative disc disease of the cervical spine. No acute osseous findings. No pharyngeal or laryngeal mass lesion.       Impression: Symmetric cerebral perfusion without core infarct or ischemic tissue at risk.  Normal CTA of the head and neck.  This report was finalized on  7/9/2022 6:18 PM by Sergio Ramsey MD.      XR Chest 1 View    Result Date: 7/9/2022  DATE OF EXAM: 7/9/2022 5:01 PM  PROCEDURE: XR CHEST 1 VW-  INDICATIONS: Weak/Dizzy/AMS triage protocol.  COMPARISON: Chest x-ray 7/2/2021  TECHNIQUE: Single frontal view of the chest.  FINDINGS: Redemonstration of the brain stimulator pulse generator projecting over the right chest with superiorly directed leads. Stable cardiomediastinal silhouette within normal limits. Similar mild eventration of the right hemidiaphragm. The lungs are grossly clear. No pleural effusion. No pneumothorax. No acute osseous findings.      Impression: No acute cardiopulmonary findings.  This report was finalized on 7/9/2022 6:19 PM by Sergio Ramsey MD.      CT Head Without Contrast Stroke Protocol    Result Date: 7/9/2022  DATE OF EXAM: 7/9/2022 5:15 PM  PROCEDURE: CT HEAD WO CONTRAST STROKE PROTOCOL-  INDICATIONS: Neuro deficit, acute, stroke suspected  COMPARISON: Brain MRI 11/13/2019, CT head 10/3/2017  TECHNIQUE: Routine transaxial and coronal reconstruction images were obtained through the head without the administration of contrast. Automated exposure control and iterative reconstruction methods were used.  The radiation dose reduction device was turned on for each scan per the ALARA (As Low as Reasonably Achievable) protocol.  FINDINGS: There are bilateral deep brain stimulator leads with associated streak artifact/beam hardening. No evidence of acute intracranial hemorrhage. No evidence of acute large territory infarct. There is mild global cerebral volume loss. There are scattered subcortical and periventricular white matter hypodensities which are nonspecific and can be seen in the setting of chronic small vessel ischemic change. No extra-axial collections. Normal size and configuration of the ventricles commensurate with degree of mild cerebral volume loss. No mass effect. Left lens replacement with otherwise unremarkable appearance of  the orbits. There is trace fluid in left mastoid air cells. The imaged paranasal sinuses appear grossly clear. No acute osseous findings.      Impression: No acute intracranial findings.  Findings discussed with stroke navigator by Dr. Sergio Ramsey at the CT scanner at 5:25 PM 7/9/2022.  This report was finalized on 7/9/2022 5:33 PM by Sergio Ramsey MD.      CT Angiogram Head w AI Analysis of LVO    Result Date: 7/9/2022  DATE OF EXAM: 7/9/2022 5:25 PM  PROCEDURE: CT CEREBRAL PERFUSION W WO CONTRAST-, CT ANGIOGRAM NECK-, CT ANGIOGRAM HEAD W AI ANALYSIS OF LVO-  INDICATIONS: Neuro deficit, acute, stroke suspected  COMPARISON: Same-day noncontrast CT of the head; CTA head and neck, CT cerebral perfusion 10/3/2017  TECHNIQUE: Routine transaxial cuts were obtained through the head without administration of contrast. Routine transaxial cuts were then obtained through the head following the intravenous administration of 120 mL of Isovue 370. Core blood volume, core blood flow, mean transit time, and Tmax images were obtained utilizing the Rapid software protocol. A limited CT angiogram of the head was also performed to measure the blood vessel density.  CTA of the head and CTA of the neck was performed after the intravenous administration of above contrast. Reconstructed coronal and sagittal images were also obtained. In addition, a 3-D volume rendered image was obtained after post processing. Automated exposure control and iterative reconstruction methods were used. AI analysis of LVO was utilized for the CTA Head imaging portion of the study.  The radiation dose reduction device was turned on for each scan per the ALARA (As Low as Reasonably Achievable) protocol.  Stenosis measurement was performed by the NASCET or similar method.   FINDINGS:  CT cerebral perfusion: Grossly symmetric cerebral perfusion without core infarct or ischemic tissue at risk.  CTA head: No abrupt cut off/large vessel occlusion, flow-limiting  stenosis, dissection, or aneurysm. Mild atherosclerosis within the carotid siphons without luminal irregularity or flow-limiting stenosis. Minimal atherosclerosis of the left V4 segment. The cerebral veins and dural venous sinuses are grossly patent.  CTA neck: No abrupt cut off/large vessel occlusion, flow-limiting stenosis, dissection, or aneurysm within the cervical carotid or cervical vertebral arteries. There is mild atherosclerosis of the bilateral carotid bifurcations without significant luminal irregularity or narrowing. There is mild atherosclerosis of the aortic arch and origin of the aortic arch branch vessels, without flow-limiting stenosis or luminal irregularity.  Extravascular findings: Homogeneous attenuation of the thyroid gland. Partially imaged upper lungs are grossly clear. Unremarkable appearance of bilateral deep brain stimulator leads with pulse generator in the subcutaneous tissues of the right chest. Multilevel degenerative disc disease of the cervical spine. No acute osseous findings. No pharyngeal or laryngeal mass lesion.       Impression: Symmetric cerebral perfusion without core infarct or ischemic tissue at risk.  Normal CTA of the head and neck.  This report was finalized on 7/9/2022 6:18 PM by Sergio Ramsey MD.      CT CEREBRAL PERFUSION WITH & WITHOUT CONTRAST    Result Date: 7/9/2022  DATE OF EXAM: 7/9/2022 5:25 PM  PROCEDURE: CT CEREBRAL PERFUSION W WO CONTRAST-, CT ANGIOGRAM NECK-, CT ANGIOGRAM HEAD W AI ANALYSIS OF LVO-  INDICATIONS: Neuro deficit, acute, stroke suspected  COMPARISON: Same-day noncontrast CT of the head; CTA head and neck, CT cerebral perfusion 10/3/2017  TECHNIQUE: Routine transaxial cuts were obtained through the head without administration of contrast. Routine transaxial cuts were then obtained through the head following the intravenous administration of 120 mL of Isovue 370. Core blood volume, core blood flow, mean transit time, and Tmax images were  obtained utilizing the Rapid software protocol. A limited CT angiogram of the head was also performed to measure the blood vessel density.  CTA of the head and CTA of the neck was performed after the intravenous administration of above contrast. Reconstructed coronal and sagittal images were also obtained. In addition, a 3-D volume rendered image was obtained after post processing. Automated exposure control and iterative reconstruction methods were used. AI analysis of LVO was utilized for the CTA Head imaging portion of the study.  The radiation dose reduction device was turned on for each scan per the ALARA (As Low as Reasonably Achievable) protocol.  Stenosis measurement was performed by the NASCET or similar method.   FINDINGS:  CT cerebral perfusion: Grossly symmetric cerebral perfusion without core infarct or ischemic tissue at risk.  CTA head: No abrupt cut off/large vessel occlusion, flow-limiting stenosis, dissection, or aneurysm. Mild atherosclerosis within the carotid siphons without luminal irregularity or flow-limiting stenosis. Minimal atherosclerosis of the left V4 segment. The cerebral veins and dural venous sinuses are grossly patent.  CTA neck: No abrupt cut off/large vessel occlusion, flow-limiting stenosis, dissection, or aneurysm within the cervical carotid or cervical vertebral arteries. There is mild atherosclerosis of the bilateral carotid bifurcations without significant luminal irregularity or narrowing. There is mild atherosclerosis of the aortic arch and origin of the aortic arch branch vessels, without flow-limiting stenosis or luminal irregularity.  Extravascular findings: Homogeneous attenuation of the thyroid gland. Partially imaged upper lungs are grossly clear. Unremarkable appearance of bilateral deep brain stimulator leads with pulse generator in the subcutaneous tissues of the right chest. Multilevel degenerative disc disease of the cervical spine. No acute osseous findings. No  pharyngeal or laryngeal mass lesion.       Impression: Symmetric cerebral perfusion without core infarct or ischemic tissue at risk.  Normal CTA of the head and neck.  This report was finalized on 7/9/2022 6:18 PM by Sergio Ramsey MD.        Results for orders placed during the hospital encounter of 06/29/21    Adult Transesophageal Echo ([facility]) W/ Cont if Necessary Per Protocol    Interpretation Summary  · Left ventricular ejection fraction appears to be 56 - 60%. Left ventricular systolic function is normal.  · Left ventricular wall thickness is consistent with mild concentric hypertrophy.  · No echocardiographic findings concerning for endocarditis.      Assessment & Plan   Assessment & Plan     Active Hospital Problems    Diagnosis  POA   • **Altered mental status, unspecified altered mental status type [R41.82]  Yes   • Leukocytosis [D72.829]  Unknown   • Parkinson's disease (HCC) [G20]  Yes     AMS  --stroke ann, neurology consulted  --serial NIH/neurochecks  --aspirin 324 given  --continue aspirin 81mg oral/ 300 rectal daily  --high intensity statin  --lipid panel, a1c, tsh  --echo with bubble  --CT head, CTA head and neck, CT perfusion wnl  --MRI  Pending, has neurostimulator.  May need clearance  --PT/OT/SLP  --Telemetry  --Permissive hypertension pending MRI results  -- Similar presentation 7/2021 where patient was positive for Streptococcus mitis bacteremia.  Blood cultures pending    Leukocytosis  -procalcitonin, lactic acid WNL  - Blood cultures pending  - UA negative  - Chest x-ray without acute findings    Parkinson's disease  - Continue home meds  - Neurostimulator in place      Home med list reviewed    DVT prophylaxis: SCDs      CODE STATUS: NR/DNI  Code Status and Medical Interventions:   Ordered at: 07/09/22 5831     Medical Intervention Limits:    NO intubation (DNI)     Level Of Support Discussed With:    Patient     Code Status (Patient has no pulse and is not breathing):    No CPR (Do Not  Attempt to Resuscitate)     Medical Interventions (Patient has pulse or is breathing):    Limited Support         Chelsea Márquez DO  07/09/22

## 2022-07-10 ENCOUNTER — APPOINTMENT (OUTPATIENT)
Dept: CARDIOLOGY | Facility: HOSPITAL | Age: 82
End: 2022-07-10

## 2022-07-10 LAB
ANION GAP SERPL CALCULATED.3IONS-SCNC: 7 MMOL/L (ref 5–15)
ASCENDING AORTA: 4.4 CM
BASOPHILS # BLD AUTO: 0.02 10*3/MM3 (ref 0–0.2)
BASOPHILS NFR BLD AUTO: 0.5 % (ref 0–1.5)
BH CV ECHO MEAS - AO MAX PG: 9.4 MMHG
BH CV ECHO MEAS - AO MEAN PG: 6 MMHG
BH CV ECHO MEAS - AO ROOT AREA (BSA CORRECTED): 2 CM2
BH CV ECHO MEAS - AO ROOT DIAM: 4 CM
BH CV ECHO MEAS - AO V2 MAX: 153 CM/SEC
BH CV ECHO MEAS - AO V2 VTI: 43 CM
BH CV ECHO MEAS - AVA(I,D): 2.8 CM2
BH CV ECHO MEAS - EDV(CUBED): 175.6 ML
BH CV ECHO MEAS - EDV(MOD-SP2): 107 ML
BH CV ECHO MEAS - EDV(MOD-SP4): 152 ML
BH CV ECHO MEAS - EF(MOD-BP): 59.8 %
BH CV ECHO MEAS - EF(MOD-SP2): 49.9 %
BH CV ECHO MEAS - EF(MOD-SP4): 66.5 %
BH CV ECHO MEAS - ESV(CUBED): 13.8 ML
BH CV ECHO MEAS - ESV(MOD-SP2): 53.6 ML
BH CV ECHO MEAS - ESV(MOD-SP4): 50.9 ML
BH CV ECHO MEAS - FS: 57.1 %
BH CV ECHO MEAS - IVS/LVPW: 1 CM
BH CV ECHO MEAS - IVSD: 1.1 CM
BH CV ECHO MEAS - LA DIMENSION: 4.1 CM
BH CV ECHO MEAS - LAT PEAK E' VEL: 9.4 CM/SEC
BH CV ECHO MEAS - LV MASS(C)D: 249.3 GRAMS
BH CV ECHO MEAS - LV MAX PG: 7.5 MMHG
BH CV ECHO MEAS - LV MEAN PG: 4 MMHG
BH CV ECHO MEAS - LV V1 MAX: 137 CM/SEC
BH CV ECHO MEAS - LV V1 VTI: 35.3 CM
BH CV ECHO MEAS - LVIDD: 5.6 CM
BH CV ECHO MEAS - LVIDS: 2.4 CM
BH CV ECHO MEAS - LVOT AREA: 3.5 CM2
BH CV ECHO MEAS - LVOT DIAM: 2.1 CM
BH CV ECHO MEAS - LVPWD: 1.1 CM
BH CV ECHO MEAS - MED PEAK E' VEL: 7.3 CM/SEC
BH CV ECHO MEAS - MV A MAX VEL: 119 CM/SEC
BH CV ECHO MEAS - MV DEC SLOPE: 341 CM/SEC2
BH CV ECHO MEAS - MV DEC TIME: 0.28 MSEC
BH CV ECHO MEAS - MV E MAX VEL: 108 CM/SEC
BH CV ECHO MEAS - MV E/A: 0.91
BH CV ECHO MEAS - MV MAX PG: 5.3 MMHG
BH CV ECHO MEAS - MV MEAN PG: 2 MMHG
BH CV ECHO MEAS - MV P1/2T: 88.5 MSEC
BH CV ECHO MEAS - MV V2 VTI: 48.3 CM
BH CV ECHO MEAS - MVA(P1/2T): 2.49 CM2
BH CV ECHO MEAS - MVA(VTI): 2.5 CM2
BH CV ECHO MEAS - PA ACC TIME: 0.1 SEC
BH CV ECHO MEAS - PA PR(ACCEL): 34.4 MMHG
BH CV ECHO MEAS - PA V2 MAX: 120.5 CM/SEC
BH CV ECHO MEAS - RAP SYSTOLE: 3 MMHG
BH CV ECHO MEAS - RVSP: 22 MMHG
BH CV ECHO MEAS - SV(LVOT): 122.3 ML
BH CV ECHO MEAS - SV(MOD-SP2): 53.4 ML
BH CV ECHO MEAS - SV(MOD-SP4): 101.1 ML
BH CV ECHO MEAS - TAPSE (>1.6): 2.29 CM
BH CV ECHO MEAS - TR MAX PG: 19.4 MMHG
BH CV ECHO MEAS - TR MAX VEL: 215.3 CM/SEC
BH CV ECHO MEASUREMENTS AVERAGE E/E' RATIO: 12.93
BH CV VAS BP RIGHT ARM: NORMAL MMHG
BH CV XLRA - RV BASE: 3.7 CM
BH CV XLRA - RV LENGTH: 8 CM
BH CV XLRA - RV MID: 3 CM
BH CV XLRA - TDI S': 13.4 CM/SEC
BUN SERPL-MCNC: 16 MG/DL (ref 8–23)
BUN/CREAT SERPL: 14.7 (ref 7–25)
CALCIUM SPEC-SCNC: 9.1 MG/DL (ref 8.6–10.5)
CHLORIDE SERPL-SCNC: 106 MMOL/L (ref 98–107)
CHOLEST SERPL-MCNC: 98 MG/DL (ref 0–200)
CO2 SERPL-SCNC: 25 MMOL/L (ref 22–29)
CREAT SERPL-MCNC: 1.09 MG/DL (ref 0.76–1.27)
DEPRECATED RDW RBC AUTO: 43.2 FL (ref 37–54)
EGFRCR SERPLBLD CKD-EPI 2021: 66.9 ML/MIN/1.73
EOSINOPHIL # BLD AUTO: 0.07 10*3/MM3 (ref 0–0.4)
EOSINOPHIL NFR BLD AUTO: 1.6 % (ref 0.3–6.2)
ERYTHROCYTE [DISTWIDTH] IN BLOOD BY AUTOMATED COUNT: 13 % (ref 12.3–15.4)
GLUCOSE BLDC GLUCOMTR-MCNC: 136 MG/DL (ref 70–130)
GLUCOSE SERPL-MCNC: 103 MG/DL (ref 65–99)
HBA1C MFR BLD: 5.2 % (ref 4.8–5.6)
HCT VFR BLD AUTO: 39 % (ref 37.5–51)
HDLC SERPL-MCNC: 48 MG/DL (ref 40–60)
HGB BLD-MCNC: 13.2 G/DL (ref 13–17.7)
IMM GRANULOCYTES # BLD AUTO: 0 10*3/MM3 (ref 0–0.05)
IMM GRANULOCYTES NFR BLD AUTO: 0 % (ref 0–0.5)
IVRT: 116 MSEC
LDLC SERPL CALC-MCNC: 37 MG/DL (ref 0–100)
LDLC/HDLC SERPL: 0.8 {RATIO}
LEFT ATRIUM VOLUME INDEX: 39.8 ML/M2
LV EF 2D ECHO EST: 65 %
LYMPHOCYTES # BLD AUTO: 1.22 10*3/MM3 (ref 0.7–3.1)
LYMPHOCYTES NFR BLD AUTO: 27.6 % (ref 19.6–45.3)
MAXIMAL PREDICTED HEART RATE: 136 BPM
MCH RBC QN AUTO: 30.6 PG (ref 26.6–33)
MCHC RBC AUTO-ENTMCNC: 33.8 G/DL (ref 31.5–35.7)
MCV RBC AUTO: 90.5 FL (ref 79–97)
MONOCYTES # BLD AUTO: 0.43 10*3/MM3 (ref 0.1–0.9)
MONOCYTES NFR BLD AUTO: 9.7 % (ref 5–12)
NEUTROPHILS NFR BLD AUTO: 2.68 10*3/MM3 (ref 1.7–7)
NEUTROPHILS NFR BLD AUTO: 60.6 % (ref 42.7–76)
NRBC BLD AUTO-RTO: 0 /100 WBC (ref 0–0.2)
PLATELET # BLD AUTO: 169 10*3/MM3 (ref 140–450)
PMV BLD AUTO: 9.3 FL (ref 6–12)
POTASSIUM SERPL-SCNC: 4 MMOL/L (ref 3.5–5.2)
PROCALCITONIN SERPL-MCNC: 0.07 NG/ML (ref 0–0.25)
RBC # BLD AUTO: 4.31 10*6/MM3 (ref 4.14–5.8)
SODIUM SERPL-SCNC: 138 MMOL/L (ref 136–145)
STRESS TARGET HR: 116 BPM
TRIGL SERPL-MCNC: 57 MG/DL (ref 0–150)
VLDLC SERPL-MCNC: 13 MG/DL (ref 5–40)
WBC NRBC COR # BLD: 4.42 10*3/MM3 (ref 3.4–10.8)

## 2022-07-10 PROCEDURE — 97535 SELF CARE MNGMENT TRAINING: CPT

## 2022-07-10 PROCEDURE — 93306 TTE W/DOPPLER COMPLETE: CPT

## 2022-07-10 PROCEDURE — 83036 HEMOGLOBIN GLYCOSYLATED A1C: CPT | Performed by: NURSE PRACTITIONER

## 2022-07-10 PROCEDURE — 97110 THERAPEUTIC EXERCISES: CPT

## 2022-07-10 PROCEDURE — 80061 LIPID PANEL: CPT | Performed by: NURSE PRACTITIONER

## 2022-07-10 PROCEDURE — 97162 PT EVAL MOD COMPLEX 30 MIN: CPT

## 2022-07-10 PROCEDURE — 97116 GAIT TRAINING THERAPY: CPT

## 2022-07-10 PROCEDURE — 93005 ELECTROCARDIOGRAM TRACING: CPT | Performed by: INTERNAL MEDICINE

## 2022-07-10 PROCEDURE — 80048 BASIC METABOLIC PNL TOTAL CA: CPT | Performed by: INTERNAL MEDICINE

## 2022-07-10 PROCEDURE — G0378 HOSPITAL OBSERVATION PER HR: HCPCS

## 2022-07-10 PROCEDURE — 84145 PROCALCITONIN (PCT): CPT | Performed by: INTERNAL MEDICINE

## 2022-07-10 PROCEDURE — 25010000002 ONDANSETRON PER 1 MG: Performed by: INTERNAL MEDICINE

## 2022-07-10 PROCEDURE — 93306 TTE W/DOPPLER COMPLETE: CPT | Performed by: INTERNAL MEDICINE

## 2022-07-10 PROCEDURE — 99232 SBSQ HOSP IP/OBS MODERATE 35: CPT | Performed by: HOSPITALIST

## 2022-07-10 PROCEDURE — 82962 GLUCOSE BLOOD TEST: CPT

## 2022-07-10 PROCEDURE — 97166 OT EVAL MOD COMPLEX 45 MIN: CPT

## 2022-07-10 PROCEDURE — 93010 ELECTROCARDIOGRAM REPORT: CPT | Performed by: INTERNAL MEDICINE

## 2022-07-10 PROCEDURE — 99233 SBSQ HOSP IP/OBS HIGH 50: CPT | Performed by: PSYCHIATRY & NEUROLOGY

## 2022-07-10 PROCEDURE — 92523 SPEECH SOUND LANG COMPREHEN: CPT

## 2022-07-10 PROCEDURE — 85025 COMPLETE CBC W/AUTO DIFF WBC: CPT | Performed by: INTERNAL MEDICINE

## 2022-07-10 RX ADMIN — DONEPEZIL HYDROCHLORIDE 10 MG: 10 TABLET, FILM COATED ORAL at 21:22

## 2022-07-10 RX ADMIN — TRAZODONE HYDROCHLORIDE 100 MG: 100 TABLET ORAL at 23:06

## 2022-07-10 RX ADMIN — ATORVASTATIN CALCIUM 80 MG: 40 TABLET, FILM COATED ORAL at 21:22

## 2022-07-10 RX ADMIN — Medication 10 ML: at 08:45

## 2022-07-10 RX ADMIN — ASPIRIN 325 MG ORAL TABLET 325 MG: 325 PILL ORAL at 08:45

## 2022-07-10 RX ADMIN — Medication 10 ML: at 08:46

## 2022-07-10 RX ADMIN — ROPINIROLE HYDROCHLORIDE 2 MG: 2 TABLET, FILM COATED ORAL at 21:22

## 2022-07-10 RX ADMIN — SODIUM CHLORIDE 75 ML/HR: 9 INJECTION, SOLUTION INTRAVENOUS at 18:01

## 2022-07-10 RX ADMIN — MEMANTINE 5 MG: 10 TABLET ORAL at 21:22

## 2022-07-10 RX ADMIN — ROPINIROLE HYDROCHLORIDE 2 MG: 2 TABLET, FILM COATED ORAL at 13:22

## 2022-07-10 RX ADMIN — ROPINIROLE HYDROCHLORIDE 2 MG: 2 TABLET, FILM COATED ORAL at 05:36

## 2022-07-10 RX ADMIN — MEMANTINE 5 MG: 10 TABLET ORAL at 08:45

## 2022-07-10 RX ADMIN — ONDANSETRON 4 MG: 2 INJECTION INTRAMUSCULAR; INTRAVENOUS at 05:38

## 2022-07-10 NOTE — PLAN OF CARE
Goal Outcome Evaluation:  Plan of Care Reviewed With: patient           Outcome Evaluation: OT eval complete.  Pt is oriented x 4, speech is difficult to understand.  Pt with h/o of Parkinsons's with RUE weaker than L which pt reports is baseline.  Pt with decreased coordination , weakness and decreased balance limtiing ind with self care.  Pt dep LBD,  setup/supervision with self feeding given increased time and effort- declines AE for feeding and LBD. Pt CGA supine to sit,  mod A STS,  min A to ambulate 4 ft with RW.  OT will follow to advance pt toward increased independence with self care.  Recommend SNF upon d/c.
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
## 2022-07-10 NOTE — PLAN OF CARE
Goal Outcome Evaluation:      Pt awake and alert throughout the day. VSS. Sinus dae. RA. NIH 4. MRI canceled per neurology as pt's symptoms have improved and pt's neuro stimulator is not compatible with MRI. IVF infusing. Will continue to monitor.   Problem: Adult Inpatient Plan of Care  Goal: Plan of Care Review  Outcome: Ongoing, Progressing  Flowsheets  Taken 7/10/2022 1823 by Selena Leger RN  Progress: improving  Taken 7/10/2022 1454 by Liz Muñoz PT  Plan of Care Reviewed With: patient        Progress: improving
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
## 2022-07-10 NOTE — PLAN OF CARE
Goal Outcome Evaluation:              Outcome Evaluation: pt admitted from ED with syncopal episode and r/o stroke. Pt alert and oriented x 4. NIHSS completed as ordered. bedside dysphagia test completed. pt able to swallow water and pills without difficulty. Hospitalist notified per order of bedside swallow results, new diet ordered. pt has history of parkinsons and has tremors on his right upper and lower extremities scoring him a 2 for those. IVF infusing without difficulty. no acute distress noted. Side rails up x 2, bed in low position, call bell in reach, bed alarm in use for pt safety. will continue with current poc.
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
## 2022-07-10 NOTE — THERAPY EVALUATION
Acute Care - Speech Language Pathology Initial Evaluation  [facility] Cognitive-Communication Evaluation       Patient Name: Jhoan Gagnon  : 1940  MRN: [id number]  Today's Date: 7/10/2022               Admit Date: 2022     Visit Dx:    ICD-10-CM ICD-9-CM   1. Altered mental status, unspecified altered mental status type  R41.82 780.97   2. Weakness  R53.1 780.79   3. Unresponsive episode  R41.89 780.09     Patient Active Problem List   Diagnosis   • Parkinson's disease (HCC)   • Falls   • Weakness   • Skin tear of hand without complication, left, sequela   • Sinus bradycardia   • Ataxia   • Syncope   • Nausea   • Hematemesis with nausea   • Positive blood culture   • Altered mental status, unspecified altered mental status type   • Leukocytosis     Past Medical History:   Diagnosis Date   • Broken arms    • Cancer (HCC)     prostate   • Lower back injury    • Parkinson's disease (HCC)      Past Surgical History:   Procedure Laterality Date   • CHOLECYSTECTOMY     • PROSTATE SURGERY     • REPLACEMENT TOTAL KNEE BILATERAL         SLP Recommendation and Plan  SLP Diagnosis: Pt exhibits mild cognitive impairment with slow processing speed, difficulty with memory and simple problem solving/reasoning tasks.  Calculation intact. Speech is moderately impaired with adequate breath support but decreased volume/pitch/prosody. Pt is aware of speech change but has not had the opportunity to participate in speech treatment.  Pt could benefit from speech treatment during acute care stay and post discharge. (07/10/22 0835)        SLC Criteria for Skilled Therapy Interventions Met: yes (07/10/22 0835)  Anticipated Discharge Disposition (SLP): unknown (07/10/22 0835)        Predicted Duration Therapy Intervention (Days): until discharge (07/10/22 0835)           Communication Strategy Suggestions: other (see comments) (Encourage increased volume, decrease competing noise (e.g., tv, hallway noise)) (07/10/22 0835)         Plan of Care Reviewed With: patient (07/10/22 1020)  Outcome Evaluation: Completed cognitive/speech communication evaluation as ordered. Pt with Parkinson's with expected impairment in volume/prosody/pitch variation/and intelligibility. Also with mild cognitive impairment, however not significantly different from when evaluated in 2017.  SLP to treat but pt could benefit from ongoing ST at discharge. (07/10/22 1020)      SLP EVALUATION (last 72 hours)     SLP SLC Evaluation     Row Name 07/10/22 0834                   Communication Assessment/Intervention    Document Type evaluation  -ML        Subjective Information complains of  Impaired speech  -ML        Patient Observations alert;cooperative;agree to therapy  -ML        Patient/Family/Caregiver Comments/Observations No family at bedside  -ML        Patient Effort excellent  -ML        Comment Motivated to participate in treatment  -ML        Symptoms Noted During/After Treatment none  MD in to see pt  -ML                  General Information    Patient Profile Reviewed yes  -ML        Pertinent History Of Current Problem Pt with Parkinson's disease.  Admitted with altered mental status.  Evaluated in 2017 with dx of mild cognitive impairment. Pt with neurostimulator and hx of chronic low back pain.  -ML        Precautions/Limitations, Vision WFL;for purposes of eval  -ML        Precautions/Limitations, Hearing WFL;for purposes of eval  -ML        Prior Level of Function-Communication motor speech impairment  -ML        Plans/Goals Discussed with patient;agreed upon  -ML        Barriers to Rehab medically complex;cognitive status  -ML                  Pain    Additional Documentation Pain Scale: Numbers Pre/Post-Treatment (Group)  -ML                  Pain Scale: Numbers Pre/Post-Treatment    Pretreatment Pain Rating --  No pain complaints  -ML                  Comprehension Assessment/Intervention    Comprehension Assessment/Intervention Auditory  Comprehension  -ML                  Auditory Comprehension Assessment/Intervention    Auditory Comprehension (Communication) WFL  -ML                  Expression Assessment/Intervention    Expression Assessment/Intervention verbal expression  -ML                  Verbal Expression Assessment/Intervention    Verbal Expression WFL  -ML                  Motor Speech Assessment/Intervention    Motor Speech Function moderate impairment  -ML        Characteristics Consistent with Dysarthria slow rate;decreased intensity;decreased prosody  -ML        Automatic Speech (Communication) counting 1-20;mild impairment  -ML        Verbal Repetition (Communication) sentences;moderate impairment  -ML                  Cursory Voice Assessment/Intervention    Quality and Resonance (Voice) breathy  -ML                  Cognitive Assessment Intervention- SLP    Cognitive Function (Cognition) mild impairment  -ML        Orientation Status (Cognition) awareness of basic personal information;WNL  -ML        Memory (Cognitive) delayed;mild impairment;moderate impairment  -ML        Attention (Cognitive) sustained;WFL  -ML        Thought Organization (Cognitive) concrete divergent;WFL  -ML        Reasoning (Cognitive) simple;mild impairment  -ML        Problem Solving (Cognitive) simple;mild impairment  -ML        Functional Math (Cognitive) simple;WNL  -ML        Pragmatics (Communication) affect;eye contact;topic maintenance;turn taking;WNL  -ML                  SLP Evaluation Clinical Impressions    SLP Diagnosis Pt exhibits mild cognitive impairment with slow processing speed, difficulty with memory and simple problem solving/reasoning tasks.  Calculation intact. Speech is moderately impaired with adequate breath support but decreased volume/pitch/prosody. Pt is aware of speech change but has not had the opportunity to participate in speech treatment.  Pt could benefit from speech treatment during acute care stay and post discharge.   -ML        Rehab Potential/Prognosis good  -ML        SLC Criteria for Skilled Therapy Interventions Met yes  -ML        Functional Impact functional impact in social situations;difficulty communicating wants, needs  -ML                  Recommendations    Therapy Frequency (SLP SLC) 4 days per week;5 days per week  -ML        Predicted Duration Therapy Intervention (Days) until discharge  -ML        Anticipated Discharge Disposition (SLP) unknown  -ML        Equipment Issued to Patient EMST  -ML        Communication Strategy Suggestions other (see comments)  Encourage increased volume, decrease competing noise (e.g., tv, hallway noise)  -ML                  Communication Treatment Objective and Progress Goals (SLP)    Motor Speech/Dysarthria Treatment Objectives Motor Speech/Dysarthria Treatment Objectives (Group)  -ML        Cognitive Linguistic Treatment Objectives Cognitive Linguistic Treatment Objectives (Group)  -ML        Additional Goals Additional Goals (Group)  -ML                  Motor Speech/Dysarthria Treatment Objectives    Respiratory Support Selection respiratory support, SLP goal 1  -ML        Phonation Selection phonation, SLP goal 1  -ML        Prosody Selection prosody, SLP goal 1  -ML        Patient Will Implement Strategies Selection strategy implementation, SLP goal 1  -ML                  Respiratory Support Goal 1 (SLP)    Improve Respiratory Support Goal 1 (SLP) sustaining a vowel sound on exhalation;repeating a sentence on exhalation;80%;with minimal cues (75-90%)  -ML        Time Frame (Respiratory Support Goal 1, SLP) short term goal (STG);by discharge  -ML                  Phonation Goal 1 (SLP)    Improve Phonation By Goal 1 (SLP) using loud speech;80%;with minimal cues (75-90%)  -ML        Time Frame (Phonation Goal 1, SLP) short term goal (STG);by discharge  -ML                  Prosody Goal 1 (SLP)    Improve Prosody by Goal 1 (SLP) using correct intonation at sentence  level;completing contrastive stress drills;increasing rate;80%;with moderate cues (50-74%)  -ML        Time Frame (Prosody Goal 1, SLP) short term goal (STG);by discharge  -ML                  Patient Will Implement Strategies Goal 1 (SLP)    Implement Strategies of Goal 1 (SLP) other (comment);using external rate control  Instruction re: EMST use/daily home exercise re: vocal strengthening/monitor loudness level.  -ML        Time Frame (Strategy Implementation Goal 1, SLP) short term goal (STG);by discharge  -ML                  Cognitive Linguistic Treatment Objectives    Memory Skills Selection memory skills, SLP goal 1  -ML        Reasoning Selection reasoning, SLP goal 1  -ML        Problem Solving Selection problem solving, SLP goal 1  -ML                  Memory Skills Goal 1 (SLP)    Improve Memory Skills Through Goal 1 (SLP) recall details from a word list;listen to a paragraph and answer questions;90%  -ML        Time Frame (Memory Skills Goal 1, SLP) short term goal (STG);by discharge  -ML                  Reasoning Goal 1 (SLP)    Improve Reasoning Through Goal 1 (SLP) complete mental flexibility task;90%;independently (over 90% accuracy)  -ML        Time Frame (Reasoning Goal 1, SLP) short term goal (STG);by discharge  -ML                  Functional Problem Solving Skills Goal 1 (SLP)    Improve Problem Solving Through Goal 1 (SLP) determine multiple solutions to problems;90%;with minimal cues (75-90%)  -ML        Time Frame (Problem Solving Goal 1, SLP) short term goal (STG);by discharge  -                  Additional Goals    Additional Goal Selection additional goal, SLP goal 1;additional goal, SLP goal 2  -ML                  Additional Goal 1 (SLP)    Additional Goal 1, SLP LTG: Improve cognitive communication to allow for successful communication of ideas independently  -ML                  Additional Goal 2 (SLP)    Additional Goal 2, SLP Introduce and instruct re: EMST/daily home exercise  program/self monitoring  -ML        Time Frame (Additional Goal 2, SLP) short term goal (STG);by discharge  -ML              User Key  (r) = Recorded By, (t) = Taken By, (c) = Cosigned By    Initials Name Effective Dates    ML Lucie Rodriges, CCC-SLP 06/16/21 -                    EDUCATION  The patient has been educated in the following areas:     Cognitive Impairment Communication Impairment.           SLP GOALS     Row Name 07/10/22 0835             Respiratory Support Goal 1 (SLP)    Improve Respiratory Support Goal 1 (SLP) sustaining a vowel sound on exhalation;repeating a sentence on exhalation;80%;with minimal cues (75-90%)  -ML      Time Frame (Respiratory Support Goal 1, SLP) short term goal (STG);by discharge  -ML              Phonation Goal 1 (SLP)    Improve Phonation By Goal 1 (SLP) using loud speech;80%;with minimal cues (75-90%)  -ML      Time Frame (Phonation Goal 1, SLP) short term goal (STG);by discharge  -ML              Prosody Goal 1 (SLP)    Improve Prosody by Goal 1 (SLP) using correct intonation at sentence level;completing contrastive stress drills;increasing rate;80%;with moderate cues (50-74%)  -ML      Time Frame (Prosody Goal 1, SLP) short term goal (STG);by discharge  -ML              Patient Will Implement Strategies Goal 1 (SLP)    Implement Strategies of Goal 1 (SLP) other (comment);using external rate control  Instruction re: EMST use/daily home exercise re: vocal strengthening/monitor loudness level.  -ML      Time Frame (Strategy Implementation Goal 1, SLP) short term goal (STG);by discharge  -ML              Memory Skills Goal 1 (SLP)    Improve Memory Skills Through Goal 1 (SLP) recall details from a word list;listen to a paragraph and answer questions;90%  -ML      Time Frame (Memory Skills Goal 1, SLP) short term goal (STG);by discharge  -ML              Reasoning Goal 1 (SLP)    Improve Reasoning Through Goal 1 (SLP) complete mental flexibility task;90%;independently  (over 90% accuracy)  -ML      Time Frame (Reasoning Goal 1, SLP) short term goal (STG);by discharge  -ML              Functional Problem Solving Skills Goal 1 (SLP)    Improve Problem Solving Through Goal 1 (SLP) determine multiple solutions to problems;90%;with minimal cues (75-90%)  -ML      Time Frame (Problem Solving Goal 1, SLP) short term goal (STG);by discharge  -ML              Additional Goal 1 (SLP)    Additional Goal 1, SLP LTG: Improve cognitive communication to allow for successful communication of ideas independently  -ML              Additional Goal 2 (SLP)    Additional Goal 2, SLP Introduce and instruct re: EMST/daily home exercise program/self monitoring  -ML      Time Frame (Additional Goal 2, SLP) short term goal (STG);by discharge  -ML            User Key  (r) = Recorded By, (t) = Taken By, (c) = Cosigned By    Initials Name Provider Type    Lucie Fragoso CCC-SLP Speech and Language Pathologist                        Time Calculation:      Time Calculation- SLP     Row Name 07/10/22 1022             Time Calculation- SLP    SLP Start Time 0835  -ML      SLP Received On 07/10/22  -            User Key  (r) = Recorded By, (t) = Taken By, (c) = Cosigned By    Initials Name Provider Type    Lucie Fragoso CCC-SLP Speech and Language Pathologist                Therapy Charges for Today     Code Description Service Date Service Provider Modifiers Qty    64306839890 HC ST EVAL SPEECH AND PROD W LANG  4 7/10/2022 Lucie Rodriges CCC-SLP GN 1        Patient was not wearing a face mask and did not exhibit coughing during this therapy encounter.  Procedure performed was aerosolizing, involved close contact (within 6 feet for at least 15 minutes or longer), and did not involve contact with infectious secretions or specimens.  Therapist used appropriate personal protective equipment including gloves, standard procedure mask and eye protection.  Appropriate PPE was worn during the  entire therapy session.  Hand hygiene was completed before and after therapy session.              Lucie Rodriges CCC-SLP  7/10/2022

## 2022-07-10 NOTE — PLAN OF CARE
Goal Outcome Evaluation:  Plan of Care Reviewed With: patient           Outcome Evaluation: Completed cognitive/speech communication evaluation as ordered. Pt with Parkinson's with expected impairment in volume/prosody/pitch variation/and intelligibility. Also with mild cognitive impairment, however not significantly different from when evaluated in 2017.  SLP to treat but pt could benefit from ongoing ST at discharge.

## 2022-07-10 NOTE — THERAPY EVALUATION
Patient Name: Jhoan Gagnon  : 1940    MRN: [id number]                              Today's Date: 7/10/2022       Admit Date: 2022    Visit Dx:     ICD-10-CM ICD-9-CM   1. Altered mental status, unspecified altered mental status type  R41.82 780.97   2. Weakness  R53.1 780.79   3. Unresponsive episode  R41.89 780.09     Patient Active Problem List   Diagnosis   • Parkinson's disease (HCC)   • Falls   • Weakness   • Skin tear of hand without complication, left, sequela   • Sinus bradycardia   • Ataxia   • Syncope   • Nausea   • Hematemesis with nausea   • Positive blood culture   • Altered mental status, unspecified altered mental status type   • Leukocytosis     Past Medical History:   Diagnosis Date   • Broken arms    • Cancer (HCC)     prostate   • Lower back injury    • Parkinson's disease (HCC)      Past Surgical History:   Procedure Laterality Date   • CHOLECYSTECTOMY     • PROSTATE SURGERY     • REPLACEMENT TOTAL KNEE BILATERAL        General Information     Row Name 07/10/22 0735          OT Time and Intention    Document Type evaluation  -AC     Mode of Treatment occupational therapy  -AC     Row Name 07/10/22 0735          General Information    Patient Profile Reviewed yes  -AC     Prior Level of Function independent:;all household mobility;community mobility;ADL's;home management;driving  uses RW when he gets up at night, uses [facility] for community mobility, completes very simple meal prep or dines out,  reports it takes him 2 hrs to get bathed and dressed, has built up utensil for feeding, but does not use them stating they are not helpful  -AC     Existing Precautions/Restrictions fall  difficult to understand speech,  Parkinson's  -AC     Barriers to Rehab previous functional deficit  -AC     Row Name 07/10/22 0735          Living Environment    People in Home alone  dtr lives 4-5 miles away from pt  -AC     Row Name 07/10/22 0735          Home Main Entrance    Number of Stairs, Main  Entrance other (see comments)  13  -     Stair Railings, Main Entrance railings on both sides of stairs  -     Row Name 07/10/22 0735          Stairs Within Home, Primary    Stairs, Within Home, Primary --  -     Number of Stairs, Within Home, Primary --  -     Row Name 07/10/22 0735          Cognition    Orientation Status (Cognition) oriented x 4  -     Row Name 07/10/22 0735          Safety Issues, Functional Mobility    Safety Issues Affecting Function (Mobility) safety precaution awareness;safety precautions follow-through/compliance;sequencing abilities  -     Impairments Affecting Function (Mobility) balance;coordination;endurance/activity tolerance;grasp;motor control;postural/trunk control;strength  -           User Key  (r) = Recorded By, (t) = Taken By, (c) = Cosigned By    Initials Name Provider Type     Ines Sanabria, OT Occupational Therapist                 Mobility/ADL's     Row Name 07/10/22 0735          Bed Mobility    Bed Mobility supine-sit  -     Supine-Sit [place] (Bed Mobility) verbal cues;contact guard  -     Assistive Device (Bed Mobility) bed rails;head of bed elevated  -     Comment, (Bed Mobility) Increased time and effort  -     Row Name 07/10/22 0735          Transfers    Transfers sit-stand transfer  -     Sit-Stand [place] (Transfers) moderate assist (50% patient effort)  -     Row Name 07/10/22 0735          Sit-Stand Transfer    Assistive Device (Sit-Stand Transfers) walker, front-wheeled  -     Comment, (Sit-Stand Transfer) required 4 attempts and elevated bed surface to come to full upright position  -     Row Name 07/10/22 0735          Functional Mobility    Functional Mobility- Ind. Level minimum assist (75% patient effort)  -     Functional Mobility- Device walker, 4-wheeled  -     Functional Mobility-Distance (Feet) 4  -     Functional Mobility- Safety Issues step length decreased  -     Functional Mobility- Comment  shuffling steps  -[facility] Name 07/10/22 0735          Activities of Daily Living    BADL Assessment/Intervention lower body dressing;feeding  -[facility] Name 07/10/22 0735          Lower Body Dressing Assessment/Training    [place] Level (Lower Body Dressing) don;socks;dependent (less than 25% patient effort)  -     Position (Lower Body Dressing) edge of bed sitting  -     Comment, (Lower Body Dressing) pt decined to attempt stating he completes seated in lower position with a stool and requires extended time- reports he is unable to use sock aid  -[facility] Name 07/10/22 0735          Self-Feeding Assessment/Training    [place] Level (Feeding) liquids to mouth;scoop food and bring to mouth;set up;supervision  -     Position (Self-Feeding) supported sitting  -     Comment, (Feeding) declines need for adapted utensils or cup stating he has AE and it does not help, required increased time and effort  -           User Key  (r) = Recorded By, (t) = Taken By, (c) = Cosigned By    Initials Name Provider Type    Ines Nnia, OT Occupational Therapist               Obj/Interventions     [facility] Name 07/10/22 0854          Sensory Assessment (Somatosensory)    Sensory Assessment (Somatosensory) UE sensation intact  -AC     Row Name 07/10/22 0854          Vision Assessment/Intervention    Visual Impairment/Limitations corrective lenses for reading  -[facility] Name 07/10/22 0854          Range of Motion Comprehensive    General Range of Motion bilateral upper extremity ROM WNL  -[facility] Name 07/10/22 0854          Strength Comprehensive (MMT)    Comment, General Manual Muscle Testing (MMT) Assessment RUE grossly 4-/5, LUE grossly 4/5  -[facility] Name 07/10/22 0854          Motor Skills    Motor Skills coordination  -     Coordination moderate impairment  -           User Key  (r) = Recorded By, (t) = Taken By, (c) = Cosigned By    Initials Name Provider Type    Ines Nina, OT  Occupational Therapist               Goals/Plan     Row Name 07/10/22 0902          Transfer Goal 1 (OT)    Activity/Assistive Device (Transfer Goal 1, OT) bed-to-chair/chair-to-bed;toilet;walker, rolling  -AC     Center Level/Cues Needed (Transfer Goal 1, OT) contact guard required  -AC     Time Frame (Transfer Goal 1, OT) by discharge  -AC     Progress/Outcome (Transfer Goal 1, OT) goal ongoing  -AC     Row Name 07/10/22 0902          Toileting Goal 1 (OT)    Activity/Device (Toileting Goal 1, OT) adjust/manage clothing;perform perineal hygiene  -AC     Center Level/Cues Needed (Toileting Goal 1, OT) minimum assist (75% or more patient effort)  -AC     Time Frame (Toileting Goal 1, OT) by discharge  -AC     Progress/Outcome (Toileting Goal 1, OT) goal ongoing  -AC     Row Name 07/10/22 0902          Grooming Goal 1 (OT)    Activity/Device (Grooming Goal 1, OT) oral care  -AC     Center (Grooming Goal 1, OT) set-up required;supervision required  -AC     Time Frame (Grooming Goal 1, OT) by discharge  -AC     Strategies/Barriers (Grooming Goal 1, OT) standing sinkside  -AC     Progress/Outcome (Grooming Goal 1, OT) goal ongoing  -AC     Row Name 07/10/22 0902          Therapy Assessment/Plan (OT)    Planned Therapy Interventions (OT) activity tolerance training;BADL retraining;functional balance retraining;occupation/activity based interventions;patient/caregiver education/training;strengthening exercise;transfer/mobility retraining  -AC           User Key  (r) = Recorded By, (t) = Taken By, (c) = Cosigned By    Initials Name Provider Type    AC Ines Sanabria OT Occupational Therapist               Clinical Impression     Row Name 07/10/22 0735          Pain Assessment    Pretreatment Pain Rating 0/10 - no pain  -AC     Posttreatment Pain Rating 0/10 - no pain  -AC     Pain Intervention(s) Repositioned  -AC     Row Name 07/10/22 0735          Plan of Care Review    Plan of Care Reviewed With  patient  -AC     Outcome Evaluation OT eval complete.  Pt is oriented x 4, speech is difficult to understand.  Pt with h/o of Parkinsons's with RUE weaker than L which pt reports is baseline.  Pt with decreased coordination , weakness and decreased balance limtiing ind with self care.  Pt dep LBD,  setup/supervision with self feeding given increased time and effort- declines AE for feeding and LBD. Pt CGA supine to sit,  mod A STS,  min A to ambulate 4 ft with RW.  OT will follow to advacne pt toward increased independence with self care.  Recommend SNF upon d/c.  -     Row Name 07/10/22 0735          Therapy Assessment/Plan (OT)    Rehab Potential (OT) good, to achieve stated therapy goals  -     Criteria for Skilled Therapeutic Interventions Met (OT) yes;skilled treatment is necessary  -     Therapy Frequency (OT) daily  -     Row Name 07/10/22 0735          Therapy Plan Review/Discharge Plan (OT)    Anticipated Discharge Disposition (OT) skilled nursing facility  -     Row Name 07/10/22 0735          Vital Signs    Pre Systolic BP Rehab 103  -AC     Pre Treatment Diastolic BP 60  -AC     Post Systolic BP Rehab 119  -AC     Post Treatment Diastolic BP 89  -AC     Pretreatment Heart Rate (beats/min) 49  -AC     Posttreatment Heart Rate (beats/min) 58  -AC     Pre SpO2 (%) 95  -AC     O2 Delivery Pre Treatment room air  -AC     O2 Delivery Intra Treatment room air  -AC     Post SpO2 (%) 95  -AC     O2 Delivery Post Treatment room air  -AC     Pre Patient Position Supine  -AC     Post Patient Position Sitting  -     Row Name 07/10/22 0735          Positioning and Restraints    Pre-Treatment Position in bed  -AC     Post Treatment Position chair  -AC     In Chair notified nsg;reclined;call light within reach;encouraged to call for assist;exit alarm on  -AC           User Key  (r) = Recorded By, (t) = Taken By, (c) = Cosigned By    Initials Name Provider Type    Ines Nina, OT Occupational Therapist                Outcome Measures     Row Name 07/10/22 0903          How much help from another is currently needed...    Putting on and taking off regular lower body clothing? 1  -AC     Bathing (including washing, rinsing, and drying) 2  -AC     Toileting (which includes using toilet bed pan or urinal) 2  -AC     Putting on and taking off regular upper body clothing 2  -AC     Taking care of personal grooming (such as brushing teeth) 3  -AC     Eating meals 3  -AC     AM-PAC 6 Clicks Score (OT) 13  -     Row Name 07/10/22 0903          Functional Assessment    Outcome Measure Options AM-PAC 6 Clicks Daily Activity (OT)  -           User Key  (r) = Recorded By, (t) = Taken By, (c) = Cosigned By    Initials Name Provider Type    Ines Nina OT Occupational Therapist                Occupational Therapy Education                 Title: PT OT SLP Therapies (In Progress)     Topic: Occupational Therapy (In Progress)     Point: ADL training (Done)     Description:   Instruct learner(s) on proper safety adaptation and remediation techniques during self care or transfers.   Instruct in proper use of assistive devices.              Learning Progress Summary           Patient Acceptance, E, VU by  at 7/10/2022 0904    Comment: Role of OT, benefits of activity                   Point: Home exercise program (Not Started)     Description:   Instruct learner(s) on appropriate technique for monitoring, assisting and/or progressing therapeutic exercises/activities.              Learner Progress:  Not documented in this visit.          Point: Precautions (Not Started)     Description:   Instruct learner(s) on prescribed precautions during self-care and functional transfers.              Learner Progress:  Not documented in this visit.          Point: Body mechanics (Not Started)     Description:   Instruct learner(s) on proper positioning and spine alignment during self-care, functional mobility activities and/or  exercises.              Learner Progress:  Not documented in this visit.                      User Key     Initials Effective Dates Name Provider Type Discipline     06/16/21 -  Ines Sanabria, OT Occupational Therapist OT              OT Recommendation and Plan  Planned Therapy Interventions (OT): activity tolerance training, BADL retraining, functional balance retraining, occupation/activity based interventions, patient/caregiver education/training, strengthening exercise, transfer/mobility retraining  Therapy Frequency (OT): daily  Plan of Care Review  Plan of Care Reviewed With: patient  Outcome Evaluation: OT eval complete.  Pt is oriented x 4, speech is difficult to understand.  Pt with h/o of Parkinsons's with RUE weaker than L which pt reports is baseline.  Pt with decreased coordination , weakness and decreased balance limtiing ind with self care.  Pt dep LBD,  setup/supervision with self feeding given increased time and effort- declines AE for feeding and LBD. Pt CGA supine to sit,  mod A STS,  min A to ambulate 4 ft with RW.  OT will follow to advacne pt toward increased independence with self care.  Recommend SNF upon d/c.     Time Calculation:    Time Calculation- OT     Row Name 07/10/22 0735             Time Calculation- OT    OT Start Time 0735  -AC      OT Received On 07/10/22  -      OT Goal Re-Cert Due Date 07/20/22  -AC              Timed Charges    25236 - OT Self Care/Mgmt Minutes 10  -AC              Total Minutes    Timed Charges Total Minutes 10  -AC       Total Minutes 10  -AC            User Key  (r) = Recorded By, (t) = Taken By, (c) = Cosigned By    Initials Name Provider Type    AC Ines Sanabria, OT Occupational Therapist              Therapy Charges for Today     Code Description Service Date Service Provider Modifiers Qty    [id number]  OT SELF CARE/MGMT/TRAIN EA 15 MIN 7/10/2022 Ines Sanabria, OT GO 1    [id number]  OT EVAL MOD COMPLEXITY 4 7/10/2022 Ines Sanabria, OT GO  1               Ines Sanabria, OT  7/10/2022

## 2022-07-10 NOTE — PROGRESS NOTES
[facility] Medicine Services  PROGRESS NOTE    Patient Name: Jhoan Gagnon  : 1940  MRN: [id number]    Date of Admission: 2022  Primary Care Physician: Wilmer Andres MD    Subjective   Subjective     CC:  AMS    HPI:  Patient sitting up resting in bedside chair working with speech therapy. Patient has no acute complaints, states he feels better than he remembers feeling yesterday.    ROS:  Gen- No fevers, chills  CV- No chest pain, palpitations  Resp- No cough, dyspnea  GI- No N/V/D, abd pain    Objective   Objective     Vital Signs:   Temp:  [97.4 °F (36.3 °C)-98 °F (36.7 °C)] 97.6 °F (36.4 °C)  Heart Rate:  [46-79] 52  Resp:  [18] 18  BP: (103-153)/(60-88) 103/60     Physical Exam:  Constitutional: No acute distress, awake, alert  HENT: NCAT, mucous membranes moist  Respiratory: Clear to auscultation bilaterally, respiratory effort normal   Cardiovascular: RRR, no murmurs, rubs, or gallops  Gastrointestinal: Positive bowel sounds, soft, nontender, nondistended  Musculoskeletal: No bilateral ankle edema  Psychiatric: Appropriate affect, cooperative  Neurologic: decreased cognitive speed and cognitive impairment present, strength not tested, Cranial Nerves grossly intact to confrontation, speech impaired  Skin: No rashes    Results Reviewed:  LAB RESULTS:      Lab 07/10/22  0722  1723 22  1707   WBC 4.42  --  4.70   HEMOGLOBIN 13.2  --  13.9   HEMATOCRIT 39.0  --  40.4   PLATELETS 169  --  177   NEUTROS ABS 2.68  --  1.83   IMMATURE GRANS (ABS) 0.00  --  0.00   LYMPHS ABS 1.22  --  2.20   MONOS ABS 0.43  --  0.52   EOS ABS 0.07  --  0.12   MCV 90.5  --  89.8   PROCALCITONIN 0.07  --   --    PROTIME  --  11.2*  --          Lab 07/10/22  0712 22  2225 22  1707 22  1254   SODIUM 138  --  142  --    POTASSIUM 4.0  --  3.8  --    CHLORIDE 106  --  106  --    CO2 25.0  --  27.0  --    ANION GAP 7.0  --  9.0  --    BUN 16  --  17  --     CREATININE 1.09  --  1.21 1.20   EGFR 66.9  --  59.0*  --    GLUCOSE 103*  --  137*  --    CALCIUM 9.1  --  9.0  --    MAGNESIUM  --   --  2.1  --    HEMOGLOBIN A1C 5.20  --   --   --    TSH  --  1.840  --   --          Lab 07/09/22  1707   TOTAL PROTEIN 6.7   ALBUMIN 3.90   GLOBULIN 2.8   ALT (SGPT) 12   AST (SGOT) 25   BILIRUBIN 1.5*   ALK PHOS 93         Lab 07/09/22  2225 07/09/22  1723 07/09/22  1707   TROPONIN T <0.010  --  <0.010   PROTIME  --  11.2*  --    INR  --  0.9  --          Lab 07/10/22  0712   CHOLESTEROL 98   LDL CHOL 37   HDL CHOL 48   TRIGLYCERIDES 57             Brief Urine Lab Results  (Last result in the past 365 days)      Color   Clarity   Blood   Leuk Est   Nitrite   Protein   CREAT   Urine HCG        07/09/22 1810 Yellow   Clear   Negative   Negative   Negative   Negative                 Microbiology Results Abnormal     Procedure Component Value - Date/Time    COVID PRE-OP / PRE-PROCEDURE SCREENING ORDER (NO ISOLATION) - Swab, Nasopharynx [[id number]]  (Normal) Collected: 07/09/22 1827    Lab Status: Final result Specimen: Swab from Nasopharynx Updated: 07/09/22 1912    Narrative:      The following orders were created for panel order COVID PRE-OP / PRE-PROCEDURE SCREENING ORDER (NO ISOLATION) - Swab, Nasopharynx.  Procedure                               Abnormality         Status                     ---------                               -----------         ------                     COVID-19 and FLU A/B PCR...[[id number]]  Normal              Final result                 Please view results for these tests on the individual orders.    COVID-19 and FLU A/B PCR - Swab, Nasopharynx [[id number]]  (Normal) Collected: 07/09/22 1827    Lab Status: Final result Specimen: Swab from Nasopharynx Updated: 07/09/22 1912     COVID19 Not Detected     Influenza A PCR Not Detected     Influenza B PCR Not Detected    Narrative:      Fact sheet for providers:  https://www.fda.gov/media/744898/download    Fact sheet for patients: https://www.fda.gov/media/967098/download    Test performed by PCR.          CT Angiogram Neck    Result Date: 7/9/2022  DATE OF EXAM: 7/9/2022 5:25 PM  PROCEDURE: CT CEREBRAL PERFUSION W WO CONTRAST-, CT ANGIOGRAM NECK-, CT ANGIOGRAM HEAD W AI ANALYSIS OF LVO-  INDICATIONS: Neuro deficit, acute, stroke suspected  COMPARISON: Same-day noncontrast CT of the head; CTA head and neck, CT cerebral perfusion 10/3/2017  TECHNIQUE: Routine transaxial cuts were obtained through the head without administration of contrast. Routine transaxial cuts were then obtained through the head following the intravenous administration of 120 mL of Isovue 370. Core blood volume, core blood flow, mean transit time, and Tmax images were obtained utilizing the Rapid software protocol. A limited CT angiogram of the head was also performed to measure the blood vessel density.  CTA of the head and CTA of the neck was performed after the intravenous administration of above contrast. Reconstructed coronal and sagittal images were also obtained. In addition, a 3-D volume rendered image was obtained after post processing. Automated exposure control and iterative reconstruction methods were used. AI analysis of LVO was utilized for the CTA Head imaging portion of the study.  The radiation dose reduction device was turned on for each scan per the ALARA (As Low as Reasonably Achievable) protocol.  Stenosis measurement was performed by the NASCET or similar method.   FINDINGS:  CT cerebral perfusion: Grossly symmetric cerebral perfusion without core infarct or ischemic tissue at risk.  CTA head: No abrupt cut off/large vessel occlusion, flow-limiting stenosis, dissection, or aneurysm. Mild atherosclerosis within the carotid siphons without luminal irregularity or flow-limiting stenosis. Minimal atherosclerosis of the left V4 segment. The cerebral veins and dural venous sinuses are  grossly patent.  CTA neck: No abrupt cut off/large vessel occlusion, flow-limiting stenosis, dissection, or aneurysm within the cervical carotid or cervical vertebral arteries. There is mild atherosclerosis of the bilateral carotid bifurcations without significant luminal irregularity or narrowing. There is mild atherosclerosis of the aortic arch and origin of the aortic arch branch vessels, without flow-limiting stenosis or luminal irregularity.  Extravascular findings: Homogeneous attenuation of the thyroid gland. Partially imaged upper lungs are grossly clear. Unremarkable appearance of bilateral deep brain stimulator leads with pulse generator in the subcutaneous tissues of the right chest. Multilevel degenerative disc disease of the cervical spine. No acute osseous findings. No pharyngeal or laryngeal mass lesion.       Impression: Symmetric cerebral perfusion without core infarct or ischemic tissue at risk.  Normal CTA of the head and neck.  This report was finalized on 7/9/2022 6:18 PM by Sergio Ramsey MD.      XR Chest 1 View    Result Date: 7/9/2022  DATE OF EXAM: 7/9/2022 5:01 PM  PROCEDURE: XR CHEST 1 VW-  INDICATIONS: Weak/Dizzy/AMS triage protocol.  COMPARISON: Chest x-ray 7/2/2021  TECHNIQUE: Single frontal view of the chest.  FINDINGS: Redemonstration of the brain stimulator pulse generator projecting over the right chest with superiorly directed leads. Stable cardiomediastinal silhouette within normal limits. Similar mild eventration of the right hemidiaphragm. The lungs are grossly clear. No pleural effusion. No pneumothorax. No acute osseous findings.      Impression: No acute cardiopulmonary findings.  This report was finalized on 7/9/2022 6:19 PM by Sergio Ramsey MD.      CT Head Without Contrast Stroke Protocol    Result Date: 7/9/2022  DATE OF EXAM: 7/9/2022 5:15 PM  PROCEDURE: CT HEAD WO CONTRAST STROKE PROTOCOL-  INDICATIONS: Neuro deficit, acute, stroke suspected  COMPARISON: Brain MRI  11/13/2019, CT head 10/3/2017  TECHNIQUE: Routine transaxial and coronal reconstruction images were obtained through the head without the administration of contrast. Automated exposure control and iterative reconstruction methods were used.  The radiation dose reduction device was turned on for each scan per the ALARA (As Low as Reasonably Achievable) protocol.  FINDINGS: There are bilateral deep brain stimulator leads with associated streak artifact/beam hardening. No evidence of acute intracranial hemorrhage. No evidence of acute large territory infarct. There is mild global cerebral volume loss. There are scattered subcortical and periventricular white matter hypodensities which are nonspecific and can be seen in the setting of chronic small vessel ischemic change. No extra-axial collections. Normal size and configuration of the ventricles commensurate with degree of mild cerebral volume loss. No mass effect. Left lens replacement with otherwise unremarkable appearance of the orbits. There is trace fluid in left mastoid air cells. The imaged paranasal sinuses appear grossly clear. No acute osseous findings.      Impression: No acute intracranial findings.  Findings discussed with stroke navigator by Dr. Sergio Ramsey at the CT scanner at 5:25 PM 7/9/2022.  This report was finalized on 7/9/2022 5:33 PM by Sergio Ramsey MD.      CT Angiogram Head w AI Analysis of LVO    Result Date: 7/9/2022  DATE OF EXAM: 7/9/2022 5:25 PM  PROCEDURE: CT CEREBRAL PERFUSION W WO CONTRAST-, CT ANGIOGRAM NECK-, CT ANGIOGRAM HEAD W AI ANALYSIS OF LVO-  INDICATIONS: Neuro deficit, acute, stroke suspected  COMPARISON: Same-day noncontrast CT of the head; CTA head and neck, CT cerebral perfusion 10/3/2017  TECHNIQUE: Routine transaxial cuts were obtained through the head without administration of contrast. Routine transaxial cuts were then obtained through the head following the intravenous administration of 120 mL of Isovue 370. Core  blood volume, core blood flow, mean transit time, and Tmax images were obtained utilizing the Rapid software protocol. A limited CT angiogram of the head was also performed to measure the blood vessel density.  CTA of the head and CTA of the neck was performed after the intravenous administration of above contrast. Reconstructed coronal and sagittal images were also obtained. In addition, a 3-D volume rendered image was obtained after post processing. Automated exposure control and iterative reconstruction methods were used. AI analysis of LVO was utilized for the CTA Head imaging portion of the study.  The radiation dose reduction device was turned on for each scan per the ALARA (As Low as Reasonably Achievable) protocol.  Stenosis measurement was performed by the NASCET or similar method.   FINDINGS:  CT cerebral perfusion: Grossly symmetric cerebral perfusion without core infarct or ischemic tissue at risk.  CTA head: No abrupt cut off/large vessel occlusion, flow-limiting stenosis, dissection, or aneurysm. Mild atherosclerosis within the carotid siphons without luminal irregularity or flow-limiting stenosis. Minimal atherosclerosis of the left V4 segment. The cerebral veins and dural venous sinuses are grossly patent.  CTA neck: No abrupt cut off/large vessel occlusion, flow-limiting stenosis, dissection, or aneurysm within the cervical carotid or cervical vertebral arteries. There is mild atherosclerosis of the bilateral carotid bifurcations without significant luminal irregularity or narrowing. There is mild atherosclerosis of the aortic arch and origin of the aortic arch branch vessels, without flow-limiting stenosis or luminal irregularity.  Extravascular findings: Homogeneous attenuation of the thyroid gland. Partially imaged upper lungs are grossly clear. Unremarkable appearance of bilateral deep brain stimulator leads with pulse generator in the subcutaneous tissues of the right chest. Multilevel  degenerative disc disease of the cervical spine. No acute osseous findings. No pharyngeal or laryngeal mass lesion.       Impression: Symmetric cerebral perfusion without core infarct or ischemic tissue at risk.  Normal CTA of the head and neck.  This report was finalized on 7/9/2022 6:18 PM by Sergio Ramsey MD.      CT CEREBRAL PERFUSION WITH & WITHOUT CONTRAST    Result Date: 7/9/2022  DATE OF EXAM: 7/9/2022 5:25 PM  PROCEDURE: CT CEREBRAL PERFUSION W WO CONTRAST-, CT ANGIOGRAM NECK-, CT ANGIOGRAM HEAD W AI ANALYSIS OF LVO-  INDICATIONS: Neuro deficit, acute, stroke suspected  COMPARISON: Same-day noncontrast CT of the head; CTA head and neck, CT cerebral perfusion 10/3/2017  TECHNIQUE: Routine transaxial cuts were obtained through the head without administration of contrast. Routine transaxial cuts were then obtained through the head following the intravenous administration of 120 mL of Isovue 370. Core blood volume, core blood flow, mean transit time, and Tmax images were obtained utilizing the Rapid software protocol. A limited CT angiogram of the head was also performed to measure the blood vessel density.  CTA of the head and CTA of the neck was performed after the intravenous administration of above contrast. Reconstructed coronal and sagittal images were also obtained. In addition, a 3-D volume rendered image was obtained after post processing. Automated exposure control and iterative reconstruction methods were used. AI analysis of LVO was utilized for the CTA Head imaging portion of the study.  The radiation dose reduction device was turned on for each scan per the ALARA (As Low as Reasonably Achievable) protocol.  Stenosis measurement was performed by the NASCET or similar method.   FINDINGS:  CT cerebral perfusion: Grossly symmetric cerebral perfusion without core infarct or ischemic tissue at risk.  CTA head: No abrupt cut off/large vessel occlusion, flow-limiting stenosis, dissection, or aneurysm.  Mild atherosclerosis within the carotid siphons without luminal irregularity or flow-limiting stenosis. Minimal atherosclerosis of the left V4 segment. The cerebral veins and dural venous sinuses are grossly patent.  CTA neck: No abrupt cut off/large vessel occlusion, flow-limiting stenosis, dissection, or aneurysm within the cervical carotid or cervical vertebral arteries. There is mild atherosclerosis of the bilateral carotid bifurcations without significant luminal irregularity or narrowing. There is mild atherosclerosis of the aortic arch and origin of the aortic arch branch vessels, without flow-limiting stenosis or luminal irregularity.  Extravascular findings: Homogeneous attenuation of the thyroid gland. Partially imaged upper lungs are grossly clear. Unremarkable appearance of bilateral deep brain stimulator leads with pulse generator in the subcutaneous tissues of the right chest. Multilevel degenerative disc disease of the cervical spine. No acute osseous findings. No pharyngeal or laryngeal mass lesion.       Impression: Symmetric cerebral perfusion without core infarct or ischemic tissue at risk.  Normal CTA of the head and neck.  This report was finalized on 7/9/2022 6:18 PM by Sergio Ramsey MD.        Results for orders placed during the hospital encounter of 06/29/21    Adult Transesophageal Echo ([facility]) W/ Cont if Necessary Per Protocol    Interpretation Summary  · Left ventricular ejection fraction appears to be 56 - 60%. Left ventricular systolic function is normal.  · Left ventricular wall thickness is consistent with mild concentric hypertrophy.  · No echocardiographic findings concerning for endocarditis.      I have reviewed the medications:  Scheduled Meds:aspirin, 325 mg, Oral, Daily   Or  aspirin, 300 mg, Rectal, Daily  atorvastatin, 80 mg, Oral, Nightly  cyanocobalamin, 1,000 mcg, Intramuscular, Q28 Days  donepezil, 10 mg, Oral, Nightly  memantine, 5 mg, Oral, Q12H  rOPINIRole, 2 mg, Oral,  Q8H  sodium chloride, 10 mL, Intravenous, Q12H  sodium chloride, 10 mL, Intravenous, Q12H  traZODone, 100 mg, Oral, Nightly      Continuous Infusions:sodium chloride, 75 mL/hr, Last Rate: 75 mL/hr (07/09/22 2057)      PRN Meds:.•  acetaminophen **OR** acetaminophen **OR** acetaminophen  •  ondansetron **OR** ondansetron  •  sodium chloride  •  sodium chloride  •  sodium chloride    Assessment & Plan   Assessment & Plan     Active Hospital Problems    Diagnosis  POA   • **Altered mental status, unspecified altered mental status type [R41.82]  Yes   • Leukocytosis [D72.829]  Unknown   • Parkinson's disease (HCC) [G20]  Yes      Resolved Hospital Problems   No resolved problems to display.        Brief Hospital Course to date:  Jhoan Gagnon is a 82 y.o. male with a PMH significant for Parkinson's disease s/p neurostimulator, prostate cancer s/p surgery, chronic low back pain who comes to the ED due to altered mental status.     This patient's problems and plans were partially entered by my partner and updated as appropriate by me 07/10/22.    All problems are new to me today.    AMS  --stroke ann, neurology consulted  --serial NIH/neurochecks  --aspirin 324 given on admission  --continue aspirin 81mg oral/ 300 rectal daily  --high intensity statin  --lipid panel, a1c, tsh pending  --echo with bubble shows preserved EF with aortic calcification; no bubble study performed  --CT head, CTA head and neck, CT perfusion wnl  --MRI Pending, has neurostimulator. Needs clearance per neurology  --PT/OT/SLP  --Telemetry  --Permissive hypertension pending MRI results  -- Similar presentation 7/2021 where patient was positive for Streptococcus mitis bacteremia.  Repeat blood cultures pending  -procalcitonin, lactic acid WNL  - Blood cultures pending  - UA negative  - Chest x-ray without acute findings     Parkinson's disease  - Continue home meds  - Neurostimulator in place     Expected Discharge Location and Transportation:  home per family  Expected Discharge Date: 07/14/22    DVT prophylaxis:  Mechanical DVT prophylaxis orders are present.          CODE STATUS:   Code Status and Medical Interventions:   Ordered at: 07/09/22 6123     Medical Intervention Limits:    NO intubation (DNI)     Level Of Support Discussed With:    Patient     Code Status (Patient has no pulse and is not breathing):    No CPR (Do Not Attempt to Resuscitate)     Medical Interventions (Patient has pulse or is breathing):    Limited Support       Betsey Rosado DO  07/10/22

## 2022-07-11 LAB
ALBUMIN SERPL-MCNC: 3.2 G/DL (ref 3.5–5.2)
ALBUMIN/GLOB SERPL: 1.1 G/DL
ALP SERPL-CCNC: 80 U/L (ref 39–117)
ALT SERPL W P-5'-P-CCNC: 8 U/L (ref 1–41)
ANION GAP SERPL CALCULATED.3IONS-SCNC: 7 MMOL/L (ref 5–15)
AST SERPL-CCNC: 20 U/L (ref 1–40)
BILIRUB SERPL-MCNC: 1 MG/DL (ref 0–1.2)
BUN SERPL-MCNC: 13 MG/DL (ref 8–23)
BUN/CREAT SERPL: 13 (ref 7–25)
CALCIUM SPEC-SCNC: 8.8 MG/DL (ref 8.6–10.5)
CHLORIDE SERPL-SCNC: 110 MMOL/L (ref 98–107)
CO2 SERPL-SCNC: 26 MMOL/L (ref 22–29)
CREAT SERPL-MCNC: 1 MG/DL (ref 0.76–1.27)
DEPRECATED RDW RBC AUTO: 45.8 FL (ref 37–54)
EGFRCR SERPLBLD CKD-EPI 2021: 74.2 ML/MIN/1.73
ERYTHROCYTE [DISTWIDTH] IN BLOOD BY AUTOMATED COUNT: 13.4 % (ref 12.3–15.4)
GLOBULIN UR ELPH-MCNC: 2.8 GM/DL
GLUCOSE SERPL-MCNC: 96 MG/DL (ref 65–99)
HCT VFR BLD AUTO: 37.1 % (ref 37.5–51)
HGB BLD-MCNC: 12.6 G/DL (ref 13–17.7)
MCH RBC QN AUTO: 31.5 PG (ref 26.6–33)
MCHC RBC AUTO-ENTMCNC: 34 G/DL (ref 31.5–35.7)
MCV RBC AUTO: 92.8 FL (ref 79–97)
PLATELET # BLD AUTO: 154 10*3/MM3 (ref 140–450)
PMV BLD AUTO: 9.7 FL (ref 6–12)
POTASSIUM SERPL-SCNC: 3.7 MMOL/L (ref 3.5–5.2)
PROT SERPL-MCNC: 6 G/DL (ref 6–8.5)
RBC # BLD AUTO: 4 10*6/MM3 (ref 4.14–5.8)
SODIUM SERPL-SCNC: 143 MMOL/L (ref 136–145)
WBC NRBC COR # BLD: 5.25 10*3/MM3 (ref 3.4–10.8)

## 2022-07-11 PROCEDURE — 99232 SBSQ HOSP IP/OBS MODERATE 35: CPT | Performed by: HOSPITALIST

## 2022-07-11 PROCEDURE — 80053 COMPREHEN METABOLIC PANEL: CPT | Performed by: HOSPITALIST

## 2022-07-11 PROCEDURE — 99233 SBSQ HOSP IP/OBS HIGH 50: CPT | Performed by: PSYCHIATRY & NEUROLOGY

## 2022-07-11 PROCEDURE — 85027 COMPLETE CBC AUTOMATED: CPT | Performed by: HOSPITALIST

## 2022-07-11 RX ADMIN — MEMANTINE 5 MG: 10 TABLET ORAL at 09:11

## 2022-07-11 RX ADMIN — ASPIRIN 325 MG ORAL TABLET 325 MG: 325 PILL ORAL at 09:11

## 2022-07-11 RX ADMIN — ROPINIROLE HYDROCHLORIDE 2 MG: 2 TABLET, FILM COATED ORAL at 09:11

## 2022-07-11 RX ADMIN — Medication 10 ML: at 20:31

## 2022-07-11 RX ADMIN — SODIUM CHLORIDE 75 ML/HR: 9 INJECTION, SOLUTION INTRAVENOUS at 16:47

## 2022-07-11 RX ADMIN — ATORVASTATIN CALCIUM 80 MG: 40 TABLET, FILM COATED ORAL at 20:31

## 2022-07-11 RX ADMIN — MEMANTINE 5 MG: 10 TABLET ORAL at 20:32

## 2022-07-11 RX ADMIN — ROPINIROLE HYDROCHLORIDE 2 MG: 2 TABLET, FILM COATED ORAL at 20:31

## 2022-07-11 RX ADMIN — DONEPEZIL HYDROCHLORIDE 10 MG: 10 TABLET, FILM COATED ORAL at 20:31

## 2022-07-11 RX ADMIN — TRAZODONE HYDROCHLORIDE 100 MG: 100 TABLET ORAL at 20:31

## 2022-07-11 RX ADMIN — ROPINIROLE HYDROCHLORIDE 2 MG: 2 TABLET, FILM COATED ORAL at 15:08

## 2022-07-11 NOTE — PROGRESS NOTES
[facility] Medicine Services  PROGRESS NOTE    Patient Name: Jhoan Gagnon  : 1940  MRN: [id number]    Date of Admission: 2022  Primary Care Physician: Wilmer Andres MD    Subjective   Subjective     CC:  AMS    HPI:  Patient sitting up at bedside eating breakfast. Alert and oriented this am. No new complaints. Patient's cognition slow.    ROS:  Gen- No fevers, chills  CV- No chest pain, palpitations  Resp- No cough, dyspnea  GI- No N/V/D, abd pain    Objective   Objective     Vital Signs:   Temp:  [97.4 °F (36.3 °C)-97.6 °F (36.4 °C)] 97.6 °F (36.4 °C)  Heart Rate:  [46-64] 52  Resp:  [18] 18  BP: (120-149)/(64-76) 141/73     Physical Exam:  Constitutional: No acute distress, awake, alert  HENT: NCAT, mucous membranes moist  Respiratory: Clear to auscultation bilaterally, respiratory effort normal   Cardiovascular: RRR, no murmurs, rubs, or gallops  Gastrointestinal: Positive bowel sounds, soft, nontender, nondistended  Musculoskeletal: No bilateral ankle edema  Psychiatric: Appropriate affect, cooperative  Neurologic: A and OX 3, decreased cognitive speed and cognitive impairment present, strength not tested, Cranial Nerves grossly intact to confrontation, speech slow  Skin: No rashes    Results Reviewed:  LAB RESULTS:      Lab 22  0704 07/10/22  0722  1723 22  1707   WBC 5.25 4.42  --  4.70   HEMOGLOBIN 12.6* 13.2  --  13.9   HEMATOCRIT 37.1* 39.0  --  40.4   PLATELETS 154 169  --  177   NEUTROS ABS  --  2.68  --  1.83   IMMATURE GRANS (ABS)  --  0.00  --  0.00   LYMPHS ABS  --  1.22  --  2.20   MONOS ABS  --  0.43  --  0.52   EOS ABS  --  0.07  --  0.12   MCV 92.8 90.5  --  89.8   PROCALCITONIN  --  0.07  --   --    PROTIME  --   --  11.2*  --          Lab 22  0704 07/10/22  0712 22  2225 22  1707 22  1254   SODIUM 143 138  --  142  --    POTASSIUM 3.7 4.0  --  3.8  --    CHLORIDE 110* 106  --  106  --    CO2 26.0 25.0   --  27.0  --    ANION GAP 7.0 7.0  --  9.0  --    BUN 13 16  --  17  --    CREATININE 1.00 1.09  --  1.21 1.20   EGFR 74.2 66.9  --  59.0*  --    GLUCOSE 96 103*  --  137*  --    CALCIUM 8.8 9.1  --  9.0  --    MAGNESIUM  --   --   --  2.1  --    HEMOGLOBIN A1C  --  5.20  --   --   --    TSH  --   --  1.840  --   --          Lab 07/11/22  0704 07/09/22  1707   TOTAL PROTEIN 6.0 6.7   ALBUMIN 3.20* 3.90   GLOBULIN 2.8 2.8   ALT (SGPT) 8 12   AST (SGOT) 20 25   BILIRUBIN 1.0 1.5*   ALK PHOS 80 93         Lab 07/09/22 2225 07/09/22  1723 07/09/22  1707   TROPONIN T <0.010  --  <0.010   PROTIME  --  11.2*  --    INR  --  0.9  --          Lab 07/10/22  0712   CHOLESTEROL 98   LDL CHOL 37   HDL CHOL 48   TRIGLYCERIDES 57             Brief Urine Lab Results  (Last result in the past 365 days)      Color   Clarity   Blood   Leuk Est   Nitrite   Protein   CREAT   Urine HCG        07/09/22 1810 Yellow   Clear   Negative   Negative   Negative   Negative                 Microbiology Results Abnormal     Procedure Component Value - Date/Time    Blood Culture - Blood, Hand, Right [[id number]]  (Normal) Collected: 07/09/22 2225    Lab Status: Preliminary result Specimen: Blood from Hand, Right Updated: 07/11/22 0704     Blood Culture No growth at 24 hours    Blood Culture - Blood, Arm, Right [[id number]]  (Normal) Collected: 07/09/22 2225    Lab Status: Preliminary result Specimen: Blood from Arm, Right Updated: 07/11/22 0704     Blood Culture No growth at 24 hours    COVID PRE-OP / PRE-PROCEDURE SCREENING ORDER (NO ISOLATION) - Swab, Nasopharynx [[id number]]  (Normal) Collected: 07/09/22 1827    Lab Status: Final result Specimen: Swab from Nasopharynx Updated: 07/09/22 1912    Narrative:      The following orders were created for panel order COVID PRE-OP / PRE-PROCEDURE SCREENING ORDER (NO ISOLATION) - Swab, Nasopharynx.  Procedure                               Abnormality         Status                     ---------                                -----------         ------                     COVID-19 and FLU A/B PCR...[[id number]]  Normal              Final result                 Please view results for these tests on the individual orders.    COVID-19 and FLU A/B PCR - Swab, Nasopharynx [[id number]]  (Normal) Collected: 07/09/22 1827    Lab Status: Final result Specimen: Swab from Nasopharynx Updated: 07/09/22 1912     COVID19 Not Detected     Influenza A PCR Not Detected     Influenza B PCR Not Detected    Narrative:      Fact sheet for providers: https://www.fda.gov/media/230397/download    Fact sheet for patients: https://www.fda.gov/media/409173/download    Test performed by PCR.          Adult Transthoracic Echo Complete W/ Cont if Necessary Per Protocol (With Agitated Saline)    Result Date: 7/10/2022  · Estimated left ventricular EF = 65% · Left ventricular wall thickness is consistent with mild concentric hypertrophy. · Mild dilation of the ascending aorta is present. · No hemodynamically significant valvular heart disease      CT Angiogram Neck    Result Date: 7/9/2022  DATE OF EXAM: 7/9/2022 5:25 PM  PROCEDURE: CT CEREBRAL PERFUSION W WO CONTRAST-, CT ANGIOGRAM NECK-, CT ANGIOGRAM HEAD W AI ANALYSIS OF LVO-  INDICATIONS: Neuro deficit, acute, stroke suspected  COMPARISON: Same-day noncontrast CT of the head; CTA head and neck, CT cerebral perfusion 10/3/2017  TECHNIQUE: Routine transaxial cuts were obtained through the head without administration of contrast. Routine transaxial cuts were then obtained through the head following the intravenous administration of 120 mL of Isovue 370. Core blood volume, core blood flow, mean transit time, and Tmax images were obtained utilizing the Rapid software protocol. A limited CT angiogram of the head was also performed to measure the blood vessel density.  CTA of the head and CTA of the neck was performed after the intravenous administration of above contrast. Reconstructed coronal and sagittal  images were also obtained. In addition, a 3-D volume rendered image was obtained after post processing. Automated exposure control and iterative reconstruction methods were used. AI analysis of LVO was utilized for the CTA Head imaging portion of the study.  The radiation dose reduction device was turned on for each scan per the ALARA (As Low as Reasonably Achievable) protocol.  Stenosis measurement was performed by the NASCET or similar method.   FINDINGS:  CT cerebral perfusion: Grossly symmetric cerebral perfusion without core infarct or ischemic tissue at risk.  CTA head: No abrupt cut off/large vessel occlusion, flow-limiting stenosis, dissection, or aneurysm. Mild atherosclerosis within the carotid siphons without luminal irregularity or flow-limiting stenosis. Minimal atherosclerosis of the left V4 segment. The cerebral veins and dural venous sinuses are grossly patent.  CTA neck: No abrupt cut off/large vessel occlusion, flow-limiting stenosis, dissection, or aneurysm within the cervical carotid or cervical vertebral arteries. There is mild atherosclerosis of the bilateral carotid bifurcations without significant luminal irregularity or narrowing. There is mild atherosclerosis of the aortic arch and origin of the aortic arch branch vessels, without flow-limiting stenosis or luminal irregularity.  Extravascular findings: Homogeneous attenuation of the thyroid gland. Partially imaged upper lungs are grossly clear. Unremarkable appearance of bilateral deep brain stimulator leads with pulse generator in the subcutaneous tissues of the right chest. Multilevel degenerative disc disease of the cervical spine. No acute osseous findings. No pharyngeal or laryngeal mass lesion.       Impression: Symmetric cerebral perfusion without core infarct or ischemic tissue at risk.  Normal CTA of the head and neck.  This report was finalized on 7/9/2022 6:18 PM by Sergio Ramsey MD.      XR Chest 1 View    Result Date:  7/9/2022  DATE OF EXAM: 7/9/2022 5:01 PM  PROCEDURE: XR CHEST 1 VW-  INDICATIONS: Weak/Dizzy/AMS triage protocol.  COMPARISON: Chest x-ray 7/2/2021  TECHNIQUE: Single frontal view of the chest.  FINDINGS: Redemonstration of the brain stimulator pulse generator projecting over the right chest with superiorly directed leads. Stable cardiomediastinal silhouette within normal limits. Similar mild eventration of the right hemidiaphragm. The lungs are grossly clear. No pleural effusion. No pneumothorax. No acute osseous findings.      Impression: No acute cardiopulmonary findings.  This report was finalized on 7/9/2022 6:19 PM by Sergio Ramsey MD.      CT Head Without Contrast Stroke Protocol    Result Date: 7/9/2022  DATE OF EXAM: 7/9/2022 5:15 PM  PROCEDURE: CT HEAD WO CONTRAST STROKE PROTOCOL-  INDICATIONS: Neuro deficit, acute, stroke suspected  COMPARISON: Brain MRI 11/13/2019, CT head 10/3/2017  TECHNIQUE: Routine transaxial and coronal reconstruction images were obtained through the head without the administration of contrast. Automated exposure control and iterative reconstruction methods were used.  The radiation dose reduction device was turned on for each scan per the ALARA (As Low as Reasonably Achievable) protocol.  FINDINGS: There are bilateral deep brain stimulator leads with associated streak artifact/beam hardening. No evidence of acute intracranial hemorrhage. No evidence of acute large territory infarct. There is mild global cerebral volume loss. There are scattered subcortical and periventricular white matter hypodensities which are nonspecific and can be seen in the setting of chronic small vessel ischemic change. No extra-axial collections. Normal size and configuration of the ventricles commensurate with degree of mild cerebral volume loss. No mass effect. Left lens replacement with otherwise unremarkable appearance of the orbits. There is trace fluid in left mastoid air cells. The imaged  paranasal sinuses appear grossly clear. No acute osseous findings.      Impression: No acute intracranial findings.  Findings discussed with stroke navigator by Dr. Sergio Ramsey at the CT scanner at 5:25 PM 7/9/2022.  This report was finalized on 7/9/2022 5:33 PM by Sergio Ramsey MD.      CT Angiogram Head w AI Analysis of LVO    Result Date: 7/9/2022  DATE OF EXAM: 7/9/2022 5:25 PM  PROCEDURE: CT CEREBRAL PERFUSION W WO CONTRAST-, CT ANGIOGRAM NECK-, CT ANGIOGRAM HEAD W AI ANALYSIS OF LVO-  INDICATIONS: Neuro deficit, acute, stroke suspected  COMPARISON: Same-day noncontrast CT of the head; CTA head and neck, CT cerebral perfusion 10/3/2017  TECHNIQUE: Routine transaxial cuts were obtained through the head without administration of contrast. Routine transaxial cuts were then obtained through the head following the intravenous administration of 120 mL of Isovue 370. Core blood volume, core blood flow, mean transit time, and Tmax images were obtained utilizing the Rapid software protocol. A limited CT angiogram of the head was also performed to measure the blood vessel density.  CTA of the head and CTA of the neck was performed after the intravenous administration of above contrast. Reconstructed coronal and sagittal images were also obtained. In addition, a 3-D volume rendered image was obtained after post processing. Automated exposure control and iterative reconstruction methods were used. AI analysis of LVO was utilized for the CTA Head imaging portion of the study.  The radiation dose reduction device was turned on for each scan per the ALARA (As Low as Reasonably Achievable) protocol.  Stenosis measurement was performed by the NASCET or similar method.   FINDINGS:  CT cerebral perfusion: Grossly symmetric cerebral perfusion without core infarct or ischemic tissue at risk.  CTA head: No abrupt cut off/large vessel occlusion, flow-limiting stenosis, dissection, or aneurysm. Mild atherosclerosis within the  carotid siphons without luminal irregularity or flow-limiting stenosis. Minimal atherosclerosis of the left V4 segment. The cerebral veins and dural venous sinuses are grossly patent.  CTA neck: No abrupt cut off/large vessel occlusion, flow-limiting stenosis, dissection, or aneurysm within the cervical carotid or cervical vertebral arteries. There is mild atherosclerosis of the bilateral carotid bifurcations without significant luminal irregularity or narrowing. There is mild atherosclerosis of the aortic arch and origin of the aortic arch branch vessels, without flow-limiting stenosis or luminal irregularity.  Extravascular findings: Homogeneous attenuation of the thyroid gland. Partially imaged upper lungs are grossly clear. Unremarkable appearance of bilateral deep brain stimulator leads with pulse generator in the subcutaneous tissues of the right chest. Multilevel degenerative disc disease of the cervical spine. No acute osseous findings. No pharyngeal or laryngeal mass lesion.       Impression: Symmetric cerebral perfusion without core infarct or ischemic tissue at risk.  Normal CTA of the head and neck.  This report was finalized on 7/9/2022 6:18 PM by Sergio Ramsey MD.      CT CEREBRAL PERFUSION WITH & WITHOUT CONTRAST    Result Date: 7/9/2022  DATE OF EXAM: 7/9/2022 5:25 PM  PROCEDURE: CT CEREBRAL PERFUSION W WO CONTRAST-, CT ANGIOGRAM NECK-, CT ANGIOGRAM HEAD W AI ANALYSIS OF LVO-  INDICATIONS: Neuro deficit, acute, stroke suspected  COMPARISON: Same-day noncontrast CT of the head; CTA head and neck, CT cerebral perfusion 10/3/2017  TECHNIQUE: Routine transaxial cuts were obtained through the head without administration of contrast. Routine transaxial cuts were then obtained through the head following the intravenous administration of 120 mL of Isovue 370. Core blood volume, core blood flow, mean transit time, and Tmax images were obtained utilizing the Rapid software protocol. A limited CT angiogram of  the head was also performed to measure the blood vessel density.  CTA of the head and CTA of the neck was performed after the intravenous administration of above contrast. Reconstructed coronal and sagittal images were also obtained. In addition, a 3-D volume rendered image was obtained after post processing. Automated exposure control and iterative reconstruction methods were used. AI analysis of LVO was utilized for the CTA Head imaging portion of the study.  The radiation dose reduction device was turned on for each scan per the ALARA (As Low as Reasonably Achievable) protocol.  Stenosis measurement was performed by the NASCET or similar method.   FINDINGS:  CT cerebral perfusion: Grossly symmetric cerebral perfusion without core infarct or ischemic tissue at risk.  CTA head: No abrupt cut off/large vessel occlusion, flow-limiting stenosis, dissection, or aneurysm. Mild atherosclerosis within the carotid siphons without luminal irregularity or flow-limiting stenosis. Minimal atherosclerosis of the left V4 segment. The cerebral veins and dural venous sinuses are grossly patent.  CTA neck: No abrupt cut off/large vessel occlusion, flow-limiting stenosis, dissection, or aneurysm within the cervical carotid or cervical vertebral arteries. There is mild atherosclerosis of the bilateral carotid bifurcations without significant luminal irregularity or narrowing. There is mild atherosclerosis of the aortic arch and origin of the aortic arch branch vessels, without flow-limiting stenosis or luminal irregularity.  Extravascular findings: Homogeneous attenuation of the thyroid gland. Partially imaged upper lungs are grossly clear. Unremarkable appearance of bilateral deep brain stimulator leads with pulse generator in the subcutaneous tissues of the right chest. Multilevel degenerative disc disease of the cervical spine. No acute osseous findings. No pharyngeal or laryngeal mass lesion.       Impression: Symmetric cerebral  perfusion without core infarct or ischemic tissue at risk.  Normal CTA of the head and neck.  This report was finalized on 7/9/2022 6:18 PM by Sergio Ramsey MD.        Results for orders placed during the hospital encounter of 07/09/22    Adult Transthoracic Echo Complete W/ Cont if Necessary Per Protocol (With Agitated Saline)    Interpretation Summary  · Estimated left ventricular EF = 65%  · Left ventricular wall thickness is consistent with mild concentric hypertrophy.  · Mild dilation of the ascending aorta is present.  · No hemodynamically significant valvular heart disease      I have reviewed the medications:  Scheduled Meds:aspirin, 325 mg, Oral, Daily   Or  aspirin, 300 mg, Rectal, Daily  atorvastatin, 80 mg, Oral, Nightly  cyanocobalamin, 1,000 mcg, Intramuscular, Q28 Days  donepezil, 10 mg, Oral, Nightly  memantine, 5 mg, Oral, Q12H  rOPINIRole, 2 mg, Oral, Q8H  sodium chloride, 10 mL, Intravenous, Q12H  sodium chloride, 10 mL, Intravenous, Q12H  traZODone, 100 mg, Oral, Nightly      Continuous Infusions:sodium chloride, 75 mL/hr, Last Rate: 75 mL/hr (07/10/22 1801)      PRN Meds:.•  acetaminophen **OR** acetaminophen **OR** acetaminophen  •  ondansetron **OR** ondansetron  •  sodium chloride  •  sodium chloride  •  sodium chloride    Assessment & Plan   Assessment & Plan     Active Hospital Problems    Diagnosis  POA   • **Altered mental status, unspecified altered mental status type [R41.82]  Yes   • Leukocytosis [D72.829]  Unknown   • Parkinson's disease (HCC) [G20]  Yes      Resolved Hospital Problems   No resolved problems to display.        Brief Hospital Course to date:  Jhoan Gagnon is a 82 y.o. male with a PMH significant for Parkinson's disease s/p neurostimulator, prostate cancer s/p surgery, chronic low back pain who comes to the ED due to altered mental status.     This patient's problems and plans were partially entered by my partner and updated as appropriate by me  07/11/22.    AMS  --stroke ann, neurology consulted  --serial NIH/neurochecks  --aspirin 324 given on admission  --continue aspirin 81mg oral/ 300 rectal daily  --high intensity statin  --echo with bubble shows preserved EF with aortic calcification; no bubble study performed  --CT head, CTA head and neck, CT perfusion wnl  --MRI cancelled per neurology, not needed.  --PT/OT/SLP  --Telemetry  -- Similar presentation 7/2021 where patient was positive for Streptococcus mitis bacteremia.  Repeat blood cultures pending  -procalcitonin, lactic acid WNL  - UA negative  - Chest x-ray without acute findings  - evaluated by neurology, Parkinsons vs infectious cause likely.  - PT/OT     Parkinson's disease  - Continue home meds  - Neurostimulator in place     Expected Discharge Location and Transportation: PT recommending [facility]-  to discuss with patient and family  Expected Discharge Date: 07/12/22    DVT prophylaxis:  Mechanical DVT prophylaxis orders are present.     AM-PAC 6 Clicks Score (PT): 15 (07/10/22 8164)    CODE STATUS:   Code Status and Medical Interventions:   Ordered at: 07/09/22 3359     Medical Intervention Limits:    NO intubation (DNI)     Level Of Support Discussed With:    Patient     Code Status (Patient has no pulse and is not breathing):    No CPR (Do Not Attempt to Resuscitate)     Medical Interventions (Patient has pulse or is breathing):    Limited Support       Betsey Rosado DO  07/11/22
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
## 2022-07-11 NOTE — CASE MANAGEMENT/SOCIAL WORK
Discharge Planning Assessment  [facility]     Patient Name: Jhoan Gagnon  MRN: [id number]  Today's Date: 7/11/2022    Admit Date: 7/9/2022     Discharge Needs Assessment     Row Name 07/11/22 1459       Living Environment    People in Home alone    Current Living Arrangements home    Primary Care Provided by self    Provides Primary Care For no one    Family Caregiver if Needed child(cara), adult    Family Caregiver Names Anamaria Keenan    Able to Return to Prior Arrangements yes       Resource/Environmental Concerns    Resource/Environmental Concerns none       Transition Planning    Patient/Family Anticipates Transition to home with family    Patient/Family Anticipated Services at Transition     Transportation Anticipated family or friend will provide       Discharge Needs Assessment    Equipment Currently Used at Home cane, straight;walker, rolling    Concerns to be Addressed denies needs/concerns at this time    Discharge Coordination/Progress Lives in [place] in a house alone.  Apparently he has the bottom half, and a family has the top half of the house.  He uses a cane during the day, and a walker at night. Patient has had home health in the past with [facility].  Patient has an advanced directive.  Family can transport home.               Discharge Plan     Row Name 07/11/22 1501       Plan    Plan Home    Patient/Family in Agreement with Plan yes    Plan Comments Spoke with patient at bedside.  Patient resides in a house in [place] [place] by himself.  Apparently, he has the downstairs and a family rents out the upstairs, and would be able to help him if needed.  Patient states that he uses a walker at night, and a cane during the day.  Patient has had [facility] in the past. He does have an advanced directive.  Patient's PCP is Wilmer Andres, and he gets his prescriptions filled at [facility].  There are no issues affording his  medications, and family will be able to transport him home at discharge.  Spoke with patient at bedside, as PT and OT have recommended SNF. Patient at this time has refused both SNF and HH, stating that he does not feel that he needs SNF at this time, and that outpatient PT and OT at [facility] do more than home health does.  CM will continue to follow.    Final Discharge Disposition Code 01 - home or self-care              Continued Care and Services - Admitted Since 7/9/2022    Coordination has not been started for this encounter.       Expected Discharge Date and Time     Expected Discharge Date Expected Discharge Time    Jul 15, 2022          Demographic Summary    No documentation.                Functional Status     Row Name 07/11/22 1458       Functional Status    Usual Activity Tolerance moderate    Current Activity Tolerance moderate       Functional Status, IADL    Medications independent    Meal Preparation independent    Housekeeping independent    Laundry independent    Shopping independent       Mental Status    General Appearance WDL WDL               Psychosocial    No documentation.                Abuse/Neglect    No documentation.                Legal    No documentation.                Substance Abuse    No documentation.                Patient Forms    No documentation.                   Marguerite Coe RN

## 2022-07-11 NOTE — PROGRESS NOTES
Daily Progress Note  Neurology     LOS: 0 days     Subjective     Chief Complaint: Altered mental status, slumped over in the car.    Interval History: He reports that yesterday in the evening, he saw some of his friends in the room and he tried to talk to them but they would not respond to him.  He also reported short period of double vision.  He has never had something like this before.    ROS: Negative for fever, chest pain and shortness of breath.    Objective     Vital signs in last 24 hours:  Temp:  [97.4 °F (36.3 °C)-97.6 °F (36.4 °C)] 97.6 °F (36.4 °C)  Heart Rate:  [46-64] 52  Resp:  [18] 18  BP: (120-149)/(64-76) 141/73      Physical Exam:              General: Sitting in chair.  NAD.              Respiratory: Respirations unlabored              CV: RRR                                        Neurologic Exam:              Mental status: Awake, alert, Follows commands. Speech is hypophonic and slow.  Masked face noted.              CN: II-XII intact to detailed exam               Motor: Strength full (5/5) throughout in BUE and BLE               resting tremors on the right.  Bradykinesia to finger-to-nose testing bilaterally.              Reflexes: 1+ and symmetric throughout                                 Results Review:    Results from last 7 days   Lab Units 07/11/22  0704   WBC 10*3/mm3 5.25   HEMOGLOBIN g/dL 12.6*   HEMATOCRIT % 37.1*   PLATELETS 10*3/mm3 154     Results from last 7 days   Lab Units 07/11/22  0704   SODIUM mmol/L 143   POTASSIUM mmol/L 3.7   CHLORIDE mmol/L 110*   CO2 mmol/L 26.0   BUN mg/dL 13   CREATININE mg/dL 1.00   CALCIUM mg/dL 8.8     Blood culture: Negative per preliminary report.    Assessment & Plan     Patient is 82-year-old male with known diagnosis of Parkinson's, status post DBS placement 2 years ago, history of frequent falls and history of syncopal episodes.  He was  brought to [facility] ED by EMS yesterday after being slumped over in his car close to his home.    - ?  Near syncopal episode.  -Mentation has improved and he feels back to his baseline.  Reported an episode of visual hallucination and double vision lasting for few hours yesterday.  Parkinson's related hallucinations are common.  This is the first time he experienced it.  If this becomes more frequent then Nuplazid can be considered as an outpatient.  -Continue with Requip 2 mg p.o. every 8 hours.  -OT, PT. plan is to discharge him to SNF.  -Neurology will sign off.  Please call back with any questions or concerns that he may have.      Reynaldo Escobar MD  07/11/22  11:22 EDT

## 2022-07-12 LAB
ALBUMIN SERPL-MCNC: 3.4 G/DL (ref 3.5–5.2)
ALBUMIN/GLOB SERPL: 1.3 G/DL
ALP SERPL-CCNC: 95 U/L (ref 39–117)
ALT SERPL W P-5'-P-CCNC: 9 U/L (ref 1–41)
ANION GAP SERPL CALCULATED.3IONS-SCNC: 6 MMOL/L (ref 5–15)
AST SERPL-CCNC: 21 U/L (ref 1–40)
BILIRUB SERPL-MCNC: 1.5 MG/DL (ref 0–1.2)
BUN SERPL-MCNC: 8 MG/DL (ref 8–23)
BUN/CREAT SERPL: 8.4 (ref 7–25)
CALCIUM SPEC-SCNC: 8.5 MG/DL (ref 8.6–10.5)
CHLORIDE SERPL-SCNC: 111 MMOL/L (ref 98–107)
CO2 SERPL-SCNC: 27 MMOL/L (ref 22–29)
CREAT SERPL-MCNC: 0.95 MG/DL (ref 0.76–1.27)
DEPRECATED RDW RBC AUTO: 43.7 FL (ref 37–54)
EGFRCR SERPLBLD CKD-EPI 2021: 78.9 ML/MIN/1.73
ERYTHROCYTE [DISTWIDTH] IN BLOOD BY AUTOMATED COUNT: 13.2 % (ref 12.3–15.4)
GLOBULIN UR ELPH-MCNC: 2.6 GM/DL
GLUCOSE SERPL-MCNC: 84 MG/DL (ref 65–99)
HCT VFR BLD AUTO: 36.9 % (ref 37.5–51)
HGB BLD-MCNC: 12.7 G/DL (ref 13–17.7)
MCH RBC QN AUTO: 31 PG (ref 26.6–33)
MCHC RBC AUTO-ENTMCNC: 34.4 G/DL (ref 31.5–35.7)
MCV RBC AUTO: 90 FL (ref 79–97)
PLATELET # BLD AUTO: 155 10*3/MM3 (ref 140–450)
PMV BLD AUTO: 9.7 FL (ref 6–12)
POTASSIUM SERPL-SCNC: 3.5 MMOL/L (ref 3.5–5.2)
PROT SERPL-MCNC: 6 G/DL (ref 6–8.5)
QT INTERVAL: 526 MS
QTC INTERVAL: 465 MS
RBC # BLD AUTO: 4.1 10*6/MM3 (ref 4.14–5.8)
SODIUM SERPL-SCNC: 144 MMOL/L (ref 136–145)
WBC NRBC COR # BLD: 4.3 10*3/MM3 (ref 3.4–10.8)

## 2022-07-12 PROCEDURE — 80053 COMPREHEN METABOLIC PANEL: CPT | Performed by: HOSPITALIST

## 2022-07-12 PROCEDURE — 99232 SBSQ HOSP IP/OBS MODERATE 35: CPT | Performed by: HOSPITALIST

## 2022-07-12 PROCEDURE — 85027 COMPLETE CBC AUTOMATED: CPT | Performed by: HOSPITALIST

## 2022-07-12 RX ADMIN — MEMANTINE 5 MG: 10 TABLET ORAL at 20:59

## 2022-07-12 RX ADMIN — DONEPEZIL HYDROCHLORIDE 10 MG: 10 TABLET, FILM COATED ORAL at 20:59

## 2022-07-12 RX ADMIN — ROPINIROLE HYDROCHLORIDE 2 MG: 2 TABLET, FILM COATED ORAL at 15:35

## 2022-07-12 RX ADMIN — ROPINIROLE HYDROCHLORIDE 2 MG: 2 TABLET, FILM COATED ORAL at 05:13

## 2022-07-12 RX ADMIN — ASPIRIN 325 MG ORAL TABLET 325 MG: 325 PILL ORAL at 09:48

## 2022-07-12 RX ADMIN — SODIUM CHLORIDE 75 ML/HR: 9 INJECTION, SOLUTION INTRAVENOUS at 06:54

## 2022-07-12 RX ADMIN — Medication 10 ML: at 21:00

## 2022-07-12 RX ADMIN — ROPINIROLE HYDROCHLORIDE 2 MG: 2 TABLET, FILM COATED ORAL at 20:59

## 2022-07-12 RX ADMIN — Medication 10 ML: at 20:58

## 2022-07-12 RX ADMIN — ATORVASTATIN CALCIUM 80 MG: 40 TABLET, FILM COATED ORAL at 20:59

## 2022-07-12 RX ADMIN — MEMANTINE 5 MG: 10 TABLET ORAL at 09:48

## 2022-07-12 RX ADMIN — TRAZODONE HYDROCHLORIDE 100 MG: 100 TABLET ORAL at 20:59

## 2022-07-12 NOTE — PROGRESS NOTES
[facility] Medicine Services  PROGRESS NOTE    Patient Name: Jhoan Gagnon  : 1940  MRN: [id number]    Date of Admission: 2022  Primary Care Physician: Wilmer Andres MD    Subjective   Subjective     CC:  AMS    HPI:  Patient sitting up in bed. A and O x 4 but actively hallucinating. Patient seeing people in the room that are not there and objects/food that are not there.    ROS:  Gen- No fevers, chills  CV- No chest pain, palpitations  Resp- No cough, dyspnea  GI- No N/V/D, abd pain  +hallucinations    Objective   Objective     Vital Signs:   Temp:  [97 °F (36.1 °C)-97.8 °F (36.6 °C)] 97.8 °F (36.6 °C)  Heart Rate:  [49-73] 58  Resp:  [18] 18  BP: (166-180)/(75-93) 171/82     Physical Exam:  Constitutional: No acute distress, awake, alert  HENT: NCAT, mucous membranes moist  Respiratory: Clear to auscultation bilaterally, respiratory effort normal   Cardiovascular: RRR, no murmurs, rubs, or gallops  Gastrointestinal: Positive bowel sounds, soft, nontender, nondistended  Musculoskeletal: No bilateral ankle edema  Psychiatric: Appropriate affect, cooperative  Neurologic: A and OX 4, decreased cognitive speed and cognitive impairment present, strength not tested, Cranial Nerves grossly intact to confrontation, speech slow  Skin: No rashes    Results Reviewed:  LAB RESULTS:      Lab 22  0422 22  0704 07/10/22  0712 22  1723 22  1707   WBC 4.30 5.25 4.42  --  4.70   HEMOGLOBIN 12.7* 12.6* 13.2  --  13.9   HEMATOCRIT 36.9* 37.1* 39.0  --  40.4   PLATELETS 155 154 169  --  177   NEUTROS ABS  --   --  2.68  --  1.83   IMMATURE GRANS (ABS)  --   --  0.00  --  0.00   LYMPHS ABS  --   --  1.22  --  2.20   MONOS ABS  --   --  0.43  --  0.52   EOS ABS  --   --  0.07  --  0.12   MCV 90.0 92.8 90.5  --  89.8   PROCALCITONIN  --   --  0.07  --   --    PROTIME  --   --   --  11.2*  --          Lab 22  0422 22  0704 07/10/22  0722  2221  07/09/22  1707 07/09/22  1254   SODIUM 144 143 138  --  142  --    POTASSIUM 3.5 3.7 4.0  --  3.8  --    CHLORIDE 111* 110* 106  --  106  --    CO2 27.0 26.0 25.0  --  27.0  --    ANION GAP 6.0 7.0 7.0  --  9.0  --    BUN 8 13 16  --  17  --    CREATININE 0.95 1.00 1.09  --  1.21 1.20   EGFR 78.9 74.2 66.9  --  59.0*  --    GLUCOSE 84 96 103*  --  137*  --    CALCIUM 8.5* 8.8 9.1  --  9.0  --    MAGNESIUM  --   --   --   --  2.1  --    HEMOGLOBIN A1C  --   --  5.20  --   --   --    TSH  --   --   --  1.840  --   --          Lab 07/12/22  0422 07/11/22  0704 07/09/22  1707   TOTAL PROTEIN 6.0 6.0 6.7   ALBUMIN 3.40* 3.20* 3.90   GLOBULIN 2.6 2.8 2.8   ALT (SGPT) 9 8 12   AST (SGOT) 21 20 25   BILIRUBIN 1.5* 1.0 1.5*   ALK PHOS 95 80 93         Lab 07/09/22  2225 07/09/22  1723 07/09/22  1707   TROPONIN T <0.010  --  <0.010   PROTIME  --  11.2*  --    INR  --  0.9  --          Lab 07/10/22  0712   CHOLESTEROL 98   LDL CHOL 37   HDL CHOL 48   TRIGLYCERIDES 57             Brief Urine Lab Results  (Last result in the past 365 days)      Color   Clarity   Blood   Leuk Est   Nitrite   Protein   CREAT   Urine HCG        07/09/22 1810 Yellow   Clear   Negative   Negative   Negative   Negative                 Microbiology Results Abnormal     Procedure Component Value - Date/Time    Blood Culture - Blood, Hand, Right [[id number]]  (Normal) Collected: 07/09/22 2225    Lab Status: Preliminary result Specimen: Blood from Hand, Right Updated: 07/12/22 0703     Blood Culture No growth at 2 days    Blood Culture - Blood, Arm, Right [[id number]]  (Normal) Collected: 07/09/22 2225    Lab Status: Preliminary result Specimen: Blood from Arm, Right Updated: 07/12/22 0703     Blood Culture No growth at 2 days    COVID PRE-OP / PRE-PROCEDURE SCREENING ORDER (NO ISOLATION) - Swab, Nasopharynx [[id number]]  (Normal) Collected: 07/09/22 1827    Lab Status: Final result Specimen: Swab from Nasopharynx Updated: 07/09/22 1912    Narrative:       The following orders were created for panel order COVID PRE-OP / PRE-PROCEDURE SCREENING ORDER (NO ISOLATION) - Swab, Nasopharynx.  Procedure                               Abnormality         Status                     ---------                               -----------         ------                     COVID-19 and FLU A/B PCR...[[id number]]  Normal              Final result                 Please view results for these tests on the individual orders.    COVID-19 and FLU A/B PCR - Swab, Nasopharynx [[id number]]  (Normal) Collected: 07/09/22 1827    Lab Status: Final result Specimen: Swab from Nasopharynx Updated: 07/09/22 1912     COVID19 Not Detected     Influenza A PCR Not Detected     Influenza B PCR Not Detected    Narrative:      Fact sheet for providers: https://www.fda.gov/media/302249/download    Fact sheet for patients: https://www.fda.gov/media/205485/download    Test performed by PCR.          No radiology results from the last 24 hrs    Results for orders placed during the hospital encounter of 07/09/22    Adult Transthoracic Echo Complete W/ Cont if Necessary Per Protocol (With Agitated Saline)    Interpretation Summary  · Estimated left ventricular EF = 65%  · Left ventricular wall thickness is consistent with mild concentric hypertrophy.  · Mild dilation of the ascending aorta is present.  · No hemodynamically significant valvular heart disease      I have reviewed the medications:  Scheduled Meds:aspirin, 325 mg, Oral, Daily   Or  aspirin, 300 mg, Rectal, Daily  atorvastatin, 80 mg, Oral, Nightly  cyanocobalamin, 1,000 mcg, Intramuscular, Q28 Days  donepezil, 10 mg, Oral, Nightly  memantine, 5 mg, Oral, Q12H  rOPINIRole, 2 mg, Oral, Q8H  sodium chloride, 10 mL, Intravenous, Q12H  sodium chloride, 10 mL, Intravenous, Q12H  traZODone, 100 mg, Oral, Nightly      Continuous Infusions:sodium chloride, 75 mL/hr, Last Rate: 75 mL/hr (07/12/22 0654)      PRN Meds:.•  acetaminophen **OR** acetaminophen **OR**  acetaminophen  •  ondansetron **OR** ondansetron  •  sodium chloride  •  sodium chloride  •  sodium chloride    Assessment & Plan   Assessment & Plan     Active Hospital Problems    Diagnosis  POA   • **Altered mental status, unspecified altered mental status type [R41.82]  Yes   • Leukocytosis [D72.829]  Unknown   • Parkinson's disease (HCC) [G20]  Yes      Resolved Hospital Problems   No resolved problems to display.        Brief Hospital Course to date:  Jhoan Gagnon is a 82 y.o. male with a PMH significant for Parkinson's disease s/p neurostimulator, prostate cancer s/p surgery, chronic low back pain who comes to the ED due to altered mental status.     This patient's problems and plans were partially entered by my partner and updated as appropriate by me 07/12/22.    AMS  --stroke ann, neurology consulted  -- A and O x4 but ACTIVELY HALLUCINATING  --serial NIH/neurochecks  --aspirin 324 given on admission  --continue aspirin 81mg oral/ 300 rectal daily  --high intensity statin  --echo with bubble shows preserved EF with aortic calcification; no bubble study performed  --CT head, CTA head and neck, CT perfusion wnl  --MRI cancelled per neurology, not needed.  --PT/OT/SLP  --Telemetry  -- Similar presentation 7/2021 where patient was positive for Streptococcus mitis bacteremia.  Repeat blood cultures pending  -procalcitonin, lactic acid WNL  - UA negative  - Chest x-ray without acute findings  - evaluated by neurology, Parkinsons vs infectious cause likely.  - PT/OT    Despite patient being alert and oriented x 4, he is actively hallucinating as a result of advancing Parkinson's disease. Patient also admits to numbness and loss of feeling of his right leg, which he drive with. The aptient was admitted after being found down behind the wheel of a car. Per his granddaughter, Tiesha, who is his POA, the patient lives in a basement alone. It takes him up to 3 hours to get ready every day and he is unable to care  for himself. She states he continues to drive and is a potential harm to others along with himself. I do not believe the patient is mentally or physically able to care for himself alone or to make the right decision regarding independent living. The decision should be made by his POA, Tiesha, as to his placement at a SNF. Tiesha would like the patient to be discharged to SNF and does not believe the patient is safe to go home and live alone.     Parkinson's disease  - Continue home meds  - Neurostimulator in place     Expected Discharge Location and Transportation: PT recommending SNF- CM   Expected Discharge Date: 07/14/22    DVT prophylaxis:  Mechanical DVT prophylaxis orders are present.     AM-PAC 6 Clicks Score (PT): 15 (07/12/22 0800)    CODE STATUS:   Code Status and Medical Interventions:   Ordered at: 07/09/22 2317     Medical Intervention Limits:    NO intubation (DNI)     Level Of Support Discussed With:    Patient     Code Status (Patient has no pulse and is not breathing):    No CPR (Do Not Attempt to Resuscitate)     Medical Interventions (Patient has pulse or is breathing):    Limited Support       Betsey Rosado DO  07/12/22

## 2022-07-12 NOTE — CASE MANAGEMENT/SOCIAL WORK
Continued Stay Note  [facility]     Patient Name: Jhoan Gagnon  MRN: [id number]  Today's Date: 7/12/2022    Admit Date: 7/9/2022     Discharge Plan     Row Name 07/12/22 1342       Plan    Plan TBD    Patient/Family in Agreement with Plan yes    Plan Comments Patient remains medically ready for discharge. Spoke with patient at bedside.  Despite being able to answer all orientation questions correctly, patient was actively hallucinating and yelling to someone named Darren.  Patient also wanted to give a cell phone to Darren.  CM took cell phone and placed it on bedside table.  Had a discussion with Tara  Manager, who stated that until a doctor deems that patient is unable to make his own decisions, CM has to abide by patient's wishes.  Dr. Rosado had a long discussion with patient's POA Tiesha.  Dr. Rosado now believes that patient can no longer make appropriate decisions for himself.  [facility]  At this time, the [facility] does not have any openings.  CM will continue to follow.    Final Discharge Disposition Code 01 - home or self-care               Discharge Codes    No documentation.               Expected Discharge Date and Time     Expected Discharge Date Expected Discharge Time    Jul 15, 2022             Marguerite Coe RN

## 2022-07-13 LAB
ALBUMIN SERPL-MCNC: 3.1 G/DL (ref 3.5–5.2)
ALBUMIN/GLOB SERPL: 1.1 G/DL
ALP SERPL-CCNC: 93 U/L (ref 39–117)
ALT SERPL W P-5'-P-CCNC: 10 U/L (ref 1–41)
ANION GAP SERPL CALCULATED.3IONS-SCNC: 5 MMOL/L (ref 5–15)
AST SERPL-CCNC: 23 U/L (ref 1–40)
BILIRUB SERPL-MCNC: 1.8 MG/DL (ref 0–1.2)
BUN SERPL-MCNC: 8 MG/DL (ref 8–23)
BUN/CREAT SERPL: 8 (ref 7–25)
CALCIUM SPEC-SCNC: 8.8 MG/DL (ref 8.6–10.5)
CHLORIDE SERPL-SCNC: 108 MMOL/L (ref 98–107)
CO2 SERPL-SCNC: 26 MMOL/L (ref 22–29)
CREAT SERPL-MCNC: 1 MG/DL (ref 0.76–1.27)
DEPRECATED RDW RBC AUTO: 43.4 FL (ref 37–54)
EGFRCR SERPLBLD CKD-EPI 2021: 74.2 ML/MIN/1.73
ERYTHROCYTE [DISTWIDTH] IN BLOOD BY AUTOMATED COUNT: 13.2 % (ref 12.3–15.4)
FLUAV RNA RESP QL NAA+PROBE: NOT DETECTED
FLUBV RNA RESP QL NAA+PROBE: NOT DETECTED
GLOBULIN UR ELPH-MCNC: 2.9 GM/DL
GLUCOSE SERPL-MCNC: 82 MG/DL (ref 65–99)
HCT VFR BLD AUTO: 36.1 % (ref 37.5–51)
HGB BLD-MCNC: 12.6 G/DL (ref 13–17.7)
MCH RBC QN AUTO: 31.2 PG (ref 26.6–33)
MCHC RBC AUTO-ENTMCNC: 34.9 G/DL (ref 31.5–35.7)
MCV RBC AUTO: 89.4 FL (ref 79–97)
PLATELET # BLD AUTO: 151 10*3/MM3 (ref 140–450)
PMV BLD AUTO: 9.5 FL (ref 6–12)
POTASSIUM SERPL-SCNC: 3.6 MMOL/L (ref 3.5–5.2)
PROT SERPL-MCNC: 6 G/DL (ref 6–8.5)
RBC # BLD AUTO: 4.04 10*6/MM3 (ref 4.14–5.8)
RSV RNA NPH QL NAA+NON-PROBE: NOT DETECTED
SARS-COV-2 RNA RESP QL NAA+PROBE: NOT DETECTED
SODIUM SERPL-SCNC: 139 MMOL/L (ref 136–145)
WBC NRBC COR # BLD: 4.16 10*3/MM3 (ref 3.4–10.8)

## 2022-07-13 PROCEDURE — 87637 SARSCOV2&INF A&B&RSV AMP PRB: CPT | Performed by: HOSPITALIST

## 2022-07-13 PROCEDURE — 99231 SBSQ HOSP IP/OBS SF/LOW 25: CPT | Performed by: HOSPITALIST

## 2022-07-13 PROCEDURE — 97116 GAIT TRAINING THERAPY: CPT

## 2022-07-13 PROCEDURE — 85027 COMPLETE CBC AUTOMATED: CPT | Performed by: HOSPITALIST

## 2022-07-13 PROCEDURE — 92507 TX SP LANG VOICE COMM INDIV: CPT

## 2022-07-13 PROCEDURE — 80053 COMPREHEN METABOLIC PANEL: CPT | Performed by: HOSPITALIST

## 2022-07-13 PROCEDURE — 97530 THERAPEUTIC ACTIVITIES: CPT

## 2022-07-13 RX ADMIN — MEMANTINE 5 MG: 10 TABLET ORAL at 20:29

## 2022-07-13 RX ADMIN — MEMANTINE 5 MG: 10 TABLET ORAL at 09:45

## 2022-07-13 RX ADMIN — SODIUM CHLORIDE 75 ML/HR: 9 INJECTION, SOLUTION INTRAVENOUS at 06:16

## 2022-07-13 RX ADMIN — ATORVASTATIN CALCIUM 80 MG: 40 TABLET, FILM COATED ORAL at 20:30

## 2022-07-13 RX ADMIN — DONEPEZIL HYDROCHLORIDE 10 MG: 10 TABLET, FILM COATED ORAL at 20:30

## 2022-07-13 RX ADMIN — ROPINIROLE HYDROCHLORIDE 2 MG: 2 TABLET, FILM COATED ORAL at 06:13

## 2022-07-13 RX ADMIN — ROPINIROLE HYDROCHLORIDE 2 MG: 2 TABLET, FILM COATED ORAL at 14:33

## 2022-07-13 RX ADMIN — Medication 10 ML: at 20:29

## 2022-07-13 RX ADMIN — TRAZODONE HYDROCHLORIDE 100 MG: 100 TABLET ORAL at 20:30

## 2022-07-13 RX ADMIN — Medication 10 ML: at 20:31

## 2022-07-13 RX ADMIN — ROPINIROLE HYDROCHLORIDE 2 MG: 2 TABLET, FILM COATED ORAL at 20:29

## 2022-07-13 RX ADMIN — ASPIRIN 325 MG ORAL TABLET 325 MG: 325 PILL ORAL at 09:45

## 2022-07-13 NOTE — THERAPY TREATMENT NOTE
Patient Name: Jhoan Gagnon  : 1940    MRN: [id number]                              Today's Date: 2022       Admit Date: 2022    Visit Dx:     ICD-10-CM ICD-9-CM   1. Altered mental status, unspecified altered mental status type  R41.82 780.97   2. Weakness  R53.1 780.79   3. Unresponsive episode  R41.89 780.09   4. Dysarthria  R47.1 784.51     Patient Active Problem List   Diagnosis   • Parkinson's disease (HCC)   • Falls   • Weakness   • Skin tear of hand without complication, left, sequela   • Sinus bradycardia   • Ataxia   • Syncope   • Nausea   • Hematemesis with nausea   • Positive blood culture   • Altered mental status, unspecified altered mental status type   • Leukocytosis     Past Medical History:   Diagnosis Date   • Broken arms    • Cancer (HCC)     prostate   • Lower back injury    • Parkinson's disease (HCC)      Past Surgical History:   Procedure Laterality Date   • CHOLECYSTECTOMY     • PROSTATE SURGERY     • REPLACEMENT TOTAL KNEE BILATERAL        General Information     Row Name 22 1040          Physical Therapy Time and Intention    Document Type therapy note (daily note)  -     Mode of Treatment physical therapy  -     Row Name 22 1040          General Information    Existing Precautions/Restrictions fall;other (see comments)  pt having hallucinations this date  -     Row Name 22 1040          Cognition    Orientation Status (Cognition) oriented x 3  -     Row Name 22 1040          Safety Issues, Functional Mobility    Safety Issues Affecting Function (Mobility) insight into deficits/self-awareness;sequencing abilities;safety precaution awareness  -     Impairments Affecting Function (Mobility) balance;coordination;endurance/activity tolerance;motor planning;postural/trunk control;strength  -           User Key  (r) = Recorded By, (t) = Taken By, (c) = Cosigned By    Initials Name Provider Type    Rebekah Clarke, PT Physical Therapist                Mobility     Row Name 07/13/22 1113          Bed Mobility    Comment, (Bed Mobility) UIC upon arrival  -SJ     Row Name 07/13/22 1113          Transfers    Comment, (Transfers) STS with cues for hand placement and sequencing. Pt dem retrograde balance upon standing and needed Lucila to modA for balance.  -SJ     Row Name 07/13/22 1113          Sit-Stand Transfer    Sit-Stand Br[place]ley (Transfers) verbal cues;moderate assist (50% patient effort)  -SJ     Assistive Device (Sit-Stand Transfers) walker, front-wheeled  -SJ     Row Name 07/13/22 1113          Gait/Stairs (Locomotion)    [place] Level (Gait) verbal cues;contact guard;1 person to manage equipment;1 person assist  -SJ     Assistive Device (Gait) walker, front-wheeled  -SJ     Distance in Feet (Gait) 180ft  -SJ     Deviations/Abnormal Patterns (Gait) bilateral deviations;festinating/shuffling;gait speed decreased;stride length decreased;weight shifting decreased  -SJ     Bilateral Gait Deviations forward flexed posture;heel strike decreased  -SJ     Comment, (Gait/Stairs) Pt amb in hallway with RW with Lucila + recliner in tow. Pt needed cues for posture and safety, rreq 1 standing rest break due to fatigue.  -SJ           User Key  (r) = Recorded By, (t) = Taken By, (c) = Cosigned By    Initials Name Provider Type    Rebekah Clarke, PT Physical Therapist               Obj/Interventions    No documentation.                Goals/Plan    No documentation.                Clinical Impression     Row Name 07/13/22 1115          Pain    Pretreatment Pain Rating 0/10 - no pain  -SJ     Posttreatment Pain Rating 0/10 - no pain  -SJ     [facility] Name 07/13/22 1115          Plan of Care Review    Plan of Care Reviewed With patient  -SJ     Progress improving  -SJ     Outcome Evaluation Pt amb in hallway with RW with Lucila + recliner in tow. Able to increase gait distance to 180ft. Pt needed cues for posture and safety, rreq 1 standing rest break due to  fatigue. Continue POC.  -     Row Name 07/13/22 1115          Vital Signs    Pre Systolic BP Rehab 185  -     Pre Treatment Diastolic BP 75  -     Pretreatment Heart Rate (beats/min) 54  -     Row Name 07/13/22 1115          Positioning and Restraints    Pre-Treatment Position sitting in chair/recliner  -     Post Treatment Position chair  -SJ     In Chair notified nsg;reclined;call light within reach;encouraged to call for assist;exit alarm on;waffle cushion  -           User Key  (r) = Recorded By, (t) = Taken By, (c) = Cosigned By    Initials Name Provider Type    Rebekah Clarke PT Physical Therapist               Outcome Measures     Row Name 07/13/22 1138 07/13/22 0800       How much help from another person do you currently need...    Turning from your back to your side while in flat bed without using bedrails? 3  - 3  -MH    Moving from lying on back to sitting on the side of a flat bed without bedrails? 3  - 3  -MH    Moving to and from a bed to a chair (including a wheelchair)? 3  - 2  -MH    Standing up from a chair using your arms (e.g., wheelchair, bedside chair)? 2  - 2  -MH    Climbing 3-5 steps with a railing? 2  - 1  -MH    To walk in hospital room? 3  -SJ 3  -MH    AM-PAC 6 Clicks Score (PT) 16  - 14  -    Highest level of mobility 5 --> Static standing  - 4 --> Transferred to chair/commode  -    Row Name 07/13/22 1138          Functional Assessment    Outcome Measure Options AM-PAC 6 Clicks Basic Mobility (PT)  -           User Key  (r) = Recorded By, (t) = Taken By, (c) = Cosigned By    Initials Name Provider Type    Rebekah Clarke PT Physical Therapist    Shelly Person, RN Registered Nurse                             Physical Therapy Education                 Title: PT OT SLP Therapies (Done)     Topic: Physical Therapy (Done)     Point: Mobility training (Done)     Learning Progress Summary           Patient Acceptance, E, VU,NR by AMBER at  7/13/2022 1139    Acceptance, E, VU by KM at 7/10/2022 1922    Eager, E,D, NR by DM at 7/10/2022 1606                   Point: Home exercise program (Done)     Learning Progress Summary           Patient Acceptance, E, VU,NR by  at 7/13/2022 1139    Acceptance, E, VU by KM at 7/10/2022 1922    Eager, E,D, NR by DM at 7/10/2022 1606                   Point: Body mechanics (Done)     Learning Progress Summary           Patient Acceptance, E, VU,NR by  at 7/13/2022 1139    Acceptance, E, VU by KM at 7/10/2022 1922    Eager, E,D, NR by DM at 7/10/2022 1606                   Point: Precautions (Done)     Learning Progress Summary           Patient Acceptance, E, VU,NR by  at 7/13/2022 1139    Acceptance, E, VU by KM at 7/10/2022 1922    Eager, E,D, NR by DM at 7/10/2022 1606                               User Key     Initials Effective Dates Name Provider Type Discipline     06/16/21 -  Rebekah Chávez, PT Physical Therapist PT     06/16/21 -  Liz Muñoz PT Physical Therapist PT     06/16/21 -  Ella Zazueta, RN Registered Nurse Nurse              PT Recommendation and Plan     Plan of Care Reviewed With: patient  Progress: improving  Outcome Evaluation: Pt amb in hallway with RW with Lucila + recliner in tow. Able to increase gait distance to 180ft. Pt needed cues for posture and safety, rreq 1 standing rest break due to fatigue. Continue POC.     Time Calculation:    PT Charges     Row Name 07/13/22 1140             Time Calculation    Start Time 1040  -      PT Received On 07/13/22  -      PT Goal Re-Cert Due Date 07/20/22  -              Time Calculation- PT    Total Timed Code Minutes- PT 15 minute(s)  -SJ              Timed Charges    25105 - Gait Training Minutes  15  -SJ              Total Minutes    Timed Charges Total Minutes 15  -SJ       Total Minutes 15  -SJ            User Key  (r) = Recorded By, (t) = Taken By, (c) = Cosigned By    Initials Name Provider Type     Kyler  Rebekah, PT Physical Therapist              Therapy Charges for Today     Code Description Service Date Service Provider Modifiers Qty    39532775879 HC GAIT TRAINING EA 15 MIN 7/13/2022 Rebekah Chávez, PT GP 1    60712971637 HC GAIT TRAINING EA 15 MIN 7/13/2022 Rebekah Chávez, PT GP 1          PT G-Codes  Outcome Measure Options: AM-PAC 6 Clicks Basic Mobility (PT)  AM-PAC 6 Clicks Score (PT): 16  AM-PAC 6 Clicks Score (OT): 13    Rebekah Chávez PT  7/13/2022

## 2022-07-13 NOTE — THERAPY TREATMENT NOTE
Acute Care - Speech Language Pathology Treatment Note  [facility]     Patient Name: Jhoan Gagnon  : 1940  MRN: [id number]  Today's Date: 2022               Admit Date: 2022     Visit Dx:    ICD-10-CM ICD-9-CM   1. Altered mental status, unspecified altered mental status type  R41.82 780.97   2. Weakness  R53.1 780.79   3. Unresponsive episode  R41.89 780.09   4. Dysarthria  R47.1 784.51     Patient Active Problem List   Diagnosis   • Parkinson's disease (HCC)   • Falls   • Weakness   • Skin tear of hand without complication, left, sequela   • Sinus bradycardia   • Ataxia   • Syncope   • Nausea   • Hematemesis with nausea   • Positive blood culture   • Altered mental status, unspecified altered mental status type   • Leukocytosis     Past Medical History:   Diagnosis Date   • Broken arms    • Cancer (HCC)     prostate   • Lower back injury    • Parkinson's disease (HCC)      Past Surgical History:   Procedure Laterality Date   • CHOLECYSTECTOMY     • PROSTATE SURGERY     • REPLACEMENT TOTAL KNEE BILATERAL         SLP Recommendation and Plan              Anticipated Discharge Disposition (SLP): unknown, anticipate therapy at next level of care (22)        Predicted Duration Therapy Intervention (Days): until discharge (22)  Daily Summary of Progress (SLP): progress toward functional goals as expected (22)           Treatment Assessment (SLP): Completed motor speech/voice exercises w/ pt. Focused on EMST & length of exhale. Pt w/ EMST75 in room - set @ 35 MEP & completed one set of 5 reps before pt reported feeling tired. Provided edu re: independent completion. Will continue to follow for tx. (22)  Plan for Continued Treatment (SLP): continue treatment per plan of care (22)  Plan of Care Reviewed With: patient (224)      SLP EVALUATION (last 72 hours)     SLP SLC Evaluation     Row Name 22                    Communication Assessment/Intervention    Document Type therapy note (daily note)  -MP        Subjective Information no complaints  -MP        Patient Observations alert;cooperative  -MP        Patient/Family/Caregiver Comments/Observations No family present  -MP        Patient Effort good  -MP                  Pain    Additional Documentation Pain Scale: FACES Pre/Post-Treatment (Group)  -MP                  Pain Scale: FACES Pre/Post-Treatment    Pain: FACES Scale, Pretreatment 0-->no hurt  -MP        Posttreatment Pain Rating 0-->no hurt  -MP                  SLP Treatment Clinical Impressions    Treatment Assessment (SLP) Completed motor speech/voice exercises w/ pt. Focused on EMST & length of exhale. Pt w/ EMST75 in room - set @ 35 MEP & completed one set of 5 reps before pt reported feeling tired. Provided edu re: independent completion. Will continue to follow for tx.  -MP        Daily Summary of Progress (SLP) progress toward functional goals as expected  -MP        Barriers to Overall Progress (SLP) Baseline deficits;Other (see comments)  Parkinson's  -MP        Plan for Continued Treatment (SLP) continue treatment per plan of care  -MP        Care Plan Review care plan/treatment goals reviewed;patient/other agree to care plan  -MP                  Recommendations    Therapy Frequency (SLP SLC) 3 days per week  -MP        Predicted Duration Therapy Intervention (Days) until discharge  -MP        Anticipated Discharge Disposition (SLP) unknown;anticipate therapy at next level of care  -MP              User Key  (r) = Recorded By, (t) = Taken By, (c) = Cosigned By    Initials Name Effective Dates    MP Ranulfo Mitchell, MS CCC-SLP 12/28/21 -                    EDUCATION  The patient has been educated in the following areas:     Cognitive Impairment Communication Impairment Voice Disorder.           SLP GOALS     Row Name 07/13/22 1100             Respiratory Support Goal 1 (SLP)    Improve Respiratory  Support Goal 1 (SLP) sustaining a vowel sound on exhalation;repeating a sentence on exhalation;80%;with minimal cues (75-90%)  -MP      Time Frame (Respiratory Support Goal 1, SLP) short term goal (STG);by discharge  -MP      Progress (Respiratory Support Skills Goal 1, SLP) 60%;with minimal cues (75-90%)  -MP      Progress/Outcomes (Respiratory Support Goal 1, SLP) continuing progress toward goal  -MP      Comment (Respiratory Support Goal 1, SLP) Max exhale 15 secs, typically ~10 secs  -MP              Phonation Goal 1 (SLP)    Improve Phonation By Goal 1 (SLP) using loud speech;80%;with minimal cues (75-90%)  -MP      Time Frame (Phonation Goal 1, SLP) short term goal (STG);by discharge  -MP      Progress (Phonation Goal 1, SLP) 50%;with moderate cues (50-74%)  -MP      Progress/Outcomes (Phonation Goal 1, SLP) continuing progress toward goal  -MP              Prosody Goal 1 (SLP)    Improve Prosody by Goal 1 (SLP) using correct intonation at sentence level;completing contrastive stress drills;increasing rate;80%;with moderate cues (50-74%)  -MP      Time Frame (Prosody Goal 1, SLP) short term goal (STG);by discharge  -MP      Progress/Outcomes (Prosody Goal 1, SLP) new goal  -MP              Patient Will Implement Strategies Goal 1 (SLP)    Implement Strategies of Goal 1 (SLP) other (comment);using external rate control  Instruction re: EMST use/daily home exercise re: vocal strengthening/monitor loudness level.  -MP      Time Frame (Strategy Implementation Goal 1, SLP) short term goal (STG);by discharge  -MP      Progress (Strategy Implementation Goal 1, SLP) 50%;with minimal cues (75-90%)  -MP      Progress/Outcomes (Strategy Implementation Goal 1, SLP) continuing progress toward goal  -MP      Comment (Strategy Implementation Goal 1, SLP) Provided instruction re: EMST use & completed 1 set of 5 reps @ 35 MEP w/ pt before reported feeling tired.  -MP              Memory Skills Goal 1 (SLP)    Improve Memory  Skills Through Goal 1 (SLP) recall details from a word list;listen to a paragraph and answer questions;90%  -MP      Time Frame (Memory Skills Goal 1, SLP) short term goal (STG);by discharge  -MP      Progress/Outcomes (Memory Skills Goal 1, SLP) new goal  -MP              Reasoning Goal 1 (SLP)    Improve Reasoning Through Goal 1 (SLP) complete mental flexibility task;90%;independently (over 90% accuracy)  -MP      Time Frame (Reasoning Goal 1, SLP) short term goal (STG);by discharge  -MP      Progress/Outcomes (Reasoning Goal 1, SLP) goal ongoing  -MP              Functional Problem Solving Skills Goal 1 (SLP)    Improve Problem Solving Through Goal 1 (SLP) determine multiple solutions to problems;90%;with minimal cues (75-90%)  -MP      Time Frame (Problem Solving Goal 1, SLP) short term goal (STG);by discharge  -MP      Progress/Outcomes (Problem Solving Goal 1, SLP) goal ongoing  -MP              Additional Goal 1 (SLP)    Additional Goal 1, SLP LTG: Improve cognitive communication to allow for successful communication of ideas independently  -MP      Time Frame (Additional Goal 1, SLP) by discharge  -MP      Progress/Outcomes (Additional Goal 1, SLP) continuing progress toward goal  -MP              Additional Goal 2 (SLP)    Additional Goal 2, SLP Introduce and instruct re: EMST/daily home exercise program/self monitoring  -MP      Time Frame (Additional Goal 2, SLP) short term goal (STG);by discharge  -MP      Progress/Outcomes (Additional Goal 2, SLP) continuing progress toward goal  -MP            User Key  (r) = Recorded By, (t) = Taken By, (c) = Cosigned By    Initials Name Provider Type    MP Ranulfo Mitchell MS CCC-SLP Speech and Language Pathologist                        Time Calculation:      Time Calculation- SLP     Row Name 07/13/22 1125             Time Calculation- SLP    SLP Start Time 1100  -MP      SLP Received On 07/13/22  -MP              Untimed Charges    26049-JN Treatment/ST  Modification Prosth Aug Alter  40  -MP              Total Minutes    Untimed Charges Total Minutes 40  -MP       Total Minutes 40  -MP            User Key  (r) = Recorded By, (t) = Taken By, (c) = Cosigned By    Initials Name Provider Type    Ranulfo Schneider MS CCC-SLP Speech and Language Pathologist                Therapy Charges for Today     Code Description Service Date Service Provider Modifiers Qty    42068938045  ST TREATMENT SPEECH 3 7/13/2022 Ranulfo Mitchell MS CCC-SLP GN 1                     Ranulfo Sloan MS CCC-TONA  7/13/2022

## 2022-07-13 NOTE — PLAN OF CARE
Goal Outcome Evaluation:  Plan of Care Reviewed With: patient            SLP treatment completed. Will continue to address cognitive-communication. Please see note for further details and recommendations.

## 2022-07-13 NOTE — PROGRESS NOTES
[facility] Medicine Services  PROGRESS NOTE    Patient Name: Jhoan Gagnon  : 1940  MRN: [id number]    Date of Admission: 2022  Primary Care Physician: Wilmer Andres MD    Subjective   Subjective     CC:  AMS    HPI:  Patient sitting up in bed. A and O x 4 but mumbling this morning about hallucinations he has had. He states he does not know that they are hallucinations in the moment but realizes it after because it doesn't make sense that what he saw would be there. He states he has hallucinated animals, people and food on his plate that is not there.    ROS:  Gen- No fevers, chills  CV- No chest pain, palpitations  Resp- No cough, dyspnea  GI- No N/V/D, abd pain  +hallucinations    Objective   Objective     Vital Signs:   Temp:  [97.5 °F (36.4 °C)-97.8 °F (36.6 °C)] 97.5 °F (36.4 °C)  Heart Rate:  [40-73] 50  Resp:  [18] 18  BP: (143-185)/() 185/75     Physical Exam:  Constitutional: No acute distress, awake, alert  HENT: NCAT, mucous membranes moist  Respiratory: Clear to auscultation bilaterally, respiratory effort normal   Cardiovascular: RRR, no murmurs, rubs, or gallops  Gastrointestinal: Positive bowel sounds, soft, nontender, nondistended  Musculoskeletal: No bilateral ankle edema  Psychiatric: Appropriate affect, cooperative  Neurologic: A and OX 4, decreased cognitive speed and cognitive impairment present, strength not tested, Cranial Nerves grossly intact to confrontation, speech slow  Skin: No rashes    Results Reviewed:  LAB RESULTS:      Lab 22  0540 22  0422 22  0704 07/10/22  0712 22  1723 22  1707   WBC 4.16 4.30 5.25 4.42  --  4.70   HEMOGLOBIN 12.6* 12.7* 12.6* 13.2  --  13.9   HEMATOCRIT 36.1* 36.9* 37.1* 39.0  --  40.4   PLATELETS 151 155 154 169  --  177   NEUTROS ABS  --   --   --  2.68  --  1.83   IMMATURE GRANS (ABS)  --   --   --  0.00  --  0.00   LYMPHS ABS  --   --   --  1.22  --  2.20   MONOS ABS  --   --    --  0.43  --  0.52   EOS ABS  --   --   --  0.07  --  0.12   MCV 89.4 90.0 92.8 90.5  --  89.8   PROCALCITONIN  --   --   --  0.07  --   --    PROTIME  --   --   --   --  11.2*  --          Lab 07/13/22  0540 07/12/22 0422 07/11/22  0704 07/10/22  0712 07/09/22 2225 07/09/22  1707   SODIUM 139 144 143 138  --  142   POTASSIUM 3.6 3.5 3.7 4.0  --  3.8   CHLORIDE 108* 111* 110* 106  --  106   CO2 26.0 27.0 26.0 25.0  --  27.0   ANION GAP 5.0 6.0 7.0 7.0  --  9.0   BUN 8 8 13 16  --  17   CREATININE 1.00 0.95 1.00 1.09  --  1.21   EGFR 74.2 78.9 74.2 66.9  --  59.0*   GLUCOSE 82 84 96 103*  --  137*   CALCIUM 8.8 8.5* 8.8 9.1  --  9.0   MAGNESIUM  --   --   --   --   --  2.1   HEMOGLOBIN A1C  --   --   --  5.20  --   --    TSH  --   --   --   --  1.840  --          Lab 07/13/22  0540 07/12/22 0422 07/11/22  0704 07/09/22  1707   TOTAL PROTEIN 6.0 6.0 6.0 6.7   ALBUMIN 3.10* 3.40* 3.20* 3.90   GLOBULIN 2.9 2.6 2.8 2.8   ALT (SGPT) 10 9 8 12   AST (SGOT) 23 21 20 25   BILIRUBIN 1.8* 1.5* 1.0 1.5*   ALK PHOS 93 95 80 93         Lab 07/09/22 2225 07/09/22  1723 07/09/22  1707   TROPONIN T <0.010  --  <0.010   PROTIME  --  11.2*  --    INR  --  0.9  --          Lab 07/10/22  0712   CHOLESTEROL 98   LDL CHOL 37   HDL CHOL 48   TRIGLYCERIDES 57             Brief Urine Lab Results  (Last result in the past 365 days)      Color   Clarity   Blood   Leuk Est   Nitrite   Protein   CREAT   Urine HCG        07/09/22 1810 Yellow   Clear   Negative   Negative   Negative   Negative                 Microbiology Results Abnormal     Procedure Component Value - Date/Time    Blood Culture - Blood, Hand, Right [[id number]]  (Normal) Collected: 07/09/22 2225    Lab Status: Preliminary result Specimen: Blood from Hand, Right Updated: 07/13/22 0703     Blood Culture No growth at 3 days    Blood Culture - Blood, Arm, Right [[id number]]  (Normal) Collected: 07/09/22 2225    Lab Status: Preliminary result Specimen: Blood from Arm, Right  Updated: 07/13/22 0703     Blood Culture No growth at 3 days    COVID PRE-OP / PRE-PROCEDURE SCREENING ORDER (NO ISOLATION) - Swab, Nasopharynx [[id number]]  (Normal) Collected: 07/09/22 1827    Lab Status: Final result Specimen: Swab from Nasopharynx Updated: 07/09/22 1912    Narrative:      The following orders were created for panel order COVID PRE-OP / PRE-PROCEDURE SCREENING ORDER (NO ISOLATION) - Swab, Nasopharynx.  Procedure                               Abnormality         Status                     ---------                               -----------         ------                     COVID-19 and FLU A/B PCR...[[id number]]  Normal              Final result                 Please view results for these tests on the individual orders.    COVID-19 and FLU A/B PCR - Swab, Nasopharynx [[id number]]  (Normal) Collected: 07/09/22 1827    Lab Status: Final result Specimen: Swab from Nasopharynx Updated: 07/09/22 1912     COVID19 Not Detected     Influenza A PCR Not Detected     Influenza B PCR Not Detected    Narrative:      Fact sheet for providers: https://www.fda.gov/media/906463/download    Fact sheet for patients: https://www.fda.gov/media/942457/download    Test performed by PCR.          No radiology results from the last 24 hrs    Results for orders placed during the hospital encounter of 07/09/22    Adult Transthoracic Echo Complete W/ Cont if Necessary Per Protocol (With Agitated Saline)    Interpretation Summary  · Estimated left ventricular EF = 65%  · Left ventricular wall thickness is consistent with mild concentric hypertrophy.  · Mild dilation of the ascending aorta is present.  · No hemodynamically significant valvular heart disease      I have reviewed the medications:  Scheduled Meds:aspirin, 325 mg, Oral, Daily   Or  aspirin, 300 mg, Rectal, Daily  atorvastatin, 80 mg, Oral, Nightly  cyanocobalamin, 1,000 mcg, Intramuscular, Q28 Days  donepezil, 10 mg, Oral, Nightly  memantine, 5 mg, Oral,  Q12H  rOPINIRole, 2 mg, Oral, Q8H  sodium chloride, 10 mL, Intravenous, Q12H  sodium chloride, 10 mL, Intravenous, Q12H  traZODone, 100 mg, Oral, Nightly      Continuous Infusions:sodium chloride, 75 mL/hr, Last Rate: 75 mL/hr (07/13/22 0616)      PRN Meds:.•  acetaminophen **OR** acetaminophen **OR** acetaminophen  •  ondansetron **OR** ondansetron  •  sodium chloride  •  sodium chloride  •  sodium chloride    Assessment & Plan   Assessment & Plan     Active Hospital Problems    Diagnosis  POA   • **Altered mental status, unspecified altered mental status type [R41.82]  Yes   • Leukocytosis [D72.829]  Unknown   • Parkinson's disease (HCC) [G20]  Yes      Resolved Hospital Problems   No resolved problems to display.        Brief Hospital Course to date:  Jhoan Gagnon is a 82 y.o. male with a PMH significant for Parkinson's disease s/p neurostimulator, prostate cancer s/p surgery, chronic low back pain who comes to the ED due to altered mental status.     This patient's problems and plans were partially entered by my partner and updated as appropriate by me 07/13/22.    AMS  --stroke ann, neurology consulted  -- A and O x4 but ACTIVELY HALLUCINATING  --serial NIH/neurochecks  --aspirin 324 given on admission  --continue aspirin 81mg oral/ 300 rectal daily  --high intensity statin  --echo with bubble shows preserved EF with aortic calcification; no bubble study performed  --CT head, CTA head and neck, CT perfusion wnl  --MRI cancelled per neurology, not needed.  --PT/OT/SLP  --Telemetry  -- Similar presentation 7/2021 where patient was positive for Streptococcus mitis bacteremia.  Repeat blood cultures pending  -procalcitonin, lactic acid WNL  - UA negative  - Chest x-ray without acute findings  - evaluated by neurology, Parkinsons vs infectious cause likely.  - PT/OT    Despite patient being alert and oriented x 4, he is actively hallucinating as a result of advancing Parkinson's disease. Patient also admits to  numbness and loss of feeling of his right leg, which he drive with. The aptient was admitted after being found down behind the wheel of a car. Per his granddaughter, Tiesha, who is his POA, the patient lives in a basement alone. It takes him up to 3 hours to get ready every day and he is unable to care for himself. She states he continues to drive and is a potential harm to others along with himself. I do not believe the patient is mentally or physically able to care for himself alone or to make the right decision regarding independent living. The decision should be made by his POA, Tiesha, as to his placement at a SNF. Tiesha would like the patient to be discharged to SNF and does not believe the patient is safe to go home and live alone.     Parkinson's disease  - Continue home meds  - Neurostimulator in place     Expected Discharge Location and Transportation: PT recommending SNF- CM   Expected Discharge Date: 07/14/22    DVT prophylaxis:  Mechanical DVT prophylaxis orders are present.     AM-PAC 6 Clicks Score (PT): 15 (07/12/22 0800)    CODE STATUS:   Code Status and Medical Interventions:   Ordered at: 07/09/22 6764     Medical Intervention Limits:    NO intubation (DNI)     Level Of Support Discussed With:    Patient     Code Status (Patient has no pulse and is not breathing):    No CPR (Do Not Attempt to Resuscitate)     Medical Interventions (Patient has pulse or is breathing):    Limited Support       Betsey Rosado DO  07/13/22

## 2022-07-13 NOTE — PLAN OF CARE
Goal Outcome Evaluation:  Plan of Care Reviewed With: patient        Progress: improving  Outcome Evaluation: Pt with good progress, modA STS progressed to Lucila for dynamic standing balance and CGA static standing, completed LBD maxA donning brief and maxA donning socks, Lucila UBD unsupported sitting, improved activity tolerance in standing and able to incorporated MIP 5 reps at FWW, cont IPOT per POC

## 2022-07-13 NOTE — PLAN OF CARE
Goal Outcome Evaluation:  Plan of Care Reviewed With: patient        Progress: improving  Outcome Evaluation: Pt amb in hallway with RW with Lucila + recliner in tow. Able to increase gait distance to 180ft. Pt needed cues for posture and safety, rreq 1 standing rest break due to fatigue. Continue POC.

## 2022-07-13 NOTE — CASE MANAGEMENT/SOCIAL WORK
Case Management Discharge Note      Final Note: Patient has been accepted to [facility], and will have a private room tomorrow, July 14.  A covid test has been ordered, and the pharmacy has been changed to [facility]'s preferred pharmacy.  Primary RN to call report to [phone].  [facility] van will take patient over to [facility] at 1530.  Please have patient down in the [address] lobby at 1515.  Spoke with patient's POA Tiesha, and she is agreeable with plan of care.  Also left a voicemail with patient's daughter Anamaria.         Selected Continued Care - Admitted Since 7/9/2022     Destination Coordination complete.    Service Provider Selected Services Address Phone Fax Patient Preferred    [facility]  Skilled Nursing [address] [address] [phone] --          Durable Medical Equipment    No services have been selected for the patient.              Dialysis/Infusion    No services have been selected for the patient.              Home Medical Care    No services have been selected for the patient.              Therapy    No services have been selected for the patient.              Community Resources    No services have been selected for the patient.              Community & DME    No services have been selected for the patient.                       Final Discharge Disposition Code: 03 - skilled nursing facility (SNF)

## 2022-07-13 NOTE — THERAPY TREATMENT NOTE
Patient Name: Jhoan Gagnon  : 1940    MRN: [id number]                              Today's Date: 2022       Admit Date: 2022    Visit Dx:     ICD-10-CM ICD-9-CM   1. Altered mental status, unspecified altered mental status type  R41.82 780.97   2. Weakness  R53.1 780.79   3. Unresponsive episode  R41.89 780.09   4. Dysarthria  R47.1 784.51     Patient Active Problem List   Diagnosis   • Parkinson's disease (HCC)   • Falls   • Weakness   • Skin tear of hand without complication, left, sequela   • Sinus bradycardia   • Ataxia   • Syncope   • Nausea   • Hematemesis with nausea   • Positive blood culture   • Altered mental status, unspecified altered mental status type   • Leukocytosis     Past Medical History:   Diagnosis Date   • Broken arms    • Cancer (HCC)     prostate   • Lower back injury    • Parkinson's disease (HCC)      Past Surgical History:   Procedure Laterality Date   • CHOLECYSTECTOMY     • PROSTATE SURGERY     • REPLACEMENT TOTAL KNEE BILATERAL        General Information     Row Name 22 1148          OT Time and Intention    Document Type therapy note (daily note)  -KF     Mode of Treatment occupational therapy  -     Row Name 22 1148          General Information    Patient Profile Reviewed yes  -KF     Existing Precautions/Restrictions fall;other (see comments)  AMS  -KF     Barriers to Rehab medically complex;previous functional deficit;cognitive status  -     Row Name 22 1148          Cognition    Orientation Status (Cognition) oriented x 3;verbal cues/prompts needed for orientation;situation  -     Row Name 22 1148          Safety Issues, Functional Mobility    Safety Issues Affecting Function (Mobility) awareness of need for assistance;insight into deficits/self-awareness;judgment;safety precaution awareness  -KF     Impairments Affecting Function (Mobility) balance;coordination;endurance/activity tolerance;motor planning;postural/trunk  control;strength  -           User Key  (r) = Recorded By, (t) = Taken By, (c) = Cosigned By    Initials Name Provider Type    KF Malena Ferguson, OT Occupational Therapist                 Mobility/ADL's     Row Name 07/13/22 1150          Bed Mobility    Comment, (Bed Mobility) UIC upon arrival  -[facility] Name 07/13/22 1150          Transfers    Transfers sit-stand transfer;stand-sit transfer  -     Comment, (Transfers) cues for seq and modA for clothing management for LBD tasks in standing some posterior lean which improved with cues  -KF     Sit-Stand Burton (Transfers) moderate assist (50% patient effort);1 person assist  -     Stand-Sit [place] (Transfers) minimum assist (75% patient effort)  -[facility] Name 07/13/22 1150          Sit-Stand Transfer    Assistive Device (Sit-Stand Transfers) walker, front-wheeled  -[facility] Name 07/13/22 1150          Stand-Sit Transfer    Assistive Device (Stand-Sit Transfers) walker, front-wheeled  -[facility] Name 07/13/22 1150          Functional Mobility    Functional Mobility- Comment deferred to PT  -[facility] 07/13/22 1150          Activities of Daily Living    BADL Assessment/Intervention lower body dressing;upper body dressing;grooming  -[facility] Name 07/13/22 1150          Lower Body Dressing Assessment/Training    Burton Level (Lower Body Dressing) don;socks;dependent (less than 25% patient effort);pants/bottoms;maximum assist (25% patient effort)  -     Position (Lower Body Dressing) unsupported sitting;supported standing  -     Comment, (Lower Body Dressing) states has AD and modified ways of completion for LBD at home  -[facility] Name 07/13/22 1150          Upper Body Dressing Assessment/Training    Burton Level (Upper Body Dressing) don;front opening garment;minimum assist (75% patient effort);verbal cues  -     Position (Upper Body Dressing) unsupported sitting  -[facility] Name 07/13/22 1150          Grooming  Assessment/Training    Price Level (Grooming) wash face, hands;set up  -KF     Position (Grooming) unsupported sitting  -KF           User Key  (r) = Recorded By, (t) = Taken By, (c) = Cosigned By    Initials Name Provider Type    Malena Chan OT Occupational Therapist               Obj/Interventions     Row Name 07/13/22 1151          Balance    Balance Assessment standing static balance;standing dynamic balance  -KF     Static Sitting Balance standby assist  -KF     Dynamic Sitting Balance contact guard  -KF     Position, Sitting Balance unsupported;sitting in chair  -KF     Static Standing Balance contact guard;verbal cues  -KF     Dynamic Standing Balance minimal assist;verbal cues  -KF     Position/Device Used, Standing Balance supported;walker, front-wheeled  -KF     Balance Interventions standing;occupation based/functional task;weight shifting activity;UE activity with balance activity  -KF     Comment, Balance tolerated incorporated use of UEs for LBD tasks in standing for donning brief  -KF           User Key  (r) = Recorded By, (t) = Taken By, (c) = Cosigned By    Initials Name Provider Type    Malena Chan, AKIN Occupational Therapist               Goals/Plan    No documentation.                Clinical Impression     Row Name 07/13/22 1153          Pain Assessment    Pretreatment Pain Rating 0/10 - no pain  -KF     Posttreatment Pain Rating 0/10 - no pain  -KF     Pre/Posttreatment Pain Comment tolerated  -KF     Pain Intervention(s) Repositioned;Ambulation/increased activity  -KF     Row Name 07/13/22 1153          Plan of Care Review    Plan of Care Reviewed With patient  -KF     Progress improving  -KF     Outcome Evaluation Pt with good progress, modA STS progressed to Lucila for dynamic standing balance and CGA static standing, completed LBD maxA donning brief and maxA donning socks, Lucila UBD unsupported sitting, improved activity tolerance in standing and able to incorporated  MIP 5 reps at FWW, cont IPOT per POC  -KF     Row Name 07/13/22 1153          Therapy Assessment/Plan (OT)    Rehab Potential (OT) good, to achieve stated therapy goals  -KF     Criteria for Skilled Therapeutic Interventions Met (OT) yes;skilled treatment is necessary  -KF     Therapy Frequency (OT) daily  -KF     Row Name 07/13/22 1153          Therapy Plan Review/Discharge Plan (OT)    Anticipated Discharge Disposition (OT) skilled nursing facility  -KF     Row Name 07/13/22 1153          Vital Signs    Pre Systolic BP Rehab 185  -KF     Pre Treatment Diastolic BP 75  -KF     Post Systolic BP Rehab 179  -KF     Post Treatment Diastolic BP 97  -KF     O2 Delivery Pre Treatment room air  -KF     Post SpO2 (%) 99  -KF     O2 Delivery Post Treatment room air  -KF     Row Name 07/13/22 1153          Positioning and Restraints    Pre-Treatment Position sitting in chair/recliner  -KF     Post Treatment Position chair  -KF     In Chair notified nsg;sitting;call light within reach;encouraged to call for assist;exit alarm on;with PT;waffle cushion  -KF           User Key  (r) = Recorded By, (t) = Taken By, (c) = Cosigned By    Initials Name Provider Type    KF Maelna Ferguson, OT Occupational Therapist               Outcome Measures     Row Name 07/13/22 1156          How much help from another is currently needed...    Putting on and taking off regular lower body clothing? 2  -KF     Bathing (including washing, rinsing, and drying) 2  -KF     Toileting (which includes using toilet bed pan or urinal) 2  -KF     Putting on and taking off regular upper body clothing 3  -KF     Taking care of personal grooming (such as brushing teeth) 3  -KF     Eating meals 3  -KF     AM-PAC 6 Clicks Score (OT) 15  -KF     Row Name 07/13/22 1138 07/13/22 0800       How much help from another person do you currently need...    Turning from your back to your side while in flat bed without using bedrails? 3  -SJ 3  -MH    Moving from lying on  back to sitting on the side of a flat bed without bedrails? 3  -SJ 3  -MH    Moving to and from a bed to a chair (including a wheelchair)? 3  -SJ 2  -MH    Standing up from a chair using your arms (e.g., wheelchair, bedside chair)? 2  -SJ 2  -MH    Climbing 3-5 steps with a railing? 2  -SJ 1  -MH    To walk in hospital room? 3  -SJ 3  -MH    AM-PAC 6 Clicks Score (PT) 16  -SJ 14  -    Highest level of mobility 5 --> Static standing  - 4 --> Transferred to chair/commode  -    Row Name 07/13/22 1156 07/13/22 1138       Functional Assessment    Outcome Measure Options AM-PAC 6 Clicks Daily Activity (OT)  - AM-PAC 6 Clicks Basic Mobility (PT)  -          User Key  (r) = Recorded By, (t) = Taken By, (c) = Cosigned By    Initials Name Provider Type     Rebekah Chávez, PT Physical Therapist     Shelly Morris, RN Registered Nurse    Malena Chan, OT Occupational Therapist                Occupational Therapy Education                 Title: PT OT SLP Therapies (Done)     Topic: Occupational Therapy (Done)     Point: ADL training (Done)     Description:   Instruct learner(s) on proper safety adaptation and remediation techniques during self care or transfers.   Instruct in proper use of assistive devices.              Learning Progress Summary           Patient Acceptance, E,TB,D, VU,DU,NR by  at 7/13/2022 1157    Acceptance, E, VU by KM at 7/10/2022 1922    Acceptance, E, VU by  at 7/10/2022 0904    Comment: Role of OT, benefits of activity                   Point: Home exercise program (Done)     Description:   Instruct learner(s) on appropriate technique for monitoring, assisting and/or progressing therapeutic exercises/activities.              Learning Progress Summary           Patient Acceptance, E,TB,D, VU,DU,NR by  at 7/13/2022 1157    Acceptance, E, VU by KM at 7/10/2022 1922                   Point: Precautions (Done)     Description:   Instruct learner(s) on prescribed  precautions during self-care and functional transfers.              Learning Progress Summary           Patient Acceptance, E,TB,D, VU,DU,NR by  at 7/13/2022 1157    Acceptance, E, VU by  at 7/10/2022 1922                   Point: Body mechanics (Done)     Description:   Instruct learner(s) on proper positioning and spine alignment during self-care, functional mobility activities and/or exercises.              Learning Progress Summary           Patient Acceptance, E,TB,D, VU,DU,NR by  at 7/13/2022 1157    Acceptance, E, VU by  at 7/10/2022 1922                               User Key     Initials Effective Dates Name Provider Type Discipline    AC 06/16/21 -  Ines Sanabria, OT Occupational Therapist OT     06/16/21 -  Ella Zazueta, RN Registered Nurse Nurse     06/16/21 -  Malena Ferguson, OT Occupational Therapist OT              OT Recommendation and Plan  Therapy Frequency (OT): daily  Plan of Care Review  Plan of Care Reviewed With: patient  Progress: improving  Outcome Evaluation: Pt with good progress, modA STS progressed to Lucila for dynamic standing balance and CGA static standing, completed LBD maxA donning brief and maxA donning socks, Lucila UBD unsupported sitting, improved activity tolerance in standing and able to incorporated MIP 5 reps at FWW, cont IPOT per POC     Time Calculation:    Time Calculation- OT     Row Name 07/13/22 1140 07/13/22 1029          Time Calculation- OT    OT Start Time -- 1029  -KF     OT Received On -- 07/13/22  -            Timed Charges    22832 - Gait Training Minutes  15  -SJ --     70722 - OT Therapeutic Activity Minutes -- 16  -KF            Total Minutes    Timed Charges Total Minutes 15  -SJ 16  -KF      Total Minutes 15  -SJ 16  -KF           User Key  (r) = Recorded By, (t) = Taken By, (c) = Cosigned By    Initials Name Provider Type    Rebekah Clarke, PT Physical Therapist    Malena Chan, OT Occupational Therapist               Therapy Charges for Today     Code Description Service Date Service Provider Modifiers Qty    [id number]  OT THERAPEUTIC ACT EA 15 MIN 7/13/2022 Malena Ferguson, OT GO 1               Malena Ferguson OT  7/13/2022

## 2022-07-14 VITALS
RESPIRATION RATE: 18 BRPM | SYSTOLIC BLOOD PRESSURE: 131 MMHG | HEART RATE: 55 BPM | OXYGEN SATURATION: 96 % | HEIGHT: 67 IN | WEIGHT: 214 LBS | TEMPERATURE: 97.7 F | DIASTOLIC BLOOD PRESSURE: 68 MMHG | BODY MASS INDEX: 33.59 KG/M2

## 2022-07-14 LAB
ALBUMIN SERPL-MCNC: 3.5 G/DL (ref 3.5–5.2)
ALBUMIN/GLOB SERPL: 1.2 G/DL
ALP SERPL-CCNC: 117 U/L (ref 39–117)
ALT SERPL W P-5'-P-CCNC: 15 U/L (ref 1–41)
ANION GAP SERPL CALCULATED.3IONS-SCNC: 9 MMOL/L (ref 5–15)
AST SERPL-CCNC: 29 U/L (ref 1–40)
BILIRUB SERPL-MCNC: 2.2 MG/DL (ref 0–1.2)
BUN SERPL-MCNC: 8 MG/DL (ref 8–23)
BUN/CREAT SERPL: 8 (ref 7–25)
CALCIUM SPEC-SCNC: 8.6 MG/DL (ref 8.6–10.5)
CHLORIDE SERPL-SCNC: 106 MMOL/L (ref 98–107)
CO2 SERPL-SCNC: 24 MMOL/L (ref 22–29)
CREAT SERPL-MCNC: 1 MG/DL (ref 0.76–1.27)
DEPRECATED RDW RBC AUTO: 43.2 FL (ref 37–54)
EGFRCR SERPLBLD CKD-EPI 2021: 74.2 ML/MIN/1.73
ERYTHROCYTE [DISTWIDTH] IN BLOOD BY AUTOMATED COUNT: 13.2 % (ref 12.3–15.4)
GLOBULIN UR ELPH-MCNC: 3 GM/DL
GLUCOSE SERPL-MCNC: 125 MG/DL (ref 65–99)
HCT VFR BLD AUTO: 39.3 % (ref 37.5–51)
HGB BLD-MCNC: 13.8 G/DL (ref 13–17.7)
MCH RBC QN AUTO: 31.2 PG (ref 26.6–33)
MCHC RBC AUTO-ENTMCNC: 35.1 G/DL (ref 31.5–35.7)
MCV RBC AUTO: 88.7 FL (ref 79–97)
PLATELET # BLD AUTO: 170 10*3/MM3 (ref 140–450)
PMV BLD AUTO: 9.8 FL (ref 6–12)
POTASSIUM SERPL-SCNC: 3.7 MMOL/L (ref 3.5–5.2)
PROT SERPL-MCNC: 6.5 G/DL (ref 6–8.5)
RBC # BLD AUTO: 4.43 10*6/MM3 (ref 4.14–5.8)
SODIUM SERPL-SCNC: 139 MMOL/L (ref 136–145)
WBC NRBC COR # BLD: 5.52 10*3/MM3 (ref 3.4–10.8)

## 2022-07-14 PROCEDURE — 99239 HOSP IP/OBS DSCHRG MGMT >30: CPT | Performed by: HOSPITALIST

## 2022-07-14 PROCEDURE — 80053 COMPREHEN METABOLIC PANEL: CPT | Performed by: HOSPITALIST

## 2022-07-14 PROCEDURE — 85027 COMPLETE CBC AUTOMATED: CPT | Performed by: HOSPITALIST

## 2022-07-14 RX ORDER — ROPINIROLE 2 MG/1
2 TABLET, FILM COATED ORAL EVERY 8 HOURS SCHEDULED
Qty: 15 TABLET | Refills: 0 | Status: SHIPPED | OUTPATIENT
Start: 2022-07-14

## 2022-07-14 RX ADMIN — Medication 10 ML: at 08:55

## 2022-07-14 RX ADMIN — ROPINIROLE HYDROCHLORIDE 2 MG: 2 TABLET, FILM COATED ORAL at 15:23

## 2022-07-14 RX ADMIN — Medication 10 ML: at 08:54

## 2022-07-14 RX ADMIN — MEMANTINE 5 MG: 10 TABLET ORAL at 08:54

## 2022-07-14 RX ADMIN — ASPIRIN 325 MG ORAL TABLET 325 MG: 325 PILL ORAL at 08:54

## 2022-07-14 RX ADMIN — ROPINIROLE HYDROCHLORIDE 2 MG: 2 TABLET, FILM COATED ORAL at 05:47

## 2022-07-14 NOTE — DISCHARGE SUMMARY
[facility] Medicine Services  DISCHARGE SUMMARY    Patient Name: Jhoan Gagnon  : 1940  MRN: [id number]    Date of Admission: 2022  4:57 PM  Date of Discharge:  2022  Primary Care Physician: Wilmer Andres MD    Consults     Date and Time Order Name Status Description    2022 10:05 PM Inpatient Neurology Consult Stroke      2022  5:08 PM Inpatient Neurology Consult Stroke Completed           Hospital Course     Presenting Problem:   Altered mental status, unspecified altered mental status type [R41.82]    Active Hospital Problems    Diagnosis  POA   • **Altered mental status, unspecified altered mental status type [R41.82]  Yes   • Leukocytosis [D72.829]  Unknown   • Parkinson's disease (HCC) [G20]  Yes      Resolved Hospital Problems   No resolved problems to display.          Hospital Course:  Jhoan Gagnon is a 82 y.o. male with a PMH significant for Parkinson's disease s/p neurostimulator, prostate cancer s/p surgery, chronic low back pain who comes to the ED due to altered mental status.     AMS  -He was evaluated by neurology. CT demonstrated no evidence of stroke. Neurology evaluated the patient and felt that this was consistent with advanced Parkinsonism. His family felt that he could no longer care for himself and he will be discharged to SNF.      Discharge Follow Up Recommendations for outpatient labs/diagnostics:  As written    Day of Discharge     HPI:   In chair. No complaints. Denies pain or dyspnea. No hallucinations currently.    Review of Systems  AS above    Vital Signs:   Temp:  [98.5 °F (36.9 °C)-99 °F (37.2 °C)] 98.5 °F (36.9 °C)  Heart Rate:  [46-69] 51  Resp:  [18] 18  BP: (130-179)/() 130/75      Physical Exam:  NAD, alert, oriented  OP clear, MMM  Neck supple  RRR  cTAB  +BS, ND, NT, soft  PARKINSON  Normal affect  No rashes  Tremor noted    Pertinent  and/or Most Recent Results     LAB RESULTS:      Lab 22  0946  07/13/22  0540 07/12/22 0422 07/11/22  0704 07/10/22  0712 07/09/22  1723 07/09/22  1707   WBC 5.52 4.16 4.30 5.25 4.42  --  4.70   HEMOGLOBIN 13.8 12.6* 12.7* 12.6* 13.2  --  13.9   HEMATOCRIT 39.3 36.1* 36.9* 37.1* 39.0  --  40.4   PLATELETS 170 151 155 154 169  --  177   NEUTROS ABS  --   --   --   --  2.68  --  1.83   IMMATURE GRANS (ABS)  --   --   --   --  0.00  --  0.00   LYMPHS ABS  --   --   --   --  1.22  --  2.20   MONOS ABS  --   --   --   --  0.43  --  0.52   EOS ABS  --   --   --   --  0.07  --  0.12   MCV 88.7 89.4 90.0 92.8 90.5  --  89.8   PROCALCITONIN  --   --   --   --  0.07  --   --    PROTIME  --   --   --   --   --  11.2*  --          Lab 07/13/22  0540 07/12/22 0422 07/11/22  0704 07/10/22  0712 07/09/22  2225 07/09/22  1707   SODIUM 139 144 143 138  --  142   POTASSIUM 3.6 3.5 3.7 4.0  --  3.8   CHLORIDE 108* 111* 110* 106  --  106   CO2 26.0 27.0 26.0 25.0  --  27.0   ANION GAP 5.0 6.0 7.0 7.0  --  9.0   BUN 8 8 13 16  --  17   CREATININE 1.00 0.95 1.00 1.09  --  1.21   EGFR 74.2 78.9 74.2 66.9  --  59.0*   GLUCOSE 82 84 96 103*  --  137*   CALCIUM 8.8 8.5* 8.8 9.1  --  9.0   MAGNESIUM  --   --   --   --   --  2.1   HEMOGLOBIN A1C  --   --   --  5.20  --   --    TSH  --   --   --   --  1.840  --          Lab 07/13/22  0540 07/12/22  0422 07/11/22  0704 07/09/22  1707   TOTAL PROTEIN 6.0 6.0 6.0 6.7   ALBUMIN 3.10* 3.40* 3.20* 3.90   GLOBULIN 2.9 2.6 2.8 2.8   ALT (SGPT) 10 9 8 12   AST (SGOT) 23 21 20 25   BILIRUBIN 1.8* 1.5* 1.0 1.5*   ALK PHOS 93 95 80 93         Lab 07/09/22  2225 07/09/22  1723 07/09/22  1707   TROPONIN T <0.010  --  <0.010   PROTIME  --  11.2*  --    INR  --  0.9  --          Lab 07/10/22  0712   CHOLESTEROL 98   LDL CHOL 37   HDL CHOL 48   TRIGLYCERIDES 57             Brief Urine Lab Results  (Last result in the past 365 days)      Color   Clarity   Blood   Leuk Est   Nitrite   Protein   CREAT   Urine HCG        07/09/22 1810 Yellow   Clear   Negative   Negative    Negative   Negative               Microbiology Results (last 10 days)     Procedure Component Value - Date/Time    COVID PRE-OP / PRE-PROCEDURE SCREENING ORDER (NO ISOLATION) - Swab, Nasopharynx [[id number]]  (Normal) Collected: 07/13/22 1800    Lab Status: Final result Specimen: Swab from Nasopharynx Updated: 07/13/22 1936    Narrative:      The following orders were created for panel order COVID PRE-OP / PRE-PROCEDURE SCREENING ORDER (NO ISOLATION) - Swab, Nasopharynx.  Procedure                               Abnormality         Status                     ---------                               -----------         ------                     COVID-19, FLU A/B, RSV P...[[id number]]  Normal              Final result                 Please view results for these tests on the individual orders.    COVID-19, FLU A/B, RSV PCR - Swab, Nasopharynx [[id number]]  (Normal) Collected: 07/13/22 1800    Lab Status: Final result Specimen: Swab from Nasopharynx Updated: 07/13/22 1936     COVID19 Not Detected     Influenza A PCR Not Detected     Influenza B PCR Not Detected     RSV, PCR Not Detected    Narrative:      Fact sheet for providers: https://www.fda.gov/media/882518/download    Fact sheet for patients: https://www.fda.gov/media/879640/download    Test performed by PCR.    Blood Culture - Blood, Hand, Right [[id number]]  (Normal) Collected: 07/09/22 2225    Lab Status: Preliminary result Specimen: Blood from Hand, Right Updated: 07/14/22 0703     Blood Culture No growth at 4 days    Blood Culture - Blood, Arm, Right [[id number]]  (Normal) Collected: 07/09/22 2225    Lab Status: Preliminary result Specimen: Blood from Arm, Right Updated: 07/14/22 0703     Blood Culture No growth at 4 days    COVID PRE-OP / PRE-PROCEDURE SCREENING ORDER (NO ISOLATION) - Swab, Nasopharynx [[id number]]  (Normal) Collected: 07/09/22 1827    Lab Status: Final result Specimen: Swab from Nasopharynx Updated: 07/09/22 1912    Narrative:      The  following orders were created for panel order COVID PRE-OP / PRE-PROCEDURE SCREENING ORDER (NO ISOLATION) - Swab, Nasopharynx.  Procedure                               Abnormality         Status                     ---------                               -----------         ------                     COVID-19 and FLU A/B PCR...[[id number]]  Normal              Final result                 Please view results for these tests on the individual orders.    COVID-19 and FLU A/B PCR - Swab, Nasopharynx [[id number]]  (Normal) Collected: 07/09/22 1827    Lab Status: Final result Specimen: Swab from Nasopharynx Updated: 07/09/22 1912     COVID19 Not Detected     Influenza A PCR Not Detected     Influenza B PCR Not Detected    Narrative:      Fact sheet for providers: https://www.fda.gov/media/230995/download    Fact sheet for patients: https://www.fda.gov/media/853939/download    Test performed by PCR.          Adult Transthoracic Echo Complete W/ Cont if Necessary Per Protocol (With Agitated Saline)    Result Date: 7/10/2022  · Estimated left ventricular EF = 65% · Left ventricular wall thickness is consistent with mild concentric hypertrophy. · Mild dilation of the ascending aorta is present. · No hemodynamically significant valvular heart disease      CT Angiogram Neck    Result Date: 7/9/2022  DATE OF EXAM: 7/9/2022 5:25 PM  PROCEDURE: CT CEREBRAL PERFUSION W WO CONTRAST-, CT ANGIOGRAM NECK-, CT ANGIOGRAM HEAD W AI ANALYSIS OF LVO-  INDICATIONS: Neuro deficit, acute, stroke suspected  COMPARISON: Same-day noncontrast CT of the head; CTA head and neck, CT cerebral perfusion 10/3/2017  TECHNIQUE: Routine transaxial cuts were obtained through the head without administration of contrast. Routine transaxial cuts were then obtained through the head following the intravenous administration of 120 mL of Isovue 370. Core blood volume, core blood flow, mean transit time, and Tmax images were obtained utilizing the Rapid software  protocol. A limited CT angiogram of the head was also performed to measure the blood vessel density.  CTA of the head and CTA of the neck was performed after the intravenous administration of above contrast. Reconstructed coronal and sagittal images were also obtained. In addition, a 3-D volume rendered image was obtained after post processing. Automated exposure control and iterative reconstruction methods were used. AI analysis of LVO was utilized for the CTA Head imaging portion of the study.  The radiation dose reduction device was turned on for each scan per the ALARA (As Low as Reasonably Achievable) protocol.  Stenosis measurement was performed by the NASCET or similar method.   FINDINGS:  CT cerebral perfusion: Grossly symmetric cerebral perfusion without core infarct or ischemic tissue at risk.  CTA head: No abrupt cut off/large vessel occlusion, flow-limiting stenosis, dissection, or aneurysm. Mild atherosclerosis within the carotid siphons without luminal irregularity or flow-limiting stenosis. Minimal atherosclerosis of the left V4 segment. The cerebral veins and dural venous sinuses are grossly patent.  CTA neck: No abrupt cut off/large vessel occlusion, flow-limiting stenosis, dissection, or aneurysm within the cervical carotid or cervical vertebral arteries. There is mild atherosclerosis of the bilateral carotid bifurcations without significant luminal irregularity or narrowing. There is mild atherosclerosis of the aortic arch and origin of the aortic arch branch vessels, without flow-limiting stenosis or luminal irregularity.  Extravascular findings: Homogeneous attenuation of the thyroid gland. Partially imaged upper lungs are grossly clear. Unremarkable appearance of bilateral deep brain stimulator leads with pulse generator in the subcutaneous tissues of the right chest. Multilevel degenerative disc disease of the cervical spine. No acute osseous findings. No pharyngeal or laryngeal mass lesion.        Symmetric cerebral perfusion without core infarct or ischemic tissue at risk.  Normal CTA of the head and neck.  This report was finalized on 7/9/2022 6:18 PM by Sergio Ramsey MD.      XR Chest 1 View    Result Date: 7/9/2022  DATE OF EXAM: 7/9/2022 5:01 PM  PROCEDURE: XR CHEST 1 VW-  INDICATIONS: Weak/Dizzy/AMS triage protocol.  COMPARISON: Chest x-ray 7/2/2021  TECHNIQUE: Single frontal view of the chest.  FINDINGS: Redemonstration of the brain stimulator pulse generator projecting over the right chest with superiorly directed leads. Stable cardiomediastinal silhouette within normal limits. Similar mild eventration of the right hemidiaphragm. The lungs are grossly clear. No pleural effusion. No pneumothorax. No acute osseous findings.      No acute cardiopulmonary findings.  This report was finalized on 7/9/2022 6:19 PM by Sergio Ramsey MD.      CT Head Without Contrast Stroke Protocol    Result Date: 7/9/2022  DATE OF EXAM: 7/9/2022 5:15 PM  PROCEDURE: CT HEAD WO CONTRAST STROKE PROTOCOL-  INDICATIONS: Neuro deficit, acute, stroke suspected  COMPARISON: Brain MRI 11/13/2019, CT head 10/3/2017  TECHNIQUE: Routine transaxial and coronal reconstruction images were obtained through the head without the administration of contrast. Automated exposure control and iterative reconstruction methods were used.  The radiation dose reduction device was turned on for each scan per the ALARA (As Low as Reasonably Achievable) protocol.  FINDINGS: There are bilateral deep brain stimulator leads with associated streak artifact/beam hardening. No evidence of acute intracranial hemorrhage. No evidence of acute large territory infarct. There is mild global cerebral volume loss. There are scattered subcortical and periventricular white matter hypodensities which are nonspecific and can be seen in the setting of chronic small vessel ischemic change. No extra-axial collections. Normal size and configuration of the ventricles  commensurate with degree of mild cerebral volume loss. No mass effect. Left lens replacement with otherwise unremarkable appearance of the orbits. There is trace fluid in left mastoid air cells. The imaged paranasal sinuses appear grossly clear. No acute osseous findings.      No acute intracranial findings.  Findings discussed with stroke navigator by Dr. Sergio Ramsey at the CT scanner at 5:25 PM 7/9/2022.  This report was finalized on 7/9/2022 5:33 PM by Sergio Ramsey MD.      CT Angiogram Head w AI Analysis of LVO    Result Date: 7/9/2022  DATE OF EXAM: 7/9/2022 5:25 PM  PROCEDURE: CT CEREBRAL PERFUSION W WO CONTRAST-, CT ANGIOGRAM NECK-, CT ANGIOGRAM HEAD W AI ANALYSIS OF LVO-  INDICATIONS: Neuro deficit, acute, stroke suspected  COMPARISON: Same-day noncontrast CT of the head; CTA head and neck, CT cerebral perfusion 10/3/2017  TECHNIQUE: Routine transaxial cuts were obtained through the head without administration of contrast. Routine transaxial cuts were then obtained through the head following the intravenous administration of 120 mL of Isovue 370. Core blood volume, core blood flow, mean transit time, and Tmax images were obtained utilizing the Rapid software protocol. A limited CT angiogram of the head was also performed to measure the blood vessel density.  CTA of the head and CTA of the neck was performed after the intravenous administration of above contrast. Reconstructed coronal and sagittal images were also obtained. In addition, a 3-D volume rendered image was obtained after post processing. Automated exposure control and iterative reconstruction methods were used. AI analysis of LVO was utilized for the CTA Head imaging portion of the study.  The radiation dose reduction device was turned on for each scan per the ALARA (As Low as Reasonably Achievable) protocol.  Stenosis measurement was performed by the NASCET or similar method.   FINDINGS:  CT cerebral perfusion: Grossly symmetric cerebral  perfusion without core infarct or ischemic tissue at risk.  CTA head: No abrupt cut off/large vessel occlusion, flow-limiting stenosis, dissection, or aneurysm. Mild atherosclerosis within the carotid siphons without luminal irregularity or flow-limiting stenosis. Minimal atherosclerosis of the left V4 segment. The cerebral veins and dural venous sinuses are grossly patent.  CTA neck: No abrupt cut off/large vessel occlusion, flow-limiting stenosis, dissection, or aneurysm within the cervical carotid or cervical vertebral arteries. There is mild atherosclerosis of the bilateral carotid bifurcations without significant luminal irregularity or narrowing. There is mild atherosclerosis of the aortic arch and origin of the aortic arch branch vessels, without flow-limiting stenosis or luminal irregularity.  Extravascular findings: Homogeneous attenuation of the thyroid gland. Partially imaged upper lungs are grossly clear. Unremarkable appearance of bilateral deep brain stimulator leads with pulse generator in the subcutaneous tissues of the right chest. Multilevel degenerative disc disease of the cervical spine. No acute osseous findings. No pharyngeal or laryngeal mass lesion.       Symmetric cerebral perfusion without core infarct or ischemic tissue at risk.  Normal CTA of the head and neck.  This report was finalized on 7/9/2022 6:18 PM by Sergio Ramsey MD.      CT CEREBRAL PERFUSION WITH & WITHOUT CONTRAST    Result Date: 7/9/2022  DATE OF EXAM: 7/9/2022 5:25 PM  PROCEDURE: CT CEREBRAL PERFUSION W WO CONTRAST-, CT ANGIOGRAM NECK-, CT ANGIOGRAM HEAD W AI ANALYSIS OF LVO-  INDICATIONS: Neuro deficit, acute, stroke suspected  COMPARISON: Same-day noncontrast CT of the head; CTA head and neck, CT cerebral perfusion 10/3/2017  TECHNIQUE: Routine transaxial cuts were obtained through the head without administration of contrast. Routine transaxial cuts were then obtained through the head following the intravenous  administration of 120 mL of Isovue 370. Core blood volume, core blood flow, mean transit time, and Tmax images were obtained utilizing the Rapid software protocol. A limited CT angiogram of the head was also performed to measure the blood vessel density.  CTA of the head and CTA of the neck was performed after the intravenous administration of above contrast. Reconstructed coronal and sagittal images were also obtained. In addition, a 3-D volume rendered image was obtained after post processing. Automated exposure control and iterative reconstruction methods were used. AI analysis of LVO was utilized for the CTA Head imaging portion of the study.  The radiation dose reduction device was turned on for each scan per the ALARA (As Low as Reasonably Achievable) protocol.  Stenosis measurement was performed by the NASCET or similar method.   FINDINGS:  CT cerebral perfusion: Grossly symmetric cerebral perfusion without core infarct or ischemic tissue at risk.  CTA head: No abrupt cut off/large vessel occlusion, flow-limiting stenosis, dissection, or aneurysm. Mild atherosclerosis within the carotid siphons without luminal irregularity or flow-limiting stenosis. Minimal atherosclerosis of the left V4 segment. The cerebral veins and dural venous sinuses are grossly patent.  CTA neck: No abrupt cut off/large vessel occlusion, flow-limiting stenosis, dissection, or aneurysm within the cervical carotid or cervical vertebral arteries. There is mild atherosclerosis of the bilateral carotid bifurcations without significant luminal irregularity or narrowing. There is mild atherosclerosis of the aortic arch and origin of the aortic arch branch vessels, without flow-limiting stenosis or luminal irregularity.  Extravascular findings: Homogeneous attenuation of the thyroid gland. Partially imaged upper lungs are grossly clear. Unremarkable appearance of bilateral deep brain stimulator leads with pulse generator in the subcutaneous  tissues of the right chest. Multilevel degenerative disc disease of the cervical spine. No acute osseous findings. No pharyngeal or laryngeal mass lesion.       Symmetric cerebral perfusion without core infarct or ischemic tissue at risk.  Normal CTA of the head and neck.  This report was finalized on 7/9/2022 6:18 PM by Sergio Ramsey MD.        Results for orders placed during the hospital encounter of 06/29/21    Duplex Carotid Ultrasound CAR    Interpretation Summary  · Proximal right internal carotid artery plaque without significant stenosis.  · Right internal carotid artery stenosis of 0-49%.  · Proximal left internal carotid artery plaque without significant stenosis.  · Left internal carotid artery stenosis of 0-49%.      Results for orders placed during the hospital encounter of 06/29/21    Duplex Carotid Ultrasound CAR    Interpretation Summary  · Proximal right internal carotid artery plaque without significant stenosis.  · Right internal carotid artery stenosis of 0-49%.  · Proximal left internal carotid artery plaque without significant stenosis.  · Left internal carotid artery stenosis of 0-49%.      Results for orders placed during the hospital encounter of 07/09/22    Adult Transthoracic Echo Complete W/ Cont if Necessary Per Protocol (With Agitated Saline)    Interpretation Summary  · Estimated left ventricular EF = 65%  · Left ventricular wall thickness is consistent with mild concentric hypertrophy.  · Mild dilation of the ascending aorta is present.  · No hemodynamically significant valvular heart disease      Plan for Follow-up of Pending Labs/Results: Reviewed  Pending Labs     Order Current Status    Comprehensive Metabolic Panel In process    Blood Culture - Blood, Arm, Right Preliminary result    Blood Culture - Blood, Hand, Right Preliminary result        Discharge Details        Discharge Medications      New Medications      Instructions Start Date   rOPINIRole 2 MG tablet  Commonly  known as: REQUIP  Replaces: rOPINIRole XL 6 MG tablet sustained-release 24 hour 24 hr tablet   2 mg, Oral, Every 8 Hours Scheduled         Continue These Medications      Instructions Start Date   amLODIPine 5 MG tablet  Commonly known as: NORVASC   5 mg, Oral, Daily      aspirin 81 MG EC tablet   81 mg, Oral, Daily      atropine 1 % ophthalmic solution   1 drop, Daily PRN, Under tongue as needed       cyanocobalamin 1000 MCG/ML injection   1,000 mcg, Intramuscular, Every 28 Days      donepezil 10 MG tablet  Commonly known as: ARICEPT   10 mg, Oral, Nightly      memantine 5 MG tablet  Commonly known as: NAMENDA   5 mg, Oral, 2 Times Daily      rosuvastatin 20 MG tablet  Commonly known as: CRESTOR   20 mg, Oral, Nightly      traZODone 100 MG tablet  Commonly known as: DESYREL   100 mg, Oral, Nightly         Stop These Medications    rOPINIRole XL 6 MG tablet sustained-release 24 hour 24 hr tablet  Commonly known as: REQUIP XL  Replaced by: rOPINIRole 2 MG tablet            No Known Allergies      Discharge Disposition:  Skilled Nursing Facility ([place] - External)    Diet:  Hospital:  Diet Order   Procedures   • Diet Regular; Cardiac, Consistent Carbohydrate       Activity:  Activity Instructions     Activity as Tolerated            Restrictions or Other Recommendations:         CODE STATUS:    Code Status and Medical Interventions:   Ordered at: 07/09/22 2157     Medical Intervention Limits:    NO intubation (DNI)     Level Of Support Discussed With:    Patient     Code Status (Patient has no pulse and is not breathing):    No CPR (Do Not Attempt to Resuscitate)     Medical Interventions (Patient has pulse or is breathing):    Limited Support       No future appointments.    Additional Instructions for the Follow-ups that You Need to Schedule     Discharge Follow-up with PCP   As directed       Currently Documented PCP:    Wilmer Andres MD    PCP Phone Number:    [phone]     Follow Up Details: 2 weeks          Discharge Follow-up with Specified Provider: [facility] 4-6 weeks   As directed      To: [facility] 4-6 weeks                     Tavares Spencer MD  07/14/22      Time Spent on Discharge:  I spent  40  minutes on this discharge activity which included: face-to-face encounter with the patient, reviewing the data in the system, coordination of the care with the nursing staff as well as consultants, documentation, and entering orders.

## 2022-07-14 NOTE — NURSING NOTE
Report called to [facility] rehab facility. Spoke with BEV Pearson. Family at bedside. Per pt. Request transport canceled. Pt transported to [facility] by POV with family. Personal belongings sent with patient and facility updated on tranpsort status. GCS 15 and vitals stable at discharge.

## 2022-07-15 LAB
BACTERIA SPEC AEROBE CULT: NORMAL
BACTERIA SPEC AEROBE CULT: NORMAL

## 2022-08-17 ENCOUNTER — APPOINTMENT (OUTPATIENT)
Dept: GENERAL RADIOLOGY | Facility: HOSPITAL | Age: 82
End: 2022-08-17

## 2022-08-17 ENCOUNTER — APPOINTMENT (OUTPATIENT)
Dept: CT IMAGING | Facility: HOSPITAL | Age: 82
End: 2022-08-17

## 2022-08-17 ENCOUNTER — HOSPITAL ENCOUNTER (INPATIENT)
Facility: HOSPITAL | Age: 82
LOS: 3 days | Discharge: NURSING, REHAB OR LONG-TERM CARE | End: 2022-08-20
Attending: EMERGENCY MEDICINE | Admitting: INTERNAL MEDICINE

## 2022-08-17 DIAGNOSIS — R55 SYNCOPE AND COLLAPSE: Primary | ICD-10-CM

## 2022-08-17 DIAGNOSIS — R09.02 HYPOXEMIA: ICD-10-CM

## 2022-08-17 PROBLEM — E78.5 HYPERLIPIDEMIA: Status: ACTIVE | Noted: 2022-08-17

## 2022-08-17 PROBLEM — I49.9 ARRHYTHMIA: Status: ACTIVE | Noted: 2022-08-17

## 2022-08-17 PROBLEM — R41.82 ALTERED MENTAL STATUS, UNSPECIFIED ALTERED MENTAL STATUS TYPE: Status: RESOLVED | Noted: 2022-07-09 | Resolved: 2022-08-17

## 2022-08-17 PROBLEM — I10 ESSENTIAL HYPERTENSION: Status: ACTIVE | Noted: 2022-08-17

## 2022-08-17 LAB
ALBUMIN SERPL-MCNC: 4 G/DL (ref 3.5–5.2)
ALBUMIN/GLOB SERPL: 1.3 G/DL
ALP SERPL-CCNC: 101 U/L (ref 39–117)
ALT SERPL W P-5'-P-CCNC: 16 U/L (ref 1–41)
ANION GAP SERPL CALCULATED.3IONS-SCNC: 9 MMOL/L (ref 5–15)
ARTERIAL PATENCY WRIST A: ABNORMAL
AST SERPL-CCNC: 27 U/L (ref 1–40)
ATMOSPHERIC PRESS: ABNORMAL MM[HG]
BASE EXCESS BLDA CALC-SCNC: 1.1 MMOL/L (ref 0–2)
BASOPHILS # BLD AUTO: 0.02 10*3/MM3 (ref 0–0.2)
BASOPHILS NFR BLD AUTO: 0.4 % (ref 0–1.5)
BDY SITE: ABNORMAL
BILIRUB SERPL-MCNC: 2.2 MG/DL (ref 0–1.2)
BILIRUB UR QL STRIP: NEGATIVE
BODY TEMPERATURE: 37 C
BUN SERPL-MCNC: 19 MG/DL (ref 8–23)
BUN/CREAT SERPL: 16.4 (ref 7–25)
CALCIUM SPEC-SCNC: 9.1 MG/DL (ref 8.6–10.5)
CHLORIDE SERPL-SCNC: 107 MMOL/L (ref 98–107)
CK SERPL-CCNC: 85 U/L (ref 20–200)
CLARITY UR: ABNORMAL
CO2 BLDA-SCNC: 25.6 MMOL/L (ref 22–33)
CO2 SERPL-SCNC: 26 MMOL/L (ref 22–29)
COHGB MFR BLD: 1.1 % (ref 0–2)
COLOR UR: ABNORMAL
CREAT SERPL-MCNC: 1.16 MG/DL (ref 0.76–1.27)
D DIMER PPP FEU-MCNC: 3.07 MCGFEU/ML (ref 0.01–0.5)
D-LACTATE SERPL-SCNC: 1.5 MMOL/L (ref 0.5–2)
DEPRECATED RDW RBC AUTO: 43.5 FL (ref 37–54)
EGFRCR SERPLBLD CKD-EPI 2021: 62.1 ML/MIN/1.73
EOSINOPHIL # BLD AUTO: 0.13 10*3/MM3 (ref 0–0.4)
EOSINOPHIL NFR BLD AUTO: 2.3 % (ref 0.3–6.2)
EPAP: 0
ERYTHROCYTE [DISTWIDTH] IN BLOOD BY AUTOMATED COUNT: 13.2 % (ref 12.3–15.4)
FLUAV RNA RESP QL NAA+PROBE: NOT DETECTED
FLUBV RNA RESP QL NAA+PROBE: NOT DETECTED
GLOBULIN UR ELPH-MCNC: 3 GM/DL
GLUCOSE SERPL-MCNC: 118 MG/DL (ref 65–99)
GLUCOSE UR STRIP-MCNC: NEGATIVE MG/DL
HCO3 BLDA-SCNC: 24.6 MMOL/L (ref 20–26)
HCT VFR BLD AUTO: 39.3 % (ref 37.5–51)
HCT VFR BLD CALC: 40.5 % (ref 38–51)
HGB BLD-MCNC: 13.7 G/DL (ref 13–17.7)
HGB BLDA-MCNC: 13.2 G/DL (ref 13.5–17.5)
HGB UR QL STRIP.AUTO: NEGATIVE
HOLD SPECIMEN: NORMAL
IMM GRANULOCYTES # BLD AUTO: 0.01 10*3/MM3 (ref 0–0.05)
IMM GRANULOCYTES NFR BLD AUTO: 0.2 % (ref 0–0.5)
INHALED O2 CONCENTRATION: 28 %
IPAP: 0
KETONES UR QL STRIP: ABNORMAL
LEUKOCYTE ESTERASE UR QL STRIP.AUTO: NEGATIVE
LYMPHOCYTES # BLD AUTO: 1.18 10*3/MM3 (ref 0.7–3.1)
LYMPHOCYTES NFR BLD AUTO: 21.3 % (ref 19.6–45.3)
MAGNESIUM SERPL-MCNC: 2.2 MG/DL (ref 1.6–2.4)
MCH RBC QN AUTO: 31.4 PG (ref 26.6–33)
MCHC RBC AUTO-ENTMCNC: 34.9 G/DL (ref 31.5–35.7)
MCV RBC AUTO: 89.9 FL (ref 79–97)
METHGB BLD QL: 0 % (ref 0–1.5)
MODALITY: ABNORMAL
MONOCYTES # BLD AUTO: 0.51 10*3/MM3 (ref 0.1–0.9)
MONOCYTES NFR BLD AUTO: 9.2 % (ref 5–12)
MYOGLOBIN SERPL-MCNC: 59.1 NG/ML (ref 28–72)
NEUTROPHILS NFR BLD AUTO: 3.69 10*3/MM3 (ref 1.7–7)
NEUTROPHILS NFR BLD AUTO: 66.6 % (ref 42.7–76)
NITRITE UR QL STRIP: NEGATIVE
NOTE: ABNORMAL
NRBC BLD AUTO-RTO: 0 /100 WBC (ref 0–0.2)
OXYHGB MFR BLDV: 93 % (ref 94–99)
PAW @ PEAK INSP FLOW SETTING VENT: 0 CMH2O
PCO2 BLDA: 34.6 MM HG (ref 35–45)
PCO2 TEMP ADJ BLD: 34.6 MM HG (ref 35–48)
PH BLDA: 7.46 PH UNITS (ref 7.35–7.45)
PH UR STRIP.AUTO: 6.5 [PH] (ref 5–8)
PH, TEMP CORRECTED: 7.46 PH UNITS
PLATELET # BLD AUTO: 161 10*3/MM3 (ref 140–450)
PMV BLD AUTO: 9 FL (ref 6–12)
PO2 BLDA: 62.2 MM HG (ref 83–108)
PO2 TEMP ADJ BLD: 62.2 MM HG (ref 83–108)
POTASSIUM SERPL-SCNC: 4.2 MMOL/L (ref 3.5–5.2)
PROCALCITONIN SERPL-MCNC: 0.07 NG/ML (ref 0–0.25)
PROT SERPL-MCNC: 7 G/DL (ref 6–8.5)
PROT UR QL STRIP: ABNORMAL
QT INTERVAL: 426 MS
QT INTERVAL: 484 MS
QTC INTERVAL: 403 MS
QTC INTERVAL: 437 MS
RBC # BLD AUTO: 4.37 10*6/MM3 (ref 4.14–5.8)
SARS-COV-2 RNA RESP QL NAA+PROBE: NOT DETECTED
SODIUM SERPL-SCNC: 142 MMOL/L (ref 136–145)
SP GR UR STRIP: 1.02 (ref 1–1.03)
T4 FREE SERPL-MCNC: 1.26 NG/DL (ref 0.93–1.7)
TOTAL RATE: 0 BREATHS/MINUTE
TROPONIN T SERPL-MCNC: <0.01 NG/ML (ref 0–0.03)
TROPONIN T SERPL-MCNC: <0.01 NG/ML (ref 0–0.03)
TSH SERPL DL<=0.05 MIU/L-ACNC: 1.96 UIU/ML (ref 0.27–4.2)
UROBILINOGEN UR QL STRIP: ABNORMAL
WBC NRBC COR # BLD: 5.54 10*3/MM3 (ref 3.4–10.8)
WHOLE BLOOD HOLD COAG: NORMAL
WHOLE BLOOD HOLD SPECIMEN: NORMAL

## 2022-08-17 PROCEDURE — 82550 ASSAY OF CK (CPK): CPT | Performed by: EMERGENCY MEDICINE

## 2022-08-17 PROCEDURE — 84443 ASSAY THYROID STIM HORMONE: CPT | Performed by: PHYSICIAN ASSISTANT

## 2022-08-17 PROCEDURE — 81003 URINALYSIS AUTO W/O SCOPE: CPT | Performed by: EMERGENCY MEDICINE

## 2022-08-17 PROCEDURE — 83605 ASSAY OF LACTIC ACID: CPT | Performed by: EMERGENCY MEDICINE

## 2022-08-17 PROCEDURE — 36600 WITHDRAWAL OF ARTERIAL BLOOD: CPT

## 2022-08-17 PROCEDURE — 87636 SARSCOV2 & INF A&B AMP PRB: CPT | Performed by: EMERGENCY MEDICINE

## 2022-08-17 PROCEDURE — 83050 HGB METHEMOGLOBIN QUAN: CPT

## 2022-08-17 PROCEDURE — 80053 COMPREHEN METABOLIC PANEL: CPT | Performed by: EMERGENCY MEDICINE

## 2022-08-17 PROCEDURE — 71275 CT ANGIOGRAPHY CHEST: CPT

## 2022-08-17 PROCEDURE — 84484 ASSAY OF TROPONIN QUANT: CPT | Performed by: EMERGENCY MEDICINE

## 2022-08-17 PROCEDURE — 85025 COMPLETE CBC W/AUTO DIFF WBC: CPT | Performed by: EMERGENCY MEDICINE

## 2022-08-17 PROCEDURE — 0 IOPAMIDOL PER 1 ML: Performed by: EMERGENCY MEDICINE

## 2022-08-17 PROCEDURE — 99285 EMERGENCY DEPT VISIT HI MDM: CPT

## 2022-08-17 PROCEDURE — 85379 FIBRIN DEGRADATION QUANT: CPT | Performed by: EMERGENCY MEDICINE

## 2022-08-17 PROCEDURE — 84145 PROCALCITONIN (PCT): CPT | Performed by: EMERGENCY MEDICINE

## 2022-08-17 PROCEDURE — 83874 ASSAY OF MYOGLOBIN: CPT | Performed by: EMERGENCY MEDICINE

## 2022-08-17 PROCEDURE — 25010000002 HEPARIN (PORCINE) PER 1000 UNITS: Performed by: PHYSICIAN ASSISTANT

## 2022-08-17 PROCEDURE — 71045 X-RAY EXAM CHEST 1 VIEW: CPT

## 2022-08-17 PROCEDURE — 82375 ASSAY CARBOXYHB QUANT: CPT

## 2022-08-17 PROCEDURE — 83735 ASSAY OF MAGNESIUM: CPT | Performed by: EMERGENCY MEDICINE

## 2022-08-17 PROCEDURE — 84439 ASSAY OF FREE THYROXINE: CPT | Performed by: PHYSICIAN ASSISTANT

## 2022-08-17 PROCEDURE — 93005 ELECTROCARDIOGRAM TRACING: CPT | Performed by: EMERGENCY MEDICINE

## 2022-08-17 PROCEDURE — 82805 BLOOD GASES W/O2 SATURATION: CPT

## 2022-08-17 PROCEDURE — 99220 PR INITIAL OBSERVATION CARE/DAY 70 MINUTES: CPT | Performed by: INTERNAL MEDICINE

## 2022-08-17 RX ORDER — SODIUM CHLORIDE 0.9 % (FLUSH) 0.9 %
10 SYRINGE (ML) INJECTION AS NEEDED
Status: DISCONTINUED | OUTPATIENT
Start: 2022-08-17 | End: 2022-08-20 | Stop reason: HOSPADM

## 2022-08-17 RX ORDER — ACETAMINOPHEN 325 MG/1
650 TABLET ORAL EVERY 4 HOURS PRN
Status: DISCONTINUED | OUTPATIENT
Start: 2022-08-17 | End: 2022-08-20 | Stop reason: HOSPADM

## 2022-08-17 RX ORDER — CHOLECALCIFEROL (VITAMIN D3) 125 MCG
5 CAPSULE ORAL NIGHTLY PRN
Status: DISCONTINUED | OUTPATIENT
Start: 2022-08-17 | End: 2022-08-20 | Stop reason: HOSPADM

## 2022-08-17 RX ORDER — DONEPEZIL HYDROCHLORIDE 10 MG/1
10 TABLET, FILM COATED ORAL NIGHTLY
Status: DISCONTINUED | OUTPATIENT
Start: 2022-08-17 | End: 2022-08-20 | Stop reason: HOSPADM

## 2022-08-17 RX ORDER — HEPARIN SODIUM 5000 [USP'U]/ML
5000 INJECTION, SOLUTION INTRAVENOUS; SUBCUTANEOUS EVERY 8 HOURS SCHEDULED
Status: DISCONTINUED | OUTPATIENT
Start: 2022-08-17 | End: 2022-08-20 | Stop reason: HOSPADM

## 2022-08-17 RX ORDER — ROSUVASTATIN CALCIUM 20 MG/1
20 TABLET, COATED ORAL NIGHTLY
Status: DISCONTINUED | OUTPATIENT
Start: 2022-08-17 | End: 2022-08-20 | Stop reason: HOSPADM

## 2022-08-17 RX ORDER — SODIUM CHLORIDE 0.9 % (FLUSH) 0.9 %
10 SYRINGE (ML) INJECTION EVERY 12 HOURS SCHEDULED
Status: DISCONTINUED | OUTPATIENT
Start: 2022-08-17 | End: 2022-08-20 | Stop reason: HOSPADM

## 2022-08-17 RX ORDER — MEMANTINE HYDROCHLORIDE 10 MG/1
5 TABLET ORAL EVERY 12 HOURS SCHEDULED
Status: DISCONTINUED | OUTPATIENT
Start: 2022-08-17 | End: 2022-08-20 | Stop reason: HOSPADM

## 2022-08-17 RX ORDER — ASPIRIN 81 MG/1
81 TABLET ORAL DAILY
Status: DISCONTINUED | OUTPATIENT
Start: 2022-08-18 | End: 2022-08-20 | Stop reason: HOSPADM

## 2022-08-17 RX ORDER — ROPINIROLE 2 MG/1
2 TABLET, FILM COATED ORAL EVERY 8 HOURS SCHEDULED
Status: DISCONTINUED | OUTPATIENT
Start: 2022-08-17 | End: 2022-08-20 | Stop reason: HOSPADM

## 2022-08-17 RX ADMIN — MEMANTINE 5 MG: 10 TABLET ORAL at 22:15

## 2022-08-17 RX ADMIN — DONEPEZIL HYDROCHLORIDE 10 MG: 10 TABLET, FILM COATED ORAL at 22:15

## 2022-08-17 RX ADMIN — IOPAMIDOL 100 ML: 755 INJECTION, SOLUTION INTRAVENOUS at 17:29

## 2022-08-17 RX ADMIN — ROSUVASTATIN CALCIUM 20 MG: 20 TABLET, FILM COATED ORAL at 22:15

## 2022-08-17 RX ADMIN — Medication 10 ML: at 22:26

## 2022-08-17 RX ADMIN — HEPARIN SODIUM 5000 UNITS: 5000 INJECTION INTRAVENOUS; SUBCUTANEOUS at 22:15

## 2022-08-17 RX ADMIN — ROPINIROLE HYDROCHLORIDE 2 MG: 2 TABLET, FILM COATED ORAL at 22:15

## 2022-08-17 NOTE — ED PROVIDER NOTES
Subjective   Pt presents with altered mental status.  He was found unresponsive in the passenger seat of his car.  EMS reports he had sats 76% room air.  They applied oxygen and brought him in.  They say they saw a few seconds of VT but it resolved before they could obtain a tracing of it.    Pt is somewhat lethargic at initial evaluation.    History of Parkinsons and has a nerve stimulator.  Also prostate cancer, dementia and chronic back pain.      History provided by:  EMS personnel  History limited by:  Mental status change      Review of Systems   Unable to perform ROS: Mental status change       Past Medical History:   Diagnosis Date   • Broken arms    • Cancer (HCC)     prostate   • Lower back injury    • Parkinson's disease (HCC)        No Known Allergies    Past Surgical History:   Procedure Laterality Date   • CHOLECYSTECTOMY     • PROSTATE SURGERY     • REPLACEMENT TOTAL KNEE BILATERAL         Family History   Problem Relation Age of Onset   • Heart disease Mother    • Cancer Father    • Stroke Father    • Stroke Maternal Grandmother    • Cancer Paternal Grandfather        Social History     Socioeconomic History   • Marital status:    Tobacco Use   • Smoking status: Never Smoker   • Smokeless tobacco: Never Used   Vaping Use   • Vaping Use: Never used   Substance and Sexual Activity   • Alcohol use: No   • Drug use: No   • Sexual activity: Defer     Partners: Female     Comment: . wife passed away in 2008           Objective   Physical Exam  Vitals and nursing note reviewed.   Constitutional:       General: He is not in acute distress.     Appearance: Normal appearance. He is not ill-appearing.   HENT:      Head: Normocephalic and atraumatic.      Mouth/Throat:      Mouth: Mucous membranes are moist.   Eyes:      General: No scleral icterus.        Right eye: No discharge.         Left eye: No discharge.      Conjunctiva/sclera: Conjunctivae normal.   Cardiovascular:      Rate and Rhythm:  Normal rate and regular rhythm.      Heart sounds: No murmur heard.  Pulmonary:      Effort: Pulmonary effort is normal. No respiratory distress.      Breath sounds: Normal breath sounds. No wheezing.   Abdominal:      General: Bowel sounds are normal. There is no distension.      Palpations: Abdomen is soft.      Tenderness: There is no abdominal tenderness. There is no guarding or rebound.   Musculoskeletal:         General: No swelling. Normal range of motion.      Cervical back: Normal range of motion and neck supple.   Skin:     General: Skin is warm and dry.      Findings: No rash.   Neurological:      Mental Status: He is lethargic.      Motor: Weakness present.      Comments: Generalized weakness, no focal deficits.  Answers questions with nodding head or one word answers.   Psychiatric:         Mood and Affect: Mood normal.         Behavior: Behavior normal.         Thought Content: Thought content normal.         Procedures           ED Course         EKG SB, 1st degree AV block.  ABG compensated, mildly low pO2.  Labs benign, except positive D-dimer.  CTA chest negative for acute process.    Pt more alert on rechecks.  He says he went out to the car to wait on a friend to drive him to an appointment.  He sat in the passenger seat with the door open and waited.  He became acutely nauseated and then fainted.  He feels better now.  He says this is similar to previous visit, admitted here last month for same symptoms and then went to SNF for a couple of weeks.    I discussed case with pt's PCP Dr Andres.  He said pt lives in an unsafe environment (steep stairs) and he doesn't think pt should be living alone anymore.      With the history of unexplained syncope and the report of VT on the monitor during transport I think the only safe plan is observation admission.                                  MDM  Number of Diagnoses or Management Options     Amount and/or Complexity of Data Reviewed  Clinical lab tests:  ordered and reviewed  Tests in the radiology section of CPT®: ordered and reviewed  Discuss the patient with other providers: yes  Independent visualization of images, tracings, or specimens: yes        Final diagnoses:   Syncope and collapse   Hypoxemia       ED Disposition  ED Disposition     ED Disposition   Decision to Admit    Condition   --    Comment   Level of Care: Observation Unit [28]   Diagnosis: Arrhythmia [603725]   Admitting Physician: GLEN MURILLO III [[id number]]   Attending Physician: GLEN MURILLO III [[id number]]   Bed Request Comments: obs tele (CDU)               No follow-up provider specified.       Medication List      No changes were made to your prescriptions during this visit.          Og Grider MD  08/17/22 2493

## 2022-08-18 PROBLEM — K92.0 HEMATEMESIS WITH NAUSEA: Status: RESOLVED | Noted: 2021-06-29 | Resolved: 2022-08-18

## 2022-08-18 PROBLEM — R11.0 NAUSEA: Status: RESOLVED | Noted: 2018-01-25 | Resolved: 2022-08-18

## 2022-08-18 PROBLEM — S61.412S: Status: RESOLVED | Noted: 2017-10-03 | Resolved: 2022-08-18

## 2022-08-18 PROBLEM — I65.23 BILATERAL CAROTID ARTERY STENOSIS: Status: ACTIVE | Noted: 2022-08-18

## 2022-08-18 PROBLEM — R78.81 POSITIVE BLOOD CULTURE: Status: RESOLVED | Noted: 2021-07-02 | Resolved: 2022-08-18
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
## 2022-08-18 PROBLEM — R29.6 FALLS: Status: RESOLVED | Noted: 2017-10-03 | Resolved: 2022-08-18

## 2022-08-18 LAB
ANION GAP SERPL CALCULATED.3IONS-SCNC: 8 MMOL/L (ref 5–15)
BASOPHILS # BLD AUTO: 0.03 10*3/MM3 (ref 0–0.2)
BASOPHILS NFR BLD AUTO: 0.6 % (ref 0–1.5)
BUN SERPL-MCNC: 17 MG/DL (ref 8–23)
BUN/CREAT SERPL: 15.3 (ref 7–25)
CALCIUM SPEC-SCNC: 8.8 MG/DL (ref 8.6–10.5)
CHLORIDE SERPL-SCNC: 108 MMOL/L (ref 98–107)
CO2 SERPL-SCNC: 24 MMOL/L (ref 22–29)
CREAT SERPL-MCNC: 1.11 MG/DL (ref 0.76–1.27)
DEPRECATED RDW RBC AUTO: 43.8 FL (ref 37–54)
EGFRCR SERPLBLD CKD-EPI 2021: 65.5 ML/MIN/1.73
EOSINOPHIL # BLD AUTO: 0.13 10*3/MM3 (ref 0–0.4)
EOSINOPHIL NFR BLD AUTO: 2.8 % (ref 0.3–6.2)
ERYTHROCYTE [DISTWIDTH] IN BLOOD BY AUTOMATED COUNT: 13.5 % (ref 12.3–15.4)
GLUCOSE SERPL-MCNC: 105 MG/DL (ref 65–99)
HCT VFR BLD AUTO: 37.7 % (ref 37.5–51)
HGB BLD-MCNC: 13 G/DL (ref 13–17.7)
IMM GRANULOCYTES # BLD AUTO: 0 10*3/MM3 (ref 0–0.05)
IMM GRANULOCYTES NFR BLD AUTO: 0 % (ref 0–0.5)
LYMPHOCYTES # BLD AUTO: 1.21 10*3/MM3 (ref 0.7–3.1)
LYMPHOCYTES NFR BLD AUTO: 26.2 % (ref 19.6–45.3)
MCH RBC QN AUTO: 31.2 PG (ref 26.6–33)
MCHC RBC AUTO-ENTMCNC: 34.5 G/DL (ref 31.5–35.7)
MCV RBC AUTO: 90.4 FL (ref 79–97)
MONOCYTES # BLD AUTO: 0.44 10*3/MM3 (ref 0.1–0.9)
MONOCYTES NFR BLD AUTO: 9.5 % (ref 5–12)
NEUTROPHILS NFR BLD AUTO: 2.81 10*3/MM3 (ref 1.7–7)
NEUTROPHILS NFR BLD AUTO: 60.9 % (ref 42.7–76)
NRBC BLD AUTO-RTO: 0 /100 WBC (ref 0–0.2)
PLATELET # BLD AUTO: 149 10*3/MM3 (ref 140–450)
PMV BLD AUTO: 9 FL (ref 6–12)
POTASSIUM SERPL-SCNC: 3.7 MMOL/L (ref 3.5–5.2)
RBC # BLD AUTO: 4.17 10*6/MM3 (ref 4.14–5.8)
SODIUM SERPL-SCNC: 140 MMOL/L (ref 136–145)
TROPONIN T SERPL-MCNC: 0.01 NG/ML (ref 0–0.03)
TROPONIN T SERPL-MCNC: <0.01 NG/ML (ref 0–0.03)
WBC NRBC COR # BLD: 4.62 10*3/MM3 (ref 3.4–10.8)

## 2022-08-18 PROCEDURE — 97162 PT EVAL MOD COMPLEX 30 MIN: CPT

## 2022-08-18 PROCEDURE — 84484 ASSAY OF TROPONIN QUANT: CPT | Performed by: INTERNAL MEDICINE

## 2022-08-18 PROCEDURE — 99221 1ST HOSP IP/OBS SF/LOW 40: CPT | Performed by: INTERNAL MEDICINE

## 2022-08-18 PROCEDURE — 99232 SBSQ HOSP IP/OBS MODERATE 35: CPT | Performed by: HOSPITALIST

## 2022-08-18 PROCEDURE — 97116 GAIT TRAINING THERAPY: CPT

## 2022-08-18 PROCEDURE — 97166 OT EVAL MOD COMPLEX 45 MIN: CPT

## 2022-08-18 PROCEDURE — 85025 COMPLETE CBC W/AUTO DIFF WBC: CPT | Performed by: PHYSICIAN ASSISTANT

## 2022-08-18 PROCEDURE — 97535 SELF CARE MNGMENT TRAINING: CPT

## 2022-08-18 PROCEDURE — 25010000002 HEPARIN (PORCINE) PER 1000 UNITS: Performed by: PHYSICIAN ASSISTANT

## 2022-08-18 PROCEDURE — 80048 BASIC METABOLIC PNL TOTAL CA: CPT | Performed by: PHYSICIAN ASSISTANT

## 2022-08-18 RX ADMIN — ROSUVASTATIN CALCIUM 20 MG: 20 TABLET, FILM COATED ORAL at 21:24

## 2022-08-18 RX ADMIN — DONEPEZIL HYDROCHLORIDE 10 MG: 10 TABLET, FILM COATED ORAL at 21:24

## 2022-08-18 RX ADMIN — ROPINIROLE HYDROCHLORIDE 2 MG: 2 TABLET, FILM COATED ORAL at 21:24

## 2022-08-18 RX ADMIN — Medication 10 ML: at 09:07

## 2022-08-18 RX ADMIN — Medication 10 ML: at 21:25

## 2022-08-18 RX ADMIN — ROPINIROLE HYDROCHLORIDE 2 MG: 2 TABLET, FILM COATED ORAL at 06:08

## 2022-08-18 RX ADMIN — HEPARIN SODIUM 5000 UNITS: 5000 INJECTION INTRAVENOUS; SUBCUTANEOUS at 14:18

## 2022-08-18 RX ADMIN — MEMANTINE 5 MG: 10 TABLET ORAL at 09:07

## 2022-08-18 RX ADMIN — HEPARIN SODIUM 5000 UNITS: 5000 INJECTION INTRAVENOUS; SUBCUTANEOUS at 21:24

## 2022-08-18 RX ADMIN — HEPARIN SODIUM 5000 UNITS: 5000 INJECTION INTRAVENOUS; SUBCUTANEOUS at 06:08

## 2022-08-18 RX ADMIN — ROPINIROLE HYDROCHLORIDE 2 MG: 2 TABLET, FILM COATED ORAL at 14:18

## 2022-08-18 RX ADMIN — ASPIRIN 81 MG: 81 TABLET, COATED ORAL at 09:07

## 2022-08-18 RX ADMIN — MEMANTINE 5 MG: 10 TABLET ORAL at 21:24

## 2022-08-18 NOTE — CONSULTS
[facility] Electrophyiology        Date of Hospital Visit: 22      Place of Service[facility]    Patient Name: Jhoan Gagnon  :1940    Referral Provider: Hospitalist  Primary Care Provider: Wilmer Andres MD      Chief complaint/Reason for Consultation:  Syncope and VT         Problem List:  Active Hospital Problems    Diagnosis    • **Syncope and collapse      · Presentation of [facility] ED 10/3/17,  7-10-22,  22     • Arrhythmia      · 6-day Holter monitor, 1/15/2020: Average heart rate 66 beats per minute minimum 35 and maximum 124 bpm occasional PAC and PVC  · Echo, 7/10/2022: Estimated left ventricular EF = 65%. No hemodynamically significant valvular heart disease  · Reported as VT by EMS which terminated before a strip could be captured.  No other documentation of VT.  22     • Sinus bradycardia      · Chronic asymptomatic  · HRs stable in 50s at rest and increase to 70s with activity.      • Bilateral carotid artery stenosis      · Carotid duplex, 2021: Proximal right and left carotid artery plaque without significant stenosis, less than 49%  · CTA Neck 22: mild atherosclerosis of the bilateral carotid bifurcations without significant luminal irregularity or narrowing. There is mild atherosclerosis of the aortic arch and origin of the aortic arch branch vessels, without flow-limiting stenosis or luminal irregularity.     • Essential hypertension    • Hyperlipidemia    • First degree atrioventricular block    • Parkinson's disease (HCC)          History of Present Illness:  This is an 82-year old hypertensive, dyslipidemic male whose cardiac history is significant for chronic, asymptomatic bradycardia and a first-degree AV block.  He presented to [facility] emergency department yesterday by EMS after his third known syncopal/presyncopal episode.  He was admitted for very similar circumstances in July of this year.  He had an echocardiogram  "showing normal LV systolic function and normal valvular morphology.  He is also had a carotid duplex study which shows nonobstructive bilateral carotid artery disease.  On both of these occasions he was found in the car slumped over and minimally responsive.  Yesterday EMS was alerted and reported an initial rhythm of ventricular tachycardia which resolved before it could be recorded.  He states he has had a slow heart rate \"all of his life\".  This is asymptomatic and improves with ambulation.  He has no anginal or CHF symptoms, no orthopnea no PND no claudication.  He had no awareness of tachyarrhythmias during either 1 of these events.  He has no known history of atrial fibrillation.  MI has been excluded.  He had a mildly elevated D-dimer with a negative CTA of the chest.  He had CTA of the head and neck during his July admission for the same complaint which showed no occlusive disease.          Past Surgical History:   Procedure Laterality Date   • CHOLECYSTECTOMY     • PROSTATE SURGERY     • REPLACEMENT TOTAL KNEE BILATERAL         No Known Allergies    Current Outpatient Medications   Medication Instructions   • amLODIPine (NORVASC) 5 mg, Oral, Daily   • aspirin 81 mg, Oral, Daily   • atropine 1 % ophthalmic solution 1 drop, Daily PRN, Under tongue as needed    • cyanocobalamin 1,000 mcg, Intramuscular, Every 28 Days   • donepezil (ARICEPT) 10 mg, Oral, Nightly   • memantine (NAMENDA) 5 mg, Oral, 2 Times Daily   • rOPINIRole (REQUIP) 2 mg, Oral, Every 8 Hours Scheduled   • rosuvastatin (CRESTOR) 20 mg, Oral, Nightly   • traZODone (DESYREL) 100 mg, Oral, Nightly          Scheduled Meds:  aspirin, 81 mg, Oral, Daily  donepezil, 10 mg, Oral, Nightly  heparin (porcine), 5,000 Units, Subcutaneous, Q8H  memantine, 5 mg, Oral, Q12H  rOPINIRole, 2 mg, Oral, Q8H  rosuvastatin, 20 mg, Oral, Nightly  sodium chloride, 10 mL, Intravenous, Q12H      Continuous Infusions:         Social History     Socioeconomic History   • " "Marital status:    Tobacco Use   • Smoking status: Never Smoker   • Smokeless tobacco: Never Used   Vaping Use   • Vaping Use: Never used   Substance and Sexual Activity   • Alcohol use: No   • Drug use: No   • Sexual activity: Defer     Partners: Female     Comment: . wife passed away in 2008       Family History   Problem Relation Age of Onset   • Heart disease Mother    • Cancer Father    • Stroke Father    • Stroke Maternal Grandmother    • Cancer Paternal Grandfather        REVIEW OF SYSTEMS:   Review of Systems   Constitutional: Negative.   HENT: Negative.    Eyes: Negative.    Cardiovascular: Positive for near-syncope and syncope. Negative for chest pain, irregular heartbeat and palpitations.   Respiratory: Negative.    Endocrine: Negative.    Hematologic/Lymphatic: Negative.    Skin: Negative.    Musculoskeletal: Negative.    Gastrointestinal: Negative.    Genitourinary: Negative.    Psychiatric/Behavioral: Negative.    Allergic/Immunologic: Negative.    All other systems reviewed and are negative.           Objective:  Vitals:    08/18/22 0323 08/18/22 0500 08/18/22 0630 08/18/22 1108   BP: 129/95  106/67 126/83   BP Location: Right arm  Left arm Left arm   Patient Position: Lying  Lying Sitting   Pulse: 50 (!) 49 50 55   Resp: 18  16 16   Temp: 97.9 °F (36.6 °C)  97.6 °F (36.4 °C) 97.9 °F (36.6 °C)   TempSrc: Oral  Oral Oral   SpO2: 97%  94% 90%   Weight: 88.5 kg (195 lb)      Height:         Body mass index is 30.54 kg/m².  Flowsheet Rows    Flowsheet Row First Filed Value   Admission Height 172.7 cm (68\") Documented at 08/17/2022 1441   Admission Weight 97.1 kg (214 lb) Documented at 08/17/2022 1441        No intake or output data in the 24 hours ending 08/18/22 1228    Vitals and nursing note reviewed.   Constitutional:       Appearance: Not in distress. Frail.   Eyes:      Conjunctiva/sclera: Conjunctivae normal.      Pupils: Pupils are equal, round, and reactive to light.   Neck:      " Vascular: JVD normal.   Pulmonary:      Effort: Pulmonary effort is normal.      Breath sounds: Normal breath sounds.   Chest:      Chest wall: Not tender to palpatation.   Cardiovascular:      Normal rate. Regular rhythm.      Murmurs: There is no murmur.      No gallop. No click. No rub.   Pulses:     Intact distal pulses.   Abdominal:      General: Bowel sounds are normal.      Palpations: Abdomen is soft.   Musculoskeletal: Normal range of motion.         General: No deformity.      Cervical back: Normal range of motion. Skin:     General: Skin is warm and dry.   Neurological:      General: No focal deficit present.      Mental Status: Alert and oriented to person, place, and time.   Psychiatric:         Behavior: Behavior is cooperative.               Lab Review:                CBC:      Lab 08/18/22  0347 08/17/22  1419   WBC 4.62 5.54   HEMOGLOBIN 13.0 13.7   HEMATOCRIT 37.7 39.3   PLATELETS 149 161   NEUTROS ABS 2.81 3.69   IMMATURE GRANS (ABS) 0.00 0.01   LYMPHS ABS 1.21 1.18   MONOS ABS 0.44 0.51   EOS ABS 0.13 0.13   MCV 90.4 89.9     CMP:        Lab 08/18/22  0014 08/17/22  1419   SODIUM 140 142   POTASSIUM 3.7 4.2   CHLORIDE 108* 107   CO2 24.0 26.0   ANION GAP 8.0 9.0   BUN 17 19   CREATININE 1.11 1.16   EGFR 65.5 62.1   GLUCOSE 105* 118*   CALCIUM 8.8 9.1   MAGNESIUM  --  2.2   TOTAL PROTEIN  --  7.0   ALBUMIN  --  4.00   GLOBULIN  --  3.0   ALT (SGPT)  --  16   AST (SGOT)  --  27   BILIRUBIN  --  2.2*   ALK PHOS  --  101     Results from last 7 days   Lab Units 08/17/22  1419   MAGNESIUM mg/dL 2.2     Estimated Creatinine Clearance: 52.6 mL/min (by C-G formula based on SCr of 1.11 mg/dL).    CARDIAC LABS:      Lab 08/18/22  0347 08/18/22  0014 08/17/22  1803 08/17/22  1419   TROPONIN T <0.010 0.010 <0.010 <0.010           EKG/ Telemetry:         Result Review:  I have personally reviewed the results from the time of admission and agree with these findings:  [x]  Laboratory  [x]  Radiology  [x]   EKG/Telemetry   []  Pathology  [x]  Old records  []  Other:        Assessment and Plan:      1.  Syncope/presyncope of unclear etiology  -Presumed secondary to VT  -Normal EF by recent echocardiogram  -Normal electrolytes  -Further evaluation of VT would include: #1 ischemic study either by myocardial perfusion study or left heart catheterization.  #2 EP study with possibility of implantation of ICD if VT is documented.  -His CODE STATUS was reviewed.  Given that he does not desire advanced life-saving measures.  He does not desire cardiac catheterization, EP study or ICD implantation.  I informed him that he cannot drive a car in the [facility], he understands and agrees.  -No utility in proceeding with ischemic study or EP study if the patient does not desire ICD placement.      2.  Chronic, asymptomatic bradycardia   -Avoid AV carla agents    3.  Chronic hypertension   -Current blood pressures are ideal on no antihypertensives.  Consider discontinuation of amlodipine and permissive hypertension at discharge.            CODE STATUS:  DNR/DNI  Medical Intervention Limits: NO intubation (DNI)  Code Status (Patient has no pulse and is not breathing): No CPR (Do Not Attempt to Resuscitate)  Medical Interventions (Patient has pulse or is breathing): Limited Support        Electronically signed by CHICA Mckeon, 08/18/22, 12:44 PM EDT.

## 2022-08-18 NOTE — PLAN OF CARE
Goal Outcome Evaluation:           Progress: no change  Outcome Evaluation: VSS, room air up in chair all day no significant cardiac events noted per monitor will continue to monitor planning for SNF placement once stable per hospitalist.

## 2022-08-18 NOTE — PROGRESS NOTES
[facility] Medicine Services  PROGRESS NOTE    Patient Name: Jhoan Gagnon  : 1940  MRN: [id number]    Date of Admission: 2022  Primary Care Physician: Wilmer Andres MD    Subjective   Subjective     CC:  F/U presyncope    HPI:  Patient seen this morning, no complaints. Awaiting Cardiology evaluation.    ROS:  Gen-no fevers, no chills  CV-no chest pain, no palpitations  Resp-no cough, no dyspnea  GI-no N/V/D, no abd pain    All other systems reviewed and negative except any additional pertinent positives and negatives as discussed in HPI.       Objective   Objective     Vital Signs:   Temp:  [97.6 °F (36.4 °C)-98.1 °F (36.7 °C)] 97.6 °F (36.4 °C)  Heart Rate:  [49-62] 50  Resp:  [16-18] 16  BP: ()/(54-98) 106/67     Physical Exam:  Gen-no acute distress  HENT-NCAT, mucous membranes moist  CV-RRR, S1 S2 normal, no m/r/g  Resp-CTAB, no wheezes or rales  Abd-soft, NT, ND, +BS  Ext-no edema  Neuro-A&Ox3, no focal deficits  Skin-no rashes  Psych-appropriate mood      Results Reviewed:  LAB RESULTS:      Lab 22  0347 22  1419   WBC 4.62 5.54   HEMOGLOBIN 13.0 13.7   HEMATOCRIT 37.7 39.3   PLATELETS 149 161   NEUTROS ABS 2.81 3.69   IMMATURE GRANS (ABS) 0.00 0.01   LYMPHS ABS 1.21 1.18   MONOS ABS 0.44 0.51   EOS ABS 0.13 0.13   MCV 90.4 89.9   PROCALCITONIN  --  0.07   LACTATE  --  1.5   D DIMER QUANT  --  3.07*         Lab 22  0014 22  1803 22  1419   SODIUM 140  --  142   POTASSIUM 3.7  --  4.2   CHLORIDE 108*  --  107   CO2 24.0  --  26.0   ANION GAP 8.0  --  9.0   BUN 17  --  19   CREATININE 1.11  --  1.16   EGFR 65.5  --  62.1   GLUCOSE 105*  --  118*   CALCIUM 8.8  --  9.1   MAGNESIUM  --   --  2.2   TSH  --  1.960  --          Lab 22  1419   TOTAL PROTEIN 7.0   ALBUMIN 4.00   GLOBULIN 3.0   ALT (SGPT) 16   AST (SGOT) 27   BILIRUBIN 2.2*   ALK PHOS 101         Lab 22  0347 22  0014 22  1803 22  1419    TROPONIN T <0.010 0.010 <0.010 <0.010                 Lab 08/17/22  1448   PH, ARTERIAL 7.460*   PCO2, ARTERIAL 34.6*   PO2 ART 62.2*   FIO2 28   HCO3 ART 24.6   BASE EXCESS ART 1.1   CARBOXYHEMOGLOBIN 1.1     Brief Urine Lab Results  (Last result in the past 365 days)      Color   Clarity   Blood   Leuk Est   Nitrite   Protein   CREAT   Urine HCG        08/17/22 1606 Dark Yellow   Cloudy   Negative   Negative   Negative   Trace                 Microbiology Results Abnormal     Procedure Component Value - Date/Time    COVID PRE-OP / PRE-PROCEDURE SCREENING ORDER (NO ISOLATION) - Swab, Nasopharynx [[id number]]  (Normal) Collected: 08/17/22 1434    Lab Status: Final result Specimen: Swab from Nasopharynx Updated: 08/17/22 1455    Narrative:      The following orders were created for panel order COVID PRE-OP / PRE-PROCEDURE SCREENING ORDER (NO ISOLATION) - Swab, Nasopharynx.  Procedure                               Abnormality         Status                     ---------                               -----------         ------                     COVID-19 and FLU A/B PCR...[[id number]]  Normal              Final result                 Please view results for these tests on the individual orders.    COVID-19 and FLU A/B PCR - Swab, Nasopharynx [[id number]]  (Normal) Collected: 08/17/22 1434    Lab Status: Final result Specimen: Swab from Nasopharynx Updated: 08/17/22 1455     COVID19 Not Detected     Influenza A PCR Not Detected     Influenza B PCR Not Detected    Narrative:      Fact sheet for providers: https://www.fda.gov/media/254219/download    Fact sheet for patients: https://www.fda.gov/media/543976/download    Test performed by PCR.          XR Chest 1 View    Result Date: 8/17/2022  DATE OF EXAM: 8/17/2022 2:32 PM  PROCEDURE: XR CHEST 1 VW-  INDICATIONS: Weakness, dizziness, altered mental status.  COMPARISON: 07/09/2022.  TECHNIQUE: Single radiographic view of the chest was obtained.  FINDINGS: The heart is  enlarged. The pulmonary vascular markings are normal. The lungs and pleural spaces are clear of active disease. There is a right-sided vagal nerve stimulator in place.  There are chronic age-related changes involving the bony thorax and thoracic aorta.      Impression:  1. Cardiomegaly. 2. No active pulmonary disease.  This report was finalized on 8/17/2022 2:41 PM by Vern Rosales MD.      CT Angiogram Chest    Result Date: 8/17/2022  DATE OF EXAM: 8/17/2022 5:23 PM  PROCEDURE: CT ANGIOGRAM CHEST-  INDICATIONS: Unresponsive, elevated d-dimer level, questionable acute pulmonary embolic disease.  COMPARISON: CT of the abdomen performed on 06/29/2021.  TECHNIQUE: Contiguous axial imaging was obtained from the thoracic inlet through the upper abdomen following the intravenous administration of 100 mL of Isovue 370. Reconstructed coronal and sagittal images were also obtained. Automated exposure control and iterative reconstruction methods were used.  The radiation dose reduction device was turned on for each scan per the ALARA (As Low as Reasonably Achievable) protocol.  FINDINGS: There are no filling defects in the pulmonary arteries to indicate acute pulmonary embolic disease. The heart size is normal. There are atherosclerotic vascular calcifications including involvement the coronary arteries. There are a few prominent mediastinal lymph nodes felt to be secondary to reactive hyperplasia. There is no hilar adenopathy. There are no layering pleural effusions. There is a thin bandlike linear density in the left lower lobe consistent with a pulmonary scar versus subsegmental atelectasis. The lungs are otherwise clear. There is again prominent walled off mesenteric fat. The visualized mesenteric vessels have a somewhat swirled pattern. This may represent panniculitis and on the previous exam there was suggestion of a congenital malrotation. The findings appear similar to the previous study. There are no suspicious  osteolytic or sclerotic lesions within the bony thorax.       Impression:  1. No acute pulmonary embolic disease. 2. There are a few prominent mediastinal lymph nodes of doubtful clinical significance. There are probably secondary to reactive hyperplasia. 3. Thin bandlike linear scar versus subsegmental atelectasis in the left lower lobe. 4. Stable appearance of the upper abdomen with abnormalities as described above.  This report was finalized on 8/17/2022 5:54 PM by Vern Rosales MD.        Results for orders placed during the hospital encounter of 07/09/22    Adult Transthoracic Echo Complete W/ Cont if Necessary Per Protocol (With Agitated Saline)    Interpretation Summary  · Estimated left ventricular EF = 65%  · Left ventricular wall thickness is consistent with mild concentric hypertrophy.  · Mild dilation of the ascending aorta is present.  · No hemodynamically significant valvular heart disease      I have reviewed the medications:  Scheduled Meds:aspirin, 81 mg, Oral, Daily  donepezil, 10 mg, Oral, Nightly  heparin (porcine), 5,000 Units, Subcutaneous, Q8H  memantine, 5 mg, Oral, Q12H  rOPINIRole, 2 mg, Oral, Q8H  rosuvastatin, 20 mg, Oral, Nightly  sodium chloride, 10 mL, Intravenous, Q12H      Continuous Infusions:   PRN Meds:.•  acetaminophen  •  melatonin  •  sodium chloride  •  sodium chloride    Assessment & Plan   Assessment & Plan     Active Hospital Problems    Diagnosis  POA   • **Arrhythmia [I49.9]  Yes   • Essential hypertension [I10]  Yes   • Hyperlipidemia [E78.5]  Yes   • Syncope and collapse [R55]  Yes   • Syncope [R55]  Yes   • Parkinson's disease (HCC) [G20]  Yes   • Sinus bradycardia [R00.1]  Yes      Resolved Hospital Problems    Diagnosis Date Resolved POA   • Altered mental status, unspecified altered mental status type [R41.82] 08/17/2022 Yes        Brief Hospital Course to date:  Jhoan Gagnon is a 82 y.o. male with a history of Parkinson's disease s/p neurostimulator, prostate  cancer s/p surgery, HTN, and chronic low back pain who presents to [facility] ED due to an episode of near syncope. EMS noted V-tach but resolved before they could obtain tracing of it. Admitted for very similar episode 7/9-7/14/22 with presyncope: negative stroke workup, was thought to be worsening of his Parkinson's disease; discharged to SNF at that time.     This patient's problems and plans were partially entered by my partner and updated as appropriate by me 08/18/22.    *All problems are new to me today.     Arrhythmia, possible V-tach  Near syncopal episode  Bradycardia  --Patient currently stable, chest pain free, and in no acute distress. He is back to baseline per daughter.  --He is bradycardic now between 48-56. Daughter notes that this is typical for him.   --Mg normal. TSH WNL. UA is unremarkable.  --EKG now shows sinus bradycardia with 1st degree block.   --D-dimer was elevated, CTA chest negative for PE.   --Given that he is bradycardic at baseline, with now the note of V-tach by EMS, Cardiology was consulted. They have discussed with patient, who does not desire further invasive workup (LHC, EP study, or ICD implantation)--thus no plans to pursue ischemic evaluation or EP study as no plans for ICD placement. Recommend avoiding AV carla agents and possibly stop Norvasc and allow for permissive hypertension.  --Monitor on telemetry.     Parkinson's disease  --Continue home Aricept and Namenda.   --Continue Requip.  --Avoid Trazodone during this admission. Patient reportedly had hallucinations during last admission after being given this, per daughter and patient. Melatonin okay for sleep.  --PT/OT recommends SNF. CM following.     HTN  HLD  --Holding Norvasc for now, he had some hypotension in the ER as low as 81/67. Cardiology agrees with holding and allowing for permissive hypertension.  --Continue statin.    Expected Discharge Location and Transportation: SNF  Expected Discharge Date: 08/19/22, patient  medically stable for discharge, PT recommended SNF and CM looking into sending him back to [facility]    DVT prophylaxis:  Medical DVT prophylaxis orders are present.          CODE STATUS:   Code Status and Medical Interventions:   Ordered at: 08/17/22 2125     Medical Intervention Limits:    NO intubation (DNI)     Code Status (Patient has no pulse and is not breathing):    No CPR (Do Not Attempt to Resuscitate)     Medical Interventions (Patient has pulse or is breathing):    Limited Support       Arleth Rosenthal MD  08/18/22

## 2022-08-18 NOTE — PLAN OF CARE
Goal Outcome Evaluation:  Plan of Care Reviewed With: patient           Outcome Evaluation: OT evaluation complete.  Pt Ox4 and pain 0/10 this date.  OT monitored HR during tx and pt agreed to get oob.  He is cga for bed mob, min assist for sts and t/f using Rw.  He is max assist for donning socks and states at home it takes him 2 hours to get ready.  He has AE and uses his own methods to perform tasks.  He has weakness, decreased coordination, and decreased  of hands.  Recommend IPOT and SNF for rehab to home.

## 2022-08-18 NOTE — THERAPY EVALUATION
Patient Name: Jhoan Gagnon  : 1940    MRN: [id number]                              Today's Date: 2022       Admit Date: 2022    Visit Dx:     ICD-10-CM ICD-9-CM   1. Syncope and collapse  R55 780.2   2. Hypoxemia  R09.02 799.02     Patient Active Problem List   Diagnosis   • Parkinson's disease (HCC)   • Weakness   • Sinus bradycardia   • Syncope and collapse   • First degree atrioventricular block   • Ataxia   • Leukocytosis   • Arrhythmia   • Essential hypertension   • Hyperlipidemia     Past Medical History:   Diagnosis Date   • Broken arms    • Cancer (HCC)     prostate   • Lower back injury    • Parkinson's disease (HCC)      Past Surgical History:   Procedure Laterality Date   • CHOLECYSTECTOMY     • PROSTATE SURGERY     • REPLACEMENT TOTAL KNEE BILATERAL        General Information     Row Name 22 0855          Physical Therapy Time and Intention    Document Type evaluation  -AE     Mode of Treatment physical therapy  -AE     Row Name 22 0855          General Information    Patient Profile Reviewed yes  -AE     Prior Level of Function independent:;all household mobility;gait;transfer;bed mobility;ADL's;dressing;bathing  -AE     Existing Precautions/Restrictions fall;other (see comments)  Neurotransmitter for Parkinson's; tremors; slow movements  -AE     Barriers to Rehab medically complex  -AE     Row Name 22 0855          Living Environment    People in Home alone  -AE     Row Name 22 0855          Home Main Entrance    Number of Stairs, Main Entrance other (see comments)  14  -AE     Stair Railings, Main Entrance railings on both sides of stairs  -AE     Row Name 22 0855          Stairs Within Home, Primary    Number of Stairs, Within Home, Primary none  -AE     Stair Railings, Within Home, Primary none  -AE     Row Name 22 0855          Cognition    Orientation Status (Cognition) oriented x 4  -AE     Row Name 22 0855          Safety Issues,  Functional Mobility    Safety Issues Affecting Function (Mobility) safety precaution awareness;safety precautions follow-through/compliance  -AE     Impairments Affecting Function (Mobility) balance;coordination;endurance/activity tolerance;motor control;postural/trunk control;strength  -AE           User Key  (r) = Recorded By, (t) = Taken By, (c) = Cosigned By    Initials Name Provider Type    AE Eddie Lopez, [facility] Physical Therapist               Mobility     Row Name 08/18/22 0857          Bed Mobility    Comment, (Bed Mobility) Pt received at EOB and left UI  -AE     Row Name 08/18/22 0857          Transfers    Comment, (Transfers) VCs for hand placement and sequencing of AD. Pt requires cues and manual assistance for placement of hands onto RW.  -AE     Row Name 08/18/22 0857          Bed-Chair Transfer    Bed-Chair [place] (Transfers) minimum assist (75% patient effort);1 person assist;verbal cues  -AE     Assistive Device (Bed-Chair Transfers) walker, front-wheeled  -AE     Row Name 08/18/22 0857          Sit-Stand Transfer    Sit-Stand [place] (Transfers) minimum assist (75% patient effort);1 person assist;verbal cues  -AE     Assistive Device (Sit-Stand Transfers) walker, front-wheeled  -AE     Row Name 08/18/22 0857          Gait/Stairs (Locomotion)    [place] Level (Gait) minimum assist (75% patient effort);1 person assist;verbal cues  -AE     Assistive Device (Gait) walker, front-wheeled  -AE     Distance in Feet (Gait) 10  in room to chair  -AE     Deviations/Abnormal Patterns (Gait) bilateral deviations;base of support, narrow;cedric decreased;gait speed decreased;stride length decreased  Parkinsonian  -AE     Bilateral Gait Deviations forward flexed posture;heel strike decreased  -AE     Comment, (Gait/Stairs) Pt demo step to gait pattern with short shuffled steps, decreased gait speed, and parkinsonian gait with turns and backward steps. Pt demo good safety awareness overall,  understanding what causes his falls and when he feels unsteady. Pt requires cues to improve upright standing posture and step length.  -AE           User Key  (r) = Recorded By, (t) = Taken By, (c) = Cosigned By    Initials Name Provider Type    AE Eddie Lopez, PT Physical Therapist               Obj/Interventions     Row Name 08/18/22 0905          Range of Motion Comprehensive    General Range of Motion bilateral lower extremity ROM WFL  -AE     Row Name 08/18/22 0905          Strength Comprehensive (MMT)    Comment, General Manual Muscle Testing (MMT) Assessment BLE 4+/5  -AE     Row Name 08/18/22 0905          Balance    Balance Assessment sitting static balance;sitting dynamic balance;sit to stand dynamic balance;standing static balance;standing dynamic balance  -AE     Static Sitting Balance standby assist  -AE     Dynamic Sitting Balance standby assist  -AE     Position, Sitting Balance unsupported;sitting edge of bed  -AE     Sit to Stand Dynamic Balance minimal assist;1-person assist;verbal cues  -AE     Static Standing Balance contact guard;1-person assist  -AE     Dynamic Standing Balance minimal assist;1-person assist  -AE     Position/Device Used, Standing Balance supported;walker, front-wheeled  -AE     Row Name 08/18/22 0905          Sensory Assessment (Somatosensory)    Sensory Assessment (Somatosensory) LE sensation intact  -AE           User Key  (r) = Recorded By, (t) = Taken By, (c) = Cosigned By    Initials Name Provider Type    AE Eddie Lopez, PT Physical Therapist               Goals/Plan     Row Name 08/18/22 0910          Bed Mobility Goal 1 (PT)    Activity/Assistive Device (Bed Mobility Goal 1, PT) sit to supine/supine to sit  -AE     [place] Level/Cues Needed (Bed Mobility Goal 1, PT) contact guard required  -AE     Time Frame (Bed Mobility Goal 1, PT) long term goal (LTG);10 days  -AE     Progress/Outcomes (Bed Mobility Goal 1, PT) goal ongoing  -AE     Row Name 08/18/22  0910          Transfer Goal 1 (PT)    Activity/Assistive Device (Transfer Goal 1, PT) sit-to-stand/stand-to-sit;bed-to-chair/chair-to-bed  -AE     [place] Level/Cues Needed (Transfer Goal 1, PT) contact guard required  -AE     Time Frame (Transfer Goal 1, PT) long term goal (LTG);10 days  -AE     Progress/Outcome (Transfer Goal 1, PT) goal ongoing  -AE     Row Name 08/18/22 0910          Gait Training Goal 1 (PT)    Activity/Assistive Device (Gait Training Goal 1, PT) gait (walking locomotion);assistive device use  -AE     [place] Level (Gait Training Goal 1, PT) contact guard required  -AE     Distance (Gait Training Goal 1, PT) 75ft  -AE     Time Frame (Gait Training Goal 1, PT) long term goal (LTG);10 days  -AE     Progress/Outcome (Gait Training Goal 1, PT) goal ongoing  -AE     Row Name 08/18/22 0910          Therapy Assessment/Plan (PT)    Planned Therapy Interventions (PT) balance training;bed mobility training;gait training;home exercise program;patient/family education;postural re-education;ROM (range of motion);strengthening;transfer training  -AE           User Key  (r) = Recorded By, (t) = Taken By, (c) = Cosigned By    Initials Name Provider Type    AE Eddie Lopez, PT Physical Therapist               Clinical Impression     Row Name 08/18/22 0906          Pain    Pretreatment Pain Rating 0/10 - no pain  -AE     Posttreatment Pain Rating 0/10 - no pain  -AE     Row Name 08/18/22 0906          Plan of Care Review    Plan of Care Reviewed With patient  -AE     Progress no change  -AE     Outcome Evaluation PT evaluation completed. Pt presents with decreased independence, decreased balance, and parkinsonian gait that limits independence. Min A x1 for STS. Pt ambulated 10ft in room from EOB to chair with min A x1 and RW. Decreased sequencing noted and required cues to improve gait mechanics. Recommend continued skilled IP PT interventions. Recommend D/C to SNF.  -AE     Row Name 08/18/22 0906           Therapy Assessment/Plan (PT)    Rehab Potential (PT) fair, will monitor progress closely  -AE     Criteria for Skilled Interventions Met (PT) yes  -AE     Therapy Frequency (PT) daily  -AE     Row Name 08/18/22 0906          Vital Signs    Pre Systolic BP Rehab 106  -AE     Pre Treatment Diastolic BP 67  -AE     Pretreatment Heart Rate (beats/min) 53  -AE     Posttreatment Heart Rate (beats/min) 56  -AE     Pre SpO2 (%) 97  -AE     O2 Delivery Pre Treatment room air  -AE     O2 Delivery Intra Treatment room air  -AE     Post SpO2 (%) 96  -AE     O2 Delivery Post Treatment room air  -AE     Pre Patient Position Sitting  -AE     Intra Patient Position Standing  -AE     Post Patient Position Sitting  -AE     Row Name 08/18/22 0906          Positioning and Restraints    Pre-Treatment Position in bed  -AE     Post Treatment Position chair  -AE     In Chair notified nsg;reclined;call light within reach;encouraged to call for assist;exit alarm on;waffle cushion;legs elevated;heels elevated  -AE           User Key  (r) = Recorded By, (t) = Taken By, (c) = Cosigned By    Initials Name Provider Type    Eddie Geronimo, PT Physical Therapist               Outcome Measures     Row Name 08/18/22 0911 08/18/22 0800       How much help from another person do you currently need...    Turning from your back to your side while in flat bed without using bedrails? 4  -AE 4  -JL    Moving from lying on back to sitting on the side of a flat bed without bedrails? 3  -AE 3  -JL    Moving to and from a bed to a chair (including a wheelchair)? 3  -AE 3  -JL    Standing up from a chair using your arms (e.g., wheelchair, bedside chair)? 3  -AE 3  -JL    Climbing 3-5 steps with a railing? 2  -AE 3  -JL    To walk in hospital room? 3  -AE 3  -JL    AM-PAC 6 Clicks Score (PT) 18  -AE 19  -JL    Highest level of mobility 6 --> Walked 10 steps or more  -AE 6 --> Walked 10 steps or more  -JL    Row Name 08/18/22 0911 08/18/22 0800        Functional Assessment    Outcome Measure Options AM-PAC 6 Clicks Basic Mobility (PT)  -AE AM-PAC 6 Clicks Daily Activity (OT)  -SW          User Key  (r) = Recorded By, (t) = Taken By, (c) = Cosigned By    Initials Name Provider Type    SW Anamaria Zafar OT Occupational Therapist    Eddie Geronimo, PT Physical Therapist    Kristofer Helms, RN Registered Nurse                             Physical Therapy Education                 Title: PT OT SLP Therapies (In Progress)     Topic: Physical Therapy (In Progress)     Point: Mobility training (Done)     Learning Progress Summary           Patient Acceptance, E, VU by AE at 8/18/2022 0825                   Point: Home exercise program (Not Started)     Learner Progress:  Not documented in this visit.          Point: Body mechanics (Done)     Learning Progress Summary           Patient Acceptance, E, VU by AE at 8/18/2022 0825                   Point: Precautions (Done)     Learning Progress Summary           Patient Acceptance, E, VU by AE at 8/18/2022 0825                               User Key     Initials Effective Dates Name Provider Type Discipline    AE 09/21/21 -  Eddie Lopez, PT Physical Therapist PT              PT Recommendation and Plan  Planned Therapy Interventions (PT): balance training, bed mobility training, gait training, home exercise program, patient/family education, postural re-education, ROM (range of motion), strengthening, transfer training  Plan of Care Reviewed With: patient  Progress: no change  Outcome Evaluation: PT evaluation completed. Pt presents with decreased independence, decreased balance, and parkinsonian gait that limits independence. Min A x1 for STS. Pt ambulated 10ft in room from EOB to chair with min A x1 and RW. Decreased sequencing noted and required cues to improve gait mechanics. Recommend continued skilled IP PT interventions. Recommend D/C to SNF.     Time Calculation:    PT Charges     Row Name 08/18/22 0912              Time Calculation    Start Time 0825  -AE      PT Received On 08/18/22  -AE      PT Goal Re-Cert Due Date 08/28/22  -AE              Time Calculation- PT    Total Timed Code Minutes- PT 8 minute(s)  -AE              Timed Charges    68959 - Gait Training Minutes  8  -AE              Untimed Charges    PT Eval/Re-eval Minutes 32  -AE              Total Minutes    Timed Charges Total Minutes 8  -AE      Untimed Charges Total Minutes 32  -AE       Total Minutes 40  -AE            User Key  (r) = Recorded By, (t) = Taken By, (c) = Cosigned By    Initials Name Provider Type    AE Eddie Lopez, PT Physical Therapist              Therapy Charges for Today     Code Description Service Date Service Provider Modifiers Qty    87016658795 HC GAIT TRAINING EA 15 MIN 8/18/2022 Eddie Lopez, PT GP 1    99096855575 HC PT EVAL MOD COMPLEXITY 3 8/18/2022 Eddie Lopez, PT GP 1          PT G-Codes  Outcome Measure Options: AM-PAC 6 Clicks Basic Mobility (PT)  AM-PAC 6 Clicks Score (PT): 18  AM-PAC 6 Clicks Score (OT): 18    Eddie Lopez PT  8/18/2022

## 2022-08-18 NOTE — PLAN OF CARE
Goal Outcome Evaluation:  Plan of Care Reviewed With: patient        Progress: no change  Outcome Evaluation: PT evaluation completed. Pt presents with decreased independence, decreased balance, and parkinsonian gait that limits independence. Min A x1 for STS. Pt ambulated 10ft in room from EOB to chair with min A x1 and RW. Decreased sequencing noted and required cues to improve gait mechanics. Recommend continued skilled IP PT interventions. Recommend D/C to SNF.

## 2022-08-18 NOTE — CASE MANAGEMENT/SOCIAL WORK
Discharge Planning Assessment  [facility]     Patient Name: Jhoan Gagnon  MRN: [id number]  Today's Date: 8/18/2022    Admit Date: 8/17/2022     Discharge Needs Assessment     Row Name 08/18/22 1003       Living Environment    People in Home alone    Current Living Arrangements home    Living Arrangement Comments Lives in a one level basement apartment of a couples home. He does his own cooking and personall care. Has assist with housekeeping and shopping.       Discharge Needs Assessment    Equipment Currently Used at Home cane, straight;walker, rolling    Equipment Needed After Discharge none    Discharge Coordination/Progress DCed 10 days ago from [facility]. Was there for 15 days. Has done [facility] outpt [place] in the past and had [facility] and [facility] in the past.               Discharge Plan     Row Name 08/18/22 1006       Plan    Plan SNF at [place]    Patient/Family in Agreement with Plan yes    Plan Comments I spoke with the pt. He hopes to go home at [place]. SNF is recommended. The pt would like to return to [facility] if an SNF is needed. I spoke with his daughter Anamaria who is in agreement with SNF. I called the referral to Lilia with [facility] to [place]. Second option is [facility].    Final Discharge Disposition Code 03 - skilled nursing facility (SNF)    Row Name 08/18/22 0759       Plan    Final Discharge Disposition Code 01 - home or self-care    Row Name 08/18/22 0754       Plan    Final Discharge Disposition Code 01 - home or self-care              Continued Care and Services - Admitted Since 8/17/2022    Coordination has not been started for this encounter.     Selected Continued Care - Prior Encounters Includes selections from prior encounters from 5/19/2022 to 8/18/2022    Discharged on 7/14/2022 Admission date: 7/9/2022 - Discharge disposition: Skilled Nursing Facility (DC - External)    Destination     Service Provider Selected Services Address Phone Fax Patient Preferred    [facility]  [facility]  Skilled Nursing [address] [place] [id number] [phone] --                    Expected Discharge Date and Time     Expected Discharge Date Expected Discharge Time    Aug 20, 2022          Demographic Summary    No documentation.                Functional Status     Row Name 08/18/22 1002       Functional Status    Usual Activity Tolerance moderate       Functional Status, IADL    Medications independent    Meal Preparation independent    Housekeeping assistive person    Laundry assistive person    Shopping assistive person               Psychosocial    No documentation.                Abuse/Neglect    No documentation.                Legal    No documentation.                Substance Abuse    No documentation.                Patient Forms    No documentation.                   Anamaria Clemons, RN

## 2022-08-18 NOTE — THERAPY EVALUATION
Patient Name: Jhoan Gagnon  : 1940    MRN: [id number]                              Today's Date: 2022       Admit Date: 2022    Visit Dx:     ICD-10-CM ICD-9-CM   1. Syncope and collapse  R55 780.2   2. Hypoxemia  R09.02 799.02     Patient Active Problem List   Diagnosis   • Parkinson's disease (HCC)   • Falls   • Weakness   • Skin tear of hand without complication, left, sequela   • Sinus bradycardia   • Ataxia   • Syncope   • Nausea   • Hematemesis with nausea   • Positive blood culture   • Leukocytosis   • Arrhythmia   • Essential hypertension   • Hyperlipidemia   • Syncope and collapse     Past Medical History:   Diagnosis Date   • Broken arms    • Cancer (HCC)     prostate   • Lower back injury    • Parkinson's disease (HCC)      Past Surgical History:   Procedure Laterality Date   • CHOLECYSTECTOMY     • PROSTATE SURGERY     • REPLACEMENT TOTAL KNEE BILATERAL        General Information     Row Name 22          OT Time and Intention    Document Type evaluation  -SW     Mode of Treatment occupational therapy  -     Row Name 22          General Information    Patient Profile Reviewed yes  -SW     Prior Level of Function independent:;all household mobility;community mobility;ADL's;home management  -     Existing Precautions/Restrictions fall;other (see comments)  has neurotransmitter for parkinsons.  -     Barriers to Rehab none identified  -     Row Name 22          Occupational Profile    Environmental Supports and Barriers (Occupational Profile) Pt has a walk in shower with grab bars  -     Row Name 22          Living Environment    People in Home alone  -     Row Name 22          Home Main Entrance    Number of Stairs, Main Entrance other (see comments)  14  -     Row Name 22          Stairs Within Home, Primary    Number of Stairs, Within Home, Primary none  -     Row Name 22          Cognition     Orientation Status (Cognition) oriented x 4  -SW     Row Name 08/18/22 0800          Safety Issues, Functional Mobility    Impairments Affecting Function (Mobility) balance;coordination;endurance/activity tolerance;strength;grasp  -           User Key  (r) = Recorded By, (t) = Taken By, (c) = Cosigned By    Initials Name Provider Type    Anamaria Tom OT Occupational Therapist                 Mobility/ADL's     Row Name 08/18/22 0800          Bed Mobility    Bed Mobility scooting/bridging;supine-sit  -SW     Scooting/Bridging [place] (Bed Mobility) contact guard  -SW     Supine-Sit [place] (Bed Mobility) contact guard  -     Assistive Device (Bed Mobility) bed rails;head of bed elevated  -     Row Name 08/18/22 0800          Transfers    Transfers sit-stand transfer;bed-chair transfer  -     Bed-Chair [place] (Transfers) minimum assist (75% patient effort)  -     Assistive Device (Bed-Chair Transfers) walker, front-wheeled  -     Sit-Stand [place] (Transfers) minimum assist (75% patient effort)  -     Row Name 08/18/22 0800          Sit-Stand Transfer    Assistive Device (Sit-Stand Transfers) walker, front-wheeled  -SW     Row Name 08/18/22 0800          Activities of Daily Living    BADL Assessment/Intervention lower body dressing;grooming;upper body dressing  -     Row Name 08/18/22 0800          Lower Body Dressing Assessment/Training    [place] Level (Lower Body Dressing) lower body dressing skills;socks;maximum assist (25% patient effort)  -     Position (Lower Body Dressing) edge of bed sitting  -     Comment, (Lower Body Dressing) Pt says it takes him 2 hours in the morning to get dressed d/t limitations with  in hands.  -     Row Name 08/18/22 0800          Grooming Assessment/Training    [place] Level (Grooming) grooming skills;wash face, hands;set up  -     Position (Grooming) edge of bed sitting  -     Row Name 08/18/22 0800          Upper  Body Dressing Assessment/Training    [place] Level (Upper Body Dressing) upper body dressing skills;pajama/robe;moderate assist (50% patient effort)  -     Position (Upper Body Dressing) edge of bed sitting  -           User Key  (r) = Recorded By, (t) = Taken By, (c) = Cosigned By    Initials Name Provider Type     Anamaria Zafar OT Occupational Therapist               Obj/Interventions     Row Name 08/18/22 0800          Sensory Assessment (Somatosensory)    Sensory Assessment (Somatosensory) UE sensation intact  -[facility] Name 08/18/22 0800          Vision Assessment/Intervention    Visual Impairment/Limitations WFL;other (see comments)  States he has glasses but he doesn't wear them.  -     Row Name 08/18/22 0800          Range of Motion Comprehensive    General Range of Motion bilateral upper extremity ROM WFL  -[facility] Name 08/18/22 0800          Strength Comprehensive (MMT)    General Manual Muscle Testing (MMT) Assessment upper extremity strength deficits identified  -     Comment, General Manual Muscle Testing (MMT) Assessment RUE 4/5, LUE 4+/5, limited  strength  -     Row Name 08/18/22 0800          Motor Skills    Motor Skills coordination;functional endurance  -     Coordination fine motor deficit;gross motor deficit  -     Functional Endurance impaired  -     Row Name 08/18/22 0800          Balance    Balance Assessment sitting static balance;sitting dynamic balance;sit to stand dynamic balance;standing static balance;standing dynamic balance  -     Static Sitting Balance independent  -     Dynamic Sitting Balance set-up  -SW     Position, Sitting Balance unsupported;sitting edge of bed  -SW     Sit to Stand Dynamic Balance minimal assist  -     Static Standing Balance contact guard  -     Dynamic Standing Balance minimal assist  -SW     Position/Device Used, Standing Balance supported;walker, rolling  -SW     Balance Interventions sitting;sit to  stand;standing;supported;static;dynamic;minimal challenge  -           User Key  (r) = Recorded By, (t) = Taken By, (c) = Cosigned By    Initials Name Provider Type    Anamaria Tom OT Occupational Therapist               Goals/Plan     Row Name 08/18/22 0800          Transfer Goal 1 (OT)    Activity/Assistive Device (Transfer Goal 1, OT) toilet  -SW     [place] Level/Cues Needed (Transfer Goal 1, OT) standby assist  -SW     Time Frame (Transfer Goal 1, OT) long term goal (LTG);by discharge  -SW     Progress/Outcome (Transfer Goal 1, OT) goal ongoing  -     Row Name 08/18/22 0800          Toileting Goal 1 (OT)    Activity/Device (Toileting Goal 1, OT) toileting skills, all  -SW     [place] Level/Cues Needed (Toileting Goal 1, OT) standby assist  -SW     Time Frame (Toileting Goal 1, OT) long term goal (LTG);by discharge  -SW     Progress/Outcome (Toileting Goal 1, OT) goal ongoing  -     Row Name 08/18/22 0800          Therapy Assessment/Plan (OT)    Planned Therapy Interventions (OT) activity tolerance training;adaptive equipment training;BADL retraining;functional balance retraining;transfer/mobility retraining;strengthening exercise;patient/caregiver education/training  -           User Key  (r) = Recorded By, (t) = Taken By, (c) = Cosigned By    Initials Name Provider Type    Anamaria Tom OT Occupational Therapist               Clinical Impression     Row Name 08/18/22 0800          Pain Assessment    Pretreatment Pain Rating 0/10 - no pain  -SW     Posttreatment Pain Rating 0/10 - no pain  -SW     Row Name 08/18/22 0800          Plan of Care Review    Plan of Care Reviewed With patient  -     Outcome Evaluation OT evaluation complete.  Pt Ox4 and pain 0/10 this date.  OT monitored HR during tx and pt agreed to get oob.  He is cga for bed mob, min assist for sts and t/f using Rw.  He is max assist for donning socks and states at home it takes him 2 hours to get ready.  He has AE and uses  his own methods to perform tasks.  He has weakness, decreased coordination, and decreased  of hands.  Recommend IPOT and SNF for rehab to home.  -     Row Name 08/18/22 0800          Therapy Assessment/Plan (OT)    Rehab Potential (OT) good, to achieve stated therapy goals  -     Criteria for Skilled Therapeutic Interventions Met (OT) yes;meets criteria;skilled treatment is necessary  -     Therapy Frequency (OT) daily  -[facility] Name 08/18/22 0800          Therapy Plan Review/Discharge Plan (OT)    Anticipated Discharge Disposition (OT) skilled nursing facility  -[facility] Name 08/18/22 0800          Vital Signs    Pre Systolic BP Rehab 106  -SW     Pre Treatment Diastolic BP 61  -SW     Pretreatment Heart Rate (beats/min) 51  -SW     Pre SpO2 (%) 95  -SW     O2 Delivery Pre Treatment room air  -SW     O2 Delivery Intra Treatment room air  -SW     O2 Delivery Post Treatment room air  -SW     Pre Patient Position Supine  -SW     Intra Patient Position Standing  -SW     Post Patient Position Sitting  -[facility] Name 08/18/22 0800          Positioning and Restraints    Pre-Treatment Position in bed  -SW     Post Treatment Position chair  -SW     In Chair notified nsg;sitting;call light within reach;encouraged to call for assist;with PT;waffle cushion  -           User Key  (r) = Recorded By, (t) = Taken By, (c) = Cosigned By    Initials Name Provider Type    Anamaria Tom, AKIN Occupational Therapist               Outcome Measures     Row Name 08/18/22 0800          How much help from another is currently needed...    Putting on and taking off regular lower body clothing? 2  -SW     Bathing (including washing, rinsing, and drying) 2  -SW     Toileting (which includes using toilet bed pan or urinal) 3  -SW     Putting on and taking off regular upper body clothing 3  -SW     Taking care of personal grooming (such as brushing teeth) 4  -SW     Eating meals 4  -SW     AM-PAC 6 Clicks Score (OT) 18  -SW      Row Name 08/18/22 0800          How much help from another person do you currently need...    Turning from your back to your side while in flat bed without using bedrails? 4  -JL     Moving from lying on back to sitting on the side of a flat bed without bedrails? 3  -JL     Moving to and from a bed to a chair (including a wheelchair)? 3  -JL     Standing up from a chair using your arms (e.g., wheelchair, bedside chair)? 3  -JL     Climbing 3-5 steps with a railing? 3  -JL     To walk in hospital room? 3  -JL     AM-PAC 6 Clicks Score (PT) 19  -JL     Highest level of mobility 6 --> Walked 10 steps or more  -JL     Row Name 08/18/22 0800          Functional Assessment    Outcome Measure Options AM-PAC 6 Clicks Daily Activity (OT)  -           User Key  (r) = Recorded By, (t) = Taken By, (c) = Cosigned By    Initials Name Provider Type    Anamaria Tom OT Occupational Therapist    Kristofer Helms RN Registered Nurse                Occupational Therapy Education                 Title: PT OT SLP Therapies (In Progress)     Topic: Occupational Therapy (In Progress)     Point: ADL training (Done)     Description:   Instruct learner(s) on proper safety adaptation and remediation techniques during self care or transfers.   Instruct in proper use of assistive devices.              Learning Progress Summary           Patient Acceptance, E, VU by ALLAN at 8/18/2022 0858                   Point: Home exercise program (Not Started)     Description:   Instruct learner(s) on appropriate technique for monitoring, assisting and/or progressing therapeutic exercises/activities.              Learner Progress:  Not documented in this visit.          Point: Precautions (Done)     Description:   Instruct learner(s) on prescribed precautions during self-care and functional transfers.              Learning Progress Summary           Patient Acceptance, E, VU by ALLAN at 8/18/2022 0858                   Point: Body mechanics (Not Started)      Description:   Instruct learner(s) on proper positioning and spine alignment during self-care, functional mobility activities and/or exercises.              Learner Progress:  Not documented in this visit.                      User Key     Initials Effective Dates Name Provider Type Discipline     06/16/21 -  Anamaria Zafar OT Occupational Therapist OT              OT Recommendation and Plan  Planned Therapy Interventions (OT): activity tolerance training, adaptive equipment training, BADL retraining, functional balance retraining, transfer/mobility retraining, strengthening exercise, patient/caregiver education/training  Therapy Frequency (OT): daily  Plan of Care Review  Plan of Care Reviewed With: patient  Outcome Evaluation: OT evaluation complete.  Pt Ox4 and pain 0/10 this date.  OT monitored HR during tx and pt agreed to get oob.  He is cga for bed mob, min assist for sts and t/f using Rw.  He is max assist for donning socks and states at home it takes him 2 hours to get ready.  He has AE and uses his own methods to perform tasks.  He has weakness, decreased coordination, and decreased  of hands.  Recommend IPOT and SNF for rehab to home.     Time Calculation:    Time Calculation- OT     Row Name 08/18/22 0800             Time Calculation- OT    OT Start Time 0800  -SW      OT Received On 08/18/22  -SW      OT Goal Re-Cert Due Date 08/28/22  -SW              Timed Charges    50219 - OT Self Care/Mgmt Minutes 15  -SW              Untimed Charges    OT Eval/Re-eval Minutes 40  -SW              Total Minutes    Timed Charges Total Minutes 15  -SW      Untimed Charges Total Minutes 40  -SW       Total Minutes 55  -SW            User Key  (r) = Recorded By, (t) = Taken By, (c) = Cosigned By    Initials Name Provider Type     Anamaria Zafar OT Occupational Therapist              Therapy Charges for Today     Code Description Service Date Service Provider Modifiers Qty    66069323925 HC OT SELF CARE/MGMT/TRAIN  EA 15 MIN 8/18/2022 Anamaria Zafar, OT GO 1    39569901478 HC OT EVAL MOD COMPLEXITY 3 8/18/2022 Anamaria Zafar OT GO 1               Anamaria Zafar OT  8/18/2022

## 2022-08-18 NOTE — H&P
[facility] Medicine Services  HISTORY AND PHYSICAL    Patient Name: Jhoan Gagnon  : 1940  MRN: [id number]  Primary Care Physician: Wilmer Andres MD  Date of admission: 2022    Subjective   Subjective     Chief Complaint:  Near syncope    HPI:  Jhoan Gagnon is a 82 y.o. male with a history of parkinson's disease s/p neurostimulator, prostate cancer s/p surgery, HTN, and chronic low back pain who presents to [facility] ED due to an episode of near syncope. He is accompanied today by his daughter. Patient states that he was walking to his daughter's car, and once sitting inside he suddenly felt lightheaded, diaphoretic, and felt as though he was about to pass out. This was witnessed by his daughter. She immediately called EMS. Upon arrival, daughter notes that EMS saw several seconds of V-tach, but unfortunately had resolved before being able to obtain a tracing. His rate spontaneously resolved and patients symptoms subsided. During the event, he denied chest pain, palpitations, SOB, cough, abdominal pain, confusion, or difficulty with speech or vision. He has no prior cardiac history.     Patient was admitted to a very similar episode about a month ago on . Stroke workup was unremarkable at that time, and was thought to be worsening of his parkinson's disease and was discharged on  to a SNF for several weeks.        Review of Systems   Constitutional: Negative for chills, fatigue, fever and unexpected weight change.   HENT: Negative for nosebleeds, postnasal drip, rhinorrhea, sinus pain and trouble swallowing.    Eyes: Negative for photophobia and visual disturbance.   Respiratory: Negative for cough, shortness of breath and wheezing.    Cardiovascular: Negative for chest pain, palpitations and leg swelling.   Gastrointestinal: Positive for nausea. Negative for abdominal pain, diarrhea and vomiting.   Genitourinary: Negative for dysuria and hematuria.    Musculoskeletal: Positive for back pain (chronic). Negative for arthralgias and myalgias.   Skin: Negative.    Neurological: Positive for dizziness, syncope, light-headedness and headaches. Negative for tremors, speech difficulty and weakness.        Sx have now resolved.    Psychiatric/Behavioral: Negative for confusion. The patient is not nervous/anxious.       All other systems reviewed and are negative.     Personal History     Past Medical History:   Diagnosis Date   • Broken arms    • Cancer (HCC)     prostate   • Lower back injury    • Parkinson's disease (HCC)              Past Surgical History:   Procedure Laterality Date   • CHOLECYSTECTOMY     • PROSTATE SURGERY     • REPLACEMENT TOTAL KNEE BILATERAL         Family History:  family history includes Cancer in his father and paternal grandfather; Heart disease in his mother; Stroke in his father and maternal grandmother. Otherwise pertinent FHx was reviewed and unremarkable.     Social History:  reports that he has never smoked. He has never used smokeless tobacco. He reports that he does not drink alcohol and does not use drugs.  Social History     Social History Narrative    Pt Lives Alone        Medications:  amLODIPine, aspirin, atropine, cyanocobalamin, donepezil, memantine, rOPINIRole, rosuvastatin, and traZODone    No Known Allergies    Objective   Objective     Vital Signs:   Temp:  [97.6 °F (36.4 °C)-98.1 °F (36.7 °C)] 98.1 °F (36.7 °C)  Heart Rate:  [49-62] 60  Resp:  [17] 17  BP: ()/(54-98) 114/55    Physical Exam  Constitutional:       General: He is not in acute distress.     Appearance: Normal appearance.   HENT:      Head: Atraumatic.      Right Ear: External ear normal.      Left Ear: External ear normal.      Nose: Nose normal.   Eyes:      Extraocular Movements: Extraocular movements intact.      Conjunctiva/sclera: Conjunctivae normal.      Pupils: Pupils are equal, round, and reactive to light.   Cardiovascular:      Rate and  Rhythm: Regular rhythm. Bradycardia present.      Pulses: Normal pulses.      Heart sounds: Normal heart sounds. No murmur heard.  Pulmonary:      Effort: Pulmonary effort is normal. No respiratory distress.      Breath sounds: Normal breath sounds. No wheezing, rhonchi or rales.   Abdominal:      General: Bowel sounds are normal. There is no distension.      Tenderness: There is no abdominal tenderness. There is no guarding or rebound.   Musculoskeletal:         General: Normal range of motion.      Cervical back: No rigidity.      Right lower leg: No edema.      Left lower leg: No edema.   Skin:     General: Skin is warm and dry.      Coloration: Skin is not jaundiced.      Findings: No lesion or rash.   Neurological:      General: No focal deficit present.      Mental Status: He is alert and oriented to person, place, and time.      Comments: Resting pill rolling tremor noted. Alert and oriented x3. No focal deficits.  strength equal bilaterally   Psychiatric:         Attention and Perception: Attention normal.         Mood and Affect: Mood normal.         Behavior: Behavior normal.         Thought Content: Thought content normal.          Results Reviewed:  I have personally reviewed most recent indicated data and agree with findings including:  [x]  Laboratory  [x]  Radiology  [x]  EKG/Telemetry  []  Pathology  []  Cardiac/Vascular Studies  []  Old records  []  Other:      LAB RESULTS:      Lab 08/17/22  1419   WBC 5.54   HEMOGLOBIN 13.7   HEMATOCRIT 39.3   PLATELETS 161   NEUTROS ABS 3.69   IMMATURE GRANS (ABS) 0.01   LYMPHS ABS 1.18   MONOS ABS 0.51   EOS ABS 0.13   MCV 89.9   PROCALCITONIN 0.07   LACTATE 1.5   D DIMER QUANT 3.07*         Lab 08/17/22  1419   SODIUM 142   POTASSIUM 4.2   CHLORIDE 107   CO2 26.0   ANION GAP 9.0   BUN 19   CREATININE 1.16   EGFR 62.1   GLUCOSE 118*   CALCIUM 9.1   MAGNESIUM 2.2         Lab 08/17/22  1419   TOTAL PROTEIN 7.0   ALBUMIN 4.00   GLOBULIN 3.0   ALT (SGPT) 16    AST (SGOT) 27   BILIRUBIN 2.2*   ALK PHOS 101         Lab 08/17/22  1803 08/17/22  1419   TROPONIN T <0.010 <0.010                 Lab 08/17/22  1448   PH, ARTERIAL 7.460*   PCO2, ARTERIAL 34.6*   PO2 ART 62.2*   FIO2 28   HCO3 ART 24.6   BASE EXCESS ART 1.1   CARBOXYHEMOGLOBIN 1.1     Brief Urine Lab Results  (Last result in the past 365 days)      Color   Clarity   Blood   Leuk Est   Nitrite   Protein   CREAT   Urine HCG        08/17/22 1606 Dark Yellow   Cloudy   Negative   Negative   Negative   Trace               Microbiology Results (last 10 days)     Procedure Component Value - Date/Time    COVID PRE-OP / PRE-PROCEDURE SCREENING ORDER (NO ISOLATION) - Swab, Nasopharynx [[id number]]  (Normal) Collected: 08/17/22 1434    Lab Status: Final result Specimen: Swab from Nasopharynx Updated: 08/17/22 1455    Narrative:      The following orders were created for panel order COVID PRE-OP / PRE-PROCEDURE SCREENING ORDER (NO ISOLATION) - Swab, Nasopharynx.  Procedure                               Abnormality         Status                     ---------                               -----------         ------                     COVID-19 and FLU A/B PCR...[[id number]]  Normal              Final result                 Please view results for these tests on the individual orders.    COVID-19 and FLU A/B PCR - Swab, Nasopharynx [[id number]]  (Normal) Collected: 08/17/22 1434    Lab Status: Final result Specimen: Swab from Nasopharynx Updated: 08/17/22 1455     COVID19 Not Detected     Influenza A PCR Not Detected     Influenza B PCR Not Detected    Narrative:      Fact sheet for providers: https://www.fda.gov/media/884123/download    Fact sheet for patients: https://www.fda.gov/media/585849/download    Test performed by PCR.          XR Chest 1 View    Result Date: 8/17/2022  DATE OF EXAM: 8/17/2022 2:32 PM  PROCEDURE: XR CHEST 1 VW-  INDICATIONS: Weakness, dizziness, altered mental status.  COMPARISON: 07/09/2022.   TECHNIQUE: Single radiographic view of the chest was obtained.  FINDINGS: The heart is enlarged. The pulmonary vascular markings are normal. The lungs and pleural spaces are clear of active disease. There is a right-sided vagal nerve stimulator in place.  There are chronic age-related changes involving the bony thorax and thoracic aorta.      Impression:  1. Cardiomegaly. 2. No active pulmonary disease.  This report was finalized on 8/17/2022 2:41 PM by Vern Rosales MD.      CT Angiogram Chest    Result Date: 8/17/2022  DATE OF EXAM: 8/17/2022 5:23 PM  PROCEDURE: CT ANGIOGRAM CHEST-  INDICATIONS: Unresponsive, elevated d-dimer level, questionable acute pulmonary embolic disease.  COMPARISON: CT of the abdomen performed on 06/29/2021.  TECHNIQUE: Contiguous axial imaging was obtained from the thoracic inlet through the upper abdomen following the intravenous administration of 100 mL of Isovue 370. Reconstructed coronal and sagittal images were also obtained. Automated exposure control and iterative reconstruction methods were used.  The radiation dose reduction device was turned on for each scan per the ALARA (As Low as Reasonably Achievable) protocol.  FINDINGS: There are no filling defects in the pulmonary arteries to indicate acute pulmonary embolic disease. The heart size is normal. There are atherosclerotic vascular calcifications including involvement the coronary arteries. There are a few prominent mediastinal lymph nodes felt to be secondary to reactive hyperplasia. There is no hilar adenopathy. There are no layering pleural effusions. There is a thin bandlike linear density in the left lower lobe consistent with a pulmonary scar versus subsegmental atelectasis. The lungs are otherwise clear. There is again prominent walled off mesenteric fat. The visualized mesenteric vessels have a somewhat swirled pattern. This may represent panniculitis and on the previous exam there was suggestion of a congenital  malrotation. The findings appear similar to the previous study. There are no suspicious osteolytic or sclerotic lesions within the bony thorax.       Impression:  1. No acute pulmonary embolic disease. 2. There are a few prominent mediastinal lymph nodes of doubtful clinical significance. There are probably secondary to reactive hyperplasia. 3. Thin bandlike linear scar versus subsegmental atelectasis in the left lower lobe. 4. Stable appearance of the upper abdomen with abnormalities as described above.  This report was finalized on 8/17/2022 5:54 PM by Vern Rosales MD.        Results for orders placed during the hospital encounter of 07/09/22    Adult Transthoracic Echo Complete W/ Cont if Necessary Per Protocol (With Agitated Saline)    Interpretation Summary  · Estimated left ventricular EF = 65%  · Left ventricular wall thickness is consistent with mild concentric hypertrophy.  · Mild dilation of the ascending aorta is present.  · No hemodynamically significant valvular heart disease      Assessment & Plan   Assessment & Plan       Arrhythmia    Sinus bradycardia    Syncope    Parkinson's disease (HCC)    Essential hypertension    Hyperlipidemia    Jhoan Gagnon is a 82 y.o. male with a history of parkinson's disease s/p neurostimulator, prostate cancer s/p surgery, HTN, and chronic low back pain who presents to [facility] ED due to an episode of near syncope. EMS noted V-tach but resolved before they could obtain tracing of it.     Arrhythmia  Near syncopal episode  Bradycardia  -Patient currently stable, chest pain free, and in no acute distress. He is back to baseline per daughter who is at bedside.   -He is bradycardic now between high 48-56 today. Daughter notes that this is typical for him.   -Mag normal. TSH pending. UA is unremarkable.  -EKG now shows sinus bradycardia with 1st degree block.   -D-dimer was elevated today. CTA chest negative for PE.   -Given that he is bradycardic at baseline, with  "now the note of V-tach by EMS, I have a high suspicion for Sick Sinus Syndrome.   -Cardiology consult for the am.   -Monitor on tele. Repeat labs in the am  -PT/OT  - Walk qshift    Parkinson's disease  -Patient is very functional at home and is alert and oriented x3 today.  -Continue home Aricept and Namenda.   -Continue Requip  -Avoid Trazodone during this admission. Patient reportedly had hallucinations during last admission after being given this, per daughter and patient. Melatonin ok for sleep.    HTN  HLD  -Continue statin  -Holding Norvasc for now pending cardiology evaluation.         DVT prophylaxis:  Heparin subQ    CODE STATUS:  DNR/DNI  Medical Intervention Limits: NO intubation (DNI)  Code Status (Patient has no pulse and is not breathing): No CPR (Do Not Attempt to Resuscitate)  Medical Interventions (Patient has pulse or is breathing): Limited Support      This note has been completed as part of a split-shared workflow.     Signature: Electronically signed by Eliza Alvarez PA-C, 08/17/22, 9:09 PM EDT        Attending   Admission Attestation       I have performed an independent face-to-face diagnostic evaluation including performing an independent physical examination as documented here.  The documented plan of care above was reviewed and developed with the advanced practice clinician (APC).      Brief Summary Statement:   Jhoan Gagnon is a 82 y.o. male who states he had a doctor's appointment today (Wednesday) and was waiting on someone to come pick him up to deliver him there, as arranged.  He states he walked to the end of his driveway, opened up the passenger side of his car, turned on the radio and air conditioning and waited there.  He states approximately 5 minutes later, \"just as quickly as you would flip a switch\" he had onset of nausea, dizziness, diaphoretic, suffered a bout of emesis, and he adds \"I felt like I was going to pass out.\"  The above story differs somewhat from " the history relayed to the APC who saw him earlier today.  At that time, the patient's daughter (who was in the room at that time, but is not present during my visit) stated he was getting into his daughter's car when the aforementioned symptoms occurred, that this was witnessed by his daughter who called EMS.  On EMS arrival, the patient was found to be in V. tach according to their report, but no tracing/documentation could be obtained before the patient converted back to his baseline rhythm.  His symptoms resolved after he returned to his baseline rhythm, per the EMS report.  During my visit he states he has never had any chest pain, shortness of breath, fever/chills, focal extremity weakness, slurred speech/facial droop.    Remainder of detailed HPI is as noted by APC and has been reviewed and/or edited by me for completeness.    Attending Physical Exam:  Temp:  [97.6 °F (36.4 °C)-98.1 °F (36.7 °C)] 97.6 °F (36.4 °C)  Heart Rate:  [49-62] 54  Resp:  [16-17] 16  BP: ()/(54-98) 141/69    Constitutional: Awake, alert, NAD, pleasant.  Eyes: PERRLA, sclerae anicteric, no conjunctival injection  HENT: NCAT, mucous membranes moist  Neck: Supple, no thyromegaly, no lymphadenopathy, trachea midline  Respiratory: Clear to auscultation bilaterally, nonlabored respirations   Cardiovascular: Bradycardia with rate of 48 on my exam, 2/6 systolic murmur, no rubs or gallops, palpable pedal pulses bilaterally  Gastrointestinal: Positive bowel sounds, soft, nontender, nondistended  Musculoskeletal: No bilateral ankle edema, no clubbing or cyanosis to extremities  Psychiatric: Appropriate affect, cooperative  Neurologic: Oriented x 3, strength symmetric in all extremities, Cranial Nerves grossly intact to confrontation, speech clear.  He has RUE pill-rolling tremor which he states is chronic and unchanged.  Skin: No rashes, normal turgor.    Brief Assessment/Plan :  See detailed assessment and plan developed with APC which I  have reviewed and/or edited for completeness.        Admission Status: I believe that this patient meets inpatient criteria due to need for telemetry monitoring, completion of series of troponins, need for consultation with cardiology service, all due to high suspicion of sick sinus syndrome.  He may require pacemaker/defibrillator, and for these reasons I feel his care will extend over 2 midnights.      Jose Carlos Carney,III, DO  08/17/22

## 2022-08-19 LAB
ANION GAP SERPL CALCULATED.3IONS-SCNC: 9 MMOL/L (ref 5–15)
BASOPHILS # BLD AUTO: 0.03 10*3/MM3 (ref 0–0.2)
BASOPHILS NFR BLD AUTO: 0.5 % (ref 0–1.5)
BUN SERPL-MCNC: 18 MG/DL (ref 8–23)
BUN/CREAT SERPL: 18.4 (ref 7–25)
CALCIUM SPEC-SCNC: 9.2 MG/DL (ref 8.6–10.5)
CHLORIDE SERPL-SCNC: 105 MMOL/L (ref 98–107)
CO2 SERPL-SCNC: 25 MMOL/L (ref 22–29)
CREAT SERPL-MCNC: 0.98 MG/DL (ref 0.76–1.27)
DEPRECATED RDW RBC AUTO: 43.7 FL (ref 37–54)
EGFRCR SERPLBLD CKD-EPI 2021: 76 ML/MIN/1.73
EOSINOPHIL # BLD AUTO: 0.12 10*3/MM3 (ref 0–0.4)
EOSINOPHIL NFR BLD AUTO: 2.2 % (ref 0.3–6.2)
ERYTHROCYTE [DISTWIDTH] IN BLOOD BY AUTOMATED COUNT: 13.2 % (ref 12.3–15.4)
GLUCOSE SERPL-MCNC: 107 MG/DL (ref 65–99)
HCT VFR BLD AUTO: 39.1 % (ref 37.5–51)
HGB BLD-MCNC: 13.3 G/DL (ref 13–17.7)
IMM GRANULOCYTES # BLD AUTO: 0.01 10*3/MM3 (ref 0–0.05)
IMM GRANULOCYTES NFR BLD AUTO: 0.2 % (ref 0–0.5)
LYMPHOCYTES # BLD AUTO: 1.07 10*3/MM3 (ref 0.7–3.1)
LYMPHOCYTES NFR BLD AUTO: 19.3 % (ref 19.6–45.3)
MCH RBC QN AUTO: 30.3 PG (ref 26.6–33)
MCHC RBC AUTO-ENTMCNC: 34 G/DL (ref 31.5–35.7)
MCV RBC AUTO: 89.1 FL (ref 79–97)
MONOCYTES # BLD AUTO: 0.5 10*3/MM3 (ref 0.1–0.9)
MONOCYTES NFR BLD AUTO: 9 % (ref 5–12)
NEUTROPHILS NFR BLD AUTO: 3.82 10*3/MM3 (ref 1.7–7)
NEUTROPHILS NFR BLD AUTO: 68.8 % (ref 42.7–76)
NRBC BLD AUTO-RTO: 0 /100 WBC (ref 0–0.2)
PLATELET # BLD AUTO: 159 10*3/MM3 (ref 140–450)
PMV BLD AUTO: 9.4 FL (ref 6–12)
POTASSIUM SERPL-SCNC: 4.3 MMOL/L (ref 3.5–5.2)
RBC # BLD AUTO: 4.39 10*6/MM3 (ref 4.14–5.8)
SODIUM SERPL-SCNC: 139 MMOL/L (ref 136–145)
WBC NRBC COR # BLD: 5.55 10*3/MM3 (ref 3.4–10.8)

## 2022-08-19 PROCEDURE — 85025 COMPLETE CBC W/AUTO DIFF WBC: CPT | Performed by: PHYSICIAN ASSISTANT

## 2022-08-19 PROCEDURE — 25010000002 HEPARIN (PORCINE) PER 1000 UNITS: Performed by: PHYSICIAN ASSISTANT

## 2022-08-19 PROCEDURE — 80048 BASIC METABOLIC PNL TOTAL CA: CPT | Performed by: INTERNAL MEDICINE

## 2022-08-19 PROCEDURE — 99232 SBSQ HOSP IP/OBS MODERATE 35: CPT | Performed by: HOSPITALIST

## 2022-08-19 RX ADMIN — HEPARIN SODIUM 5000 UNITS: 5000 INJECTION INTRAVENOUS; SUBCUTANEOUS at 21:56

## 2022-08-19 RX ADMIN — ROPINIROLE HYDROCHLORIDE 2 MG: 2 TABLET, FILM COATED ORAL at 21:56

## 2022-08-19 RX ADMIN — ASPIRIN 81 MG: 81 TABLET, COATED ORAL at 09:36

## 2022-08-19 RX ADMIN — ACETAMINOPHEN 650 MG: 325 TABLET, FILM COATED ORAL at 22:26

## 2022-08-19 RX ADMIN — Medication 10 ML: at 09:36

## 2022-08-19 RX ADMIN — ROPINIROLE HYDROCHLORIDE 2 MG: 2 TABLET, FILM COATED ORAL at 06:41

## 2022-08-19 RX ADMIN — Medication 10 ML: at 22:01

## 2022-08-19 RX ADMIN — MEMANTINE 5 MG: 10 TABLET ORAL at 09:36

## 2022-08-19 RX ADMIN — HEPARIN SODIUM 5000 UNITS: 5000 INJECTION INTRAVENOUS; SUBCUTANEOUS at 06:41

## 2022-08-19 RX ADMIN — ROSUVASTATIN CALCIUM 20 MG: 20 TABLET, FILM COATED ORAL at 21:56

## 2022-08-19 RX ADMIN — ROPINIROLE HYDROCHLORIDE 2 MG: 2 TABLET, FILM COATED ORAL at 14:58

## 2022-08-19 RX ADMIN — MEMANTINE 5 MG: 10 TABLET ORAL at 21:56

## 2022-08-19 RX ADMIN — DONEPEZIL HYDROCHLORIDE 10 MG: 10 TABLET, FILM COATED ORAL at 21:56

## 2022-08-19 RX ADMIN — HEPARIN SODIUM 5000 UNITS: 5000 INJECTION INTRAVENOUS; SUBCUTANEOUS at 14:58

## 2022-08-19 NOTE — CASE MANAGEMENT/SOCIAL WORK
Case Management Discharge Note      Final Note: Per Lilia at [facility], they have a skilled bed for the pt on Saturday 8-. The pt is agreeable to the transfer. Please call report to [phone] and send the copied chart and DC summary with the pt. Any controlled meds listed on the DC summary can be faxed to the SNF pharmacy ([facility]) placed in [facility]. The pts daughter and granddaughter are in agreement. Daughter will transport in the car tomorrow afternoon. No covid test needed. Please fax the DC summary to [phone] before the pt transfers.         Selected Continued Care - Admitted Since 8/17/2022     Destination Coordination complete.    Service Provider Selected Services Address Phone Fax Patient Preferred    [facility]  Skilled Nursing [address] [place], [place] [id number] [phone] --          Durable Medical Equipment    No services have been selected for the patient.              Dialysis/Infusion    No services have been selected for the patient.              Home Medical Care    No services have been selected for the patient.              Therapy    No services have been selected for the patient.              Community Resources    No services have been selected for the patient.              Community & DME    No services have been selected for the patient.                Selected Continued Care - Prior Encounters Includes selections from prior encounters from 5/19/2022 to 8/19/2022    Discharged on 7/14/2022 Admission date: 7/9/2022 - Discharge disposition: Skilled Nursing Facility (DC - External)    Destination     Service Provider Selected Services Address Phone Fax Patient Preferred    [facility]  Skilled Nursing [address] [place], [place] [id number] [phone] --                         Final Discharge Disposition Code: 03 - skilled nursing facility (SNF)

## 2022-08-19 NOTE — PROGRESS NOTES
Psychiatric Medicine Services  PROGRESS NOTE    Patient Name: Jhoan Gagnon  : 1940  MRN: [id number]    Date of Admission: 2022  Primary Care Physician: Wilmer Andres MD    Subjective   Subjective     CC:  F/U presyncope    HPI:  Patient seen this morning, has bathed and dressed with assistance. Feels better. He really wants to get out of the hospital as he is growing increasingly weak each day he is here and does not want to lose any more of the strength he gained while at rehab. He is deciding on home vs rehab again.     ROS:  Gen-no fevers, no chills  CV-no chest pain, no palpitations  Resp-no cough, no dyspnea  GI-no N/V/D, no abd pain    All other systems reviewed and negative except any additional pertinent positives and negatives as discussed in HPI.       Objective   Objective     Vital Signs:   Temp:  [97.8 °F (36.6 °C)-98.3 °F (36.8 °C)] 98.2 °F (36.8 °C)  Heart Rate:  [50-95] 89  Resp:  [16-18] 16  BP: ()/(54-89) 113/69     Physical Exam:  Gen-no acute distress  HENT-NCAT, mucous membranes moist  CV-RRR, S1 S2 normal, no m/r/g  Resp-CTAB, no wheezes or rales  Abd-soft, NT, ND, +BS  Ext-no edema  Neuro-A&Ox3, no focal deficits, resting tremor right hand  Skin-no rashes  Psych-appropriate mood      Results Reviewed:  LAB RESULTS:      Lab 22  0528 22  0347 22  1419   WBC 5.55 4.62 5.54   HEMOGLOBIN 13.3 13.0 13.7   HEMATOCRIT 39.1 37.7 39.3   PLATELETS 159 149 161   NEUTROS ABS 3.82 2.81 3.69   IMMATURE GRANS (ABS) 0.01 0.00 0.01   LYMPHS ABS 1.07 1.21 1.18   MONOS ABS 0.50 0.44 0.51   EOS ABS 0.12 0.13 0.13   MCV 89.1 90.4 89.9   PROCALCITONIN  --   --  0.07   LACTATE  --   --  1.5   D DIMER QUANT  --   --  3.07*         Lab 22  0528 22  0014 22  1803 22  1419   SODIUM 139 140  --  142   POTASSIUM 4.3 3.7  --  4.2   CHLORIDE 105 108*  --  107   CO2 25.0 24.0  --  26.0   ANION GAP 9.0 8.0  --  9.0   BUN 18 17  --   19   CREATININE 0.98 1.11  --  1.16   EGFR 76.0 65.5  --  62.1   GLUCOSE 107* 105*  --  118*   CALCIUM 9.2 8.8  --  9.1   MAGNESIUM  --   --   --  2.2   TSH  --   --  1.960  --          Lab 08/17/22  1419   TOTAL PROTEIN 7.0   ALBUMIN 4.00   GLOBULIN 3.0   ALT (SGPT) 16   AST (SGOT) 27   BILIRUBIN 2.2*   ALK PHOS 101         Lab 08/18/22  0347 08/18/22  0014 08/17/22  1803 08/17/22  1419   TROPONIN T <0.010 0.010 <0.010 <0.010                 Lab 08/17/22  1448   PH, ARTERIAL 7.460*   PCO2, ARTERIAL 34.6*   PO2 ART 62.2*   FIO2 28   HCO3 ART 24.6   BASE EXCESS ART 1.1   CARBOXYHEMOGLOBIN 1.1     Brief Urine Lab Results  (Last result in the past 365 days)      Color   Clarity   Blood   Leuk Est   Nitrite   Protein   CREAT   Urine HCG        08/17/22 1606 Dark Yellow   Cloudy   Negative   Negative   Negative   Trace                 Microbiology Results Abnormal     Procedure Component Value - Date/Time    COVID PRE-OP / PRE-PROCEDURE SCREENING ORDER (NO ISOLATION) - Swab, Nasopharynx [[id number]]  (Normal) Collected: 08/17/22 1434    Lab Status: Final result Specimen: Swab from Nasopharynx Updated: 08/17/22 1455    Narrative:      The following orders were created for panel order COVID PRE-OP / PRE-PROCEDURE SCREENING ORDER (NO ISOLATION) - Swab, Nasopharynx.  Procedure                               Abnormality         Status                     ---------                               -----------         ------                     COVID-19 and FLU A/B PCR...[[id number]]  Normal              Final result                 Please view results for these tests on the individual orders.    COVID-19 and FLU A/B PCR - Swab, Nasopharynx [[id number]]  (Normal) Collected: 08/17/22 1434    Lab Status: Final result Specimen: Swab from Nasopharynx Updated: 08/17/22 1455     COVID19 Not Detected     Influenza A PCR Not Detected     Influenza B PCR Not Detected    Narrative:      Fact sheet for providers:  https://www.fda.gov/media/971573/download    Fact sheet for patients: https://www.fda.gov/media/331279/download    Test performed by PCR.          XR Chest 1 View    Result Date: 8/17/2022  DATE OF EXAM: 8/17/2022 2:32 PM  PROCEDURE: XR CHEST 1 VW-  INDICATIONS: Weakness, dizziness, altered mental status.  COMPARISON: 07/09/2022.  TECHNIQUE: Single radiographic view of the chest was obtained.  FINDINGS: The heart is enlarged. The pulmonary vascular markings are normal. The lungs and pleural spaces are clear of active disease. There is a right-sided vagal nerve stimulator in place.  There are chronic age-related changes involving the bony thorax and thoracic aorta.      Impression:  1. Cardiomegaly. 2. No active pulmonary disease.  This report was finalized on 8/17/2022 2:41 PM by Vern Rosales MD.      CT Angiogram Chest    Result Date: 8/17/2022  DATE OF EXAM: 8/17/2022 5:23 PM  PROCEDURE: CT ANGIOGRAM CHEST-  INDICATIONS: Unresponsive, elevated d-dimer level, questionable acute pulmonary embolic disease.  COMPARISON: CT of the abdomen performed on 06/29/2021.  TECHNIQUE: Contiguous axial imaging was obtained from the thoracic inlet through the upper abdomen following the intravenous administration of 100 mL of Isovue 370. Reconstructed coronal and sagittal images were also obtained. Automated exposure control and iterative reconstruction methods were used.  The radiation dose reduction device was turned on for each scan per the ALARA (As Low as Reasonably Achievable) protocol.  FINDINGS: There are no filling defects in the pulmonary arteries to indicate acute pulmonary embolic disease. The heart size is normal. There are atherosclerotic vascular calcifications including involvement the coronary arteries. There are a few prominent mediastinal lymph nodes felt to be secondary to reactive hyperplasia. There is no hilar adenopathy. There are no layering pleural effusions. There is a thin bandlike linear density in the  left lower lobe consistent with a pulmonary scar versus subsegmental atelectasis. The lungs are otherwise clear. There is again prominent walled off mesenteric fat. The visualized mesenteric vessels have a somewhat swirled pattern. This may represent panniculitis and on the previous exam there was suggestion of a congenital malrotation. The findings appear similar to the previous study. There are no suspicious osteolytic or sclerotic lesions within the bony thorax.       Impression:  1. No acute pulmonary embolic disease. 2. There are a few prominent mediastinal lymph nodes of doubtful clinical significance. There are probably secondary to reactive hyperplasia. 3. Thin bandlike linear scar versus subsegmental atelectasis in the left lower lobe. 4. Stable appearance of the upper abdomen with abnormalities as described above.  This report was finalized on 8/17/2022 5:54 PM by Vern Rosales MD.        Results for orders placed during the hospital encounter of 07/09/22    Adult Transthoracic Echo Complete W/ Cont if Necessary Per Protocol (With Agitated Saline)    Interpretation Summary  · Estimated left ventricular EF = 65%  · Left ventricular wall thickness is consistent with mild concentric hypertrophy.  · Mild dilation of the ascending aorta is present.  · No hemodynamically significant valvular heart disease      I have reviewed the medications:  Scheduled Meds:aspirin, 81 mg, Oral, Daily  donepezil, 10 mg, Oral, Nightly  heparin (porcine), 5,000 Units, Subcutaneous, Q8H  memantine, 5 mg, Oral, Q12H  rOPINIRole, 2 mg, Oral, Q8H  rosuvastatin, 20 mg, Oral, Nightly  sodium chloride, 10 mL, Intravenous, Q12H      Continuous Infusions:   PRN Meds:.•  acetaminophen  •  melatonin  •  sodium chloride  •  sodium chloride    Assessment & Plan   Assessment & Plan     Active Hospital Problems    Diagnosis  POA   • **Syncope and collapse [R55]  Yes   • Bilateral carotid artery stenosis [I65.23]  Yes   • Arrhythmia [I49.9]  Yes    • Essential hypertension [I10]  Yes   • Hyperlipidemia [E78.5]  Yes   • Parkinson's disease (HCC) [G20]  Yes   • Sinus bradycardia [R00.1]  Yes   • First degree atrioventricular block [I44.0]  Yes      Resolved Hospital Problems    Diagnosis Date Resolved POA   • Altered mental status, unspecified altered mental status type [R41.82] 08/17/2022 Yes        Brief Hospital Course to date:  Jhoan Gagnon is a 82 y.o. male with a history of Parkinson's disease s/p neurostimulator, prostate cancer s/p surgery, HTN, and chronic low back pain who presents to [facility] ED due to an episode of near syncope. EMS noted V-tach but resolved before they could obtain tracing of it. Admitted for very similar episode 7/9-7/14/22 with presyncope: negative stroke workup, was thought to be worsening of his Parkinson's disease; discharged to SNF at that time.     This patient's problems and plans were partially entered by my partner and updated as appropriate by me 08/19/22.     Arrhythmia, possible V-tach  Near syncopal episode  Bradycardia  --Patient currently stable, chest pain free, and in no acute distress. He is back to baseline per daughter.  --He is bradycardic now. Daughter notes that this is typical for him.   --Mg normal. TSH WNL. UA is unremarkable.  --EKG now shows sinus bradycardia with 1st degree block.   --D-dimer was elevated, CTA chest negative for PE.   --Echo 7/10/22 was unremarkable.   --Given that he is bradycardic at baseline, with now the note of V-tach by EMS, Cardiology/Dr. Rodrigues was consulted. They have discussed with patient, who does not desire further invasive workup (LHC, EP study, or ICD implantation)--thus no plans to pursue ischemic evaluation or EP study as no plans for ICD placement. Recommend avoiding AV carla agents and possibly stop Norvasc and allow for permissive hypertension.  --Monitor on telemetry.     Parkinson's disease  --Continue home Aricept and Namenda.   --Continue Requip.  --Avoid  Trazodone during this admission. Patient reportedly had hallucinations during last admission after being given this, per daughter and patient. Melatonin okay for sleep.  --PT/OT recommends SNF. CM following.     HTN  HLD  --Holding Norvasc for now, he had some hypotension in the ER as low as 81/67. Cardiology agrees with holding and allowing for permissive hypertension.  --Continue statin.    Discussed with patient and friend at bedside. Again talked through risks vs benefit of further cardiac evaluation, he does not want any further workup at this time. Can follow up with Cardiology as outpatient if he changes his mind in the future.    Expected Discharge Location and Transportation: [place]  Expected Discharge Date: 08/20/22 family to transport after lunch    DVT prophylaxis:  Medical DVT prophylaxis orders are present.     AM-PAC 6 Clicks Score (PT): 18 (08/19/22 0800)    CODE STATUS:   Code Status and Medical Interventions:   Ordered at: 08/17/22 2363     Medical Intervention Limits:    NO intubation (DNI)     Code Status (Patient has no pulse and is not breathing):    No CPR (Do Not Attempt to Resuscitate)     Medical Interventions (Patient has pulse or is breathing):    Limited Support       Arleth Rosenthal MD  08/19/22

## 2022-08-19 NOTE — PLAN OF CARE
Goal Outcome Evaluation:  Plan of Care Reviewed With: patient, family        Progress: improving   Mr. Gagnon walked around the unit multiple times today with CGA. NSR throughout the shift.   Problem: Adult Inpatient Plan of Care  Goal: Plan of Care Review  Outcome: Ongoing, Progressing  Flowsheets (Taken 8/19/2022 1822)  Progress: improving  Plan of Care Reviewed With:   patient   family  Goal: Patient-Specific Goal (Individualized)  Outcome: Ongoing, Progressing  Goal: Absence of Hospital-Acquired Illness or Injury  Outcome: Ongoing, Progressing  Intervention: Identify and Manage Fall Risk  Flowsheets  Taken 8/19/2022 1800  Safety Promotion/Fall Prevention:   activity supervised   assistive device/personal items within reach   clutter free environment maintained   fall prevention program maintained   nonskid shoes/slippers when out of bed   safety round/check completed  Taken 8/19/2022 1600  Safety Promotion/Fall Prevention:   activity supervised   assistive device/personal items within reach   clutter free environment maintained   fall prevention program maintained   nonskid shoes/slippers when out of bed   safety round/check completed  Taken 8/19/2022 1400  Safety Promotion/Fall Prevention:   activity supervised   assistive device/personal items within reach   clutter free environment maintained   fall prevention program maintained   nonskid shoes/slippers when out of bed   safety round/check completed  Taken 8/19/2022 1200  Safety Promotion/Fall Prevention:   activity supervised   assistive device/personal items within reach   clutter free environment maintained   fall prevention program maintained   nonskid shoes/slippers when out of bed   safety round/check completed  Taken 8/19/2022 1000  Safety Promotion/Fall Prevention:   assistive device/personal items within reach   activity supervised   clutter free environment maintained   fall prevention program maintained   nonskid shoes/slippers when out of bed    safety round/check completed  Taken 8/19/2022 0800  Safety Promotion/Fall Prevention:   activity supervised   assistive device/personal items within reach   clutter free environment maintained   fall prevention program maintained   nonskid shoes/slippers when out of bed   safety round/check completed  Intervention: Prevent Skin Injury  Flowsheets  Taken 8/19/2022 1800  Body Position: position changed independently  Skin Protection:   tubing/devices free from skin contact   transparent dressing maintained   incontinence pads utilized   adhesive use limited  Taken 8/19/2022 1600  Body Position: position changed independently  Skin Protection:   adhesive use limited   tubing/devices free from skin contact   incontinence pads utilized  Taken 8/19/2022 1400  Body Position: position changed independently  Skin Protection:   adhesive use limited   tubing/devices free from skin contact   transparent dressing maintained  Taken 8/19/2022 1200  Body Position: position changed independently  Skin Protection:   tubing/devices free from skin contact   adhesive use limited   transparent dressing maintained  Taken 8/19/2022 1000  Body Position:   position changed independently   sitting up in bed  Skin Protection:   tubing/devices free from skin contact   transparent dressing maintained   adhesive use limited  Taken 8/19/2022 0800  Body Position: position changed independently  Skin Protection:   adhesive use limited   incontinence pads utilized   transparent dressing maintained   tubing/devices free from skin contact  Intervention: Prevent and Manage VTE (Venous Thromboembolism) Risk  Recent Flowsheet Documentation  Taken 8/19/2022 1800 by Tana Iraheta, RN  Activity Management:   activity adjusted per tolerance   activity encouraged  Taken 8/19/2022 1600 by Tana Iraheta, RN  Activity Management: up in chair  Taken 8/19/2022 1400 by Tana Iraheta, RN  Activity Management:   activity adjusted per tolerance    activity encouraged  Taken 8/19/2022 1200 by Tana Iraheta RN  Activity Management: ambulated outside room  Taken 8/19/2022 1000 by Tana Iraheta RN  Activity Management: ambulated to bathroom  Taken 8/19/2022 0800 by Tana Iraheta RN  Activity Management:   activity adjusted per tolerance   activity encouraged  VTE Prevention/Management:   sequential compression devices off   bilateral   left   right  Range of Motion: active ROM (range of motion) encouraged  Intervention: Prevent Infection  Recent Flowsheet Documentation  Taken 8/19/2022 1200 by Tana Iraheta RN  Infection Prevention: hand hygiene promoted  Taken 8/19/2022 0800 by Tana Iraheta RN  Infection Prevention:   hand hygiene promoted   single patient room provided  Goal: Optimal Comfort and Wellbeing  Outcome: Ongoing, Progressing  Intervention: Monitor Pain and Promote Comfort  Flowsheets (Taken 8/19/2022 1822)  Pain Management Interventions:   pain management plan reviewed with patient/caregiver   care clustered   quiet environment facilitated   relaxation techniques promoted  Intervention: Provide Person-Centered Care  Flowsheets (Taken 8/19/2022 0800)  Trust Relationship/Rapport:   care explained   choices provided   emotional support provided   empathic listening provided   questions answered   questions encouraged   reassurance provided   thoughts/feelings acknowledged  Goal: Readiness for Transition of Care  Outcome: Ongoing, Progressing     Problem: Chest Pain  Goal: Resolution of Chest Pain Symptoms  Outcome: Ongoing, Progressing  Intervention: Manage Acute Chest Pain  Flowsheets (Taken 8/19/2022 1822)  Chest Pain Intervention: cardiac monitoring continued     Problem: Fall Injury Risk  Goal: Absence of Fall and Fall-Related Injury  Outcome: Ongoing, Progressing  Intervention: Identify and Manage Contributors  Flowsheets (Taken 8/19/2022 0800)  Medication Review/Management:   medications reviewed   high-risk  medications identified  Self-Care Promotion: independence encouraged  Intervention: Promote Injury-Free Environment  Flowsheets  Taken 8/19/2022 1800  Safety Promotion/Fall Prevention:   activity supervised   assistive device/personal items within reach   clutter free environment maintained   fall prevention program maintained   nonskid shoes/slippers when out of bed   safety round/check completed  Taken 8/19/2022 1600  Safety Promotion/Fall Prevention:   activity supervised   assistive device/personal items within reach   clutter free environment maintained   fall prevention program maintained   nonskid shoes/slippers when out of bed   safety round/check completed  Taken 8/19/2022 1400  Safety Promotion/Fall Prevention:   activity supervised   assistive device/personal items within reach   clutter free environment maintained   fall prevention program maintained   nonskid shoes/slippers when out of bed   safety round/check completed  Taken 8/19/2022 1200  Safety Promotion/Fall Prevention:   activity supervised   assistive device/personal items within reach   clutter free environment maintained   fall prevention program maintained   nonskid shoes/slippers when out of bed   safety round/check completed  Taken 8/19/2022 1000  Safety Promotion/Fall Prevention:   assistive device/personal items within reach   activity supervised   clutter free environment maintained   fall prevention program maintained   nonskid shoes/slippers when out of bed   safety round/check completed  Taken 8/19/2022 0800  Safety Promotion/Fall Prevention:   activity supervised   assistive device/personal items within reach   clutter free environment maintained   fall prevention program maintained   nonskid shoes/slippers when out of bed   safety round/check completed   Expected discharge tomorrow afternoon.

## 2022-08-19 NOTE — PLAN OF CARE
Goal Outcome Evaluation:  Plan of Care Reviewed With: patient        Progress: no change  Outcome Evaluation: .    VSS, NSR or sinus bradycardia per monitor, O2 on RA. No c/o pain this shift, pt rested well overnight.       Problem: Adult Inpatient Plan of Care  Goal: Absence of Hospital-Acquired Illness or Injury  Intervention: Identify and Manage Fall Risk  Recent Flowsheet Documentation  Taken 8/19/2022 0400 by Ashley Beach RN  Safety Promotion/Fall Prevention:   activity supervised   assistive device/personal items within reach   clutter free environment maintained   fall prevention program maintained   lighting adjusted   nonskid shoes/slippers when out of bed   room organization consistent   safety round/check completed   toileting scheduled  Taken 8/19/2022 0200 by Ashley Beach RN  Safety Promotion/Fall Prevention:   activity supervised   assistive device/personal items within reach   clutter free environment maintained   fall prevention program maintained   lighting adjusted   nonskid shoes/slippers when out of bed   room organization consistent   safety round/check completed   toileting scheduled  Taken 8/19/2022 0000 by Ashley Beach RN  Safety Promotion/Fall Prevention:   activity supervised   assistive device/personal items within reach   clutter free environment maintained   fall prevention program maintained   lighting adjusted   nonskid shoes/slippers when out of bed   room organization consistent   safety round/check completed   toileting scheduled  Taken 8/18/2022 2200 by Ashley Beach RN  Safety Promotion/Fall Prevention:   activity supervised   assistive device/personal items within reach   clutter free environment maintained   fall prevention program maintained   lighting adjusted   nonskid shoes/slippers when out of bed   room organization consistent   safety round/check completed   toileting scheduled  Taken 8/18/2022 2000 by Ashley Beach RN  Safety Promotion/Fall  Prevention:   activity supervised   assistive device/personal items within reach   clutter free environment maintained   fall prevention program maintained   lighting adjusted   nonskid shoes/slippers when out of bed   room organization consistent   safety round/check completed   toileting scheduled

## 2022-08-20 VITALS
TEMPERATURE: 98.2 F | BODY MASS INDEX: 30.61 KG/M2 | OXYGEN SATURATION: 96 % | SYSTOLIC BLOOD PRESSURE: 125 MMHG | WEIGHT: 195 LBS | HEART RATE: 59 BPM | DIASTOLIC BLOOD PRESSURE: 69 MMHG | HEIGHT: 67 IN | RESPIRATION RATE: 18 BRPM

## 2022-08-20 PROCEDURE — 99238 HOSP IP/OBS DSCHRG MGMT 30/<: CPT | Performed by: INTERNAL MEDICINE

## 2022-08-20 PROCEDURE — 25010000002 HEPARIN (PORCINE) PER 1000 UNITS: Performed by: PHYSICIAN ASSISTANT

## 2022-08-20 RX ADMIN — MEMANTINE 5 MG: 10 TABLET ORAL at 09:40

## 2022-08-20 RX ADMIN — ASPIRIN 81 MG: 81 TABLET, COATED ORAL at 09:40

## 2022-08-20 RX ADMIN — ROPINIROLE HYDROCHLORIDE 2 MG: 2 TABLET, FILM COATED ORAL at 14:09

## 2022-08-20 RX ADMIN — Medication 10 ML: at 09:00

## 2022-08-20 RX ADMIN — HEPARIN SODIUM 5000 UNITS: 5000 INJECTION INTRAVENOUS; SUBCUTANEOUS at 06:43

## 2022-08-20 RX ADMIN — ROPINIROLE HYDROCHLORIDE 2 MG: 2 TABLET, FILM COATED ORAL at 06:43

## 2022-08-20 NOTE — PLAN OF CARE
A fourth Goal Outcome Evaluation:      Pt admitted on 8-17-22  for syncope and collapse. Pt remains sinus bradycardic and in first degree A-V Block this AM. PT has remained alert and oriented x 3 . He is very pleasant but sad today occasionally crying  over his wifes passing fourteen years ago today. Emotional support given and patient did eventually compose himself. [facility] and his daughter will transport him in her priivate vehicle. Report was called three times attempting to leave messages on line one (Transfer report) . Line 2 (New admission report) and Line 3 DON line. I left my phone # to return my phone call without any response yet. His daughter was given discharge papers which were reviewed with she and her dad. They verbalize an understanding of our discussion.     A fourth attempt was made to connect with [facility] Staff at time of discharge without success.     A staff member from [facility] finally called to get report at 1720hrs.

## 2022-08-20 NOTE — DISCHARGE SUMMARY
[facility] Medicine Services  DISCHARGE SUMMARY    Patient Name: Jhoan Gagnon  : 1940  MRN: [id number]    Date of Admission: 2022  2:05 PM  Date of Discharge:  2022  Primary Care Physician: Wilmer Andres MD    Consults     Date and Time Order Name Status Description    2022 12:34 AM Inpatient Cardiology Consult Completed           Hospital Course     Presenting Problem:   Arrhythmia [I49.9]  Syncope and collapse [R55]    Active Hospital Problems    Diagnosis  POA   • **Syncope and collapse [R55]  Yes   • Bilateral carotid artery stenosis [I65.23]  Yes   • Arrhythmia [I49.9]  Yes   • Essential hypertension [I10]  Yes   • Hyperlipidemia [E78.5]  Yes   • Parkinson's disease (HCC) [G20]  Yes   • Sinus bradycardia [R00.1]  Yes   • First degree atrioventricular block [I44.0]  Yes      Resolved Hospital Problems    Diagnosis Date Resolved POA   • Altered mental status, unspecified altered mental status type [R41.82] 2022 Yes          Hospital Course:  Jhoan Gagnon is a 82 y.o. male with a history of Parkinson's disease s/p neurostimulator, prostate cancer s/p surgery, HTN, and chronic low back pain who presented to [facility] ED due to an episode of near syncope. EMS noted V-tach but resolved before they could obtain tracing of it. Admitted for very similar episode -22 with presyncope: negative stroke workup, was thought to be worsening of his Parkinson's disease; discharged to SNF at that time.         Arrhythmia, possible V-tach  Near syncopal episode  Bradycardia  --Patient currently stable, chest pain free, and in no acute distress. He is back to baseline per daughter.  --He is bradycardic now. Daughter notes that this is typical for him.   --Mg normal. TSH WNL. UA is unremarkable.  --EKG now shows sinus bradycardia with 1st degree block.   --D-dimer was elevated, CTA chest negative for PE.   --Echo 7/10/22 was unremarkable.   --Given that he is  bradycardic at baseline, with now the note of V-tach by EMS, Cardiology/Dr. Rodrigues was consulted. They have discussed with patient, who does not desire further invasive workup (LHC, EP study, or ICD implantation)--thus no plans to pursue ischemic evaluation nor EP study as no plans for ICD placement. Recommend avoiding AV carla agents and stop Norvasc and allow for permissive hypertension.     Parkinson's disease  --Continue home Aricept and Namenda.   --Continue Requip.  --Avoid Trazodone during this admission. Patient reportedly had hallucinations during last admission after being given this, per daughter and patient. Melatonin okay for sleep.  --PT/OT recommends SNF- to [facility] today with family to transport     HTN  HLD  --Holding Norvasc for now, he had some hypotension in the ER as low as 81/67. Cardiology agrees with holding and allowing for permissive hypertension.  --Continue statin.      Again talked through risks vs benefit of further cardiac evaluation, he does not want any further workup at this time. Can follow up with Cardiology as outpatient if he changes his mind in the future.         Discharge Follow Up Recommendations for outpatient labs/diagnostics:   Follow up with PCP within one week of d/c from rehab facility    Day of Discharge     HPI:   Doing well this am, denies any issues overnight. Currently eating breakfast.     Review of Systems  Gen- No fevers, chills  CV- No chest pain, palpitations  Resp- No cough, dyspnea  GI- No N/V/D, abd pain        Vital Signs:   Temp:  [97.6 °F (36.4 °C)-98.6 °F (37 °C)] 97.6 °F (36.4 °C)  Heart Rate:  [51-89] 51  Resp:  [16-18] 18  BP: (108-149)/(69-89) 130/89      Physical Exam:  Gen-no acute distress  HENT-NCAT, mucous membranes moist  CV-RRR, S1 S2 normal, no m/r/g  Resp-CTAB, no wheezes or rales  Abd-soft, NT, ND, +BS  Ext-no edema  Neuro-A&Ox3, no focal deficits, resting tremor right hand  Skin-no rashes  Psych-appropriate mood       Pertinent  and/or  Most Recent Results     LAB RESULTS:      Lab 08/19/22  0528 08/18/22  0347 08/17/22  1419   WBC 5.55 4.62 5.54   HEMOGLOBIN 13.3 13.0 13.7   HEMATOCRIT 39.1 37.7 39.3   PLATELETS 159 149 161   NEUTROS ABS 3.82 2.81 3.69   IMMATURE GRANS (ABS) 0.01 0.00 0.01   LYMPHS ABS 1.07 1.21 1.18   MONOS ABS 0.50 0.44 0.51   EOS ABS 0.12 0.13 0.13   MCV 89.1 90.4 89.9   PROCALCITONIN  --   --  0.07   LACTATE  --   --  1.5   D DIMER QUANT  --   --  3.07*         Lab 08/19/22  0528 08/18/22  0014 08/17/22  1803 08/17/22  1419   SODIUM 139 140  --  142   POTASSIUM 4.3 3.7  --  4.2   CHLORIDE 105 108*  --  107   CO2 25.0 24.0  --  26.0   ANION GAP 9.0 8.0  --  9.0   BUN 18 17  --  19   CREATININE 0.98 1.11  --  1.16   EGFR 76.0 65.5  --  62.1   GLUCOSE 107* 105*  --  118*   CALCIUM 9.2 8.8  --  9.1   MAGNESIUM  --   --   --  2.2   TSH  --   --  1.960  --          Lab 08/17/22  1419   TOTAL PROTEIN 7.0   ALBUMIN 4.00   GLOBULIN 3.0   ALT (SGPT) 16   AST (SGOT) 27   BILIRUBIN 2.2*   ALK PHOS 101         Lab 08/18/22  0347 08/18/22  0014 08/17/22  1803 08/17/22  1419   TROPONIN T <0.010 0.010 <0.010 <0.010                 Lab 08/17/22  1448   PH, ARTERIAL 7.460*   PCO2, ARTERIAL 34.6*   PO2 ART 62.2*   FIO2 28   HCO3 ART 24.6   BASE EXCESS ART 1.1   CARBOXYHEMOGLOBIN 1.1     Brief Urine Lab Results  (Last result in the past 365 days)      Color   Clarity   Blood   Leuk Est   Nitrite   Protein   CREAT   Urine HCG        08/17/22 1606 Dark Yellow   Cloudy   Negative   Negative   Negative   Trace               Microbiology Results (last 10 days)     Procedure Component Value - Date/Time    COVID PRE-OP / PRE-PROCEDURE SCREENING ORDER (NO ISOLATION) - Swab, Nasopharynx [[id number]]  (Normal) Collected: 08/17/22 1434    Lab Status: Final result Specimen: Swab from Nasopharynx Updated: 08/17/22 1455    Narrative:      The following orders were created for panel order COVID PRE-OP / PRE-PROCEDURE SCREENING ORDER (NO ISOLATION) - Swab,  Nasopharynx.  Procedure                               Abnormality         Status                     ---------                               -----------         ------                     COVID-19 and FLU A/B PCR...[[id number]]  Normal              Final result                 Please view results for these tests on the individual orders.    COVID-19 and FLU A/B PCR - Swab, Nasopharynx [[id number]]  (Normal) Collected: 08/17/22 1434    Lab Status: Final result Specimen: Swab from Nasopharynx Updated: 08/17/22 1455     COVID19 Not Detected     Influenza A PCR Not Detected     Influenza B PCR Not Detected    Narrative:      Fact sheet for providers: https://www.fda.gov/media/249027/download    Fact sheet for patients: https://www.fda.gov/media/567217/download    Test performed by PCR.          XR Chest 1 View    Result Date: 8/17/2022  DATE OF EXAM: 8/17/2022 2:32 PM  PROCEDURE: XR CHEST 1 VW-  INDICATIONS: Weakness, dizziness, altered mental status.  COMPARISON: 07/09/2022.  TECHNIQUE: Single radiographic view of the chest was obtained.  FINDINGS: The heart is enlarged. The pulmonary vascular markings are normal. The lungs and pleural spaces are clear of active disease. There is a right-sided vagal nerve stimulator in place.  There are chronic age-related changes involving the bony thorax and thoracic aorta.       1. Cardiomegaly. 2. No active pulmonary disease.  This report was finalized on 8/17/2022 2:41 PM by Vern Rosales MD.      CT Angiogram Chest    Result Date: 8/17/2022  DATE OF EXAM: 8/17/2022 5:23 PM  PROCEDURE: CT ANGIOGRAM CHEST-  INDICATIONS: Unresponsive, elevated d-dimer level, questionable acute pulmonary embolic disease.  COMPARISON: CT of the abdomen performed on 06/29/2021.  TECHNIQUE: Contiguous axial imaging was obtained from the thoracic inlet through the upper abdomen following the intravenous administration of 100 mL of Isovue 370. Reconstructed coronal and sagittal images were also obtained.  Automated exposure control and iterative reconstruction methods were used.  The radiation dose reduction device was turned on for each scan per the ALARA (As Low as Reasonably Achievable) protocol.  FINDINGS: There are no filling defects in the pulmonary arteries to indicate acute pulmonary embolic disease. The heart size is normal. There are atherosclerotic vascular calcifications including involvement the coronary arteries. There are a few prominent mediastinal lymph nodes felt to be secondary to reactive hyperplasia. There is no hilar adenopathy. There are no layering pleural effusions. There is a thin bandlike linear density in the left lower lobe consistent with a pulmonary scar versus subsegmental atelectasis. The lungs are otherwise clear. There is again prominent walled off mesenteric fat. The visualized mesenteric vessels have a somewhat swirled pattern. This may represent panniculitis and on the previous exam there was suggestion of a congenital malrotation. The findings appear similar to the previous study. There are no suspicious osteolytic or sclerotic lesions within the bony thorax.        1. No acute pulmonary embolic disease. 2. There are a few prominent mediastinal lymph nodes of doubtful clinical significance. There are probably secondary to reactive hyperplasia. 3. Thin bandlike linear scar versus subsegmental atelectasis in the left lower lobe. 4. Stable appearance of the upper abdomen with abnormalities as described above.  This report was finalized on 8/17/2022 5:54 PM by Vern Rosales MD.        Results for orders placed during the hospital encounter of 06/29/21    Duplex Carotid Ultrasound CAR    Interpretation Summary  · Proximal right internal carotid artery plaque without significant stenosis.  · Right internal carotid artery stenosis of 0-49%.  · Proximal left internal carotid artery plaque without significant stenosis.  · Left internal carotid artery stenosis of 0-49%.      Results for  orders placed during the hospital encounter of 06/29/21    Duplex Carotid Ultrasound CAR    Interpretation Summary  · Proximal right internal carotid artery plaque without significant stenosis.  · Right internal carotid artery stenosis of 0-49%.  · Proximal left internal carotid artery plaque without significant stenosis.  · Left internal carotid artery stenosis of 0-49%.      Results for orders placed during the hospital encounter of 07/09/22    Adult Transthoracic Echo Complete W/ Cont if Necessary Per Protocol (With Agitated Saline)    Interpretation Summary  · Estimated left ventricular EF = 65%  · Left ventricular wall thickness is consistent with mild concentric hypertrophy.  · Mild dilation of the ascending aorta is present.  · No hemodynamically significant valvular heart disease      Plan for Follow-up of Pending Labs/Results:     Discharge Details        Discharge Medications      Continue These Medications      Instructions Start Date   aspirin 81 MG EC tablet   81 mg, Oral, Daily      atropine 1 % ophthalmic solution   1 drop, Daily PRN, Under tongue as needed       cyanocobalamin 1000 MCG/ML injection   1,000 mcg, Intramuscular, Every 28 Days      donepezil 10 MG tablet  Commonly known as: ARICEPT   10 mg, Oral, Nightly      memantine 5 MG tablet  Commonly known as: NAMENDA   5 mg, Oral, 2 Times Daily      rOPINIRole 2 MG tablet  Commonly known as: REQUIP   2 mg, Oral, Every 8 Hours Scheduled      rosuvastatin 20 MG tablet  Commonly known as: CRESTOR   20 mg, Oral, Nightly         Stop These Medications    amLODIPine 5 MG tablet  Commonly known as: NORVASC     traZODone 100 MG tablet  Commonly known as: DESYREL            No Known Allergies      Discharge Disposition:      Diet:  Hospital:  Diet Order   Procedures   • Diet Regular; Cardiac, Consistent Carbohydrate       Activity:      Restrictions or Other Recommendations:       CODE STATUS:    Code Status and Medical Interventions:   Ordered at:  08/17/22 2125     Medical Intervention Limits:    NO intubation (DNI)     Code Status (Patient has no pulse and is not breathing):    No CPR (Do Not Attempt to Resuscitate)     Medical Interventions (Patient has pulse or is breathing):    Limited Support       Future Appointments   Date Time Provider Department Center   10/18/2022  3:00 PM Reynaldo Escobar MD [facility]                 Naty Santo MD  08/20/22      Time Spent on Discharge:  I spent  35 minutes on this discharge activity which included: face-to-face encounter with the patient, reviewing the data in the system, coordination of the care with the nursing staff as well as consultants, documentation, and entering orders.

## 2022-08-20 NOTE — PLAN OF CARE
Goal Outcome Evaluation:  Plan of Care Reviewed With: patient        Progress: no change  Outcome Evaluation: pt on ra. pain managed per mar. no voiced complaints at this time. will continue to monitor.

## 2022-08-20 NOTE — DISCHARGE PLACEMENT REQUEST
"Jenelle Gagnon (82 y.o. Male)             Date of Birth   1940    Social Security Number       Address   [address] [address]    Home Phone   [phone]    MRN   [id number]       [facility]    Marital Status                               Admission Date   8/17/22    Admission Type   Emergency    Admitting Provider   Naty Santo MD    Attending Provider   Naty Santo MD    Department, Room/Bed   [address], S519/1       Discharge Date       Discharge Disposition   Rehab Facility or Unit (DC - External)    Discharge Destination                               Attending Provider: Naty Santo MD    Allergies: No Known Allergies    Isolation: None   Infection: None   Code Status: No CPR   Advance Care Planning Activity    Ht: 170.2 cm (67\")   Wt: 88.5 kg (195 lb)    Admission Cmt: None   Principal Problem: Syncope and collapse [R55] More...                 Active Insurance as of 8/17/2022     Primary Coverage     Payor Plan Insurance Group Employer/Plan Group    MEDICARE MEDICARE A & B      Payor Plan Address Payor Plan Phone Number Payor Plan Fax Number Effective Dates    [address] [phone]  6/1/2003 - None Entered    [facility] [id number]       Subscriber Name Subscriber Birth Date Member ID       JENELEL GAGNON 1940 [id number]           Secondary Coverage     Payor Plan Insurance Group Employer/Plan Group    [facility] SUPP [id number]     Payor Plan Address Payor Plan Phone Number Payor Plan Fax Number Effective Dates    [address]   12/1/2016 - None Entered    [facility] [id number]       Subscriber Name Subscriber Birth Date Member ID       JENELLE GAGNON 1940 [id number]                 Emergency Contacts      (Rel.) Home Phone Work Phone Mobile Phone    Shlomo,Anamaria (Daughter) [phone] -- [phone]    Tiesha Louise (Grandchild) -- -- [phone]    " Trang Ríos (Friend) [phone] -- --               Discharge Summary      Naty Santo MD at 22 0824          [facility] Medicine Services  DISCHARGE SUMMARY    Patient Name: Jhoan Gagnon  : 1940  MRN: [id number]    Date of Admission: 2022  2:05 PM  Date of Discharge:  2022  Primary Care Physician: Wilmer Andres MD    Consults     Date and Time Order Name Status Description    2022 12:34 AM Inpatient Cardiology Consult Completed           Hospital Course     Presenting Problem:   Arrhythmia [I49.9]  Syncope and collapse [R55]    Active Hospital Problems    Diagnosis  POA   • **Syncope and collapse [R55]  Yes   • Bilateral carotid artery stenosis [I65.23]  Yes   • Arrhythmia [I49.9]  Yes   • Essential hypertension [I10]  Yes   • Hyperlipidemia [E78.5]  Yes   • Parkinson's disease (HCC) [G20]  Yes   • Sinus bradycardia [R00.1]  Yes   • First degree atrioventricular block [I44.0]  Yes      Resolved Hospital Problems    Diagnosis Date Resolved POA   • Altered mental status, unspecified altered mental status type [R41.82] 2022 Yes          Hospital Course:  Jhoan Gagnon is a 82 y.o. male with a history of Parkinson's disease s/p neurostimulator, prostate cancer s/p surgery, HTN, and chronic low back pain who presented to [facility] ED due to an episode of near syncope. EMS noted V-tach but resolved before they could obtain tracing of it. Admitted for very similar episode -22 with presyncope: negative stroke workup, was thought to be worsening of his Parkinson's disease; discharged to SNF at that time.         Arrhythmia, possible V-tach  Near syncopal episode  Bradycardia  --Patient currently stable, chest pain free, and in no acute distress. He is back to baseline per daughter.  --He is bradycardic now. Daughter notes that this is typical for him.   --Mg normal. TSH WNL. UA is unremarkable.  --EKG now shows sinus bradycardia  with 1st degree block.   --D-dimer was elevated, CTA chest negative for PE.   --Echo 7/10/22 was unremarkable.   --Given that he is bradycardic at baseline, with now the note of V-tach by EMS, Cardiology/Dr. Rodrigues was consulted. They have discussed with patient, who does not desire further invasive workup (LHC, EP study, or ICD implantation)--thus no plans to pursue ischemic evaluation nor EP study as no plans for ICD placement. Recommend avoiding AV carla agents and stop Norvasc and allow for permissive hypertension.     Parkinson's disease  --Continue home Aricept and Namenda.   --Continue Requip.  --Avoid Trazodone during this admission. Patient reportedly had hallucinations during last admission after being given this, per daughter and patient. Melatonin okay for sleep.  --PT/OT recommends SNF- to [facility] today with family to transport     HTN  HLD  --Holding Norvasc for now, he had some hypotension in the ER as low as 81/67. Cardiology agrees with holding and allowing for permissive hypertension.  --Continue statin.      Again talked through risks vs benefit of further cardiac evaluation, he does not want any further workup at this time. Can follow up with Cardiology as outpatient if he changes his mind in the future.         Discharge Follow Up Recommendations for outpatient labs/diagnostics:   Follow up with PCP within one week of d/c from rehab facility    Day of Discharge     HPI:   Doing well this am, denies any issues overnight. Currently eating breakfast.     Review of Systems  Gen- No fevers, chills  CV- No chest pain, palpitations  Resp- No cough, dyspnea  GI- No N/V/D, abd pain        Vital Signs:   Temp:  [97.6 °F (36.4 °C)-98.6 °F (37 °C)] 97.6 °F (36.4 °C)  Heart Rate:  [51-89] 51  Resp:  [16-18] 18  BP: (108-149)/(69-89) 130/89      Physical Exam:  Gen-no acute distress  HENT-NCAT, mucous membranes moist  CV-RRR, S1 S2 normal, no m/r/g  Resp-CTAB, no wheezes or rales  Abd-soft, NT, ND, +BS  Ext-no  edema  Neuro-A&Ox3, no focal deficits, resting tremor right hand  Skin-no rashes  Psych-appropriate mood       Pertinent  and/or Most Recent Results     LAB RESULTS:      Lab 08/19/22  0528 08/18/22  0347 08/17/22  1419   WBC 5.55 4.62 5.54   HEMOGLOBIN 13.3 13.0 13.7   HEMATOCRIT 39.1 37.7 39.3   PLATELETS 159 149 161   NEUTROS ABS 3.82 2.81 3.69   IMMATURE GRANS (ABS) 0.01 0.00 0.01   LYMPHS ABS 1.07 1.21 1.18   MONOS ABS 0.50 0.44 0.51   EOS ABS 0.12 0.13 0.13   MCV 89.1 90.4 89.9   PROCALCITONIN  --   --  0.07   LACTATE  --   --  1.5   D DIMER QUANT  --   --  3.07*         Lab 08/19/22  0528 08/18/22  0014 08/17/22  1803 08/17/22  1419   SODIUM 139 140  --  142   POTASSIUM 4.3 3.7  --  4.2   CHLORIDE 105 108*  --  107   CO2 25.0 24.0  --  26.0   ANION GAP 9.0 8.0  --  9.0   BUN 18 17  --  19   CREATININE 0.98 1.11  --  1.16   EGFR 76.0 65.5  --  62.1   GLUCOSE 107* 105*  --  118*   CALCIUM 9.2 8.8  --  9.1   MAGNESIUM  --   --   --  2.2   TSH  --   --  1.960  --          Lab 08/17/22  1419   TOTAL PROTEIN 7.0   ALBUMIN 4.00   GLOBULIN 3.0   ALT (SGPT) 16   AST (SGOT) 27   BILIRUBIN 2.2*   ALK PHOS 101         Lab 08/18/22  0347 08/18/22  0014 08/17/22  1803 08/17/22  1419   TROPONIN T <0.010 0.010 <0.010 <0.010                 Lab 08/17/22  1448   PH, ARTERIAL 7.460*   PCO2, ARTERIAL 34.6*   PO2 ART 62.2*   FIO2 28   HCO3 ART 24.6   BASE EXCESS ART 1.1   CARBOXYHEMOGLOBIN 1.1     Brief Urine Lab Results  (Last result in the past 365 days)      Color   Clarity   Blood   Leuk Est   Nitrite   Protein   CREAT   Urine HCG        08/17/22 1606 Dark Yellow   Cloudy   Negative   Negative   Negative   Trace               Microbiology Results (last 10 days)     Procedure Component Value - Date/Time    COVID PRE-OP / PRE-PROCEDURE SCREENING ORDER (NO ISOLATION) - Swab, Nasopharynx [[id number]]  (Normal) Collected: 08/17/22 1434    Lab Status: Final result Specimen: Swab from Nasopharynx Updated: 08/17/22 1455    Narrative:       The following orders were created for panel order COVID PRE-OP / PRE-PROCEDURE SCREENING ORDER (NO ISOLATION) - Swab, Nasopharynx.  Procedure                               Abnormality         Status                     ---------                               -----------         ------                     COVID-19 and FLU A/B PCR...[[id number]]  Normal              Final result                 Please view results for these tests on the individual orders.    COVID-19 and FLU A/B PCR - Swab, Nasopharynx [[id number]]  (Normal) Collected: 08/17/22 1434    Lab Status: Final result Specimen: Swab from Nasopharynx Updated: 08/17/22 1455     COVID19 Not Detected     Influenza A PCR Not Detected     Influenza B PCR Not Detected    Narrative:      Fact sheet for providers: https://www.fda.gov/media/581685/download    Fact sheet for patients: https://www.fda.gov/media/278967/download    Test performed by PCR.          XR Chest 1 View    Result Date: 8/17/2022  DATE OF EXAM: 8/17/2022 2:32 PM  PROCEDURE: XR CHEST 1 VW-  INDICATIONS: Weakness, dizziness, altered mental status.  COMPARISON: 07/09/2022.  TECHNIQUE: Single radiographic view of the chest was obtained.  FINDINGS: The heart is enlarged. The pulmonary vascular markings are normal. The lungs and pleural spaces are clear of active disease. There is a right-sided vagal nerve stimulator in place.  There are chronic age-related changes involving the bony thorax and thoracic aorta.       1. Cardiomegaly. 2. No active pulmonary disease.  This report was finalized on 8/17/2022 2:41 PM by Vern Rosales MD.      CT Angiogram Chest    Result Date: 8/17/2022  DATE OF EXAM: 8/17/2022 5:23 PM  PROCEDURE: CT ANGIOGRAM CHEST-  INDICATIONS: Unresponsive, elevated d-dimer level, questionable acute pulmonary embolic disease.  COMPARISON: CT of the abdomen performed on 06/29/2021.  TECHNIQUE: Contiguous axial imaging was obtained from the thoracic inlet through the upper abdomen following  the intravenous administration of 100 mL of Isovue 370. Reconstructed coronal and sagittal images were also obtained. Automated exposure control and iterative reconstruction methods were used.  The radiation dose reduction device was turned on for each scan per the ALARA (As Low as Reasonably Achievable) protocol.  FINDINGS: There are no filling defects in the pulmonary arteries to indicate acute pulmonary embolic disease. The heart size is normal. There are atherosclerotic vascular calcifications including involvement the coronary arteries. There are a few prominent mediastinal lymph nodes felt to be secondary to reactive hyperplasia. There is no hilar adenopathy. There are no layering pleural effusions. There is a thin bandlike linear density in the left lower lobe consistent with a pulmonary scar versus subsegmental atelectasis. The lungs are otherwise clear. There is again prominent walled off mesenteric fat. The visualized mesenteric vessels have a somewhat swirled pattern. This may represent panniculitis and on the previous exam there was suggestion of a congenital malrotation. The findings appear similar to the previous study. There are no suspicious osteolytic or sclerotic lesions within the bony thorax.        1. No acute pulmonary embolic disease. 2. There are a few prominent mediastinal lymph nodes of doubtful clinical significance. There are probably secondary to reactive hyperplasia. 3. Thin bandlike linear scar versus subsegmental atelectasis in the left lower lobe. 4. Stable appearance of the upper abdomen with abnormalities as described above.  This report was finalized on 8/17/2022 5:54 PM by Vern Rosales MD.        Results for orders placed during the hospital encounter of 06/29/21    Duplex Carotid Ultrasound CAR    Interpretation Summary  · Proximal right internal carotid artery plaque without significant stenosis.  · Right internal carotid artery stenosis of 0-49%.  · Proximal left internal  carotid artery plaque without significant stenosis.  · Left internal carotid artery stenosis of 0-49%.      Results for orders placed during the hospital encounter of 06/29/21    Duplex Carotid Ultrasound CAR    Interpretation Summary  · Proximal right internal carotid artery plaque without significant stenosis.  · Right internal carotid artery stenosis of 0-49%.  · Proximal left internal carotid artery plaque without significant stenosis.  · Left internal carotid artery stenosis of 0-49%.      Results for orders placed during the hospital encounter of 07/09/22    Adult Transthoracic Echo Complete W/ Cont if Necessary Per Protocol (With Agitated Saline)    Interpretation Summary  · Estimated left ventricular EF = 65%  · Left ventricular wall thickness is consistent with mild concentric hypertrophy.  · Mild dilation of the ascending aorta is present.  · No hemodynamically significant valvular heart disease      Plan for Follow-up of Pending Labs/Results:     Discharge Details        Discharge Medications      Continue These Medications      Instructions Start Date   aspirin 81 MG EC tablet   81 mg, Oral, Daily      atropine 1 % ophthalmic solution   1 drop, Daily PRN, Under tongue as needed       cyanocobalamin 1000 MCG/ML injection   1,000 mcg, Intramuscular, Every 28 Days      donepezil 10 MG tablet  Commonly known as: ARICEPT   10 mg, Oral, Nightly      memantine 5 MG tablet  Commonly known as: NAMENDA   5 mg, Oral, 2 Times Daily      rOPINIRole 2 MG tablet  Commonly known as: REQUIP   2 mg, Oral, Every 8 Hours Scheduled      rosuvastatin 20 MG tablet  Commonly known as: CRESTOR   20 mg, Oral, Nightly         Stop These Medications    amLODIPine 5 MG tablet  Commonly known as: NORVASC     traZODone 100 MG tablet  Commonly known as: DESYREL            No Known Allergies      Discharge Disposition:      Diet:  Hospital:  Diet Order   Procedures   • Diet Regular; Cardiac, Consistent Carbohydrate        Activity:      Restrictions or Other Recommendations:       CODE STATUS:    Code Status and Medical Interventions:   Ordered at: 08/17/22 2126     Medical Intervention Limits:    NO intubation (DNI)     Code Status (Patient has no pulse and is not breathing):    No CPR (Do Not Attempt to Resuscitate)     Medical Interventions (Patient has pulse or is breathing):    Limited Support       Future Appointments   Date Time Provider Department Center   10/18/2022  3:00 PM Reynaldo Escobar MD [facility]                 Naty Santo MD  08/20/22      Time Spent on Discharge:  I spent  35 minutes on this discharge activity which included: face-to-face encounter with the patient, reviewing the data in the system, coordination of the care with the nursing staff as well as consultants, documentation, and entering orders.          Electronically signed by Naty Santo MD at 08/20/22 8488

## 2022-08-25 ENCOUNTER — NURSING HOME (OUTPATIENT)
Dept: INTERNAL MEDICINE | Facility: CLINIC | Age: 82
End: 2022-08-25

## 2022-08-25 VITALS
DIASTOLIC BLOOD PRESSURE: 82 MMHG | WEIGHT: 195 LBS | RESPIRATION RATE: 18 BRPM | HEART RATE: 109 BPM | OXYGEN SATURATION: 97 % | SYSTOLIC BLOOD PRESSURE: 121 MMHG | BODY MASS INDEX: 30.54 KG/M2 | TEMPERATURE: 97.7 F

## 2022-08-25 DIAGNOSIS — G20 PARKINSON'S DISEASE: ICD-10-CM

## 2022-08-25 DIAGNOSIS — R27.0 ATAXIA: ICD-10-CM

## 2022-08-25 DIAGNOSIS — R53.1 WEAKNESS: ICD-10-CM

## 2022-08-25 DIAGNOSIS — R00.1 SINUS BRADYCARDIA: ICD-10-CM

## 2022-08-25 DIAGNOSIS — I44.0 FIRST DEGREE ATRIOVENTRICULAR BLOCK: ICD-10-CM

## 2022-08-25 DIAGNOSIS — E78.5 HYPERLIPIDEMIA, UNSPECIFIED HYPERLIPIDEMIA TYPE: ICD-10-CM

## 2022-08-25 DIAGNOSIS — I10 ESSENTIAL HYPERTENSION: ICD-10-CM

## 2022-08-25 DIAGNOSIS — R55 SYNCOPE AND COLLAPSE: Primary | ICD-10-CM

## 2022-08-25 PROCEDURE — 99306 1ST NF CARE HIGH MDM 50: CPT | Performed by: INTERNAL MEDICINE

## 2022-08-30 NOTE — PROGRESS NOTES
Nursing Home History and Physical       Augustussangita Cox DO []  YONG Payne []  [address] [place] [address]  Phone: [phone]  Fax: [phone] Rudy Alaniz MD []  Delgado Gutierrez DO [x]   [address] [place] [address]  Phone: [phone]  Fax: [phone]     PATIENT NAME: Jhoan Gagnon                                                                          YOB: 1940           DATE OF SERVICE: 08/25/2022  FACILITY:  []  [place]   [] [place]    [] [facility]   [] [facility]   []  [facility]   [x] [place] ______________________________________________________________________    CHIEF COMPLAINT:  Nursing facility admission      HISTORY OF PRESENT ILLNESS:   Patient is a very pleasant 82-year-old white male with a history of Parkinson's disease, hypertension, and ataxia who unfortunately suffered recurrent episodes of syncope.  His recent episode had associated ventricular tachycardia noted by EMS.  By the time patient arrived to the hospital, this had resolved.  Patient did have notable bradycardia and further cardiac work-up was suggested however patient preferred not to have aggressive work-up at this point especially with consideration for ICD implant and other devices.  Patient has been transferred to this facility for strengthening and rehab and fall prevention.    On exam today, patient was sitting outside on a chair.  He was in good spirits and content with his care.  He realizes that he is getting older and having difficulty with mobility.  He prefers to have conservative management and stated that he preferred to be DNR.    PAST MEDICAL & SURGICAL HISTORY:   Past Medical History:   Diagnosis Date   • Broken arms    • Cancer (HCC)     prostate   • Lower back injury    • Parkinson's disease (HCC)       Past Surgical History:   Procedure Laterality Date   • CHOLECYSTECTOMY     • PROSTATE SURGERY     • REPLACEMENT TOTAL KNEE BILATERAL            MEDICATIONS:  I have reviewed and reconciled the patients medication list in the patients chart at the skilled nursing facility on 08/25/2022.      ALLERGIES:  No Known Allergies      SOCIAL HISTORY:  Social History     Socioeconomic History   • Marital status:    Tobacco Use   • Smoking status: Never Smoker   • Smokeless tobacco: Never Used   Vaping Use   • Vaping Use: Never used   Substance and Sexual Activity   • Alcohol use: No   • Drug use: No   • Sexual activity: Defer     Partners: Female     Comment: . wife passed away in 2008       FAMILY HISTORY:  Family History   Problem Relation Age of Onset   • Heart disease Mother    • Cancer Father    • Stroke Father    • Stroke Maternal Grandmother    • Cancer Paternal Grandfather         REVIEW OF SYSTEMS:  Review of Systems   Constitutional: Negative for chills, fatigue and fever.   HENT: Negative for congestion, ear pain, rhinorrhea, sinus pressure and sore throat.    Eyes: Negative for visual disturbance.   Respiratory: Negative for cough, chest tightness, shortness of breath and wheezing.    Cardiovascular: Negative for chest pain, palpitations and leg swelling.   Gastrointestinal: Negative for abdominal pain, blood in stool, constipation, diarrhea, nausea and vomiting.   Endocrine: Negative for polydipsia and polyuria.   Genitourinary: Negative for dysuria and hematuria.   Musculoskeletal: Negative for arthralgias and back pain.   Skin: Negative for rash.   Neurological: Negative for dizziness, light-headedness, numbness and headaches.   Psychiatric/Behavioral: Negative for dysphoric mood and sleep disturbance. The patient is not nervous/anxious.          PHYSICAL EXAMINATION:   VITAL SIGNS: /82   Pulse 109   Temp 97.7 °F (36.5 °C)   Resp 18   Wt 88.5 kg (195 lb)   SpO2 97%   BMI 30.54 kg/m²     Physical Exam  Vitals and nursing note reviewed.   Constitutional:       Appearance: Normal appearance. He is well-developed. He is  obese.   HENT:      Head: Normocephalic and atraumatic.      Nose: Nose normal.      Mouth/Throat:      Mouth: Mucous membranes are moist.      Pharynx: No oropharyngeal exudate.   Eyes:      General: No scleral icterus.     Conjunctiva/sclera: Conjunctivae normal.      Pupils: Pupils are equal, round, and reactive to light.   Neck:      Thyroid: No thyromegaly.   Cardiovascular:      Rate and Rhythm: Normal rate and regular rhythm.      Heart sounds: Normal heart sounds. No murmur heard.    No friction rub. No gallop.   Pulmonary:      Effort: Pulmonary effort is normal. No respiratory distress.      Breath sounds: Normal breath sounds. No wheezing.   Abdominal:      General: Bowel sounds are normal. There is no distension.      Palpations: Abdomen is soft.      Tenderness: There is no abdominal tenderness.   Musculoskeletal:         General: No deformity or signs of injury.      Cervical back: Normal range of motion and neck supple.      Right lower leg: Edema present.      Left lower leg: Edema present.   Lymphadenopathy:      Cervical: No cervical adenopathy.   Skin:     General: Skin is warm and dry.      Findings: No rash.   Neurological:      Mental Status: He is alert and oriented to person, place, and time.   Psychiatric:         Mood and Affect: Mood normal.         Behavior: Behavior normal.         RECORDS REVIEW:   Discharge Summary from [facility] 8/20/2022    Results for orders placed or performed during the hospital encounter of 08/17/22   COVID-19 and FLU A/B PCR - Swab, Nasopharynx    Specimen: Nasopharynx; Swab   Result Value Ref Range    COVID19 Not Detected Not Detected - Ref. Range    Influenza A PCR Not Detected Not Detected    Influenza B PCR Not Detected Not Detected   Comprehensive Metabolic Panel    Specimen: Blood   Result Value Ref Range    Glucose 118 (H) 65 - 99 mg/dL    BUN 19 8 - 23 mg/dL    Creatinine 1.16 0.76 - 1.27 mg/dL    Sodium 142 136 - 145 mmol/L    Potassium  4.2 3.5 - 5.2 mmol/L    Chloride 107 98 - 107 mmol/L    CO2 26.0 22.0 - 29.0 mmol/L    Calcium 9.1 8.6 - 10.5 mg/dL    Total Protein 7.0 6.0 - 8.5 g/dL    Albumin 4.00 3.50 - 5.20 g/dL    ALT (SGPT) 16 1 - 41 U/L    AST (SGOT) 27 1 - 40 U/L    Alkaline Phosphatase 101 39 - 117 U/L    Total Bilirubin 2.2 (H) 0.0 - 1.2 mg/dL    Globulin 3.0 gm/dL    A/G Ratio 1.3 g/dL    BUN/Creatinine Ratio 16.4 7.0 - 25.0    Anion Gap 9.0 5.0 - 15.0 mmol/L    eGFR 62.1 >60.0 mL/min/1.73   Troponin    Specimen: Blood   Result Value Ref Range    Troponin T <0.010 0.000 - 0.030 ng/mL   Magnesium    Specimen: Blood   Result Value Ref Range    Magnesium 2.2 1.6 - 2.4 mg/dL   Urinalysis With Microscopic If Indicated (No Culture) - Urine, Clean Catch    Specimen: Urine, Clean Catch   Result Value Ref Range    Color, UA Dark Yellow (A) Yellow, Straw    Appearance, UA Cloudy (A) Clear    pH, UA 6.5 5.0 - 8.0    Specific Gravity, UA 1.022 1.001 - 1.030    Glucose, UA Negative Negative    Ketones, UA Trace (A) Negative    Bilirubin, UA Negative Negative    Blood, UA Negative Negative    Protein, UA Trace (A) Negative    Leuk Esterase, UA Negative Negative    Nitrite, UA Negative Negative    Urobilinogen, UA 1.0 E.U./dL 0.2 - 1.0 E.U./dL   CBC Auto Differential    Specimen: Blood   Result Value Ref Range    WBC 5.54 3.40 - 10.80 10*3/mm3    RBC 4.37 4.14 - 5.80 10*6/mm3    Hemoglobin 13.7 13.0 - 17.7 g/dL    Hematocrit 39.3 37.5 - 51.0 %    MCV 89.9 79.0 - 97.0 fL    MCH 31.4 26.6 - 33.0 pg    MCHC 34.9 31.5 - 35.7 g/dL    RDW 13.2 12.3 - 15.4 %    RDW-SD 43.5 37.0 - 54.0 fl    MPV 9.0 6.0 - 12.0 fL    Platelets 161 140 - 450 10*3/mm3    Neutrophil % 66.6 42.7 - 76.0 %    Lymphocyte % 21.3 19.6 - 45.3 %    Monocyte % 9.2 5.0 - 12.0 %    Eosinophil % 2.3 0.3 - 6.2 %    Basophil % 0.4 0.0 - 1.5 %    Immature Grans % 0.2 0.0 - 0.5 %    Neutrophils, Absolute 3.69 1.70 - 7.00 10*3/mm3    Lymphocytes, Absolute 1.18 0.70 - 3.10 10*3/mm3    Monocytes,  Absolute 0.51 0.10 - 0.90 10*3/mm3    Eosinophils, Absolute 0.13 0.00 - 0.40 10*3/mm3    Basophils, Absolute 0.02 0.00 - 0.20 10*3/mm3    Immature Grans, Absolute 0.01 0.00 - 0.05 10*3/mm3    nRBC 0.0 0.0 - 0.2 /100 WBC   Procalcitonin    Specimen: Blood   Result Value Ref Range    Procalcitonin 0.07 0.00 - 0.25 ng/mL   Lactic Acid, Plasma    Specimen: Blood   Result Value Ref Range    Lactate 1.5 0.5 - 2.0 mmol/L   CK    Specimen: Blood   Result Value Ref Range    Creatine Kinase 85 20 - 200 U/L   Myoglobin, Serum    Specimen: Blood   Result Value Ref Range    Myoglobin 59.1 28.0 - 72.0 ng/mL   D-dimer, Quantitative    Specimen: Blood   Result Value Ref Range    D-Dimer, Quantitative 3.07 (H) 0.01 - 0.50 MCGFEU/mL   Blood Gas, Arterial With Co-Ox    Specimen: Arterial Blood   Result Value Ref Range    Site Right Brachial     Malik's Test N/A     pH, Arterial 7.460 (H) 7.350 - 7.450 pH units    pCO2, Arterial 34.6 (L) 35.0 - 45.0 mm Hg    pO2, Arterial 62.2 (L) 83.0 - 108.0 mm Hg    HCO3, Arterial 24.6 20.0 - 26.0 mmol/L    Base Excess, Arterial 1.1 0.0 - 2.0 mmol/L    Hemoglobin, Blood Gas 13.2 (L) 13.5 - 17.5 g/dL    Hematocrit, Blood Gas 40.5 38.0 - 51.0 %    Oxyhemoglobin 93.0 (L) 94 - 99 %    Methemoglobin 0.00 0.00 - 1.50 %    Carboxyhemoglobin 1.1 0 - 2 %    CO2 Content 25.6 22 - 33 mmol/L    Temperature 37.0 C    Barometric Pressure for Blood Gas      Modality Nasal Cannula     FIO2 28 %    Rate 0 Breaths/minute    PIP 0 cmH2O    IPAP 0     EPAP 0     Note      pH, Temp Corrected 7.460 pH Units    pCO2, Temperature Corrected 34.6 (L) 35 - 48 mm Hg    pO2, Temperature Corrected 62.2 (L) 83 - 108 mm Hg   Troponin    Specimen: Blood   Result Value Ref Range    Troponin T <0.010 0.000 - 0.030 ng/mL   TSH    Specimen: Blood   Result Value Ref Range    TSH 1.960 0.270 - 4.200 uIU/mL   T4, Free    Specimen: Blood   Result Value Ref Range    Free T4 1.26 0.93 - 1.70 ng/dL   Basic Metabolic Panel    Specimen: Arm,  Left; Blood   Result Value Ref Range    Glucose 105 (H) 65 - 99 mg/dL    BUN 17 8 - 23 mg/dL    Creatinine 1.11 0.76 - 1.27 mg/dL    Sodium 140 136 - 145 mmol/L    Potassium 3.7 3.5 - 5.2 mmol/L    Chloride 108 (H) 98 - 107 mmol/L    CO2 24.0 22.0 - 29.0 mmol/L    Calcium 8.8 8.6 - 10.5 mg/dL    BUN/Creatinine Ratio 15.3 7.0 - 25.0    Anion Gap 8.0 5.0 - 15.0 mmol/L    eGFR 65.5 >60.0 mL/min/1.73   CBC Auto Differential    Specimen: Blood   Result Value Ref Range    WBC 4.62 3.40 - 10.80 10*3/mm3    RBC 4.17 4.14 - 5.80 10*6/mm3    Hemoglobin 13.0 13.0 - 17.7 g/dL    Hematocrit 37.7 37.5 - 51.0 %    MCV 90.4 79.0 - 97.0 fL    MCH 31.2 26.6 - 33.0 pg    MCHC 34.5 31.5 - 35.7 g/dL    RDW 13.5 12.3 - 15.4 %    RDW-SD 43.8 37.0 - 54.0 fl    MPV 9.0 6.0 - 12.0 fL    Platelets 149 140 - 450 10*3/mm3    Neutrophil % 60.9 42.7 - 76.0 %    Lymphocyte % 26.2 19.6 - 45.3 %    Monocyte % 9.5 5.0 - 12.0 %    Eosinophil % 2.8 0.3 - 6.2 %    Basophil % 0.6 0.0 - 1.5 %    Immature Grans % 0.0 0.0 - 0.5 %    Neutrophils, Absolute 2.81 1.70 - 7.00 10*3/mm3    Lymphocytes, Absolute 1.21 0.70 - 3.10 10*3/mm3    Monocytes, Absolute 0.44 0.10 - 0.90 10*3/mm3    Eosinophils, Absolute 0.13 0.00 - 0.40 10*3/mm3    Basophils, Absolute 0.03 0.00 - 0.20 10*3/mm3    Immature Grans, Absolute 0.00 0.00 - 0.05 10*3/mm3    nRBC 0.0 0.0 - 0.2 /100 WBC   Troponin    Specimen: Arm, Left; Blood   Result Value Ref Range    Troponin T 0.010 0.000 - 0.030 ng/mL   Troponin    Specimen: Blood   Result Value Ref Range    Troponin T <0.010 0.000 - 0.030 ng/mL   Basic Metabolic Panel    Specimen: Arm, Left; Blood   Result Value Ref Range    Glucose 107 (H) 65 - 99 mg/dL    BUN 18 8 - 23 mg/dL    Creatinine 0.98 0.76 - 1.27 mg/dL    Sodium 139 136 - 145 mmol/L    Potassium 4.3 3.5 - 5.2 mmol/L    Chloride 105 98 - 107 mmol/L    CO2 25.0 22.0 - 29.0 mmol/L    Calcium 9.2 8.6 - 10.5 mg/dL    BUN/Creatinine Ratio 18.4 7.0 - 25.0    Anion Gap 9.0 5.0 - 15.0 mmol/L     eGFR 76.0 >60.0 mL/min/1.73   CBC Auto Differential    Specimen: Arm, Left; Blood   Result Value Ref Range    WBC 5.55 3.40 - 10.80 10*3/mm3    RBC 4.39 4.14 - 5.80 10*6/mm3    Hemoglobin 13.3 13.0 - 17.7 g/dL    Hematocrit 39.1 37.5 - 51.0 %    MCV 89.1 79.0 - 97.0 fL    MCH 30.3 26.6 - 33.0 pg    MCHC 34.0 31.5 - 35.7 g/dL    RDW 13.2 12.3 - 15.4 %    RDW-SD 43.7 37.0 - 54.0 fl    MPV 9.4 6.0 - 12.0 fL    Platelets 159 140 - 450 10*3/mm3    Neutrophil % 68.8 42.7 - 76.0 %    Lymphocyte % 19.3 (L) 19.6 - 45.3 %    Monocyte % 9.0 5.0 - 12.0 %    Eosinophil % 2.2 0.3 - 6.2 %    Basophil % 0.5 0.0 - 1.5 %    Immature Grans % 0.2 0.0 - 0.5 %    Neutrophils, Absolute 3.82 1.70 - 7.00 10*3/mm3    Lymphocytes, Absolute 1.07 0.70 - 3.10 10*3/mm3    Monocytes, Absolute 0.50 0.10 - 0.90 10*3/mm3    Eosinophils, Absolute 0.12 0.00 - 0.40 10*3/mm3    Basophils, Absolute 0.03 0.00 - 0.20 10*3/mm3    Immature Grans, Absolute 0.01 0.00 - 0.05 10*3/mm3    nRBC 0.0 0.0 - 0.2 /100 WBC   ECG 12 Lead   Result Value Ref Range    QT Interval 426 ms    QTC Interval 403 ms   ECG 12 Lead   Result Value Ref Range    QT Interval 484 ms    QTC Interval 437 ms   Green Top (Gel)   Result Value Ref Range    Extra Tube Hold for add-ons.    Lavender Top   Result Value Ref Range    Extra Tube hold for add-on    Gold Top - SST   Result Value Ref Range    Extra Tube Hold for add-ons.    Gray Top   Result Value Ref Range    Extra Tube Hold for add-ons.    Light Blue Top   Result Value Ref Range    Extra Tube Hold for add-ons.          ASSESSMENT   Diagnoses and all orders for this visit:    1. Syncope and collapse (Primary)    2. Sinus bradycardia    3. First degree atrioventricular block    4. Essential hypertension    5. Weakness    6. Parkinson's disease (HCC)    7. Ataxia    8. Hyperlipidemia, unspecified hyperlipidemia type        PLAN  Syncope and collapse/bradycardia/first-degree AV block  - Stable at this time.  No episodes since arrival to  this facility.  Patient prefers conservative measures rather than aggressive treatment with medical devices and surgical interventions.  - Continue conservative management with medications.  Currently stable at this time.    Essential hypertension  - Well-controlled.    Parkinson's disease  - Continue Sinemet and Requip    Parkinson's dementia  - Continue Aricept and Namenda.    Hyperlipidemia  - Continue rosuvastatin.    [x]  Discussed Patient in detail with nursing/staff, addressed all needs today.     [x]  Plan of Care Reviewed   [x]  PT/OT Reviewed   []  Order Changes  []  Discharge Plans Reviewed  [x]  Advance Directive on file with Nursing Home.   [x]  POA on file with Nursing Home.    [x]  Code Status listed and reviewed.       Delgado Gutierrez DO.  8/29/2022      **Part of this note may be an electronic transcription/translation of spoken language to printed text using the Dragon Dictation System.**

## 2024-08-08 NOTE — THERAPY DISCHARGE NOTE
Acute Care - Physical Therapy Discharge Summary  [facility]       Patient Name: Jhoan Gagnon  : 1940  MRN: [id number]    Today's Date: 10/9/2017  Onset of Illness/Injury or Date of Surgery Date: 10/03/17    Date of Referral to PT: 10/03/17  Referring Physician: YONG Vera      Admit Date: 10/3/2017      PT Recommendation and Plan    Visit Dx:    ICD-10-CM ICD-9-CM   1. Spell of altered consciousness R40.4 780.09   2. Right sided weakness R53.1 728.87   3. Syncope and collapse R55 780.2   4. Parkinson disease G20 332.0   5. DDD (degenerative disc disease), cervical M50.30 722.4   6. 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 Blunt trauma of neck, initial encounter S19.80XA 959.09   8. Impaired functional mobility, balance, gait, and endurance Z74.09 V49.89   9. Impaired mobility and ADLs Z74.09 799.89   10. Parkinson's disease G20 332.0   11. Fall, initial encounter W19.XXXA E888.9   12. Weakness R53.1 780.79                       IP PT Goals       10/05/17 1027 10/04/17 1012       Transfer Training PT LTG    Transfer Training PT LTG, Date Established  10/04/17  -KM     Transfer Training PT LTG, Time to Achieve  2 wks  -KM     Transfer Training PT LTG, Activity Type  bed to chair /chair to bed;sit to stand/stand to sit  -KM     Transfer Training PT LTG, [place] Level  independent  -KM     Transfer Training PT LTG, Outcome goal met  -KM      Gait Training PT LTG    Gait Training Goal PT LTG, Date Established  10/04/17  -KM     Gait Training Goal PT LTG, Time to Achieve  2 wks  -KM     Gait Training Goal PT LTG, [place] Level  independent  -KM     Gait Training Goal PT LTG, Assist Device  cane, straight  -KM     Gait Training Goal PT LTG, Distance to Achieve  400  -KM     Gait Training Goal PT LTG, Outcome goal ongoing  -KM      Stair Training PT LTG    Stair Training Goal PT LTG, Date Established  10/04/17  -KM     Stair Training Goal PT LTG, Time to Achieve  2 wks  -KM      Papers on MS desk to sign   Stair Training Goal PT LTG, [place] Level  independent  -KM     Stair Training Goal PT LTG, Assist Device  2 handrails  -KM     Stair Training Goal PT LTG, Distance to Achieve  12  -KM     Stair Training Goal PT LTG, Outcome goal met  -KM        User Key  (r) = Recorded By, (t) = Taken By, (c) = Cosigned By    Initials Name Provider Type    MAT Barnett, PT Physical Therapist            Goals Status: Treatment plan discontinued secondary to discharge from acute facility.    PT Discharge Summary  Reason for Discharge: Discharge from facility  Outcomes Achieved: Refer to plan of care for updates on goals achieved  Discharge Destination: Home with home health      Mylene Almendarez, PT   10/9/2017

## 2024-08-16 ENCOUNTER — TRANSCRIBE ORDERS (OUTPATIENT)
Dept: ADMINISTRATIVE | Facility: HOSPITAL | Age: 84
End: 2024-08-16
Payer: MEDICARE

## 2024-08-16 DIAGNOSIS — N18.30 STAGE 3 CHRONIC KIDNEY DISEASE, UNSPECIFIED WHETHER STAGE 3A OR 3B CKD: Primary | ICD-10-CM

## 2024-09-17 ENCOUNTER — HOSPITAL ENCOUNTER (OUTPATIENT)
Dept: ULTRASOUND IMAGING | Facility: HOSPITAL | Age: 84
Discharge: HOME | End: 2024-09-17
Admitting: INTERNAL MEDICINE
Payer: MEDICARE

## 2024-09-17 DIAGNOSIS — N18.30 STAGE 3 CHRONIC KIDNEY DISEASE, UNSPECIFIED WHETHER STAGE 3A OR 3B CKD: ICD-10-CM

## 2024-09-17 PROCEDURE — 76775 US EXAM ABDO BACK WALL LIM: CPT

## 2024-11-24 ENCOUNTER — APPOINTMENT (OUTPATIENT)
Dept: GENERAL RADIOLOGY | Facility: HOSPITAL | Age: 84
End: 2024-11-24
Payer: MEDICARE

## 2024-11-24 ENCOUNTER — APPOINTMENT (OUTPATIENT)
Dept: CT IMAGING | Facility: HOSPITAL | Age: 84
End: 2024-11-24
Payer: MEDICARE

## 2024-11-24 ENCOUNTER — HOSPITAL ENCOUNTER (INPATIENT)
Facility: HOSPITAL | Age: 84
LOS: 3 days | Discharge: NURSING, REHAB OR LONG-TERM CARE | End: 2024-11-27
Attending: EMERGENCY MEDICINE | Admitting: STUDENT IN AN ORGANIZED HEALTH CARE EDUCATION/TRAINING PROGRAM
Payer: MEDICARE

## 2024-11-24 DIAGNOSIS — R29.6 MULTIPLE FALLS: ICD-10-CM

## 2024-11-24 DIAGNOSIS — Z60.2 LIVES ALONE: ICD-10-CM

## 2024-11-24 DIAGNOSIS — E87.6 HYPOKALEMIA: ICD-10-CM

## 2024-11-24 DIAGNOSIS — N17.9 AKI (ACUTE KIDNEY INJURY): Primary | ICD-10-CM

## 2024-11-24 DIAGNOSIS — R13.10 DYSPHAGIA, UNSPECIFIED TYPE: ICD-10-CM

## 2024-11-24 DIAGNOSIS — G20.A1 PARKINSON'S DISEASE, UNSPECIFIED WHETHER DYSKINESIA PRESENT, UNSPECIFIED WHETHER MANIFESTATIONS FLUCTUATE: ICD-10-CM

## 2024-11-24 DIAGNOSIS — R79.89 ELEVATED TROPONIN: ICD-10-CM

## 2024-11-24 DIAGNOSIS — R53.1 GENERALIZED WEAKNESS: ICD-10-CM

## 2024-11-24 LAB
ALBUMIN SERPL-MCNC: 3.5 G/DL (ref 3.5–5.2)
ALBUMIN/GLOB SERPL: 1.3 G/DL
ALP SERPL-CCNC: 91 U/L (ref 39–117)
ALT SERPL W P-5'-P-CCNC: 10 U/L (ref 1–41)
ANION GAP SERPL CALCULATED.3IONS-SCNC: 11 MMOL/L (ref 5–15)
AST SERPL-CCNC: 22 U/L (ref 1–40)
B PARAPERT DNA SPEC QL NAA+PROBE: NOT DETECTED
B PERT DNA SPEC QL NAA+PROBE: NOT DETECTED
BACTERIA UR QL AUTO: NORMAL /HPF
BASOPHILS # BLD AUTO: 0.04 10*3/MM3 (ref 0–0.2)
BASOPHILS NFR BLD AUTO: 1 % (ref 0–1.5)
BILIRUB SERPL-MCNC: 1.6 MG/DL (ref 0–1.2)
BILIRUB UR QL STRIP: ABNORMAL
BUN SERPL-MCNC: 16 MG/DL (ref 8–23)
BUN/CREAT SERPL: 10.9 (ref 7–25)
C PNEUM DNA NPH QL NAA+NON-PROBE: NOT DETECTED
CALCIUM SPEC-SCNC: 8.7 MG/DL (ref 8.6–10.5)
CHLORIDE SERPL-SCNC: 101 MMOL/L (ref 98–107)
CK SERPL-CCNC: 81 U/L (ref 20–200)
CLARITY UR: CLEAR
CO2 SERPL-SCNC: 24 MMOL/L (ref 22–29)
COLOR UR: ABNORMAL
CREAT SERPL-MCNC: 1.47 MG/DL (ref 0.76–1.27)
D-LACTATE SERPL-SCNC: 1.1 MMOL/L (ref 0.5–2)
DEPRECATED RDW RBC AUTO: 42.7 FL (ref 37–54)
EGFRCR SERPLBLD CKD-EPI 2021: 46.2 ML/MIN/1.73
EOSINOPHIL # BLD AUTO: 0.03 10*3/MM3 (ref 0–0.4)
EOSINOPHIL NFR BLD AUTO: 0.8 % (ref 0.3–6.2)
ERYTHROCYTE [DISTWIDTH] IN BLOOD BY AUTOMATED COUNT: 13.2 % (ref 12.3–15.4)
FLUAV SUBTYP SPEC NAA+PROBE: NOT DETECTED
FLUBV RNA ISLT QL NAA+PROBE: NOT DETECTED
GLOBULIN UR ELPH-MCNC: 2.8 GM/DL
GLUCOSE SERPL-MCNC: 91 MG/DL (ref 65–99)
GLUCOSE UR STRIP-MCNC: NEGATIVE MG/DL
HADV DNA SPEC NAA+PROBE: NOT DETECTED
HCOV 229E RNA SPEC QL NAA+PROBE: NOT DETECTED
HCOV HKU1 RNA SPEC QL NAA+PROBE: NOT DETECTED
HCOV NL63 RNA SPEC QL NAA+PROBE: NOT DETECTED
HCOV OC43 RNA SPEC QL NAA+PROBE: NOT DETECTED
HCT VFR BLD AUTO: 41 % (ref 37.5–51)
HGB BLD-MCNC: 14.2 G/DL (ref 13–17.7)
HGB UR QL STRIP.AUTO: NEGATIVE
HMPV RNA NPH QL NAA+NON-PROBE: NOT DETECTED
HOLD SPECIMEN: NORMAL
HPIV1 RNA ISLT QL NAA+PROBE: NOT DETECTED
HPIV2 RNA SPEC QL NAA+PROBE: NOT DETECTED
HPIV3 RNA NPH QL NAA+PROBE: NOT DETECTED
HPIV4 P GENE NPH QL NAA+PROBE: NOT DETECTED
HYALINE CASTS UR QL AUTO: NORMAL /LPF
IMM GRANULOCYTES # BLD AUTO: 0 10*3/MM3 (ref 0–0.05)
IMM GRANULOCYTES NFR BLD AUTO: 0 % (ref 0–0.5)
KETONES UR QL STRIP: NEGATIVE
LEUKOCYTE ESTERASE UR QL STRIP.AUTO: ABNORMAL
LYMPHOCYTES # BLD AUTO: 0.65 10*3/MM3 (ref 0.7–3.1)
LYMPHOCYTES NFR BLD AUTO: 17.1 % (ref 19.6–45.3)
M PNEUMO IGG SER IA-ACNC: NOT DETECTED
MAGNESIUM SERPL-MCNC: 1.9 MG/DL (ref 1.6–2.4)
MCH RBC QN AUTO: 30.3 PG (ref 26.6–33)
MCHC RBC AUTO-ENTMCNC: 34.6 G/DL (ref 31.5–35.7)
MCV RBC AUTO: 87.6 FL (ref 79–97)
MONOCYTES # BLD AUTO: 0.31 10*3/MM3 (ref 0.1–0.9)
MONOCYTES NFR BLD AUTO: 8.1 % (ref 5–12)
NEUTROPHILS NFR BLD AUTO: 2.78 10*3/MM3 (ref 1.7–7)
NEUTROPHILS NFR BLD AUTO: 73 % (ref 42.7–76)
NITRITE UR QL STRIP: POSITIVE
NRBC BLD AUTO-RTO: 0 /100 WBC (ref 0–0.2)
PH UR STRIP.AUTO: <=5 [PH] (ref 5–8)
PLATELET # BLD AUTO: 134 10*3/MM3 (ref 140–450)
PMV BLD AUTO: 9.1 FL (ref 6–12)
POTASSIUM SERPL-SCNC: 3.3 MMOL/L (ref 3.5–5.2)
PROT SERPL-MCNC: 6.3 G/DL (ref 6–8.5)
PROT UR QL STRIP: ABNORMAL
QT INTERVAL: 366 MS
QTC INTERVAL: 368 MS
RBC # BLD AUTO: 4.68 10*6/MM3 (ref 4.14–5.8)
RBC # UR STRIP: NORMAL /HPF
REF LAB TEST METHOD: NORMAL
RHINOVIRUS RNA SPEC NAA+PROBE: NOT DETECTED
RSV RNA NPH QL NAA+NON-PROBE: NOT DETECTED
SARS-COV-2 RNA NPH QL NAA+NON-PROBE: NOT DETECTED
SODIUM SERPL-SCNC: 136 MMOL/L (ref 136–145)
SP GR UR STRIP: 1.02 (ref 1–1.03)
SQUAMOUS #/AREA URNS HPF: NORMAL /HPF
TROPONIN T SERPL HS-MCNC: 48 NG/L
UROBILINOGEN UR QL STRIP: ABNORMAL
WBC # UR STRIP: NORMAL /HPF
WBC NRBC COR # BLD AUTO: 3.81 10*3/MM3 (ref 3.4–10.8)
WHOLE BLOOD HOLD COAG: NORMAL
WHOLE BLOOD HOLD SPECIMEN: NORMAL

## 2024-11-24 PROCEDURE — P9612 CATHETERIZE FOR URINE SPEC: HCPCS

## 2024-11-24 PROCEDURE — 93005 ELECTROCARDIOGRAM TRACING: CPT | Performed by: EMERGENCY MEDICINE

## 2024-11-24 PROCEDURE — 97530 THERAPEUTIC ACTIVITIES: CPT

## 2024-11-24 PROCEDURE — 83735 ASSAY OF MAGNESIUM: CPT | Performed by: EMERGENCY MEDICINE

## 2024-11-24 PROCEDURE — 87040 BLOOD CULTURE FOR BACTERIA: CPT | Performed by: EMERGENCY MEDICINE

## 2024-11-24 PROCEDURE — 83605 ASSAY OF LACTIC ACID: CPT | Performed by: EMERGENCY MEDICINE

## 2024-11-24 PROCEDURE — 36415 COLL VENOUS BLD VENIPUNCTURE: CPT

## 2024-11-24 PROCEDURE — 70450 CT HEAD/BRAIN W/O DYE: CPT

## 2024-11-24 PROCEDURE — 71045 X-RAY EXAM CHEST 1 VIEW: CPT

## 2024-11-24 PROCEDURE — 97162 PT EVAL MOD COMPLEX 30 MIN: CPT

## 2024-11-24 PROCEDURE — 97116 GAIT TRAINING THERAPY: CPT

## 2024-11-24 PROCEDURE — 84484 ASSAY OF TROPONIN QUANT: CPT | Performed by: EMERGENCY MEDICINE

## 2024-11-24 PROCEDURE — 25010000002 HEPARIN (PORCINE) PER 1000 UNITS: Performed by: INTERNAL MEDICINE

## 2024-11-24 PROCEDURE — 99285 EMERGENCY DEPT VISIT HI MDM: CPT

## 2024-11-24 PROCEDURE — 25810000003 SODIUM CHLORIDE 0.9 % SOLUTION: Performed by: INTERNAL MEDICINE

## 2024-11-24 PROCEDURE — 0202U NFCT DS 22 TRGT SARS-COV-2: CPT | Performed by: EMERGENCY MEDICINE

## 2024-11-24 PROCEDURE — 81001 URINALYSIS AUTO W/SCOPE: CPT | Performed by: EMERGENCY MEDICINE

## 2024-11-24 PROCEDURE — 85025 COMPLETE CBC W/AUTO DIFF WBC: CPT | Performed by: EMERGENCY MEDICINE

## 2024-11-24 PROCEDURE — 80053 COMPREHEN METABOLIC PANEL: CPT | Performed by: EMERGENCY MEDICINE

## 2024-11-24 PROCEDURE — 82550 ASSAY OF CK (CPK): CPT | Performed by: EMERGENCY MEDICINE

## 2024-11-24 PROCEDURE — 99222 1ST HOSP IP/OBS MODERATE 55: CPT | Performed by: INTERNAL MEDICINE

## 2024-11-24 RX ORDER — ACETAMINOPHEN 325 MG/1
650 TABLET ORAL EVERY 4 HOURS PRN
Status: DISCONTINUED | OUTPATIENT
Start: 2024-11-24 | End: 2024-11-27 | Stop reason: HOSPADM

## 2024-11-24 RX ORDER — MEMANTINE HYDROCHLORIDE 10 MG/1
5 TABLET ORAL EVERY 12 HOURS SCHEDULED
Status: DISCONTINUED | OUTPATIENT
Start: 2024-11-24 | End: 2024-11-27 | Stop reason: HOSPADM

## 2024-11-24 RX ORDER — SODIUM CHLORIDE 0.9 % (FLUSH) 0.9 %
1-10 SYRINGE (ML) INJECTION AS NEEDED
Status: DISCONTINUED | OUTPATIENT
Start: 2024-11-24 | End: 2024-11-27 | Stop reason: HOSPADM

## 2024-11-24 RX ORDER — CALCIUM CARBONATE 500 MG/1
2 TABLET, CHEWABLE ORAL 2 TIMES DAILY PRN
Status: DISCONTINUED | OUTPATIENT
Start: 2024-11-24 | End: 2024-11-27 | Stop reason: HOSPADM

## 2024-11-24 RX ORDER — BISACODYL 5 MG/1
5 TABLET, DELAYED RELEASE ORAL DAILY PRN
Status: DISCONTINUED | OUTPATIENT
Start: 2024-11-24 | End: 2024-11-27 | Stop reason: HOSPADM

## 2024-11-24 RX ORDER — BISACODYL 10 MG
10 SUPPOSITORY, RECTAL RECTAL DAILY PRN
Status: DISCONTINUED | OUTPATIENT
Start: 2024-11-24 | End: 2024-11-27 | Stop reason: HOSPADM

## 2024-11-24 RX ORDER — ACETAMINOPHEN 650 MG/1
650 SUPPOSITORY RECTAL EVERY 4 HOURS PRN
Status: DISCONTINUED | OUTPATIENT
Start: 2024-11-24 | End: 2024-11-27 | Stop reason: HOSPADM

## 2024-11-24 RX ORDER — CYANOCOBALAMIN 1000 UG/ML
1000 INJECTION, SOLUTION INTRAMUSCULAR; SUBCUTANEOUS
Status: DISCONTINUED | OUTPATIENT
Start: 2024-11-25 | End: 2024-11-27 | Stop reason: HOSPADM

## 2024-11-24 RX ORDER — SODIUM CHLORIDE 9 MG/ML
40 INJECTION, SOLUTION INTRAVENOUS AS NEEDED
Status: DISCONTINUED | OUTPATIENT
Start: 2024-11-24 | End: 2024-11-27 | Stop reason: HOSPADM

## 2024-11-24 RX ORDER — SODIUM CHLORIDE 9 MG/ML
100 INJECTION, SOLUTION INTRAVENOUS CONTINUOUS
Status: ACTIVE | OUTPATIENT
Start: 2024-11-24 | End: 2024-11-25

## 2024-11-24 RX ORDER — ROSUVASTATIN CALCIUM 20 MG/1
20 TABLET, COATED ORAL NIGHTLY
Status: DISCONTINUED | OUTPATIENT
Start: 2024-11-24 | End: 2024-11-27 | Stop reason: HOSPADM

## 2024-11-24 RX ORDER — AMOXICILLIN 250 MG
2 CAPSULE ORAL 2 TIMES DAILY PRN
Status: DISCONTINUED | OUTPATIENT
Start: 2024-11-24 | End: 2024-11-27 | Stop reason: HOSPADM

## 2024-11-24 RX ORDER — ASPIRIN 81 MG/1
81 TABLET ORAL DAILY
Status: DISCONTINUED | OUTPATIENT
Start: 2024-11-24 | End: 2024-11-27 | Stop reason: HOSPADM

## 2024-11-24 RX ORDER — NITROGLYCERIN 0.4 MG/1
0.4 TABLET SUBLINGUAL
Status: DISCONTINUED | OUTPATIENT
Start: 2024-11-24 | End: 2024-11-27 | Stop reason: HOSPADM

## 2024-11-24 RX ORDER — ACETAMINOPHEN 160 MG/5ML
650 SOLUTION ORAL EVERY 4 HOURS PRN
Status: DISCONTINUED | OUTPATIENT
Start: 2024-11-24 | End: 2024-11-27 | Stop reason: HOSPADM

## 2024-11-24 RX ORDER — POLYETHYLENE GLYCOL 3350 17 G/17G
17 POWDER, FOR SOLUTION ORAL DAILY PRN
Status: DISCONTINUED | OUTPATIENT
Start: 2024-11-24 | End: 2024-11-27 | Stop reason: HOSPADM

## 2024-11-24 RX ORDER — ONDANSETRON 4 MG/1
4 TABLET, ORALLY DISINTEGRATING ORAL EVERY 6 HOURS PRN
Status: DISCONTINUED | OUTPATIENT
Start: 2024-11-24 | End: 2024-11-27 | Stop reason: HOSPADM

## 2024-11-24 RX ORDER — SODIUM CHLORIDE 0.9 % (FLUSH) 0.9 %
10 SYRINGE (ML) INJECTION EVERY 12 HOURS SCHEDULED
Status: DISCONTINUED | OUTPATIENT
Start: 2024-11-24 | End: 2024-11-27 | Stop reason: HOSPADM

## 2024-11-24 RX ORDER — POTASSIUM CHLORIDE 1500 MG/1
40 TABLET, EXTENDED RELEASE ORAL EVERY 4 HOURS
Status: COMPLETED | OUTPATIENT
Start: 2024-11-24 | End: 2024-11-24

## 2024-11-24 RX ORDER — HEPARIN SODIUM 5000 [USP'U]/ML
5000 INJECTION, SOLUTION INTRAVENOUS; SUBCUTANEOUS EVERY 8 HOURS SCHEDULED
Status: DISCONTINUED | OUTPATIENT
Start: 2024-11-24 | End: 2024-11-27 | Stop reason: HOSPADM

## 2024-11-24 RX ORDER — SODIUM CHLORIDE 0.9 % (FLUSH) 0.9 %
10 SYRINGE (ML) INJECTION AS NEEDED
Status: DISCONTINUED | OUTPATIENT
Start: 2024-11-24 | End: 2024-11-24

## 2024-11-24 RX ORDER — ROPINIROLE 2 MG/1
2 TABLET, FILM COATED ORAL EVERY 8 HOURS SCHEDULED
Status: DISCONTINUED | OUTPATIENT
Start: 2024-11-24 | End: 2024-11-27 | Stop reason: HOSPADM

## 2024-11-24 RX ORDER — DONEPEZIL HYDROCHLORIDE 10 MG/1
10 TABLET, FILM COATED ORAL NIGHTLY
Status: DISCONTINUED | OUTPATIENT
Start: 2024-11-24 | End: 2024-11-27 | Stop reason: HOSPADM

## 2024-11-24 RX ORDER — ONDANSETRON 2 MG/ML
4 INJECTION INTRAMUSCULAR; INTRAVENOUS EVERY 6 HOURS PRN
Status: DISCONTINUED | OUTPATIENT
Start: 2024-11-24 | End: 2024-11-27 | Stop reason: HOSPADM

## 2024-11-24 RX ADMIN — Medication 10 ML: at 20:15

## 2024-11-24 RX ADMIN — SODIUM CHLORIDE 100 ML/HR: 9 INJECTION, SOLUTION INTRAVENOUS at 14:57

## 2024-11-24 RX ADMIN — HEPARIN SODIUM 5000 UNITS: 5000 INJECTION INTRAVENOUS; SUBCUTANEOUS at 20:16

## 2024-11-24 RX ADMIN — POTASSIUM CHLORIDE 40 MEQ: 1500 TABLET, EXTENDED RELEASE ORAL at 16:07

## 2024-11-24 RX ADMIN — POTASSIUM CHLORIDE 40 MEQ: 1500 TABLET, EXTENDED RELEASE ORAL at 20:15

## 2024-11-24 RX ADMIN — ROSUVASTATIN 20 MG: 20 TABLET, FILM COATED ORAL at 20:15

## 2024-11-24 RX ADMIN — MEMANTINE 5 MG: 10 TABLET ORAL at 20:15

## 2024-11-24 RX ADMIN — ASPIRIN 81 MG: 81 TABLET, COATED ORAL at 16:07

## 2024-11-24 RX ADMIN — HEPARIN SODIUM 5000 UNITS: 5000 INJECTION INTRAVENOUS; SUBCUTANEOUS at 16:07

## 2024-11-24 SDOH — SOCIAL STABILITY - SOCIAL INSECURITY: PROBLEMS RELATED TO LIVING ALONE: Z60.2

## 2024-11-24 NOTE — Clinical Note
Level of Care: Telemetry [5]   Diagnosis: Weakness [959736]   Admitting Physician: RAQUEL ROBERSON [[id number]]   Certification: I Certify That Inpatient Hospital Services Are Medically Necessary For Greater Than 2 Midnights

## 2024-11-24 NOTE — ED NOTES
" Jhoan Gagnon    Nursing Report ED to Floor:  Mental status: A&OX4  Ambulatory status: X2 ASSIST - WEAK   Oxygen Therapy:  RA  Cardiac Rhythm: SINUS MICHELLE   Admitted from: HOME  Safety Concerns:  FALL RISK   Social Issues: N/A  ED Room #:  21    ED Nurse Phone Extension - [phone] or may call [phone].      HPI:   Chief Complaint   Patient presents with    Weakness - Generalized       Past Medical History:  Past Medical History:   Diagnosis Date    Broken arms     Cancer     prostate    Lower back injury     Parkinson's disease         Past Surgical History:  Past Surgical History:   Procedure Laterality Date    CHOLECYSTECTOMY      PROSTATE SURGERY      REPLACEMENT TOTAL KNEE BILATERAL          Admitting Doctor:   Naty Santo MD    Consulting Provider(s):  Consults       No orders found from 10/26/2024 to 11/25/2024.             Admitting Diagnosis:   The primary encounter diagnosis was LI (acute kidney injury). Diagnoses of Elevated troponin, Generalized weakness, Multiple falls, Parkinson's disease, unspecified whether dyskinesia present, unspecified whether manifestations fluctuate, Lives alone, and Hypokalemia were also pertinent to this visit.    Most Recent Vitals:   Vitals:    11/24/24 1027 11/24/24 1130 11/24/24 1200 11/24/24 1230   BP: 136/74 107/73 122/70 126/79   BP Location: Right arm      Patient Position: Sitting      Pulse: 64 55 55 56   Resp: 18      Temp: 98.7 °F (37.1 °C)      TempSrc: Oral      SpO2: 93% 93% 94% 96%   Weight: 90.7 kg (200 lb)      Height: 167.6 cm (66\")          Active LDAs/IV Access:   Lines, Drains & Airways       Active LDAs       Name Placement date Placement time Site Days    Peripheral IV 11/24/24 1034 Anterior;Distal;Right;Upper Arm 11/24/24  1034  Arm  less than 1                    Labs (abnormal labs have a star):   Labs Reviewed   COMPREHENSIVE METABOLIC PANEL - Abnormal; Notable for the following components:       Result Value    Creatinine 1.47 (*)     " Potassium 3.3 (*)     Total Bilirubin 1.6 (*)     eGFR 46.2 (*)     All other components within normal limits    Narrative:     GFR Normal >60  Chronic Kidney Disease <60  Kidney Failure <15    The GFR formula is only valid for adults with stable renal function between ages 18 and 70.   SINGLE HS TROPONIN T - Abnormal; Notable for the following components:    HS Troponin T 48 (*)     All other components within normal limits    Narrative:     High Sensitive Troponin T Reference Range:  <14.0 ng/L- Negative Female for AMI  <22.0 ng/L- Negative Male for AMI  >=14 - Abnormal Female indicating possible myocardial injury.  >=22 - Abnormal Male indicating possible myocardial injury.   Clinicians would have to utilize clinical acumen, EKG, Troponin, and serial changes to determine if it is an Acute Myocardial Infarction or myocardial injury due to an underlying chronic condition.        URINALYSIS W/ MICROSCOPIC IF INDICATED (NO CULTURE) - Abnormal; Notable for the following components:    Color, UA Orange (*)     Bilirubin, UA Small (1+) (*)     Protein, UA 30 mg/dL (1+) (*)     Leuk Esterase, UA Small (1+) (*)     Nitrite, UA Positive (*)     All other components within normal limits    Narrative:     Any Substance that causes an abnormal urine color can alter the accuracy of the chemical reactions.     CBC WITH AUTO DIFFERENTIAL - Abnormal; Notable for the following components:    Platelets 134 (*)     Lymphocyte % 17.1 (*)     Lymphocytes, Absolute 0.65 (*)     All other components within normal limits   RESPIRATORY PANEL PCR W/ COVID-19 (SARS-COV-2), NP SWAB IN UTM/VTP, 2 HR TAT - Normal    Narrative:     In the setting of a positive respiratory panel with a viral infection PLUS a negative procalcitonin without other underlying concern for bacterial infection, consider observing off antibiotics or discontinuation of antibiotics and continue supportive care. If the respiratory panel is positive for atypical bacterial  infection (Bordetella pertussis, Chlamydophila pneumoniae, or Mycoplasma pneumoniae), consider antibiotic de-escalation to target atypical bacterial infection.   MAGNESIUM - Normal   CK - Normal   LACTIC ACID, PLASMA - Normal   COVID PRE-OP / PRE-PROCEDURE SCREENING ORDER (NO ISOLATION)    Narrative:     The following orders were created for panel order COVID PRE-OP / PRE-PROCEDURE SCREENING ORDER (NO ISOLATION) - Swab, Nasopharynx.  Procedure                               Abnormality         Status                     ---------                               -----------         ------                     Respiratory Panel PCR w/...[[id number]]  Normal              Final result                 Please view results for these tests on the individual orders.   BLOOD CULTURE   BLOOD CULTURE   RAINBOW DRAW    Narrative:     The following orders were created for panel order [place] Draw.  Procedure                               Abnormality         Status                     ---------                               -----------         ------                     Green Top (Gel)[[id number]]                                  Final result               Lavender Top[[id number]]                                     Final result               Gold Top - SST[[id number]]                                   Final result               Enrique Top[[id number]]                                         Final result               Light Blue Top[[id number]]                                   Final result                 Please view results for these tests on the individual orders.   URINALYSIS, MICROSCOPIC ONLY   CBC AND DIFFERENTIAL    Narrative:     The following orders were created for panel order CBC & Differential.  Procedure                               Abnormality         Status                     ---------                               -----------         ------                     CBC Auto Differential[[id number]]        Abnormal            Final  result                 Please view results for these tests on the individual orders.   GREEN TOP   LAVENDER TOP   GOLD TOP - SST   GRAY TOP   LIGHT BLUE TOP       Meds Given in ED:   Medications   sodium chloride 0.9 % flush 10 mL (has no administration in time range)           Last NIH score:                                                          Dysphagia screening results:        [place] Coma Scale:  No data recorded     CIWA:        Restraint Type:            Isolation Status:  No active isolations

## 2024-11-24 NOTE — PLAN OF CARE
Goal Outcome Evaluation:  Plan of Care Reviewed With: patient           Outcome Evaluation: VSS, patient remains on room air. Patient resting in bed, call light in reach.

## 2024-11-24 NOTE — PLAN OF CARE
Goal Outcome Evaluation:  Plan of Care Reviewed With: patient           Outcome Evaluation: PT eval completed. Patient presenting with deficits in BLE strength, gross motor coordination, motor planning, standing balance, and functional endurance effecting functional mobility below baseline. Patient will benefit from skilled IP PT services to address impairments for return to PLOF. Recommend inaptient rehab at [facility].    Anticipated Discharge Disposition (PT): inpatient rehabilitation facility

## 2024-11-24 NOTE — H&P
[facility]  HISTORY AND PHYSICAL    Patient Name: Jhoan Gagnon  : 1940  MRN: [id number]  Primary Care Physician: Wilmer Andres MD  Date of admission: 2024      Subjective   Subjective     Chief Complaint:  Frequent falls     HPI:  Jhoan Gagnon is a 84 y.o. male  with a history of Parkinson's disease s/p neurostimulator, prostate cancer s/p surgery, HTN, know first degree AVB (previously declined any intervention/PPM placement) and chronic low back pain who presented to [facility] ED after family found him down at home.  Pt reports that he has been getting progressively worse in terms of falling and weakness but it was especially bad yesterday.  He states that he attempted to get out of bed when his friend came to check on him and fell twice just trying to get down the [place] from his bedroom to his kitchen for a cup of coffee.  His friend then helped him back to bed.  Then, his daughter and granddaughter were going to come pick him up for dinner and, when he tried to get up out of bed, he ended up sliding to the floor where he stayed for about an hour until his family arrived and helped him back to bed.  This am, he called his friend to help him get dressed for Spiritism, but, when his friend arrived, pt was too weak to tolerate getting ready for Spiritism.  Family ended up calling EMS who brought him here.  Pt endorses hematuria and thinks he may have a UTI. Denies any dysuria or F/C.  Of note, he denies any head trauma when he has fallen and denies any LOC.       Personal History     Past Medical History:   Diagnosis Date    Broken arms     Cancer     prostate    Lower back injury     Parkinson's disease            Past Surgical History:   Procedure Laterality Date    CHOLECYSTECTOMY      PROSTATE SURGERY      REPLACEMENT TOTAL KNEE BILATERAL         Family History: family history includes Cancer in his father and paternal grandfather; Heart disease in his  mother; Stroke in his father and maternal grandmother.     Social History:  reports that he has never smoked. He has never used smokeless tobacco. He reports that he does not drink alcohol and does not use drugs.  Social History     Social History Narrative    Pt Lives Alone        Medications:  Available home medication information reviewed.  aspirin, atropine, cyanocobalamin, donepezil, memantine, rOPINIRole, and rosuvastatin    Allergies   Allergen Reactions    Donepezil Unknown - Low Severity    Ropinirole Unknown - Low Severity and Other (See Comments)       Objective   Objective     Vital Signs:   Temp:  [98.7 °F (37.1 °C)] 98.7 °F (37.1 °C)  Heart Rate:  [55-64] 56  Resp:  [18] 18  BP: (107-136)/(70-79) 126/79       Physical Exam   Constitutional: Awake, alert  Eyes: PERRLA, sclerae anicteric, no conjunctival injection  HENT: NCAT, mucous membranes dry   Neck: Supple, no thyromegaly, no lymphadenopathy, trachea midline  Respiratory: Clear to auscultation bilaterally, nonlabored respirations   Cardiovascular: RRR, no murmurs, rubs, or gallops, palpable pedal pulses bilaterally  Gastrointestinal: Positive bowel sounds, soft, nontender, nondistended  Musculoskeletal: No bilateral ankle edema, no clubbing or cyanosis to extremities  Psychiatric: Appropriate affect, cooperative  Neurologic: Oriented x 3, diffusely weak,  Cranial Nerves grossly intact to confrontation, pill rolling tremor of R hand, speech difficult to understand  Skin: No rashes     Result Review:  I have personally reviewed the results from the time of this admission to 11/24/2024 13:52 EST and agree with these findings:  [x]  Laboratory list / accordion  []  Microbiology  [x]  Radiology  [x]  EKG/Telemetry   []  Cardiology/Vascular   []  Pathology  [x]  Old records  []  Other:  Most notable findings include: see assessment and plan       LAB RESULTS:      Lab 11/24/24  1033   WBC 3.81   HEMOGLOBIN 14.2   HEMATOCRIT 41.0   PLATELETS 134*    NEUTROS ABS 2.78   IMMATURE GRANS (ABS) 0.00   LYMPHS ABS 0.65*   MONOS ABS 0.31   EOS ABS 0.03   MCV 87.6   LACTATE 1.1         Lab 11/24/24  1033   SODIUM 136   POTASSIUM 3.3*   CHLORIDE 101   CO2 24.0   ANION GAP 11.0   BUN 16   CREATININE 1.47*   EGFR 46.2*   GLUCOSE 91   CALCIUM 8.7   MAGNESIUM 1.9         Lab 11/24/24  1033   TOTAL PROTEIN 6.3   ALBUMIN 3.5   GLOBULIN 2.8   ALT (SGPT) 10   AST (SGOT) 22   BILIRUBIN 1.6*   ALK PHOS 91         Lab 11/24/24  1033   HSTROP T 48*                 UA          11/24/2024    10:45   Urinalysis   Squamous Epithelial Cells, UA 0-2    Specific Gravity, UA 1.020    Ketones, UA Negative    Blood, UA Negative    Leukocytes, UA Small (1+)    Nitrite, UA Positive    RBC, UA 0-2    WBC, UA 0-2    Bacteria, UA None Seen        Microbiology Results (last 10 days)       Procedure Component Value - Date/Time    COVID PRE-OP / PRE-PROCEDURE SCREENING ORDER (NO ISOLATION) - Swab, Nasopharynx [[id number]]  (Normal) Collected: 11/24/24 1127    Lab Status: Final result Specimen: Swab from Nasopharynx Updated: 11/24/24 1225    Narrative:      The following orders were created for panel order COVID PRE-OP / PRE-PROCEDURE SCREENING ORDER (NO ISOLATION) - Swab, Nasopharynx.  Procedure                               Abnormality         Status                     ---------                               -----------         ------                     Respiratory Panel PCR w/...[[id number]]  Normal              Final result                 Please view results for these tests on the individual orders.    Respiratory Panel PCR w/COVID-19(SARS-CoV-2) PERRY/CHRISTINE/SHIRA/PAD/COR/TAYLOR In-House, NP Swab in UTM/VTM, 2 HR TAT - Swab, Nasopharynx [[id number]]  (Normal) Collected: 11/24/24 1127    Lab Status: Final result Specimen: Swab from Nasopharynx Updated: 11/24/24 1225     ADENOVIRUS, PCR Not Detected     Coronavirus 229E Not Detected     Coronavirus HKU1 Not Detected     Coronavirus NL63 Not Detected      Coronavirus OC43 Not Detected     COVID19 Not Detected     Human Metapneumovirus Not Detected     Human Rhinovirus/Enterovirus Not Detected     Influenza A PCR Not Detected     Influenza B PCR Not Detected     Parainfluenza Virus 1 Not Detected     Parainfluenza Virus 2 Not Detected     Parainfluenza Virus 3 Not Detected     Parainfluenza Virus 4 Not Detected     RSV, PCR Not Detected     Bordetella pertussis pcr Not Detected     Bordetella parapertussis PCR Not Detected     Chlamydophila pneumoniae PCR Not Detected     Mycoplasma pneumo by PCR Not Detected    Narrative:      In the setting of a positive respiratory panel with a viral infection PLUS a negative procalcitonin without other underlying concern for bacterial infection, consider observing off antibiotics or discontinuation of antibiotics and continue supportive care. If the respiratory panel is positive for atypical bacterial infection (Bordetella pertussis, Chlamydophila pneumoniae, or Mycoplasma pneumoniae), consider antibiotic de-escalation to target atypical bacterial infection.            CT Head Without Contrast    Result Date: 11/24/2024  CT HEAD WO CONTRAST Date of Exam: 11/24/2024 11:40 AM EST Indication: fall, head injury. Comparison: CT head without contrast 7/9/2022 Technique: Axial CT images were obtained of the head without contrast administration.  Automated exposure control and iterative construction methods were used. Findings: Bilateral deep brain stimulator leads are present terminating in the subthalamic nuclei with associated streak artifact. There is no evidence of acute territorial infarction. There is no acute intracranial hemorrhage. There are no extra-axial collections. Ventricles and CSF spaces are symmetrically prominent. No mass effect nor hydrocephalus. Mild white matter hypodensities likely reflect the sequela of chronic microvascular ischemic disease. There is atheromatous calcification of the bilateral carotid siphons.   The paranasal sinuses are clear. Trace left mastoid fluid.  Osseous structures and orbits appear intact.     Impression: Impression: No acute intracranial abnormality. Electronically Signed: Meera Mohan MD  11/24/2024 11:58 AM EST  Workstation ID: [id number]    XR Chest 1 View    Result Date: 11/24/2024  XR CHEST 1 VW Date of Exam: 11/24/2024 11:23 AM EST Indication: Weak/Dizzy/AMS triage protocol Comparison: Chest radiograph 8/17/2022 Findings: Cardiomegaly stable from prior. Aortic atherosclerotic disease. Lung volumes low without lobar consolidation, edema, effusion or pneumothorax. Contour deformity in the lateral aspect of right hemidiaphragm probably representing eventration. Right-sided vagal nerve stimulator. Degenerative related osseous change. Radiographically stable appearance of the chest since 2022.     Impression: Impression: No active pulmonary disease. Radiographically stable chest since 2022. Electronically Signed: Neo Schofield MD  11/24/2024 11:43 AM EST  Workstation ID: [id number]     Results for orders placed during the hospital encounter of 07/09/22    Adult Transthoracic Echo Complete W/ Cont if Necessary Per Protocol (With Agitated Saline)    Interpretation Summary  · Estimated left ventricular EF = 65%  · Left ventricular wall thickness is consistent with mild concentric hypertrophy.  · Mild dilation of the ascending aorta is present.  · No hemodynamically significant valvular heart disease      Assessment & Plan   Assessment & Plan       Weakness    Elevated serum creatinine    Hypokalemia    Parkinson's disease    First degree atrioventricular block    Essential hypertension    Hyperlipidemia    Bilateral carotid artery stenosis      Mr. Gagnon is an 83 yo WM  with a history of Parkinson's disease s/p neurostimulator, prostate cancer s/p surgery, HTN, know first degree AVB (previously declined any intervention/PPM placement) and chronic low back pain who presented to [facility] ED after family  found him down at home.      Plan:    Weakness/found down  Multiple falls in recent past  -- pt lives alone with family nearby, has been falling a lot recently- concern for patient safety given how many times he has fallen  -- suspect progression of his Parkinson's  -- PT/OT consult, suspect he will need inpatient rehab with likely transition to long term care facility  -- UA with leukocyte esterase and + nitrites but negative bacteria and minimal WBCs.  -- Respiratory Viral Panel negative   -- doubt infectious etiology     Elevated Creatinine  - likely prerenal due to decreased PO intake  -- CK wnl, so not in rhabdo  -- gentle IVFs for 24 hours  -- repeat labs in am    Hypokalemia  -- replace per protocol    Thrombocytopenia  -- mild, monitor     Parkinson's   -- continue home meds  -- follows with Neurology outpatient     H/o First degree AVB with ? Episode of Vtach (August 2022)   -- declined PPM in 2022 when he was seen by [facility] (Dr. Rodrigues) for the same      Total time spent: 40 minutes  Time spent includes time reviewing chart, face-to-face time, counseling patient/family/caregiver, ordering medications/tests/procedures, communicating with other health care professionals, documenting clinical information in the electronic health record, and coordination of care.       VTE Prophylaxis:  Pharmacologic VTE prophylaxis orders are signed & held.  [facility]          CODE STATUS:    Code Status and Medical Interventions: No CPR (Do Not Attempt to Resuscitate); Limited Support; No cardioversion, No intubation (DNI), No vasopressors   Ordered at: 11/24/24 1347     Medical Intervention Limits:    No cardioversion    No intubation (DNI)    No vasopressors     Level Of Support Discussed With:    Patient     Code Status (Patient has no pulse and is not breathing):    No CPR (Do Not Attempt to Resuscitate)     Medical Interventions (Patient has pulse or is breathing):    Limited Support       Expected Discharge   Expected  Discharge Date: 11/27/2024; Expected Discharge Time:      Naty Santo MD  11/24/24

## 2024-11-24 NOTE — THERAPY EVALUATION
Patient Name: Jhoan Gagnon  : 1940    MRN: [id number]                              Today's Date: 2024       Admit Date: 2024    Visit Dx:     ICD-10-CM ICD-9-CM   1. LI (acute kidney injury)  N17.9 584.9   2. Elevated troponin  R79.89 790.6   3. Generalized weakness  R53.1 780.79   4. Multiple falls  R29.6 V15.88   5. Parkinson's disease, unspecified whether dyskinesia present, unspecified whether manifestations fluctuate  G20.A1 332.0   6. Lives alone  Z60.2 V60.3   7. Hypokalemia  E87.6 276.8     Patient Active Problem List   Diagnosis    Parkinson's disease    Weakness    Sinus bradycardia    Syncope and collapse    First degree atrioventricular block    Ataxia    Leukocytosis    Arrhythmia    Essential hypertension    Hyperlipidemia    Bilateral carotid artery stenosis    Elevated serum creatinine    Hypokalemia     Past Medical History:   Diagnosis Date    Broken arms     Cancer     prostate    Lower back injury     Parkinson's disease      Past Surgical History:   Procedure Laterality Date    CHOLECYSTECTOMY      PROSTATE SURGERY      REPLACEMENT TOTAL KNEE BILATERAL        General Information       Row Name 24 [facility]          Physical Therapy Time and Intention    Document Type evaluation  -LO     Mode of Treatment individual therapy;physical therapy  -       Row Name 24 1551          General Information    Patient Profile Reviewed yes  -LO     Prior Level of Function independent:;gait;transfer;bed mobility;all household mobility  SPC and FWW at baseline  -LO     Existing Precautions/Restrictions other (see comments)  PD with strong display of sx  -LO     Barriers to Rehab medically complex;previous functional deficit;cognitive status  -       Row Name 24 1551          Living Environment    People in Home alone  -       Row Name 24 1551          Home Main Entrance    Number of Stairs, Main Entrance other (see comments)  13  -LO       Row Name 24 1550           Stairs Within Home, Primary    Stairs, Within Home, Primary none  -LO     Stair Railings, Within Home, Primary railings safe and in good condition  -LO       Row Name 11/24/24 1551          Cognition    Orientation Status (Cognition) oriented x 3  -LO       Row Name 11/24/24 1551          Safety Issues/Impairments Affecting Functional Mobility    Safety Issues Affecting Function (Mobility) awareness of need for assistance;insight into deficits/self-awareness;positioning of assistive device;safety precaution awareness;safety precautions follow-through/compliance;sequencing abilities  -LO     Impairments Affecting Function (Mobility) balance;cognition;coordination;endurance/activity tolerance;motor control;motor planning;strength  -LO     Cognitive Impairments, Mobility Safety/Performance insight into deficits/self-awareness;awareness, need for assistance;problem-solving/reasoning;safety precaution awareness;safety precaution follow-through;sequencing abilities  -LO     Comment, Safety Issues/Impairments (Mobility) delayed processing  -               User Key  (r) = Recorded By, (t) = Taken By, (c) = Cosigned By      Initials Name Provider Type    Annie Barnes PT Physical Therapist                   Mobility       Row Name 11/24/24 1554          Bed Mobility    Bed Mobility supine-sit;sit-supine;scooting/bridging  -     Scooting/Bridging [place] (Bed Mobility) dependent (less than 25% patient effort);2 person assist;verbal cues;nonverbal cues (demo/gesture)  -LO     Supine-Sit [place] (Bed Mobility) moderate assist (50% patient effort);verbal cues;nonverbal cues (demo/gesture)  -LO     Sit-Supine [place] (Bed Mobility) maximum assist (25% patient effort);verbal cues;nonverbal cues (demo/gesture)  -     Assistive Device (Bed Mobility) bed rails;head of bed elevated  -     Comment, (Bed Mobility) vc for sequencing, use of bedrails . Physical assist at trunk and BLEs  -       Row  Name 11/24/24 1553          Transfers    Comment, (Transfers) EOB> FWW> EOB; vc for HP, demonstrates slow stand with slow sequencing. Very rigid posture  -LO       Row Name 11/24/24 1553          Bed-Chair Transfer    Bed-Chair [place] (Transfers) not tested  -LO       Row Name 11/24/24 1553          Sit-Stand Transfer    Sit-Stand [place] (Transfers) minimum assist (75% patient effort);verbal cues;nonverbal cues (demo/gesture)  -LO     Assistive Device (Sit-Stand Transfers) walker, front-wheeled  -LO       Row Name 11/24/24 1553          Gait/Stairs (Locomotion)    [place] Level (Gait) minimum assist (75% patient effort);verbal cues;nonverbal cues (demo/gesture)  -LO     Assistive Device (Gait) walker, front-wheeled  -LO     Distance in Feet (Gait) 25  +25  -LO     Deviations/Abnormal Patterns (Gait) bilateral deviations;base of support, narrow;festinating/shuffling;gait speed decreased;steppage;stride length decreased  -LO     Bilateral Gait Deviations forward flexed posture;heel strike decreased  -LO     Comment, (Gait/Stairs) Requires a visual cue ( therapist's foot for example) to take first step, shuffles/festinates at doorways and during turns and requires more visual cuing and assist with AD.  -LO               User Key  (r) = Recorded By, (t) = Taken By, (c) = Cosigned By      Initials Name Provider Type    Annie Barnes PT Physical Therapist                   Obj/Interventions       Row Name 11/24/24 1556          Range of Motion Comprehensive    General Range of Motion bilateral lower extremity ROM WNL  -LO       Row Name 11/24/24 1556          Strength Comprehensive (MMT)    General Manual Muscle Testing (MMT) Assessment lower extremity strength deficits identified  -LO     Comment, General Manual Muscle Testing (MMT) Assessment BLE grossly 4-/5  -LO       Row Name 11/24/24 1556          Motor Skills    Motor Skills functional endurance  -LO     Functional Endurance fair, reduced from  baseline  -[facility] Name 11/24/24 1556          Balance    Balance Assessment sitting static balance;sitting dynamic balance;standing static balance;standing dynamic balance  -LO     Static Sitting Balance contact guard  -LO     Dynamic Sitting Balance minimal assist  -LO     Position, Sitting Balance unsupported;sitting edge of bed  -LO     Static Standing Balance contact guard  -LO     Dynamic Standing Balance minimal assist  -LO     Position/Device Used, Standing Balance supported;walker, front-wheeled  -LO     Comment, Balance FWW [place] for safety in standing  -       Row Name 11/24/24 1556          Sensory Assessment (Somatosensory)    Sensory Assessment (Somatosensory) unable/difficult to assess  -               User Key  (r) = Recorded By, (t) = Taken By, (c) = Cosigned By      Initials Name Provider Type    Annie Barnes, PT Physical Therapist                   Goals/Plan       [facility] Name 11/24/24 1601          Bed Mobility Goal 1 (PT)    Activity/Assistive Device (Bed Mobility Goal 1, PT) rolling to left;rolling to right;scooting;sit to supine;supine to sit  -LO     [place] Level/Cues Needed (Bed Mobility Goal 1, PT) minimum assist (75% or more patient effort)  -LO     Time Frame (Bed Mobility Goal 1, PT) short term goal (STG);5 days  -[facility] Name 11/24/24 1601          Transfer Goal 1 (PT)    Activity/Assistive Device (Transfer Goal 1, PT) sit-to-stand/stand-to-sit;bed-to-chair/chair-to-bed;car transfer  -LO     [place] Level/Cues Needed (Transfer Goal 1, PT) contact guard required  -LO     Time Frame (Transfer Goal 1, PT) long term goal (LTG);10 days  -[facility] Name 11/24/24 1601          Gait Training Goal 1 (PT)    Activity/Assistive Device (Gait Training Goal 1, PT) gait (walking locomotion);decrease fall risk;diminish gait deviation;improve balance and speed;increase endurance/gait distance;walker, rolling  -LO     [place] Level (Gait Training Goal 1, PT) contact guard  required  -LO     Time Frame (Gait Training Goal 1, PT) long term goal (LTG);10 days  -LO       Row Name 11/24/24 1601          Balance Goal 1 (PT)    Activity/Assistive Device (Balance Goal) sit to stand dynamic balance;standing static balance;standing dynamic balance  -LO     [place] Level/Cues Needed (Balance Goal 1, PT) contact guard required  -LO     Time Frame (Balance Goal 1, PT) long-term goal (LTG);2 weeks  -LO       Row Name 11/24/24 1601          Stairs Goal 1 (PT)    Activity/Assistive Device (Stairs Goal 1, PT) ascending stairs;descending stairs  -LO     [place] Level/Cues Needed (Stairs Goal 1, PT) minimum assist (75% or more patient effort)  -LO     Number of Stairs (Stairs Goal 1, PT) 10  -LO     Time Frame (Stairs Goal 1, PT) long term goal (LTG);10 days  -LO       Row Name 11/24/24 1601          Therapy Assessment/Plan (PT)    Planned Therapy Interventions (PT) balance training;bed mobility training;gait training;home exercise program;motor coordination training;strengthening;stair training;patient/family education;neuromuscular re-education;transfer training  -LO               User Key  (r) = Recorded By, (t) = Taken By, (c) = Cosigned By      Initials Name Provider Type    Annie Barnes, PT Physical Therapist                   Clinical Impression       Row Name 11/24/24 1542          Pain    Pretreatment Pain Rating 0/10 - no pain  -LO     Posttreatment Pain Rating 0/10 - no pain  -LO       Row Name 11/24/24 5018          Plan of Care Review    Plan of Care Reviewed With patient  -LO     Outcome Evaluation PT eval completed. Patient presenting with deficits in BLE strength, gross motor coordination, motor planning, standing balance, and functional endurance effecting functional mobility below baseline. Patient will benefit from skilled IP PT services to address impairments for return to [facility]. Recommend inaptient rehab at [place].  -       Row Name 11/24/24 5480          Therapy  Assessment/Plan (PT)    Patient/Family Therapy Goals Statement (PT) more independence with mobility  -LO     Rehab Potential (PT) good  -LO     Criteria for Skilled Interventions Met (PT) yes;meets criteria;skilled treatment is necessary  -LO     Therapy Frequency (PT) daily  -LO     Predicted Duration of Therapy Intervention (PT) 2 weeks  -LO       Row Name 11/24/24 1557          Vital Signs    Pre Systolic BP Rehab 138  -LO     Pre Treatment Diastolic BP 79  -LO     Pretreatment Heart Rate (beats/min) 54  -LO     Pre SpO2 (%) 97  -LO     O2 Delivery Pre Treatment room air  -LO     O2 Delivery Intra Treatment room air  -LO     O2 Delivery Post Treatment room air  -LO     Pre Patient Position Supine  -LO     Intra Patient Position Standing  -LO     Post Patient Position Supine  -LO       Row Name 11/24/24 1557          Positioning and Restraints    Pre-Treatment Position in bed  -LO     Post Treatment Position bed  -LO     In Bed notified nsg;supine;fowlers;call light within reach;encouraged to call for assist;exit alarm on  -LO               User Key  (r) = Recorded By, (t) = Taken By, (c) = Cosigned By      Initials Name Provider Type    Annie Barnes, PT Physical Therapist                   Outcome Measures       Row Name 11/24/24 1602 11/24/24 1447       How much help from another person do you currently need...    Turning from your back to your side while in flat bed without using bedrails? 3  -LO 3  -MM    Moving from lying on back to sitting on the side of a flat bed without bedrails? 2  -LO 2  -MM    Moving to and from a bed to a chair (including a wheelchair)? 3  -LO 2  -MM    Standing up from a chair using your arms (e.g., wheelchair, bedside chair)? 3  -LO 2  -MM    Climbing 3-5 steps with a railing? 2  -LO 2  -MM    To walk in hospital room? 3  -LO 2  -MM    AM-PAC 6 Clicks Score (PT) 16  -LO 13  -MM    Highest Level of Mobility Goal 5 --> Static standing  -LO 4 --> Transfer to chair/commode  -MM       Row Name 11/24/24 1434          How much help from another person do you currently need...    Turning from your back to your side while in flat bed without using bedrails? 3  -MM     Moving from lying on back to sitting on the side of a flat bed without bedrails? 2  -MM     Moving to and from a bed to a chair (including a wheelchair)? 2  -MM     Standing up from a chair using your arms (e.g., wheelchair, bedside chair)? 2  -MM     Climbing 3-5 steps with a railing? 2  -MM     To walk in hospital room? 2  -MM     AM-PAC 6 Clicks Score (PT) 13  -MM     Highest Level of Mobility Goal 4 --> Transfer to chair/commode  -MM       Row Name 11/24/24 1602          Functional Assessment    Outcome Measure Options AM-PAC 6 Clicks Basic Mobility (PT)  -               User Key  (r) = Recorded By, (t) = Taken By, (c) = Cosigned By      Initials Name Provider Type     Annie Nolen, PT Physical Therapist    Fabby Grider RN Registered Nurse                                 Physical Therapy Education       Title: PT OT SLP Therapies (In Progress)       Topic: Physical Therapy (Done)       Point: Mobility training (Done)       Learning Progress Summary            Patient Acceptance, E, VU,NR by  at 11/24/2024 1502    Comment: PT POC                      Point: Home exercise program (Done)       Learning Progress Summary            Patient Acceptance, E, VU,NR by  at 11/24/2024 1502    Comment: PT POC                      Point: Body mechanics (Done)       Learning Progress Summary            Patient Acceptance, E, VU,NR by  at 11/24/2024 1502    Comment: PT POC                      Point: Precautions (Done)       Learning Progress Summary            Patient Acceptance, E, VU,NR by  at 11/24/2024 1502    Comment: PT POC                                      User Key       Initials Effective Dates Name Provider Type Discipline     06/16/21 -  Annie Nolen, PT Physical Therapist PT                  PT Recommendation  and Plan  Planned Therapy Interventions (PT): balance training, bed mobility training, gait training, home exercise program, motor coordination training, strengthening, stair training, patient/family education, neuromuscular re-education, transfer training  Outcome Evaluation: PT eval completed. Patient presenting with deficits in BLE strength, gross motor coordination, motor planning, standing balance, and functional endurance effecting functional mobility below baseline. Patient will benefit from skilled IP PT services to address impairments for return to [facility]. Recommend inaptient rehab at [place].     Time Calculation:   PT Evaluation Complexity  History, PT Evaluation Complexity: 1-2 personal factors and/or comorbidities  Examination of Body Systems (PT Eval Complexity): total of 3 or more elements  Clinical Presentation (PT Evaluation Complexity): evolving  Clinical Decision Making (PT Evaluation Complexity): moderate complexity  Overall Complexity (PT Evaluation Complexity): moderate complexity     PT Charges       Row Name 11/24/24 1502             Time Calculation    Start Time 1502  -LO      PT Received On 11/24/24  -LO      PT Goal Re-Cert Due Date 12/04/24  -LO         Timed Charges    49868 - Gait Training Minutes  14  -LO      82792 - PT Therapeutic Activity Minutes 10  -LO         Untimed Charges    PT Eval/Re-eval Minutes 40  -LO         Total Minutes    Timed Charges Total Minutes 24  -LO      Untimed Charges Total Minutes 40  -LO       Total Minutes 64  -LO                User Key  (r) = Recorded By, (t) = Taken By, (c) = Cosigned By      Initials Name Provider Type    Annie Barnes, PT Physical Therapist                  Therapy Charges for Today       Code Description Service Date Service Provider Modifiers Qty    41678672198 HC GAIT TRAINING EA 15 MIN 11/24/2024 Annie Nolen, PT GP 1    97856718570 HC PT THERAPEUTIC ACT EA 15 MIN 11/24/2024 Annie Nolen, PT GP 1    00808053551 HC PT EVAL MOD  COMPLEXITY 3 11/24/2024 Annie Nolen, PT GP 1            PT G-Codes  Outcome Measure Options: AM-PAC 6 Clicks Basic Mobility (PT)  AM-PAC 6 Clicks Score (PT): 16  PT Discharge Summary  Anticipated Discharge Disposition (PT): inpatient rehabilitation facility    Annie Nolen, PT  11/24/2024

## 2024-11-24 NOTE — ED PROVIDER NOTES
Subjective   History of Present Illness  Patient is a pleasant 84-year-old male, lives at home alone, history of Parkinson's.  For the past 4 days he has been lying in bed and sleeping more than usual.  His family lives nearby that assist him as needed.  Last night attempted to get out of bed and fell on the floor.  Was lying on floor for approximately 1 hour prior to family coming to assist him.  They helped him get in bed this morning his weakness persisted to the point he was unable to stand and he finally decided come to the emergency department.  Patient's primary care provider is Dr. Andres.  Patient's daughter-in-law is supplying much of the history.  She states that she believes he might have recently been treated for a urinary tract infection.        Review of Systems   All other systems reviewed and are negative.      Past Medical History:   Diagnosis Date    Broken arms     Cancer     prostate    Lower back injury     Parkinson's disease        Allergies   Allergen Reactions    Donepezil Unknown - Low Severity    Ropinirole Unknown - Low Severity and Other (See Comments)       Past Surgical History:   Procedure Laterality Date    CHOLECYSTECTOMY      PROSTATE SURGERY      REPLACEMENT TOTAL KNEE BILATERAL         Family History   Problem Relation Age of Onset    Heart disease Mother     Cancer Father     Stroke Father     Stroke Maternal Grandmother     Cancer Paternal Grandfather        Social History     Socioeconomic History    Marital status:    Tobacco Use    Smoking status: Never    Smokeless tobacco: Never   Vaping Use    Vaping status: Never Used   Substance and Sexual Activity    Alcohol use: No    Drug use: No    Sexual activity: Defer     Partners: Female     Comment: . wife passed away in 2008           Objective   Physical Exam  Vitals and nursing note reviewed.   Constitutional:       General: He is not in acute distress.  HENT:      Head: Normocephalic and atraumatic.   Eyes:       Conjunctiva/sclera: Conjunctivae normal.      Pupils: Pupils are equal, round, and reactive to light.   Neck:      Thyroid: No thyromegaly.   Cardiovascular:      Rate and Rhythm: Normal rate and regular rhythm.      Heart sounds: Normal heart sounds. No murmur heard.     No friction rub. No gallop.   Pulmonary:      Effort: Pulmonary effort is normal. No respiratory distress.      Breath sounds: Normal breath sounds.   Abdominal:      General: Bowel sounds are normal.      Palpations: Abdomen is soft.      Tenderness: There is no abdominal tenderness.   Musculoskeletal:         General: Normal range of motion.      Cervical back: Normal range of motion and neck supple.   Lymphadenopathy:      Cervical: No cervical adenopathy.   Skin:     General: Skin is warm and dry.   Neurological:      General: No focal deficit present.      Mental Status: He is alert and oriented to person, place, and time.      Motor: Weakness present.      Coordination: Coordination abnormal.      Gait: Gait abnormal.   Psychiatric:         Mood and Affect: Mood normal.         Behavior: Behavior normal.         Procedures           ED Course      Recent Results (from the past 24 hours)   Potassium    Collection Time: 11/25/24 12:17 AM    Specimen: Blood   Result Value Ref Range    Potassium 4.0 3.5 - 5.2 mmol/L   Basic Metabolic Panel    Collection Time: 11/25/24  5:32 AM    Specimen: Blood   Result Value Ref Range    Glucose 89 65 - 99 mg/dL    BUN 14 8 - 23 mg/dL    Creatinine 1.57 (H) 0.76 - 1.27 mg/dL    Sodium 136 136 - 145 mmol/L    Potassium 3.9 3.5 - 5.2 mmol/L    Chloride 102 98 - 107 mmol/L    CO2 23.0 22.0 - 29.0 mmol/L    Calcium 8.3 (L) 8.6 - 10.5 mg/dL    BUN/Creatinine Ratio 8.9 7.0 - 25.0    Anion Gap 11.0 5.0 - 15.0 mmol/L    eGFR 42.7 (L) >60.0 mL/min/1.73   CBC (No Diff)    Collection Time: 11/25/24  5:32 AM    Specimen: Blood   Result Value Ref Range    WBC 2.55 (L) 3.40 - 10.80 10*3/mm3    RBC 4.36 4.14 - 5.80 10*6/mm3     Hemoglobin 13.2 13.0 - 17.7 g/dL    Hematocrit 38.8 37.5 - 51.0 %    MCV 89.0 79.0 - 97.0 fL    MCH 30.3 26.6 - 33.0 pg    MCHC 34.0 31.5 - 35.7 g/dL    RDW 13.2 12.3 - 15.4 %    RDW-SD 43.3 37.0 - 54.0 fl    MPV 9.5 6.0 - 12.0 fL    Platelets 118 (L) 140 - 450 10*3/mm3   Hemoglobin A1c    Collection Time: 11/25/24  5:32 AM    Specimen: Blood   Result Value Ref Range    Hemoglobin A1C 5.20 4.80 - 5.60 %   Lipid Panel    Collection Time: 11/25/24  5:32 AM    Specimen: Blood   Result Value Ref Range    Total Cholesterol 90 0 - 200 mg/dL    Triglycerides 69 0 - 150 mg/dL    HDL Cholesterol 40 40 - 60 mg/dL    LDL Cholesterol  35 0 - 100 mg/dL    VLDL Cholesterol 15 5 - 40 mg/dL    LDL/HDL Ratio 0.91    TSH    Collection Time: 11/25/24  5:32 AM    Specimen: Blood   Result Value Ref Range    TSH 1.720 0.270 - 4.200 uIU/mL     Note: In addition to lab results from this visit, the labs listed above may include labs taken at another facility or during a different encounter within the last 24 hours. Please correlate lab times with ED admission and discharge times for further clarification of the services performed during this visit.    CT Head Without Contrast   Final Result   Impression:   No acute intracranial abnormality.            Electronically Signed: Meera Mohan MD     11/24/2024 11:58 AM EST     Workstation ID: [id number]      XR Chest 1 View   Final Result   Impression:   No active pulmonary disease. Radiographically stable chest since 2022.         Electronically Signed: Neo Schofield MD     11/24/2024 11:43 AM EST     Workstation ID: [id number]        Vitals:    11/25/24 1200 11/25/24 1400 11/25/24 1555 11/25/24 1602   BP:   134/76    BP Location:       Patient Position:       Pulse: 56 65  61   Resp:       Temp:       TempSrc:       SpO2: 96% 98%  90%   Weight:       Height:         Medications   aspirin EC tablet 81 mg (81 mg Oral Given 11/25/24 0910)   cyanocobalamin injection 1,000 mcg (1,000 mcg  Intramuscular Given 11/25/24 0909)   donepezil (ARICEPT) tablet 10 mg ( Oral Dose Auto Held 12/10/24 2100)   memantine (NAMENDA) tablet 5 mg (5 mg Oral Given 11/25/24 0910)   rOPINIRole (REQUIP) tablet 2 mg ( Oral Dose Auto Held 12/10/24 2200)   rosuvastatin (CRESTOR) tablet 20 mg (20 mg Oral Given 11/24/24 2015)   sodium chloride 0.9 % flush 10 mL (10 mL Intravenous Given 11/25/24 0912)   sodium chloride 0.9 % flush 1-10 mL (has no administration in time range)   sodium chloride 0.9 % infusion 40 mL (has no administration in time range)   heparin (porcine) 5000 UNIT/ML injection 5,000 Units (5,000 Units Subcutaneous Given 11/25/24 1300)   nitroglycerin (NITROSTAT) SL tablet 0.4 mg (has no administration in time range)   Potassium Replacement - Follow Nurse / BPA Driven Protocol (has no administration in time range)   Magnesium Standard Dose Replacement - Follow Nurse / BPA Driven Protocol (has no administration in time range)   Phosphorus Replacement - Follow Nurse / BPA Driven Protocol (has no administration in time range)   Calcium Replacement - Follow Nurse / BPA Driven Protocol (has no administration in time range)   sodium chloride 0.9 % infusion (100 mL/hr Intravenous New Bag 11/25/24 0303)   acetaminophen (TYLENOL) tablet 650 mg (650 mg Oral Given 11/25/24 0917)     Or   acetaminophen (TYLENOL) 160 MG/5ML oral solution 650 mg ( Oral Not Given:  See Alt 11/25/24 0917)     Or   acetaminophen (TYLENOL) suppository 650 mg ( Rectal Not Given:  See Alt 11/25/24 0917)   sennosides-docusate (PERICOLACE) 8.6-50 MG per tablet 2 tablet (has no administration in time range)     And   polyethylene glycol (MIRALAX) packet 17 g (has no administration in time range)     And   bisacodyl (DULCOLAX) EC tablet 5 mg (has no administration in time range)     And   bisacodyl (DULCOLAX) suppository 10 mg (has no administration in time range)   melatonin tablet 5 mg (has no administration in time range)   ondansetron ODT  (ZOFRAN-ODT) disintegrating tablet 4 mg (has no administration in time range)     Or   ondansetron (ZOFRAN) injection 4 mg (has no administration in time range)   calcium carbonate (TUMS) chewable tablet 500 mg (200 mg elemental) (has no administration in time range)   cephalexin (KEFLEX) capsule 500 mg (500 mg Oral Given 11/25/24 1300)   potassium chloride (KLOR-CON M20) CR tablet 40 mEq (40 mEq Oral Given 11/24/24 2015)     ECG/EMG Results (last 24 hours)       Procedure Component Value Units Date/Time    ECG 12 Lead ED Triage Standing Order; Weak / Dizzy / AMS [[id number]] Collected: 11/24/24 1035     Updated: 11/24/24 1036     QT Interval 366 ms      QTC Interval 368 ms     Narrative:      Test Reason : ED Triage Standing Order~  Blood Pressure :   */*   mmHG  Vent. Rate :  61 BPM     Atrial Rate :  61 BPM     P-R Int : 270 ms          QRS Dur : 106 ms      QT Int : 366 ms       P-R-T Axes :  11 -82  82 degrees    QTcB Int : 368 ms    Sinus rhythm with 1st degree AV block  Left anterior fascicular block  ST elevation, consider lateral injury or acute infarct  ** ** ACUTE MI / STEMI ** **  Abnormal ECG  When compared with ECG of 17-Aug-2022 17:54,  Left anterior fascicular block is now present  QT has shortened    Referred By: KATHERINE           Confirmed By:           ECG 12 Lead ED Triage Standing Order; Weak / Dizzy / AMS   Final Result   Test Reason : ED Triage Standing Order~   Blood Pressure :   */*   mmHG   Vent. Rate :  61 BPM     Atrial Rate :  61 BPM      P-R Int : 270 ms          QRS Dur : 106 ms       QT Int : 366 ms       P-R-T Axes :  11 -82  82 degrees     QTcB Int : 368 ms      Sinus rhythm with 1st degree AV block   Left anterior fascicular block   ST elevation, consider lateral injury or acute infarct   Abnormal ECG   When compared with ECG of 17-Aug-2022 17:54,   Left anterior fascicular block is now present   QT has shortened   Confirmed by MD YURI, LUZ ([id number]) on 11/24/2024 8:48:30 PM       Referred By: ERMD           Confirmed By: LUZ WELCH MD                                                           Medical Decision Making  Pt lives alone and is no longer able to take care of himself safely.  He has experienced falls and is now too weak to stand up out of bed.  LI, elevated toponin.  Case discussed with internal medicine attending and pt will be admitted for further evaluation and management.    Problems Addressed:  LI (acute kidney injury): complicated acute illness or injury  Elevated troponin: complicated acute illness or injury  Generalized weakness: complicated acute illness or injury  Hypokalemia: complicated acute illness or injury  Lives alone: complicated acute illness or injury  Multiple falls: complicated acute illness or injury  Parkinson's disease, unspecified whether dyskinesia present, unspecified whether manifestations fluctuate: complicated acute illness or injury    Amount and/or Complexity of Data Reviewed  External Data Reviewed: notes.  Labs: ordered. Decision-making details documented in ED Course.  Radiology: ordered and independent interpretation performed. Decision-making details documented in ED Course.  ECG/medicine tests: ordered and independent interpretation performed. Decision-making details documented in ED Course.    Risk  Decision regarding hospitalization.        Final diagnoses:   LI (acute kidney injury)   Elevated troponin   Generalized weakness   Multiple falls   Parkinson's disease, unspecified whether dyskinesia present, unspecified whether manifestations fluctuate   Lives alone   Hypokalemia       ED Disposition  ED Disposition       ED Disposition   Decision to Admit    Condition   --    Comment   Level of Care: Telemetry [5]   Diagnosis: Weakness [[id number]]   Admitting Physician: RAQUEL ROBERSON [[id number]]                  Luz Welch, DO  11/25/24 1820       Luz Welch, DO  11/25/24 1826

## 2024-11-25 LAB
ANION GAP SERPL CALCULATED.3IONS-SCNC: 11 MMOL/L (ref 5–15)
BUN SERPL-MCNC: 14 MG/DL (ref 8–23)
BUN/CREAT SERPL: 8.9 (ref 7–25)
CALCIUM SPEC-SCNC: 8.3 MG/DL (ref 8.6–10.5)
CHLORIDE SERPL-SCNC: 102 MMOL/L (ref 98–107)
CHOLEST SERPL-MCNC: 90 MG/DL (ref 0–200)
CO2 SERPL-SCNC: 23 MMOL/L (ref 22–29)
CREAT SERPL-MCNC: 1.57 MG/DL (ref 0.76–1.27)
DEPRECATED RDW RBC AUTO: 43.3 FL (ref 37–54)
EGFRCR SERPLBLD CKD-EPI 2021: 42.7 ML/MIN/1.73
ERYTHROCYTE [DISTWIDTH] IN BLOOD BY AUTOMATED COUNT: 13.2 % (ref 12.3–15.4)
GLUCOSE SERPL-MCNC: 89 MG/DL (ref 65–99)
HBA1C MFR BLD: 5.2 % (ref 4.8–5.6)
HCT VFR BLD AUTO: 38.8 % (ref 37.5–51)
HDLC SERPL-MCNC: 40 MG/DL (ref 40–60)
HGB BLD-MCNC: 13.2 G/DL (ref 13–17.7)
LDLC SERPL CALC-MCNC: 35 MG/DL (ref 0–100)
LDLC/HDLC SERPL: 0.91 {RATIO}
MCH RBC QN AUTO: 30.3 PG (ref 26.6–33)
MCHC RBC AUTO-ENTMCNC: 34 G/DL (ref 31.5–35.7)
MCV RBC AUTO: 89 FL (ref 79–97)
PLATELET # BLD AUTO: 118 10*3/MM3 (ref 140–450)
PMV BLD AUTO: 9.5 FL (ref 6–12)
POTASSIUM SERPL-SCNC: 3.9 MMOL/L (ref 3.5–5.2)
POTASSIUM SERPL-SCNC: 4 MMOL/L (ref 3.5–5.2)
RBC # BLD AUTO: 4.36 10*6/MM3 (ref 4.14–5.8)
SODIUM SERPL-SCNC: 136 MMOL/L (ref 136–145)
TRIGL SERPL-MCNC: 69 MG/DL (ref 0–150)
TSH SERPL DL<=0.05 MIU/L-ACNC: 1.72 UIU/ML (ref 0.27–4.2)
VLDLC SERPL-MCNC: 15 MG/DL (ref 5–40)
WBC NRBC COR # BLD AUTO: 2.55 10*3/MM3 (ref 3.4–10.8)

## 2024-11-25 PROCEDURE — 84132 ASSAY OF SERUM POTASSIUM: CPT | Performed by: INTERNAL MEDICINE

## 2024-11-25 PROCEDURE — 99232 SBSQ HOSP IP/OBS MODERATE 35: CPT | Performed by: STUDENT IN AN ORGANIZED HEALTH CARE EDUCATION/TRAINING PROGRAM

## 2024-11-25 PROCEDURE — 97535 SELF CARE MNGMENT TRAINING: CPT

## 2024-11-25 PROCEDURE — 80048 BASIC METABOLIC PNL TOTAL CA: CPT | Performed by: INTERNAL MEDICINE

## 2024-11-25 PROCEDURE — 92610 EVALUATE SWALLOWING FUNCTION: CPT

## 2024-11-25 PROCEDURE — 80061 LIPID PANEL: CPT | Performed by: INTERNAL MEDICINE

## 2024-11-25 PROCEDURE — 25010000002 CYANOCOBALAMIN PER 1000 MCG: Performed by: INTERNAL MEDICINE

## 2024-11-25 PROCEDURE — 97530 THERAPEUTIC ACTIVITIES: CPT

## 2024-11-25 PROCEDURE — 83036 HEMOGLOBIN GLYCOSYLATED A1C: CPT | Performed by: INTERNAL MEDICINE

## 2024-11-25 PROCEDURE — 25010000002 HEPARIN (PORCINE) PER 1000 UNITS: Performed by: INTERNAL MEDICINE

## 2024-11-25 PROCEDURE — 97166 OT EVAL MOD COMPLEX 45 MIN: CPT

## 2024-11-25 PROCEDURE — 25810000003 SODIUM CHLORIDE 0.9 % SOLUTION: Performed by: INTERNAL MEDICINE

## 2024-11-25 PROCEDURE — 84443 ASSAY THYROID STIM HORMONE: CPT | Performed by: INTERNAL MEDICINE

## 2024-11-25 PROCEDURE — 85027 COMPLETE CBC AUTOMATED: CPT | Performed by: INTERNAL MEDICINE

## 2024-11-25 RX ADMIN — SODIUM CHLORIDE 100 ML/HR: 9 INJECTION, SOLUTION INTRAVENOUS at 03:03

## 2024-11-25 RX ADMIN — CEPHALEXIN 500 MG: 250 CAPSULE ORAL at 20:36

## 2024-11-25 RX ADMIN — Medication 5 MG: at 20:37

## 2024-11-25 RX ADMIN — CEPHALEXIN 500 MG: 250 CAPSULE ORAL at 13:00

## 2024-11-25 RX ADMIN — CYANOCOBALAMIN 1000 MCG: 1000 INJECTION, SOLUTION INTRAMUSCULAR at 09:09

## 2024-11-25 RX ADMIN — ACETAMINOPHEN 650 MG: 325 TABLET ORAL at 20:36

## 2024-11-25 RX ADMIN — ROSUVASTATIN 20 MG: 20 TABLET, FILM COATED ORAL at 20:36

## 2024-11-25 RX ADMIN — HEPARIN SODIUM 5000 UNITS: 5000 INJECTION INTRAVENOUS; SUBCUTANEOUS at 20:35

## 2024-11-25 RX ADMIN — MEMANTINE 5 MG: 10 TABLET ORAL at 09:10

## 2024-11-25 RX ADMIN — ACETAMINOPHEN 650 MG: 325 TABLET ORAL at 09:17

## 2024-11-25 RX ADMIN — Medication 10 ML: at 09:12

## 2024-11-25 RX ADMIN — Medication 10 ML: at 20:36

## 2024-11-25 RX ADMIN — ASPIRIN 81 MG: 81 TABLET, COATED ORAL at 09:10

## 2024-11-25 RX ADMIN — HEPARIN SODIUM 5000 UNITS: 5000 INJECTION INTRAVENOUS; SUBCUTANEOUS at 13:00

## 2024-11-25 RX ADMIN — MEMANTINE 5 MG: 10 TABLET ORAL at 20:36

## 2024-11-25 RX ADMIN — HEPARIN SODIUM 5000 UNITS: 5000 INJECTION INTRAVENOUS; SUBCUTANEOUS at 05:42

## 2024-11-25 NOTE — THERAPY EVALUATION
Patient Name: Jhoan Gagnon  : 1940    MRN: [id number]                              Today's Date: 2024       Admit Date: 2024    Visit Dx:     ICD-10-CM ICD-9-CM   1. LI (acute kidney injury)  N17.9 584.9   2. Elevated troponin  R79.89 790.6   3. Generalized weakness  R53.1 780.79   4. Multiple falls  R29.6 V15.88   5. Parkinson's disease, unspecified whether dyskinesia present, unspecified whether manifestations fluctuate  G20.A1 332.0   6. Lives alone  Z60.2 V60.3   7. Hypokalemia  E87.6 276.8     Patient Active Problem List   Diagnosis    Parkinson's disease    Weakness    Sinus bradycardia    Syncope and collapse    First degree atrioventricular block    Ataxia    Leukocytosis    Arrhythmia    Essential hypertension    Hyperlipidemia    Bilateral carotid artery stenosis    Elevated serum creatinine    Hypokalemia     Past Medical History:   Diagnosis Date    Broken arms     Cancer     prostate    Lower back injury     Parkinson's disease      Past Surgical History:   Procedure Laterality Date    CHOLECYSTECTOMY      PROSTATE SURGERY      REPLACEMENT TOTAL KNEE BILATERAL        General Information       Row Name 24 0916          OT Time and Intention    Document Type evaluation  -JY     Mode of Treatment occupational therapy;individual therapy  -JY       Row Name 24 0916          General Information    Patient Profile Reviewed yes  -JY     Prior Level of Function independent:;gait;transfer;bed mobility;ADL's;feeding;grooming;dressing;bathing;driving;home management;cleaning;max assist:;cooking  reports I in all ADLs, related t/fs w/ use of SPC or FWW w/  increased use of FWW lately, able to complete home mgmt tasks, laundry, very simple meal prep (eats out) & short dist driving ; increased time to complete; endorses at least 4 falls/6 months  -JY     Existing Precautions/Restrictions fall;other (see comments)  PD w/ strong displacy of sx, chronic low back pain; increased time  for processing and response, tremors  -JY     Barriers to Rehab medically complex;previous functional deficit  -[employer]       Row Name 11/25/24 0916          Occupational Profile    Environmental Supports and Barriers (Occupational Profile) walk in shower w/o seat (pt stands for bathing), elevated toilet with grab bars, multiple grab bars around home, DME: has SPC and FWW with pt reporting preference for cane yet need for FWW more recently  -JY     Patient Goals (Occupational Profile) to return to PLOF  -YESICA       Row Name 11/25/24 0916          Living Environment    People in Home alone;other (see comments)  reports calling on serafinter some however [facility] [place], no immediate A seems available  -[email or web address] Name 11/25/24 0916          Home Main Entrance    Number of Stairs, Main Entrance other (see comments)  13  -J     Stair Railings, Main Entrance railings on both sides of stairs  -[employer] Name 11/25/24 0916          Stairs Within Home, Primary    Number of Stairs, Within Home, Primary none  -[employer]       [employer] Name 11/25/24 0916          Cognition    Orientation Status (Cognition) oriented x 3;verbal cues/prompts needed for orientation;situation;other (see comments)  cues to recall fall, did not initially report  -[employer] Name 11/25/24 0916          Safety Issues/Impairments Affecting Functional Mobility    Safety Issues Affecting Function (Mobility) awareness of need for assistance;insight into deficits/self-awareness;positioning of assistive device;safety precaution awareness;safety precautions follow-through/compliance;sequencing abilities  -JY     Impairments Affecting Function (Mobility) balance;cognition;coordination;endurance/activity tolerance;motor control;motor planning;strength;postural/trunk control  -JY     Cognitive Impairments, Mobility Safety/Performance awareness, need for assistance;insight into deficits/self-awareness;problem-solving/reasoning;safety precaution  "awareness;safety precaution follow-through;sequencing abilities  -JY     Comment, Safety Issues/Impairments (Mobility) pt alert and able to follow commands; requires increased time to process prompts/cues/direction and increased time to respond; pt w/ PD w/ strong display of sx  -JY               User Key  (r) = Recorded By, (t) = Taken By, (c) = Cosigned By      Initials Name Provider Type    Margarita Talley OT Occupational Therapist                     Mobility/ADL's       Row Name 11/25/24 0927          Bed Mobility    Bed Mobility supine-sit;scooting/bridging  -JY     Scooting/Bridging [place] (Bed Mobility) moderate assist (50% patient effort);2 person assist;verbal cues;other (see comments)  mod A x 2 for scooting to EOB with cues for L & R support to advance hips for symmetry  -JY     Supine-Sit [place] (Bed Mobility) moderate assist (50% patient effort);1 person assist;2 person assist;nonverbal cues (demo/gesture);verbal cues  -JY     Bed Mobility, Safety Issues decreased use of arms for pushing/pulling;decreased use of legs for bridging/pushing  -JY     Assistive Device (Bed Mobility) bed rails;head of bed elevated  -JY     Comment, (Bed Mobility) skilled cues to advance LEs to EOB, to reach across midline to grasp bedrail for pulling self forward and into sitting; pt able to advance LEs grossly with minimal A for lattermost positioning and some A for bringing trunk forward enough to allow grasp of bedrail and to complete uprighting; at one point 2 person A for bringing R hip around to sit EOB and upright trunk; denies any dizziness or feeling LH; cued to \"think big\" advances in movement and use of momentum  -JY       Row Name 11/25/24 0980          Transfers    Transfers stand-sit transfer;sit-stand transfer;bed-chair transfer  -JY     Comment, (Transfers) skilled cues for optimal hand placement for controlled ascend, descend specifically to push up from seated surface vs pulling at FWW for " "leverage to stand and to further position self in close proximity and aligned at desired seated surface and to reach back prior to sitting; in initial standing cues for anterior weight shift through  feet and UB over LB positioning for stability and to initiate forward movement with weight shift and advance; rigid posture in standing  -YESICA       Row Name 11/25/24 0927          Bed-Chair Transfer    Bed-Chair [place] (Transfers) minimum assist (75% patient effort);2 person assist;verbal cues;nonverbal cues (demo/gesture)  -YESICA     Assistive Device (Bed-Chair Transfers) walker, front-wheeled  -YESICA       Row Name 11/25/24 0927          Sit-Stand Transfer    Sit-Stand [place] (Transfers) minimum assist (75% patient effort);2 person assist;verbal cues;nonverbal cues (demo/gesture)  -YESICA     Assistive Device (Sit-Stand Transfers) walker, front-wheeled  -YESICA       Row Name 11/25/24 0927          Stand-Sit Transfer    Stand-Sit [place] (Transfers) minimum assist (75% patient effort);2 person assist;verbal cues;nonverbal cues (demo/gesture)  -YESICA     Assistive Device (Stand-Sit Transfers) walker, front-wheeled  -YESICA       Row Name 11/25/24 0927          Functional Mobility    Functional Mobility- Ind. Level minimum assist (75% patient effort);2 person assist required;verbal cues required;nonverbal cues required (demo/gesture)  -YESICA     Functional Mobility- Device walker, front-wheeled  -YESICA     Functional Mobility-Distance (Feet) --  brief in room ADL related mobility  -     Functional Mobility- Comment defer to PT for specifics however during in room ADL related mobility pt req'd gross min A x 2 for L & R support and mgmt of IV pole; pt req'd increased time to allow for slower seq and initiation of movement, rigid posture; cues for \"think big\" advances in movement with some noted difficulty in transition, turning, reverse stepping  -YESICA     Patient was able to Ambulate yes  -YESICA       Row Name 11/25/24 0927          " Activities of Daily Living    BADL Assessment/Intervention upper body dressing;lower body dressing;grooming;feeding  -       Row Name 11/25/24 0927          Upper Body Dressing Assessment/Training    [address] Level (Upper Body Dressing) doff;don;pajama/robe;moderate assist (50% patient effort);verbal cues;nonverbal cues (demo/gesture)  -JY     Position (Upper Body Dressing) edge of bed sitting  -JY     Comment, (Upper Body Dressing) pt endorses/presents with more difficulty in use of RUE thus educated on optimal position, tech, seq for threading and unthreading; pt able to assist more on L side d/t IV placement at R side; pt req'd A for proximal and posterior mgmt of gown  -[facility] Name 11/25/24 0927          Lower Body Dressing Assessment/Training    [address] Level (Lower Body Dressing) doff;don;socks;dependent (less than 25% patient effort)  mgmt of socks  -JY     Position (Lower Body Dressing) supine;supported sitting  -JY     Comment, (Lower Body Dressing) pt able to reach some distally however not significant enough for doffing socks or re donning; pt endorses using actual tools to assist with reach of shoe at home; educated pt on  AE made to assist with LBD and issued pt  reacher and shoe horn to assist w/ clothing mgmt and item retrieval; pt would benefit from further training in upcoming sessions  -[facility] Name 11/25/24 0927          Grooming Assessment/Training    [address] Level (Grooming) wash face, hands;set up;supervision  -     Position (Grooming) supported sitting  -JY     Comment, (Grooming) spv for provision of cues for initiation of hand/face washing and follow through, less physical A for task itself  -       Row Name 11/25/24 0927          Self-Feeding Assessment/Training    [address] Level (Feeding) finger foods;other (see comments);liquids to mouth;minimum assist (75% patient effort);scoop food and bring to mouth;maximum assist (25% patient effort);prepare  tray/open items;dependent (less than 25% patient effort)  SBA for finger foods  -JY     Assistive Devices (Feeding) scoop dish/plate guard;adapted cup  -JY     Position (Feeding) supported sitting  -JY     Comment, (Feeding) pt reported difficulty with feeding self foods other than finger foods upon OT arrival and observation of pt not eating; facilitated change in position to promote upright sitting for greater stability and provided pt with adapted cup and scoop plate with trial of cup and noted improvement; educated pt on options for grasp and cont'd straw use for improved reach given tremors and decreased forward flexion at shoulders to bring to mouth; educated pt on utensil options and pt indicated no improved feeding with use of weighted utensils at home and preference to receive A, have finger foods here vs trial of weighted or built up utensils; pt able to slightly grasp utensil when loaded and to take to mouth for few bites and able to eat more solid food cut up  -J               User Key  (r) = Recorded By, (t) = Taken By, (c) = Cosigned By      Initials Name Provider Type    Margarita Talley OT Occupational Therapist                   Obj/Interventions       Row Name 11/25/24 0947          Sensory Assessment (Somatosensory)    Sensory Assessment (Somatosensory) bilateral UE;sensation intact  -     Bilateral UE Sensory Assessment general sensation;light touch awareness;intact  -     Sensory Assessment denies any numbness or tingling and able to recognize LT stimuli as intact and symmetrical at BUEs  -       Row Name 11/25/24 0992          Vision Assessment/Intervention    Visual Impairment/Limitations corrective lenses full-time;other (see comments)  endorses presribed to wear eyewear full time however does not d/t perceived tripping trouble w/ mobility w/ use  -     Vision Assessment Comment denies any acute changes to vision  -       Row Name 11/25/24 0921          Range of Motion  Comprehensive    General Range of Motion bilateral upper extremity ROM WFL  -JY     Comment, General Range of Motion BUE AROM grossly WFL, decreased FE at R shoulder at baseline, this date difficulty elevating above 100 degrees FE or demonstrating IR/ER at R shoulder; has learned compensatory, adaptive strategies  -JY       Row Name 11/25/24 0945          Strength Comprehensive (MMT)    General Manual Muscle Testing (MMT) Assessment upper extremity strength deficits identified  -JY     Comment, General Manual Muscle Testing (MMT) Assessment BUE MMS grossly 4 to 4+/5 with exception of R UE 3+/5 to 4-/5  -JY       Row Name 11/25/24 0945          Motor Skills    Motor Skills functional endurance;coordination  -JY     Coordination upper extremity;finger to nose;other (see comments);WFL  finger thumb opposition  -JY     Functional Endurance decreased activity tolerance toward more dynamic demands  -JY       Row Name 11/25/24 0945          Balance    Balance Assessment sitting static balance;sitting dynamic balance;standing static balance;standing dynamic balance  -JY     Static Sitting Balance contact guard  -JY     Dynamic Sitting Balance minimal assist  -JY     Position, Sitting Balance unsupported;sitting edge of bed;sitting in chair  -JY     Static Standing Balance minimal assist  -JY     Dynamic Standing Balance minimal assist;2-person assist;verbal cues  -JY     Position/Device Used, Standing Balance supported;walker, front-wheeled  -JY     Balance Interventions sitting;standing;static;dynamic;occupation based/functional task  -JY     Comment, Balance no overt LOB during seated or standing task however req'd L & R support in standing; demonstrated improved balance when anterior shift completed and UB over LB achieved  -JY               User Key  (r) = Recorded By, (t) = Taken By, (c) = Cosigned By      Initials Name Provider Type    Margarita Talley OT Occupational Therapist                   Goals/Plan       Brittanie  Name 11/25/24 0959          Bed Mobility Goal 1 (OT)    Activity/Assistive Device (Bed Mobility Goal 1, OT) sit to supine;supine to sit;scooting  -JY     [place] Level/Cues Needed (Bed Mobility Goal 1, OT) minimum assist (75% or more patient effort);moderate assist (50-74% patient effort);verbal cues required  -JY     Time Frame (Bed Mobility Goal 1, OT) long term goal (LTG);1 week  -JY     Progress/Outcomes (Bed Mobility Goal 1, OT) new goal  -TERRENCEY       Row Name 11/25/24 0959          Transfer Goal 1 (OT)    Activity/Assistive Device (Transfer Goal 1, OT) sit-to-stand/stand-to-sit;bed-to-chair/chair-to-bed;commode, bedside without drop arms;walker, rolling  -JY     [place] Level/Cues Needed (Transfer Goal 1, OT) minimum assist (75% or more patient effort);verbal cues required  -JY     Time Frame (Transfer Goal 1, OT) long term goal (LTG);1 week  -JY     Progress/Outcome (Transfer Goal 1, OT) new goal  -TERRENCEY       Row Name 11/25/24 0959          Dressing Goal 1 (OT)    Activity/Device (Dressing Goal 1, OT) upper body dressing  -JY     [place]/Cues Needed (Dressing Goal 1, OT) minimum assist (75% or more patient effort);verbal cues required  -JY     Time Frame (Dressing Goal 1, OT) short term goal (STG);5 days  -JY     Progress/Outcome (Dressing Goal 1, OT) new goal  -TERRENCEY       Row Name 11/25/24 0959          Grooming Goal 1 (OT)    Activity/Device (Grooming Goal 1, OT) hair care;oral care;wash face, hands  -JY     [place] (Grooming Goal 1, OT) minimum assist (75% or more patient effort);verbal cues required  -JY     Time Frame (Grooming Goal 1, OT) short term goal (STG);5 days  -JY     Progress/Outcome (Grooming Goal 1, OT) new goal  -TERRENCEY       Row Name 11/25/24 0959          Self-Feeding Goal 1 (OT)    Activity/Device (Self-Feeding Goal 1, OT) finger foods;liquids to mouth;scoop food and bring to mouth;adapted cup;scoop dish/plate guard;other (see comments)  AE as needed  -YESICA [employer]  Level/Cues Needed (Self-Feeding Goal 1, OT) minimum assist (75% or more patient effort);verbal cues required  -JY     Time Frame (Self-Feeding Goal 1, OT) long term goal (LTG);1 week  -JY     Progress/Outcomes (Self-Feeding Goal 1, OT) new goal  -JY       Row Name 11/25/24 0959          Therapy Assessment/Plan (OT)    Planned Therapy Interventions (OT) activity tolerance training;adaptive equipment training;BADL retraining;functional balance retraining;occupation/activity based interventions;patient/caregiver education/training;ROM/therapeutic exercise;strengthening exercise;transfer/mobility retraining  -JY               User Key  (r) = Recorded By, (t) = Taken By, (c) = Cosigned By      Initials Name Provider Type    Margarita Talley OT Occupational Therapist                   Clinical Impression       Row Name 11/25/24 0952          Pain Assessment    Pretreatment Pain Rating 0/10 - no pain  -JY     Posttreatment Pain Rating 0/10 - no pain  -JY     Response to Pain Interventions no change per patient report  -JY     Pre/Posttreatment Pain Comment denies any pain and tolerated OT interventions  -JY       Row Name 11/25/24 0952          Plan of Care Review    Plan of Care Reviewed With patient  -JY     Progress no change  OT eval  -JY     Outcome Evaluation OT evaluation completed. Pt presents with decreased I in ADLs, related t/fs and mobility compared to PLOF. Pt limited by decreased activity tolerance with need for increased time for processing and response in verbalizations and physical movement, impaired balance, muscle weakness at BUEs, decreased distal reach toward LEs impacting LB ADLs and fatigue with more dynamic demand. OT issued LH AE and some feeding equipment to A with self care tasks given tremors, difficulty toward reaching to feet, spillage during self feeding etc. Pt demonstrated some gains in drinking liquids yet will need further training in upcoming sessions. Pt req'd mod A x 1 -2 for bed  mobility and gross min A x 2 for fxl t/fs and mobility with FWW use with need for cues for postural alignment, weight shifting and advancing with  most available fluidity. Pt is below baseline occupational performance and would benefit from IPOT POC and IRF at d/c when medically ready.  -       Row Name 11/25/24 0952          Therapy Assessment/Plan (OT)    Patient/Family Therapy Goal Statement (OT) to return to PLOF  -JY     Rehab Potential (OT) good  -JY     Criteria for Skilled Therapeutic Interventions Met (OT) yes;skilled treatment is necessary  -JY     Therapy Frequency (OT) daily  -JY     Predicted Duration of Therapy Intervention (OT) 6 days  -       Row Name 11/25/24 0952          Therapy Plan Review/Discharge Plan (OT)    Equipment Needs Upon Discharge (OT) dressing equipment;bathing equipment  -JY     Anticipated Discharge Disposition (OT) inpatient rehabilitation facility  -[facility] Name 11/25/24 0952          Vital Signs    Pre Systolic BP Rehab 140  -JY     Pre Treatment Diastolic BP 76  -JY     Post Systolic BP Rehab 134  -JY     Post Treatment Diastolic BP 76  -JY     Pretreatment Heart Rate (beats/min) 59  -JY     Posttreatment Heart Rate (beats/min) 62  -JY     Pre SpO2 (%) 95  -JY     O2 Delivery Pre Treatment room air  -JY     O2 Delivery Intra Treatment room air  -JY     Post SpO2 (%) 96  -JY     O2 Delivery Post Treatment room air  -JY     Pre Patient Position Supine  -JY     Intra Patient Position Standing  -JY     Post Patient Position Sitting  -JY       [facility] Name 11/25/24 0952          Positioning and Restraints    Pre-Treatment Position in bed  -JY     Post Treatment Position chair  -JY     In Chair notified nsg;reclined;call light within reach;encouraged to call for assist;exit alarm on;waffle cushion;on mechanical lift sling;legs elevated;heels elevated  -JY               User Key  (r) = Recorded By, (t) = Taken By, (c) = Cosigned By      Initials Name Provider Type    YESICA Patel  AKIN Avila Occupational Therapist                   Outcome Measures       Row Name 11/25/24 1004          How much help from another is currently needed...    Putting on and taking off regular lower body clothing? 1  -JY     Bathing (including washing, rinsing, and drying) 2  -JY     Toileting (which includes using toilet bed pan or urinal) 1  -JY     Putting on and taking off regular upper body clothing 2  -JY     Taking care of personal grooming (such as brushing teeth) 3  -JY     Eating meals 2  -JY     AM-PAC 6 Clicks Score (OT) 11  -JY       Row Name 11/25/24 1004          Functional Assessment    Outcome Measure Options AM-PAC 6 Clicks Daily Activity (OT)  -JY               User Key  (r) = Recorded By, (t) = Taken By, (c) = Cosigned By      Initials Name Provider Type    Margarita Talley OT Occupational Therapist                    Occupational Therapy Education       Title: PT OT SLP Therapies (In Progress)       Topic: Occupational Therapy (In Progress)       Point: ADL training (In Progress)       Description:   Instruct learner(s) on proper safety adaptation and remediation techniques during self care or transfers.   Instruct in proper use of assistive devices.                  Learning Progress Summary            Patient Acceptance, E,D, NR by YESICA at 11/25/2024 0750                      Point: Home exercise program (Not Started)       Description:   Instruct learner(s) on appropriate technique for monitoring, assisting and/or progressing therapeutic exercises/activities.                  Learner Progress:  Not documented in this visit.              Point: Precautions (In Progress)       Description:   Instruct learner(s) on prescribed precautions during self-care and functional transfers.                  Learning Progress Summary            Patient Acceptance, E,D, NR by YESICA at 11/25/2024 0750                      Point: Body mechanics (In Progress)       Description:   Instruct learner(s) on proper  positioning and spine alignment during self-care, functional mobility activities and/or exercises.                  Learning Progress Summary            Patient Acceptance, E,D, NR by YESICA at 11/25/2024 4690                                      User Key       Initials Effective Dates Name Provider Type Discipline    YESICA 06/16/21 -  Margarita Patel OT Occupational Therapist OT                  OT Recommendation and Plan  Planned Therapy Interventions (OT): activity tolerance training, adaptive equipment training, BADL retraining, functional balance retraining, occupation/activity based interventions, patient/caregiver education/training, ROM/therapeutic exercise, strengthening exercise, transfer/mobility retraining  Therapy Frequency (OT): daily  Plan of Care Review  Plan of Care Reviewed With: patient  Progress: no change (OT eval)  Outcome Evaluation: OT evaluation completed. Pt presents with decreased I in ADLs, related t/fs and mobility compared to PLOF. Pt limited by decreased activity tolerance with need for increased time for processing and response in verbalizations and physical movement, impaired balance, muscle weakness at BUEs, decreased distal reach toward LEs impacting LB ADLs and fatigue with more dynamic demand. OT issued LH AE and some feeding equipment to A with self care tasks given tremors, difficulty toward reaching to feet, spillage during self feeding etc. Pt demonstrated some gains in drinking liquids yet will need further training in upcoming sessions. Pt req'd mod A x 1 -2 for bed mobility and gross min A x 2 for fxl t/fs and mobility with FWW use with need for cues for postural alignment, weight shifting and advancing with  most available fluidity. Pt is below baseline occupational performance and would benefit from IPOT POC and IRF at d/c when medically ready.     Time Calculation:   Evaluation Complexity (OT)  Review Occupational Profile/Medical/Therapy History Complexity: expanded/moderate  complexity  Assessment, Occupational Performance/Identification of Deficit Complexity: 3-5 performance deficits  Clinical Decision Making Complexity (OT): detailed assessment/moderate complexity  Overall Complexity of Evaluation (OT): moderate complexity     Time Calculation- OT       Row Name 11/25/24 1005             Time Calculation- OT    OT Start Time 0750  -JY      OT Received On 11/25/24  -JY      OT Goal Re-Cert Due Date 12/05/24  -JY         Timed Charges    28694 - OT Therapeutic Activity Minutes 10  -JY      46829 - OT Self Care/Mgmt Minutes 29  -JY         Untimed Charges    OT Eval/Re-eval Minutes 50  -JY         Total Minutes    Timed Charges Total Minutes 39  -JY      Untimed Charges Total Minutes 50  -JY       Total Minutes 89  -JY                User Key  (r) = Recorded By, (t) = Taken By, (c) = Cosigned By      Initials Name Provider Type    Margairta Talley OT Occupational Therapist                  Therapy Charges for Today       Code Description Service Date Service Provider Modifiers Qty    53454682958 HC OT THERAPEUTIC ACT EA 15 MIN 11/25/2024 Margarita Patel OT GO 1    17800247592 HC OT SELF CARE/MGMT/TRAIN EA 15 MIN 11/25/2024 Margarita Patel OT GO 2    94832385226 HC OT EVAL MOD COMPLEXITY 4 11/25/2024 Margarita Patel OT GO 1    34347825699 HC OT THER SUPP EA 15 MIN 11/25/2024 Margarita Patel OT GO 2                 Margarita Patel OT  11/25/2024

## 2024-11-25 NOTE — PLAN OF CARE
Goal Outcome Evaluation:  Plan of Care Reviewed With: patient        Progress: no change (OT eval)  Outcome Evaluation: OT evaluation completed. Pt presents with decreased I in ADLs, related t/fs and mobility compared to PLOF. Pt limited by decreased activity tolerance with need for increased time for processing and response in verbalizations and physical movement, impaired balance, muscle weakness at BUEs, decreased distal reach toward LEs impacting LB ADLs and fatigue with more dynamic demand. OT issued LH AE and some feeding equipment to A with self care tasks given tremors, difficulty toward reaching to feet, spillage during self feeding etc. Pt demonstrated some gains in drinking liquids yet will need further training in upcoming sessions. Pt req'd mod A x 1 -2 for bed mobility and gross min A x 2 for fxl t/fs and mobility with FWW use with need for cues for postural alignment, weight shifting and advancing with  most available fluidity. Pt is below baseline occupational performance and would benefit from IPOT POC and IRF at d/c when medically ready.    Anticipated Discharge Disposition (OT): inpatient rehabilitation facility

## 2024-11-25 NOTE — PROGRESS NOTES
[facility] Medicine Services  PROGRESS NOTE    Patient Name: Jhoan Gagnon  : 1940  MRN: [id number]    Date of Admission: 2024  Primary Care Physician: Wilmer Andres MD    Subjective   Subjective     CC:  Frequent falls     HPI:  Patient reports continued weakness as well as some pain with urination.  Denies fevers, chills, sweats.  He denies any abdominal pain otherwise.      Objective   Objective     Vital Signs:   Temp:  [97.9 °F (36.6 °C)-99.3 °F (37.4 °C)] 97.9 °F (36.6 °C)  Heart Rate:  [52-67] 67  Resp:  [18-20] 20  BP: (107-148)/(62-79) 134/76     Physical Exam:  General appearance: alert, awake, oriented, no acute distress, chronically ill-appearing, frail  Cardiovascular: RRR, no murmurs or rubs, radial pulse full 2/4 BL   Respiratory: CTAB, no crackles or wheezes   Abdomen: soft, non-tender, no organomegaly, bowel sounds normoactive    Neuro/CNS: alert and oriented x3, normal speech, resting tremor    Results Reviewed:  LAB RESULTS:      Lab 24  0532 24  1033   WBC 2.55* 3.81   HEMOGLOBIN 13.2 14.2   HEMATOCRIT 38.8 41.0   PLATELETS 118* 134*   NEUTROS ABS  --  2.78   IMMATURE GRANS (ABS)  --  0.00   LYMPHS ABS  --  0.65*   MONOS ABS  --  0.31   EOS ABS  --  0.03   MCV 89.0 87.6   LACTATE  --  1.1   HSTROP T  --  48*         Lab 24  0532 24  0017 24  1033   SODIUM 136  --  136   POTASSIUM 3.9 4.0 3.3*   CHLORIDE 102  --  101   CO2 23.0  --  24.0   ANION GAP 11.0  --  11.0   BUN 14  --  16   CREATININE 1.57*  --  1.47*   EGFR 42.7*  --  46.2*   GLUCOSE 89  --  91   CALCIUM 8.3*  --  8.7   MAGNESIUM  --   --  1.9   HEMOGLOBIN A1C 5.20  --   --    TSH 1.720  --   --          Lab 24  1033   TOTAL PROTEIN 6.3   ALBUMIN 3.5   GLOBULIN 2.8   ALT (SGPT) 10   AST (SGOT) 22   BILIRUBIN 1.6*   ALK PHOS 91         Lab 24  1033   HSTROP T 48*         Lab 24  0532   CHOLESTEROL 90   LDL CHOL 35   HDL CHOL 40   TRIGLYCERIDES  69             Brief Urine Lab Results  (Last result in the past 365 days)        Color   Clarity   Blood   Leuk Est   Nitrite   Protein   CREAT   Urine HCG        11/24/24 1045 Orange   Clear   Negative   Small (1+)   Positive   30 mg/dL (1+)                   Microbiology Results Abnormal       None            CT Head Without Contrast    Result Date: 11/24/2024  CT HEAD WO CONTRAST Date of Exam: 11/24/2024 11:40 AM EST Indication: fall, head injury. Comparison: CT head without contrast 7/9/2022 Technique: Axial CT images were obtained of the head without contrast administration.  Automated exposure control and iterative construction methods were used. Findings: Bilateral deep brain stimulator leads are present terminating in the subthalamic nuclei with associated streak artifact. There is no evidence of acute territorial infarction. There is no acute intracranial hemorrhage. There are no extra-axial collections. Ventricles and CSF spaces are symmetrically prominent. No mass effect nor hydrocephalus. Mild white matter hypodensities likely reflect the sequela of chronic microvascular ischemic disease. There is atheromatous calcification of the bilateral carotid siphons.  The paranasal sinuses are clear. Trace left mastoid fluid.  Osseous structures and orbits appear intact.     Impression: Impression: No acute intracranial abnormality. Electronically Signed: Meera Mohan MD  11/24/2024 11:58 AM EST  Workstation ID: [id number]    XR Chest 1 View    Result Date: 11/24/2024  XR CHEST 1 VW Date of Exam: 11/24/2024 11:23 AM EST Indication: Weak/Dizzy/AMS triage protocol Comparison: Chest radiograph 8/17/2022 Findings: Cardiomegaly stable from prior. Aortic atherosclerotic disease. Lung volumes low without lobar consolidation, edema, effusion or pneumothorax. Contour deformity in the lateral aspect of right hemidiaphragm probably representing eventration. Right-sided vagal nerve stimulator. Degenerative related osseous  change. Radiographically stable appearance of the chest since 2022.     Impression: Impression: No active pulmonary disease. Radiographically stable chest since 2022. Electronically Signed: Neo Schofield MD  11/24/2024 11:43 AM EST  Workstation ID: [id number]     Results for orders placed during the hospital encounter of 07/09/22    Adult Transthoracic Echo Complete W/ Cont if Necessary Per Protocol (With Agitated Saline)    Interpretation Summary  · Estimated left ventricular EF = 65%  · Left ventricular wall thickness is consistent with mild concentric hypertrophy.  · Mild dilation of the ascending aorta is present.  · No hemodynamically significant valvular heart disease      Current medications:  Scheduled Meds:aspirin, 81 mg, Oral, Daily  cyanocobalamin, 1,000 mcg, Intramuscular, Q28 Days  [Held by provider] donepezil, 10 mg, Oral, Nightly  heparin (porcine), 5,000 Units, Subcutaneous, Q8H  memantine, 5 mg, Oral, Q12H  [Held by provider] rOPINIRole, 2 mg, Oral, Q8H  rosuvastatin, 20 mg, Oral, Nightly  sodium chloride, 10 mL, Intravenous, Q12H      Continuous Infusions:sodium chloride, 100 mL/hr, Last Rate: 100 mL/hr (11/25/24 0303)      PRN Meds:.  acetaminophen **OR** acetaminophen **OR** acetaminophen    senna-docusate sodium **AND** polyethylene glycol **AND** bisacodyl **AND** bisacodyl    calcium carbonate    Calcium Replacement - Follow Nurse / BPA Driven Protocol    Magnesium Standard Dose Replacement - Follow Nurse / BPA Driven Protocol    melatonin    nitroglycerin    ondansetron ODT **OR** ondansetron    Phosphorus Replacement - Follow Nurse / BPA Driven Protocol    Potassium Replacement - Follow Nurse / BPA Driven Protocol    sodium chloride    sodium chloride    Assessment & Plan   Assessment & Plan     Active Hospital Problems    Diagnosis  POA    **Weakness [R53.1]  Yes    Elevated serum creatinine [R79.89]  Unknown    Hypokalemia [E87.6]  Unknown    Bilateral carotid artery stenosis [I65.23]  Yes     Hyperlipidemia [E78.5]  Yes    Essential hypertension [I10]  Yes    Parkinson's disease [G20.A1]  Yes    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     This patient's assessments and plans were partially entered by my partner and updated as appropriate by me on 11/25/2024     Brief Hospital Course to date:  Jhoan Gagnon is a 84 y.o. male with past medical history significant for Parkinson's disease status post neurostimulator, prostate cancer, hypertension, first-degree block, was admitted for evaluation of weakness after family found him down at home.  Patient noted to have a UTI, started on antibiotics.  His symptoms likely due to progression of his underlying Parkinson's disease.    Weakness/found down  Multiple falls in recent past  - pt lives alone with family nearby, has been falling a lot recently- concern for patient safety given how many times he has fallen  - PT/OT consult, suspect he will need inpatient rehab with likely transition to long term care facility  - Respiratory Viral Panel negative   -due to now reporting pain with urination, will start antibiotics       Elevated Creatinine  - likely prerenal due to decreased PO intake  -CK wnl   -IVF resuscitation, hold nephrotoxic agents  -No improvement after hydration overnight, unclear if this is his baseline   -BMP in AM       Parkinson's s/p neurostimulator   - continue home meds  - follows with Neurology outpatient      H/o First degree AVB with ? Episode of Vtach (August 2022)   -- declined PPM in 2022 when he was seen by [facility] Cardiology (Dr. Rodrigues) for the same    Expected Discharge Location and Transportation: Rehab   Expected Discharge   Expected Discharge Date: 11/27/2024; Expected Discharge Time:      VTE Prophylaxis:  Pharmacologic VTE prophylaxis orders are present.         AM-PAC 6 Clicks Score (PT): 16 (11/24/24 2000)    CODE STATUS:   Code Status and Medical  Interventions: No CPR (Do Not Attempt to Resuscitate); Limited Support; No cardioversion, No intubation (DNI), No vasopressors   Ordered at: 11/24/24 1347     Medical Intervention Limits:    No cardioversion    No intubation (DNI)    No vasopressors     Level Of Support Discussed With:    Patient     Code Status (Patient has no pulse and is not breathing):    No CPR (Do Not Attempt to Resuscitate)     Medical Interventions (Patient has pulse or is breathing):    Limited Support       David Paul, DO  11/25/24

## 2024-11-25 NOTE — PLAN OF CARE
Goal Outcome Evaluation:                   Anticipated Discharge Disposition (SLP): unknown, anticipate therapy at next level of care          SLP Swallowing Diagnosis: oral dysphagia, R/O pharyngeal dysphagia (11/25/24 0348)

## 2024-11-25 NOTE — PLAN OF CARE
Goal Outcome Evaluation:           Progress: no change  Outcome Evaluation: Pt A&Ox4. VSS on RA. NSR. Plan of care continues.

## 2024-11-25 NOTE — THERAPY EVALUATION
Acute Care - Speech Language Pathology   Swallow Initial Evaluation [facility]  Clinical Swallow Evaluation         Patient Name: Jhoan Gagnon  : 1940  MRN: [id number]  Today's Date: 2024               Admit Date: 2024    Visit Dx:     ICD-10-CM ICD-9-CM   1. LI (acute kidney injury)  N17.9 584.9   2. Elevated troponin  R79.89 790.6   3. Generalized weakness  R53.1 780.79   4. Multiple falls  R29.6 V15.88   5. Parkinson's disease, unspecified whether dyskinesia present, unspecified whether manifestations fluctuate  G20.A1 332.0   6. Lives alone  Z60.2 V60.3   7. Hypokalemia  E87.6 276.8   8. Dysphagia, unspecified type  R13.10 787.20     Patient Active Problem List   Diagnosis    Parkinson's disease    Weakness    Sinus bradycardia    Syncope and collapse    First degree atrioventricular block    Ataxia    Leukocytosis    Arrhythmia    Essential hypertension    Hyperlipidemia    Bilateral carotid artery stenosis    Elevated serum creatinine    Hypokalemia     Past Medical History:   Diagnosis Date    Broken arms     Cancer     prostate    Lower back injury     Parkinson's disease      Past Surgical History:   Procedure Laterality Date    CHOLECYSTECTOMY      PROSTATE SURGERY      REPLACEMENT TOTAL KNEE BILATERAL         SLP Recommendation and Plan  SLP Swallowing Diagnosis: oral dysphagia, R/O pharyngeal dysphagia (24 1140)  SLP Diet Recommendation: soft to chew textures, chopped, thin liquids (24 1140)  Recommended Precautions and Strategies: general aspiration precautions, assist with feeding (24 1140)  SLP Rec. for Method of Medication Administration: meds whole, with thin liquids, meds crushed, with puree, as tolerated (24 1140)     Monitor for Signs of Aspiration: yes, notify SLP if any concerns (24 1140)  Recommended Diagnostics: other (see comments) (diet tolerance/adjustment) (24 1140)  Swallow Criteria for Skilled Therapeutic Interventions Met:  demonstrates skilled criteria (11/25/24 1140)  Anticipated Discharge Disposition (SLP): unknown, anticipate therapy at next level of care (11/25/24 1140)  Rehab Potential/Prognosis, Swallowing: good, to achieve stated therapy goals (11/25/24 1140)  Therapy Frequency (Swallow): PRN (11/25/24 1140)  Predicted Duration Therapy Intervention (Days): 1 week (11/25/24 1140)  Oral Care Recommendations: Oral Care BID/PRN, Toothbrush (11/25/24 1140)                                               SWALLOW EVALUATION (Last 72 Hours)       SLP Adult Swallow Evaluation       Row Name 11/25/24 1140                   Rehab Evaluation    Document Type evaluation  -        Subjective Information no complaints  -        Patient Observations alert;cooperative  -        Patient/Family/Caregiver Comments/Observations No family present  -        Patient Effort good  -        Symptoms Noted During/After Treatment none  -           General Information    Patient Profile Reviewed yes  -        Pertinent History Of Current Problem Adm w/ weakness after being found down. Hx: Parkinson's disease s/p neuro-stimulator, prostate CA, heart block. CXR negative for disease processes, Head CT negative for acute infarct  -        Current Method of Nutrition regular textures;thin liquids  -        Precautions/Limitations, Vision WFL;for purposes of eval  -        Precautions/Limitations, Hearing WFL;for purposes of eval  -        Prior Level of Function-Swallowing no diet consistency restrictions;other (see comments)  per pt report  -        Plans/Goals Discussed with patient;agreed upon  -        Barriers to Rehab medically complex;cognitive status  -        Patient's Goals for Discharge patient did not state  -           Pain    Additional Documentation Pain Scale: FACES Pre/Post-Treatment (Group)  -           Pain Scale: FACES Pre/Post-Treatment    Pain: FACES Scale, Pretreatment 0-->no hurt  -        Posttreatment  Pain Rating 0-->no hurt  -           Oral Motor Structure and Function    Dentition Assessment missing teeth  -        Secretion Management WNL/WFL  -        Mucosal Quality moist, healthy  -           Oral Musculature and Cranial Nerve Assessment    Oral Motor General Assessment generalized oral motor weakness  -           General Eating/Swallowing Observations    Respiratory Support Currently in Use room air  -        Eating/Swallowing Skills self-fed;fed by SLP  -        Positioning During Eating upright in chair;upright 90 degree  -        Utensils Used spoon;cup;straw  -        Consistencies Trialed ice chips;thin liquids;pureed;regular textures  -           Clinical Swallow Eval    Oral Prep Phase impaired  -        Oral Residue impaired  -        Pharyngeal Phase no overt signs/symptoms of pharyngeal impairment  -        Clinical Swallow Evaluation Summary No overt s/s of aspiration accross trials of thin liquid, pureed, or regular solid consistencies; even w/ pushed for consecutive sips of thin. Prolonged mastication & residue w/ regular solid trial; able to clear w/ liquid wash. Pt indicated preference for softer solids. Recommend soft/chopped solid diet w/ thin liquids, general aspiration precautions, assistance w/ feeding. SLP will f/u for diet tolerance/adjustment  -           Oral Prep Concerns    Oral Prep Concerns prolonged mastication  -        Prolonged Mastication regular consistencies  -           Oral Residue Concerns    Oral Residue Concerns diffuse residue throughout oral cavity  -        Diffuse Residue Throughout Oral Cavity regular consistencies  -           SLP Evaluation Clinical Impression    SLP Swallowing Diagnosis oral dysphagia;R/O pharyngeal dysphagia  -        Functional Impact risk of aspiration/pneumonia  -        Rehab Potential/Prognosis, Swallowing good, to achieve stated therapy goals  -        Swallow Criteria for Skilled Therapeutic  Interventions Met [facility] skilled criteria  -           Recommendations    Therapy Frequency (Swallow) PRN  -        Predicted Duration Therapy Intervention (Days) 1 week  -        SLP Diet Recommendation soft to chew textures;chopped;thin liquids  -        Recommended Diagnostics other (see comments)  diet tolerance/adjustment  -        Recommended Precautions and Strategies general aspiration precautions;assist with feeding  -        Oral Care Recommendations Oral Care BID/PRN;Toothbrush  -        SLP Rec. for Method of Medication Administration meds whole;with thin liquids;meds crushed;with puree;as tolerated  -        Monitor for Signs of Aspiration yes;notify SLP if any concerns  -        Anticipated Discharge Disposition (SLP) unknown;anticipate therapy at next level of care  -                  User Key  (r) = Recorded By, (t) = Taken By, (c) = Cosigned By      Initials Name Effective Dates     Lali Santo, MS CCC-SLP 05/12/23 -                     EDUCATION  The patient has been educated in the following areas:   Dysphagia (Swallowing Impairment) Oral Care/Hydration Modified Diet Instruction..        SLP GOALS       Row Name 11/25/24 1140             (LTG) Patient will demonstrate functional swallow for    Diet Texture (Demonstrate functional swallow) regular textures  -      Liquid viscosity (Demonstrate functional swallow) thin liquids  -      [place] (Demonstrate functional swallow) independently (over 90% accuracy)  -      Time Frame (Demonstrate functional swallow) 1 week  -      Progress/Outcomes (Demonstrate functional swallow) new goal  -         (STG) Patient will tolerate trials of    Consistencies Trialed (Tolerate trials) soft to chew (chopped) textures;thin liquids  -      Desired Outcome (Tolerate trials) without signs/symptoms of aspiration;with adequate oral prep/transit/clearance  -      [place] (Tolerate trials) independently (over 90%  accuracy)  -      Time Frame (Tolerate trials) 1 week  -      Progress/Outcomes (Tolerate trials) new goal  -                User Key  (r) = Recorded By, (t) = Taken By, (c) = Cosigned By      Initials Name Provider Type    Lali Guadarrama MS CCC-SLP Speech and Language Pathologist                         Time Calculation:    Time Calculation- SLP       Row Name 11/25/24 1406             Time Calculation- SLP    SLP Start Time 1140  -      SLP Received On 11/25/24  -         Untimed Charges    85539-[id number] Eval Oral Pharyng Swallow Minutes 40  -         Total Minutes    Untimed Charges Total Minutes 40  -       Total Minutes 40  -                User Key  (r) = Recorded By, (t) = Taken By, (c) = Cosigned By      Initials Name Provider Type    Lali Guadarrama MS CCC-SLP Speech and Language Pathologist                    Therapy Charges for Today       Code Description Service Date Service Provider Modifiers Qty    44556418142 HC ST EVAL ORAL PHARYNG SWALLOW 3 11/25/2024 Lali Santo MS CCC-SLP GN 1                 MS MARIZA Phillips  11/25/2024

## 2024-11-25 NOTE — CASE MANAGEMENT/SOCIAL WORK
Discharge Planning Assessment  [facility]     Patient Name: Jhoan Gagnon  MRN: [id number]  Today's Date: 11/25/2024    Admit Date: 11/24/2024    Plan: [facility]   Discharge Needs Assessment       Row Name 11/25/24 1317       Living Environment    People in Home alone    Unique Family Situation Patient reports he rents out the top floor of his home and lives in the basement    Current Living Arrangements home    Potentially Unsafe Housing Conditions none    Primary Care Provided by self;friend    Provides Primary Care For no one    Family Caregiver if Needed friend(s)    Family Caregiver Names Eula Felipepatti    Quality of Family Relationships unable to assess    Able to Return to Prior Arrangements yes       Resource/Environmental Concerns    Resource/Environmental Concerns home accessibility    Home Accessibility Concerns stairs to access bedroom or bathroom    Transportation Concerns none       Transition Planning    Patient/Family Anticipates Transition to inpatient rehabilitation facility    Patient/Family Anticipated Services at Transition rehabilitation services    Transportation Anticipated other (see comments)  will need medical transport       Discharge Needs Assessment    Readmission Within the Last 30 Days no previous admission in last 30 days    Equipment Currently Used at Home walker, rolling;cane, straight    Concerns to be Addressed denies needs/concerns at this time;discharge planning    Anticipated Changes Related to Illness none    Equipment Needed After Discharge none    Current Discharge Risk dependent with mobility/activities of daily living;chronically ill;lives alone;physical impairment    Discharge Coordination/Progress [facility]                   Discharge Plan       Row Name 11/25/24 1320       Plan    Plan [facility]    Patient/Family in Agreement with Plan yes    Plan Comments Per MDR, family interested in LTC.  Spoke with patient at bedside regarding discharge planning.  Patient denies use of HH but  indicates [facility]  He use both a Straight Cane and Rolling Walker at times.  CM asked if should contact his POA regarding the decision for rehab. patient reports that POA is only in place if he cannot make his own decisions and he is capable of making his own decisions.  Patient reports that he still lives in his own house but in the basement and is currently renting out the upper portion of his home.  Jhonny reprots he went to [facility] in the past but was aware that facility no longer exists, he does not want to go to a NH but is agreeable to go to [facility].  No other discharge needs verbalized.  CM following.  Patient request CM call his KARENATiesha to advise.  Patient plan is to discharge to [facility] pending bed offer and medical readiness.    Final Discharge Disposition Code 62 - inpatient rehab facility                  Continued Care and Services - Admitted Since 11/24/2024    No active coordination exists for this encounter.       Expected Discharge Date and Time       Expected Discharge Date Expected Discharge Time    Nov 27, 2024            Demographic Summary       Row Name 11/25/24 1314       General Information    Admission Type inpatient    Arrived From home    Referral Source admission list    Reason for Consult discharge planning    Preferred Language English    General Information Comments Wilmer Andres MD       Contact Information    Permission Granted to Share Info With     Contact Information Comments Anamaria Keenan, daughter  [phone]  jewels Peterson/TAMARA  [phone]  Trang Ríos, friend  [phone]                   Functional Status       Row Name 11/25/24 1315       Functional Status    Usual Activity Tolerance moderate    Current Activity Tolerance fair       Functional Status, IADL    Medications independent    Meal Preparation assistive equipment and person    Housekeeping assistive equipment and person    Laundry assistive equipment and person     Shopping assistive equipment and person    If for any reason you need help with day-to-day activities such as bathing, preparing meals, shopping, managing finances, etc., do you get the help you need? I get all the help I need       Mental Status Summary    Recent Changes in Mental Status/Cognitive Functioning no changes       Employment/    Employment/ Comments Medicare/[facility] [facility]                   Psychosocial    No documentation.                  Abuse/Neglect    No documentation.                  Legal    No documentation.                  Substance Abuse    No documentation.                  Patient Forms    No documentation.                     Shelly Gunn RN

## 2024-11-26 LAB
ANION GAP SERPL CALCULATED.3IONS-SCNC: 10 MMOL/L (ref 5–15)
BUN SERPL-MCNC: 12 MG/DL (ref 8–23)
BUN/CREAT SERPL: 8.6 (ref 7–25)
CALCIUM SPEC-SCNC: 8.3 MG/DL (ref 8.6–10.5)
CHLORIDE SERPL-SCNC: 105 MMOL/L (ref 98–107)
CO2 SERPL-SCNC: 23 MMOL/L (ref 22–29)
CREAT SERPL-MCNC: 1.4 MG/DL (ref 0.76–1.27)
DEPRECATED RDW RBC AUTO: 43.3 FL (ref 37–54)
EGFRCR SERPLBLD CKD-EPI 2021: 48.9 ML/MIN/1.73
ERYTHROCYTE [DISTWIDTH] IN BLOOD BY AUTOMATED COUNT: 13.2 % (ref 12.3–15.4)
GLUCOSE SERPL-MCNC: 82 MG/DL (ref 65–99)
HCT VFR BLD AUTO: 39.9 % (ref 37.5–51)
HGB BLD-MCNC: 13.4 G/DL (ref 13–17.7)
MCH RBC QN AUTO: 30 PG (ref 26.6–33)
MCHC RBC AUTO-ENTMCNC: 33.6 G/DL (ref 31.5–35.7)
MCV RBC AUTO: 89.3 FL (ref 79–97)
PLATELET # BLD AUTO: 114 10*3/MM3 (ref 140–450)
PMV BLD AUTO: 9.4 FL (ref 6–12)
POTASSIUM SERPL-SCNC: 4 MMOL/L (ref 3.5–5.2)
RBC # BLD AUTO: 4.47 10*6/MM3 (ref 4.14–5.8)
SODIUM SERPL-SCNC: 138 MMOL/L (ref 136–145)
WBC NRBC COR # BLD AUTO: 2.16 10*3/MM3 (ref 3.4–10.8)

## 2024-11-26 PROCEDURE — 92526 ORAL FUNCTION THERAPY: CPT

## 2024-11-26 PROCEDURE — 97110 THERAPEUTIC EXERCISES: CPT

## 2024-11-26 PROCEDURE — 99232 SBSQ HOSP IP/OBS MODERATE 35: CPT | Performed by: NURSE PRACTITIONER

## 2024-11-26 PROCEDURE — 80048 BASIC METABOLIC PNL TOTAL CA: CPT | Performed by: STUDENT IN AN ORGANIZED HEALTH CARE EDUCATION/TRAINING PROGRAM

## 2024-11-26 PROCEDURE — 97530 THERAPEUTIC ACTIVITIES: CPT

## 2024-11-26 PROCEDURE — 97116 GAIT TRAINING THERAPY: CPT

## 2024-11-26 PROCEDURE — 85027 COMPLETE CBC AUTOMATED: CPT | Performed by: STUDENT IN AN ORGANIZED HEALTH CARE EDUCATION/TRAINING PROGRAM

## 2024-11-26 PROCEDURE — 25010000002 HEPARIN (PORCINE) PER 1000 UNITS: Performed by: INTERNAL MEDICINE

## 2024-11-26 RX ORDER — IPRATROPIUM BROMIDE AND ALBUTEROL SULFATE 2.5; .5 MG/3ML; MG/3ML
3 SOLUTION RESPIRATORY (INHALATION) EVERY 4 HOURS PRN
Status: DISCONTINUED | OUTPATIENT
Start: 2024-11-26 | End: 2024-11-27 | Stop reason: HOSPADM

## 2024-11-26 RX ADMIN — ACETAMINOPHEN 650 MG: 325 TABLET ORAL at 10:26

## 2024-11-26 RX ADMIN — ASPIRIN 81 MG: 81 TABLET, COATED ORAL at 10:26

## 2024-11-26 RX ADMIN — CEPHALEXIN 500 MG: 250 CAPSULE ORAL at 20:22

## 2024-11-26 RX ADMIN — CEPHALEXIN 500 MG: 250 CAPSULE ORAL at 16:47

## 2024-11-26 RX ADMIN — MEMANTINE 5 MG: 10 TABLET ORAL at 20:24

## 2024-11-26 RX ADMIN — ROSUVASTATIN 20 MG: 20 TABLET, FILM COATED ORAL at 20:24

## 2024-11-26 RX ADMIN — Medication 10 ML: at 20:23

## 2024-11-26 RX ADMIN — HEPARIN SODIUM 5000 UNITS: 5000 INJECTION INTRAVENOUS; SUBCUTANEOUS at 16:47

## 2024-11-26 RX ADMIN — HEPARIN SODIUM 5000 UNITS: 5000 INJECTION INTRAVENOUS; SUBCUTANEOUS at 05:41

## 2024-11-26 RX ADMIN — Medication 10 ML: at 10:28

## 2024-11-26 RX ADMIN — CEPHALEXIN 500 MG: 250 CAPSULE ORAL at 05:41

## 2024-11-26 RX ADMIN — HEPARIN SODIUM 5000 UNITS: 5000 INJECTION INTRAVENOUS; SUBCUTANEOUS at 20:22

## 2024-11-26 RX ADMIN — MEMANTINE 5 MG: 10 TABLET ORAL at 10:27

## 2024-11-26 NOTE — PLAN OF CARE
Goal Outcome Evaluation:  Plan of Care Reviewed With: patient        Progress: improving  Outcome Evaluation: Increased ambulation distance to 100ft with FWW and Lucila regressing to modAx1+1, with chair follow for safety.  Con to present below baseline with gait instability, decreased balance/coordination, decreased functional endurance, and weakness limiting indep with mobility and ability to safely navigate home environment.  Will con to benefit from skilled IP PT to improve deficits and promote return to PLOF.  Rec IPR upon d/c for best fxl outcome, as pt is at increased risk for falls.    Anticipated Discharge Disposition (PT): inpatient rehabilitation facility

## 2024-11-26 NOTE — PROGRESS NOTES
[facility] Medicine Services  PROGRESS NOTE    Patient Name: Jhoan Gagnon  : 1940  MRN: [id number]    Date of Admission: 2024  Primary Care Physician: Wimler Andres MD    Subjective   Subjective     CC:  Frequent falls     HPI:  She was seen resting up in bed in no acute distress.  Awake and alert.  States he feels okay.  Still has an obvious resting tremor.  Feels weak.  Reports he lives alone has been falling a lot recently.  Agreeable to rehab.  Awaiting placement.  No new issues.      Objective   Objective     Vital Signs:   Temp:  [97.3 °F (36.3 °C)-98 °F (36.7 °C)] 97.7 °F (36.5 °C)  Heart Rate:  [51-65] 55  Resp:  [15-16] 16  BP: (134-172)/(72-86) 136/84     Physical Exam:  Constitutional: No acute distress, awake, alert.  Resting back in bed.  Chronically ill and debilitated appearing elderly male.  HENT: NCAT, mucous membranes moist  Respiratory: Clear to auscultation bilaterally but slightly decreased at bases, respiratory effort normal   Cardiovascular: mild dae, no murmurs, rubs, or gallops  Gastrointestinal: Positive bowel sounds, soft, nontender, nondistended. Obese   Musculoskeletal: No bilateral ankle edema. PARKINSON spont   Psychiatric: Appropriate affect, cooperative  Neurologic: Oriented x 3, strength symmetric in all extremities, Cranial Nerves grossly intact to confrontation, speech clear. Resting tremor. Follows commands   Skin: No rashes      Results Reviewed:  LAB RESULTS:      Lab 24  0508 24  0532 24  1033   WBC 2.16* 2.55* 3.81   HEMOGLOBIN 13.4 13.2 14.2   HEMATOCRIT 39.9 38.8 41.0   PLATELETS 114* 118* 134*   NEUTROS ABS  --   --  2.78   IMMATURE GRANS (ABS)  --   --  0.00   LYMPHS ABS  --   --  0.65*   MONOS ABS  --   --  0.31   EOS ABS  --   --  0.03   MCV 89.3 89.0 87.6   LACTATE  --   --  1.1   HSTROP T  --   --  48*         Lab 24  0508 24  0532 24  0017 24  1033   SODIUM 138 136  --  136    POTASSIUM 4.0 3.9 4.0 3.3*   CHLORIDE 105 102  --  101   CO2 23.0 23.0  --  24.0   ANION GAP 10.0 11.0  --  11.0   BUN 12 14  --  16   CREATININE 1.40* 1.57*  --  1.47*   EGFR 48.9* 42.7*  --  46.2*   GLUCOSE 82 89  --  91   CALCIUM 8.3* 8.3*  --  8.7   MAGNESIUM  --   --   --  1.9   HEMOGLOBIN A1C  --  5.20  --   --    TSH  --  1.720  --   --          Lab 11/24/24  1033   TOTAL PROTEIN 6.3   ALBUMIN 3.5   GLOBULIN 2.8   ALT (SGPT) 10   AST (SGOT) 22   BILIRUBIN 1.6*   ALK PHOS 91         Lab 11/24/24  1033   HSTROP T 48*         Lab 11/25/24  0532   CHOLESTEROL 90   LDL CHOL 35   HDL CHOL 40   TRIGLYCERIDES 69             Brief Urine Lab Results  (Last result in the past 365 days)        Color   Clarity   Blood   Leuk Est   Nitrite   Protein   CREAT   Urine HCG        11/24/24 1045 Orange   Clear   Negative   Small (1+)   Positive   30 mg/dL (1+)                   Microbiology Results Abnormal       None            No radiology results from the last 24 hrs    Results for orders placed during the hospital encounter of 07/09/22    Adult Transthoracic Echo Complete W/ Cont if Necessary Per Protocol (With Agitated Saline)    Interpretation Summary  · Estimated left ventricular EF = 65%  · Left ventricular wall thickness is consistent with mild concentric hypertrophy.  · Mild dilation of the ascending aorta is present.  · No hemodynamically significant valvular heart disease      Current medications:  Scheduled Meds:aspirin, 81 mg, Oral, Daily  cephalexin, 500 mg, Oral, Q8H  cyanocobalamin, 1,000 mcg, Intramuscular, Q28 Days  [Held by provider] donepezil, 10 mg, Oral, Nightly  heparin (porcine), 5,000 Units, Subcutaneous, Q8H  memantine, 5 mg, Oral, Q12H  [Held by provider] rOPINIRole, 2 mg, Oral, Q8H  rosuvastatin, 20 mg, Oral, Nightly  sodium chloride, 10 mL, Intravenous, Q12H      Continuous Infusions:     PRN Meds:.  acetaminophen **OR** acetaminophen **OR** acetaminophen    senna-docusate sodium **AND**  polyethylene glycol **AND** bisacodyl **AND** bisacodyl    calcium carbonate    Calcium Replacement - Follow Nurse / BPA Driven Protocol    ipratropium-albuterol    Magnesium Standard Dose Replacement - Follow Nurse / BPA Driven Protocol    melatonin    nitroglycerin    ondansetron ODT **OR** ondansetron    Phosphorus Replacement - Follow Nurse / BPA Driven Protocol    Potassium Replacement - Follow Nurse / BPA Driven Protocol    sodium chloride    sodium chloride    Assessment & Plan   Assessment & Plan     Active Hospital Problems    Diagnosis  POA    **Weakness [R53.1]  Yes    Elevated serum creatinine [R79.89]  Unknown    Hypokalemia [E87.6]  Unknown    Bilateral carotid artery stenosis [I65.23]  Yes    Hyperlipidemia [E78.5]  Yes    Essential hypertension [I10]  Yes    Parkinson's disease [G20.A1]  Yes    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     This patient's assessments and plans were partially entered by my partner and updated as appropriate by me on 11/25/2024     Brief Hospital Course to date:  Jhoan Gagnon is a 84 y.o. male with past medical history significant for Parkinson's disease status post neurostimulator, prostate cancer, hypertension, first-degree block, was admitted for evaluation of weakness after family found him down at home.  Patient noted to have a UTI, started on antibiotics.  His symptoms likely due to progression of his underlying Parkinson's disease.    This patient's problems and plans were partially entered by my partner and updated as appropriate by me 11/26/24.    Assessment/Plan:  Pt is new to me today     Weakness/found down  Multiple falls in recent past  - pt lives alone with family nearby, has been falling a lot recently- concern for patient safety given how many times he has fallen  - PT/OT consult, suspect he will need inpatient rehab with likely transition to long term care facility  -  Respiratory Viral Panel negative   -due to now reporting pain with urination, prior started antibiotics    --therapy now recs IPR.  Pt agrees.  CM following awaiting placement.     Elevated Creatinine  - likely prerenal due to decreased PO intake  -CK wnl   -IVF resuscitation, holding nephrotoxic agents  -No improvement after hydration overnight yesterday, unclear if this is his current baseline   -BMP today with mild improvement.  Monitor     Parkinson's s/p neurostimulator   - continue home meds  - follows with Neurology outpatient      H/o First degree AVB with ? Episode of Vtach (August 2022)   -- declined PPM in 2022 when he was seen by EP Cardiology (Dr. Rodrigues) for the same    Expected Discharge Location and Transportation: Rehab pending placement   Expected Discharge   Expected Discharge Date: 11/27/2024; Expected Discharge Time:      VTE Prophylaxis:  Pharmacologic VTE prophylaxis orders are present.         AM-PAC 6 Clicks Score (PT): 13 (11/25/24 2000)    CODE STATUS:   Code Status and Medical Interventions: No CPR (Do Not Attempt to Resuscitate); Limited Support; No cardioversion, No intubation (DNI), No vasopressors   Ordered at: 11/24/24 1347     Medical Intervention Limits:    No cardioversion    No intubation (DNI)    No vasopressors     Level Of Support Discussed With:    Patient     Code Status (Patient has no pulse and is not breathing):    No CPR (Do Not Attempt to Resuscitate)     Medical Interventions (Patient has pulse or is breathing):    Limited Support       Bertha Hudson, APRN  11/26/24

## 2024-11-26 NOTE — THERAPY TREATMENT NOTE
Patient Name: Jhoan Gagnon  : 1940    MRN: [id number]                              Today's Date: 2024       Admit Date: 2024    Visit Dx:     ICD-10-CM ICD-9-CM   1. LI (acute kidney injury)  N17.9 584.9   2. Elevated troponin  R79.89 790.6   3. Generalized weakness  R53.1 780.79   4. Multiple falls  R29.6 V15.88   5. Parkinson's disease, unspecified whether dyskinesia present, unspecified whether manifestations fluctuate  G20.A1 332.0   6. Lives alone  Z60.2 V60.3   7. Hypokalemia  E87.6 276.8   8. Dysphagia, unspecified type  R13.10 787.20     Patient Active Problem List   Diagnosis    Parkinson's disease    Weakness    Sinus bradycardia    Syncope and collapse    First degree atrioventricular block    Ataxia    Leukocytosis    Arrhythmia    Essential hypertension    Hyperlipidemia    Bilateral carotid artery stenosis    Elevated serum creatinine    Hypokalemia     Past Medical History:   Diagnosis Date    Broken arms     Cancer     prostate    Lower back injury     Parkinson's disease      Past Surgical History:   Procedure Laterality Date    CHOLECYSTECTOMY      PROSTATE SURGERY      REPLACEMENT TOTAL KNEE BILATERAL        General Information       Row Name 24 1526          Physical Therapy Time and Intention    Document Type therapy note (daily note)  -BA     Mode of Treatment physical therapy  -BA       Row Name 24 1526          General Information    Patient Profile Reviewed yes  -BA     Existing Precautions/Restrictions fall;other (see comments)  PD w/ strong displacy of sx; chronic low back pain; increased time for processing and response; tremors  -BA     Barriers to Rehab medically complex;previous functional deficit  -BA       Row Name 24 1526          Cognition    Orientation Status (Cognition) oriented x 3  -BA       Row Name 24 1526          Safety Issues/Impairments Affecting Functional Mobility    Safety Issues Affecting Function (Mobility) awareness  of need for assistance;insight into deficits/self-awareness;judgment;positioning of assistive device;safety precaution awareness;safety precautions follow-through/compliance;sequencing abilities  -     Impairments Affecting Function (Mobility) balance;cognition;coordination;endurance/activity tolerance;motor control;motor planning;postural/trunk control;strength  -     Cognitive Impairments, Mobility Safety/Performance awareness, need for assistance;insight into deficits/self-awareness;problem-solving/reasoning;safety precaution awareness;safety precaution follow-through;sequencing abilities  -     Comment, Safety Issues/Impairments (Mobility) Decreased and slow processing; requires extended time for responses and mobilization.  -               User Key  (r) = Recorded By, (t) = Taken By, (c) = Cosigned By      Initials Name Provider Type    Marguerite Leon PT Physical Therapist                   Mobility       Row Name 11/26/24 6950          Bed Mobility    Bed Mobility rolling right;sidelying-sit;scooting/bridging  -     Rolling Right [place] (Bed Mobility) moderate assist (50% patient effort);maximum assist (25% patient effort);2 person assist;verbal cues;nonverbal cues (demo/gesture)  -     Scooting/Bridging [place] (Bed Mobility) maximum assist (25% patient effort);2 person assist;verbal cues;nonverbal cues (demo/gesture)  -     Sidelying-Sit [place] (Bed Mobility) moderate assist (50% patient effort);maximum assist (25% patient effort);2 person assist;verbal cues;nonverbal cues (demo/gesture)  -     Assistive Device (Bed Mobility) bed rails;head of bed elevated;repositioning sheet  -     Comment, (Bed Mobility) Increased time and effort.  Required multiple attempts to fully roll toward R side.  VCs/TCs for sequencing and hand placement throughout.  Required increased assist to manage BLE and trunk, along with use of draw sheet to help scoot hips toward EOB.  Reported no  dizziness upon sitting EOB.  Initial posterior lean, but improved with time and cues.  -       Row Name 11/26/24 1530          Bed-Chair Transfer    Bed-Chair [place] (Transfers) minimum assist (75% patient effort);2 person assist;verbal cues;nonverbal cues (demo/gesture)  -     Assistive Device (Bed-Chair Transfers) walker, front-wheeled  -BA     Comment, (Bed-Chair Transfer) SPT from bed to chair with taking several short, shuffled, lateral steps toward L side.  VCs/TCs for sequencing of steps, walker management/steering, anterior weight shifting, and improved step length.  -       Row Name 11/26/24 1530          Sit-Stand Transfer    Sit-Stand [place] (Transfers) minimum assist (75% patient effort);moderate assist (50% patient effort);2 person assist;verbal cues;nonverbal cues (demo/gesture)  -     Assistive Device (Sit-Stand Transfers) walker, front-wheeled  -BA     Comment, (Sit-Stand Transfer) STS x 3 reps total; minAx2 from EOB x 2 reps regressing to modAx2 from chair x 1 rep.  Exhibited posterior lean; more pronounced from chair.  Slow with transitions.  VCs/TCs for optimal pre-positioning, sequencing, hand placement, anterior weight shifting, and upright posture.  Reported no dizziness upon standing.  -       Row Name 11/26/24 1530          Gait/Stairs (Locomotion)    [place] Level (Gait) minimum assist (75% patient effort);moderate assist (50% patient effort);1 person assist;1 person to manage equipment;verbal cues;other (see comments)  chair follow  -     Assistive Device (Gait) walker, front-wheeled  -BA     Distance in Feet (Gait) 100  -BA     Deviations/Abnormal Patterns (Gait) bilateral deviations;festinating/shuffling;cedric decreased;gait speed decreased;stride length decreased;base of support, narrow  -BA     Bilateral Gait Deviations forward flexed posture;heel strike decreased  -     Comment, (Gait/Stairs) Chair follow for safety.  Demo'd step through gait pattern  with short, shuffled steps, more narrow MEGHA, and forward flexed posture.  Required 1 standing rest break d/t fatigue. Davis regressing to modA as pt fatigued and distance increased.  Tends to veer toward R side.  VCs/TCs for improved stride length, walker management/steering, to widen MEGHA, and upright posture.  Gait distance limited by fatigue.  -               User Key  (r) = Recorded By, (t) = Taken By, (c) = Cosigned By      Initials Name Provider Type     Marguerite Esquivel, PT Physical Therapist                   Obj/Interventions       Row Name 11/26/24 1540          Motor Skills    Therapeutic Exercise hip;knee;ankle  -[facility] Name 11/26/24 1540          Hip (Therapeutic Exercise)    Hip (Therapeutic Exercise) strengthening exercise  -     Hip Strengthening (Therapeutic Exercise) bilateral;marching while seated;aBduction;aDduction;sitting;10 repetitions;other (see comments)  with AAROM for B hip abd/add  -       Row Name 11/26/24 1540          Knee (Therapeutic Exercise)    Knee (Therapeutic Exercise) strengthening exercise  -     Knee Strengthening (Therapeutic Exercise) bilateral;LAQ (long arc quad);sitting;10 repetitions  -       Row Name 11/26/24 1540          Ankle (Therapeutic Exercise)    Ankle (Therapeutic Exercise) AROM (active range of motion)  -     Ankle AROM (Therapeutic Exercise) bilateral;dorsiflexion;plantarflexion;sitting;10 repetitions  -       Row Name 11/26/24 1540          Balance    Balance Assessment sitting static balance;sitting dynamic balance;standing static balance;standing dynamic balance  -     Static Sitting Balance contact guard;verbal cues  -     Dynamic Sitting Balance minimal assist;verbal cues  -     Position, Sitting Balance unsupported;sitting edge of bed;sitting in chair  -     Static Standing Balance moderate assist;minimal assist;verbal cues  -BA     Dynamic Standing Balance moderate assist;minimal assist;verbal cues  -     Position/Device  Used, Standing Balance supported;walker, front-wheeled  -     Balance Interventions sitting;sit to stand;standing;supported;static;dynamic;occupation based/functional task  -     Comment, Balance Initial mild instability upon sitting EOB d/t mild posterior lean requiring Lucila progressing to CGA; improved with time following VCs/TCs for anterior weight shifting.  Mild/mod unsteadiness with standing, transfers, and ambulation activity with FWW; no overt LOB noted.  Initial posterior lean with standing, but able to improve with time and more visual cues for anterior weight shifting and upright posture.  -               User Key  (r) = Recorded By, (t) = Taken By, (c) = Cosigned By      Initials Name Provider Type     Marguerite Esquivel, PT Physical Therapist                   Goals/Plan    No documentation.                  Clinical Impression       Row Name 11/26/24 1547          Pain    Pretreatment Pain Rating 0/10 - no pain  -     Posttreatment Pain Rating 0/10 - no pain  -     Pre/Posttreatment Pain Comment Reported no pain throughout session, but generalized discomfort.  -       Row Name 11/26/24 6607          Plan of Care Review    Plan of Care Reviewed With patient  -     Progress improving  -     Outcome Evaluation Increased ambulation distance to 100ft with FWW and Lucila regressing to modAx1+1, with chair follow for safety.  Con to present below baseline with gait instability, decreased balance/coordination, decreased functional endurance, and weakness limiting indep with mobility and ability to safely navigate home environment.  Will con to benefit from skilled IP PT to improve deficits and promote return to PLOF.  Rec IPR upon d/c for best fxl outcome, as pt is at increased risk for falls.  -       Row Name 11/26/24 0938          Vital Signs    Pre Systolic BP Rehab 136  -BA     Pre Treatment Diastolic BP 84  -BA     Post Systolic BP Rehab 125  -BA     Post Treatment Diastolic BP 75  -BA      Pretreatment Heart Rate (beats/min) 63  -BA     Posttreatment Heart Rate (beats/min) 61  -BA     Pre SpO2 (%) 97  -BA     O2 Delivery Pre Treatment room air  -BA     O2 Delivery Intra Treatment room air  -BA     Post SpO2 (%) 96  -BA     O2 Delivery Post Treatment room air  -BA     Pre Patient Position Supine  -BA     Post Patient Position Sitting  -BA       Row Name 11/26/24 1545          Positioning and Restraints    Pre-Treatment Position in bed  -BA     Post Treatment Position chair  -BA     In Chair notified nsg;reclined;call light within reach;encouraged to call for assist;exit alarm on;waffle cushion;legs elevated;heels elevated  -BA               User Key  (r) = Recorded By, (t) = Taken By, (c) = Cosigned By      Initials Name Provider Type    Marguerite Leon PT Physical Therapist                   Outcome Measures       Row Name 11/26/24 1549 11/26/24 0800       How much help from another person do you currently need...    Turning from your back to your side while in flat bed without using bedrails? 2  -BA 2  -MO    Moving from lying on back to sitting on the side of a flat bed without bedrails? 2  -BA 2  -MO    Moving to and from a bed to a chair (including a wheelchair)? 3  -BA 2  -MO    Standing up from a chair using your arms (e.g., wheelchair, bedside chair)? 2  -BA 2  -MO    Climbing 3-5 steps with a railing? 2  -BA 1  -MO    To walk in hospital room? 2  -BA 1  -MO    AM-PAC 6 Clicks Score (PT) 13  -BA 10  -MO    Highest Level of Mobility Goal 4 --> Transfer to chair/commode  -BA 4 --> Transfer to chair/commode  -MO      Row Name 11/26/24 1549          Functional Assessment    Outcome Measure Options AM-PAC 6 Clicks Basic Mobility (PT)  -BA               User Key  (r) = Recorded By, (t) = Taken By, (c) = Cosigned By      Initials Name Provider Type    Albertina Jones, RN Registered Nurse    Marguerite Leon, HILLARY Physical Therapist                                 Physical Therapy  Education       Title: PT OT SLP Therapies (In Progress)       Topic: Physical Therapy (Done)       Point: Mobility training (Done)       Learning Progress Summary            Patient Acceptance, E, VU,NR by  at 11/26/2024 1550    Acceptance, E, VU,NR by  at 11/24/2024 1502    Comment: PT POC                      Point: Home exercise program (Done)       Learning Progress Summary            Patient Acceptance, E, VU,NR by  at 11/26/2024 1550    Acceptance, E, VU,NR by  at 11/24/2024 1502    Comment: PT POC                      Point: Body mechanics (Done)       Learning Progress Summary            Patient Acceptance, E, VU,NR by  at 11/26/2024 1550    Acceptance, E, VU,NR by  at 11/24/2024 1502    Comment: PT POC                      Point: Precautions (Done)       Learning Progress Summary            Patient Acceptance, E, VU,NR by  at 11/26/2024 1550    Acceptance, E, VU,NR by  at 11/24/2024 1502    Comment: PT POC                                      User Key       Initials Effective Dates Name Provider Type Discipline     06/16/21 -  Annie Nolen, PT Physical Therapist PT     09/21/21 -  Marguerite Esquivel, PT Physical Therapist PT                  PT Recommendation and Plan     Progress: improving  Outcome Evaluation: Increased ambulation distance to 100ft with FWW and Lucila regressing to modAx1+1, with chair follow for safety.  Con to present below baseline with gait instability, decreased balance/coordination, decreased functional endurance, and weakness limiting indep with mobility and ability to safely navigate home environment.  Will con to benefit from skilled IP PT to improve deficits and promote return to PLOF.  Rec IPR upon d/c for best fxl outcome, as pt is at increased risk for falls.     Time Calculation:         PT Charges       Row Name 11/26/24 1550             Time Calculation    Start Time 1335  -BA      PT Received On 11/26/24  -         Time Calculation- PT    Total Timed  Code Minutes- PT 44 minute(s)  -BA         Timed Charges    51057 - PT Therapeutic Exercise Minutes 11  -BA      64908 - Gait Training Minutes  15  -BA      61701 - PT Therapeutic Activity Minutes 18  -BA         Total Minutes    Timed Charges Total Minutes 44  -BA       Total Minutes 44  -BA                User Key  (r) = Recorded By, (t) = Taken By, (c) = Cosigned By      Initials Name Provider Type    Marguerite Leon, PT Physical Therapist                  Therapy Charges for Today       Code Description Service Date Service Provider Modifiers Qty    19687737644 HC PT THER PROC EA 15 MIN 11/26/2024 Marguerite Esquivel, PT GP 1    49928995948 HC GAIT TRAINING EA 15 MIN 11/26/2024 Marguerite Esquivel, PT GP 1    34216941398 HC PT THERAPEUTIC ACT EA 15 MIN 11/26/2024 Marguerite Esquivel, PT GP 1    75995086143 HC PT THER SUPP EA 15 MIN 11/26/2024 Marguerite Esquivel, PT GP 2            PT G-Codes  Outcome Measure Options: AM-PAC 6 Clicks Basic Mobility (PT)  AM-PAC 6 Clicks Score (PT): 13  AM-PAC 6 Clicks Score (OT): 11  PT Discharge Summary  Anticipated Discharge Disposition (PT): inpatient rehabilitation facility    Marguerite Esquivel PT  11/26/2024

## 2024-11-26 NOTE — PLAN OF CARE
Goal Outcome Evaluation:           Progress: no change  Outcome Evaluation: Pt A&Ox4. VSS on RA. SB with 1st deg AVB. Pt up with assist of 2. Pt with noted tremors secondary to Parkinson's. Plan of care continues.

## 2024-11-26 NOTE — THERAPY TREATMENT NOTE
Acute Care - Speech Language Pathology   Swallow Treatment Note [facility]     Patient Name: Jhoan Gagnon  : 1940  MRN: [id number]  Today's Date: 2024               Admit Date: 2024    Visit Dx:     ICD-10-CM ICD-9-CM   1. LI (acute kidney injury)  N17.9 584.9   2. Elevated troponin  R79.89 790.6   3. Generalized weakness  R53.1 780.79   4. Multiple falls  R29.6 V15.88   5. Parkinson's disease, unspecified whether dyskinesia present, unspecified whether manifestations fluctuate  G20.A1 332.0   6. Lives alone  Z60.2 V60.3   7. Hypokalemia  E87.6 276.8   8. Dysphagia, unspecified type  R13.10 787.20     Patient Active Problem List   Diagnosis    Parkinson's disease    Weakness    Sinus bradycardia    Syncope and collapse    First degree atrioventricular block    Ataxia    Leukocytosis    Arrhythmia    Essential hypertension    Hyperlipidemia    Bilateral carotid artery stenosis    Elevated serum creatinine    Hypokalemia     Past Medical History:   Diagnosis Date    Broken arms     Cancer     prostate    Lower back injury     Parkinson's disease      Past Surgical History:   Procedure Laterality Date    CHOLECYSTECTOMY      PROSTATE SURGERY      REPLACEMENT TOTAL KNEE BILATERAL         SLP Recommendation and Plan     SLP Diet Recommendation: soft to chew textures, chopped, thin liquids (24)  Recommended Precautions and Strategies: general aspiration precautions, fatigue precautions, 1:1 supervision, assist with feeding (24)  SLP Rec. for Method of Medication Administration: meds whole, with thin liquids, meds crushed, with puree, as tolerated (24)     Monitor for Signs of Aspiration: yes, notify SLP if any concerns (24)  Recommended Diagnostics: No further SLP services recommended (24)     Anticipated Discharge Disposition (SLP): inpatient rehabilitation facility (24)     Therapy Frequency (Swallow): evaluation only (24  1530)     Oral Care Recommendations: Oral Care BID/PRN, Toothbrush (11/26/24 1530)        Daily Summary of Progress (SLP): progress toward functional goals as expected (11/26/24 1530)               Treatment Assessment (SLP): toleration of diet, no clinical signs of, pharyngeal dysphagia, continued, oral dysphagia (11/26/24 1530)  Treatment Assessment Comments (SLP): No overt s/s of aspiration w/ thin liquid or regular solid trials. Prolonged mastication & diffuse oral residue w/ regular solid trial. Pt reports fatigue 2' prolonged mastication of regular solids. Pt/family in agreement to cont soft/chopped solids. Reviewed general aspiration/fatigue precautions w/ pt and family. No acute needs @ this time, SLP will sign off for dysphagia. Please reconsult if further concerns arise (11/26/24 1530)  Plan for Continued Treatment (SLP): treatment no longer indicated as all goals met (11/26/24 1530)                SWALLOW EVALUATION (Last 72 Hours)       SLP Adult Swallow Evaluation       Row Name 11/26/24 1530 11/25/24 1140                Rehab Evaluation    Document Type therapy note (daily note)  - evaluation  -       Subjective Information no complaints  - no complaints  -       Patient Observations alert;cooperative  - alert;cooperative  -       Patient/Family/Caregiver Comments/Observations family present  - No family present  -       Patient Effort good  - good  -       Symptoms Noted During/After Treatment none  - none  -          General Information    Patient Profile Reviewed -- yes  -       Pertinent History Of Current Problem -- Adm w/ weakness after being found down. Hx: Parkinson's disease s/p neuro-stimulator, prostate CA, heart block. CXR negative for disease processes, Head CT negative for acute infarct  -       Current Method of Nutrition -- regular textures;thin liquids  -       Precautions/Limitations, Vision -- WFL;for purposes of eval  -       Precautions/Limitations,  Hearing -- WFL;for purposes of eval  -       Prior Level of Function-Swallowing -- no diet consistency restrictions;other (see comments)  per pt report  -       Plans/Goals Discussed with -- patient;agreed upon  [facility]       Barriers to Rehab -- medically complex;cognitive status  -       Patient's Goals for Discharge -- patient did not state  -          Pain    Additional Documentation Pain Scale: FACES Pre/Post-Treatment (Group)  [facility] Pain Scale: FACES Pre/Post-Treatment (Group)  -          Pain Scale: FACES Pre/Post-Treatment    Pain: FACES Scale, Pretreatment 0-->no hurt  - 0-->no hurt  -       Posttreatment Pain Rating 0-->no hurt  - 0-->no hurt  [facility]          Oral Motor Structure and Function    Dentition Assessment -- missing teeth  -       Secretion Management -- WNL/WFL  -       Mucosal Quality -- moist, healthy  -          Oral Musculature and Cranial Nerve Assessment    Oral Motor General Assessment -- generalized oral motor weakness  -          General Eating/Swallowing Observations    Respiratory Support Currently in Use -- room air  -       Eating/Swallowing Skills -- self-fed;fed by SLP  -       Positioning During Eating -- upright in chair;upright 90 degree  -       Utensils Used -- spoon;cup;straw  -       Consistencies Trialed -- ice chips;thin liquids;pureed;regular textures  -          Clinical Swallow Eval    Oral Prep Phase -- impaired  -       Oral Residue -- impaired  -       Pharyngeal Phase -- no overt signs/symptoms of pharyngeal impairment  -       Clinical Swallow Evaluation Summary -- No overt s/s of aspiration accross trials of thin liquid, pureed, or regular solid consistencies; even w/ pushed for consecutive sips of thin. Prolonged mastication & residue w/ regular solid trial; able to clear w/ liquid wash. Pt indicated preference for softer solids. Recommend soft/chopped solid diet w/ thin liquids, general aspiration precautions, assistance w/  feeding. SLP will f/u for diet tolerance/adjustment  -          Oral Prep Concerns    Oral Prep Concerns -- prolonged mastication  -       Prolonged Mastication -- regular consistencies  -          Oral Residue Concerns    Oral Residue Concerns -- diffuse residue throughout oral cavity  -       Diffuse Residue Throughout Oral Cavity -- regular consistencies  -          SLP Evaluation Clinical Impression    SLP Swallowing Diagnosis -- oral dysphagia;R/O pharyngeal dysphagia  -       Functional Impact -- risk of aspiration/pneumonia  -       Rehab Potential/Prognosis, Swallowing -- good, to achieve stated therapy goals  -       Swallow Criteria for Skilled Therapeutic Interventions Met -- demonstrates skilled criteria  -          SLP Treatment Clinical Impressions    Treatment Assessment (SLP) toleration of diet;no clinical signs of;pharyngeal dysphagia;continued;oral dysphagia  - --       Treatment Assessment Comments (SLP) No overt s/s of aspiration w/ thin liquid or regular solid trials. Prolonged mastication & diffuse oral residue w/ regular solid trial. Pt reports fatigue 2' prolonged mastication of regular solids. Pt/family in agreement to cont soft/chopped solids. Reviewed general aspiration/fatigue precautions w/ pt and family. No acute needs @ this time, SLP will sign off for dysphagia. Please reconsult if further concerns arise  - --       Daily Summary of Progress (SLP) progress toward functional goals as expected  - --       Plan for Continued Treatment (SLP) treatment no longer indicated as all goals met  - --       Care Plan Review care plan/treatment goals reviewed  - --       Care Plan Review, Other Participant(s) family  - --          Recommendations    Therapy Frequency (Swallow) evaluation only  - PRN  -       Predicted Duration Therapy Intervention (Days) -- 1 week  -       SLP Diet Recommendation soft to chew textures;chopped;thin liquids  - soft to chew  textures;chopped;thin liquids  -       Recommended Diagnostics No further SLP services recommended  - other (see comments)  diet tolerance/adjustment  -       Recommended Precautions and Strategies general aspiration precautions;fatigue precautions;1:1 supervision;assist with feeding  - general aspiration precautions;assist with feeding  -       Oral Care Recommendations Oral Care BID/PRN;Toothbrush  - Oral Care BID/PRN;Toothbrush  -       SLP Rec. for Method of Medication Administration meds whole;with thin liquids;meds crushed;with puree;as tolerated  - meds whole;with thin liquids;meds crushed;with puree;as tolerated  -       Monitor for Signs of Aspiration yes;notify SLP if any concerns  - yes;notify SLP if any concerns  -       Anticipated Discharge Disposition (SLP) inpatient rehabilitation facility  - unknown;anticipate therapy at next level of care  -                 User Key  (r) = Recorded By, (t) = Taken By, (c) = Cosigned By      Initials Name Effective Dates     Lali Santo, MS [facility]-SLP 05/12/23 -                     EDUCATION  The patient has been educated in the following areas:   Dysphagia (Swallowing Impairment) Oral Care/Hydration Modified Diet Instruction.        SLP GOALS       Row Name 11/26/24 1530 11/25/24 1140          (LTG) Patient will demonstrate functional swallow for    Diet Texture (Demonstrate functional swallow) regular textures  - regular textures  -     Liquid viscosity (Demonstrate functional swallow) thin liquids  - thin liquids  -     [place] (Demonstrate functional swallow) independently (over 90% accuracy)  - independently (over 90% accuracy)  -     Time Frame (Demonstrate functional swallow) 1 week  - 1 week  -     Progress/Outcomes (Demonstrate functional swallow) goal no longer appropriate  - new goal  -     Comment (Demonstrate functional swallow) No overt s/s of aspiration w/ thin liquid or regular solid trials.  Prolonged mastication & residue w/ regular solid trial. Pt/family in agreement to continue soft/chopped solid diet during admission 2' mastication/fatigue concerns.  - --        (STG) Patient will tolerate trials of    Consistencies Trialed (Tolerate trials) soft to chew (chopped) textures;thin liquids  - soft to chew (chopped) textures;thin liquids  -     Desired Outcome (Tolerate trials) without signs/symptoms of aspiration;with adequate oral prep/transit/clearance  - without signs/symptoms of aspiration;with adequate oral prep/transit/clearance  -     [place] (Tolerate trials) independently (over 90% accuracy)  - independently (over 90% accuracy)  -     Time Frame (Tolerate trials) 1 week  - 1 week  -     Progress/Outcomes (Tolerate trials) goal met  - new goal  -               User Key  (r) = Recorded By, (t) = Taken By, (c) = Cosigned By      Initials Name Provider Type    Lali Guadarrama MS CCC-SLP Speech and Language Pathologist                         Time Calculation:    Time Calculation- SLP       Row Name 11/26/24 1626             Time Calculation- [facility]    SLP Start Time 1530  -      SLP Received On 11/26/24  -         Untimed Charges    10135-DT Treatment Swallow Minutes 41  -         Total Minutes    Untimed Charges Total Minutes 41  -       Total Minutes 41  -                User Key  (r) = Recorded By, (t) = Taken By, (c) = Cosigned By      Initials Name Provider Type    Lali Guadarrama MS CCC-SLP Speech and Language Pathologist                    Therapy Charges for Today       Code Description Service Date Service Provider Modifiers Qty    02024606984 HC ST EVAL ORAL PHARYNG SWALLOW 3 11/25/2024 Lali Santo MS CCC-TONA GN 1    50114864264 HC ST TREATMENT SWALLOW 3 11/26/2024 Lali Santo MS CCC-SLP GN 1                 MS MARIZA Phillips  11/26/2024

## 2024-11-26 NOTE — CASE MANAGEMENT/SOCIAL WORK
Continued Stay Note  [facility]     Patient Name: Jhoan Gagnon  MRN: [id number]  Today's Date: 11/26/2024    Admit Date: 11/24/2024    Plan: update   Discharge Plan       Row Name 11/26/24 1521       Plan    Plan update    Patient/Family in Agreement with Plan yes    Plan Comments Spoke to [facility] liaison who advises they are waiting for their MD to approve and may have bed for him tomorrow.  Spoke with patient at bedside, PT currently working with him.  Patient still wanting to go to [facility], no new needs verbalized.  Spoke to patient renzo/Tiesha MCDONALD, to advise that [facility] may be offering a bed and could potentially discharge tomorrow if so.  She is in agreement with that plan and is on the way to visit him now.  CM following.  Patient plan is to discharge to [facility] pending bed offer, will need medical van transport.    Final Discharge Disposition Code 62 - inpatient rehab facility                   Discharge Codes    No documentation.                 Expected Discharge Date and Time       Expected Discharge Date Expected Discharge Time    Nov 27, 2024               Shelly Gunn RN

## 2024-11-26 NOTE — PLAN OF CARE
Goal Outcome Evaluation:                   Anticipated Discharge Disposition (SLP): inpatient rehabilitation facility             Treatment Assessment (SLP): toleration of diet, no clinical signs of, pharyngeal dysphagia, continued, oral dysphagia (11/26/24 9860)

## 2024-11-27 VITALS
TEMPERATURE: 98.1 F | DIASTOLIC BLOOD PRESSURE: 84 MMHG | RESPIRATION RATE: 18 BRPM | SYSTOLIC BLOOD PRESSURE: 145 MMHG | HEIGHT: 66 IN | OXYGEN SATURATION: 96 % | HEART RATE: 55 BPM | WEIGHT: 200 LBS | BODY MASS INDEX: 32.14 KG/M2

## 2024-11-27 PROCEDURE — 99239 HOSP IP/OBS DSCHRG MGMT >30: CPT | Performed by: NURSE PRACTITIONER

## 2024-11-27 PROCEDURE — 25010000002 HEPARIN (PORCINE) PER 1000 UNITS: Performed by: INTERNAL MEDICINE

## 2024-11-27 RX ORDER — IPRATROPIUM BROMIDE AND ALBUTEROL SULFATE 2.5; .5 MG/3ML; MG/3ML
3 SOLUTION RESPIRATORY (INHALATION) EVERY 4 HOURS PRN
Start: 2024-11-27

## 2024-11-27 RX ORDER — CEPHALEXIN 500 MG/1
500 CAPSULE ORAL EVERY 8 HOURS SCHEDULED
Start: 2024-11-27 | End: 2024-11-28

## 2024-11-27 RX ORDER — AMOXICILLIN 250 MG
2 CAPSULE ORAL 2 TIMES DAILY PRN
Start: 2024-11-27

## 2024-11-27 RX ORDER — POLYETHYLENE GLYCOL 3350 17 G/17G
17 POWDER, FOR SOLUTION ORAL DAILY PRN
Start: 2024-11-27

## 2024-11-27 RX ORDER — CALCIUM CARBONATE 500 MG/1
2 TABLET, CHEWABLE ORAL 2 TIMES DAILY PRN
Start: 2024-11-27

## 2024-11-27 RX ORDER — ACETAMINOPHEN 325 MG/1
650 TABLET ORAL EVERY 4 HOURS PRN
Start: 2024-11-27

## 2024-11-27 RX ORDER — MEMANTINE HYDROCHLORIDE 5 MG/1
5 TABLET ORAL EVERY 12 HOURS SCHEDULED
Start: 2024-11-27

## 2024-11-27 RX ADMIN — ASPIRIN 81 MG: 81 TABLET, COATED ORAL at 08:46

## 2024-11-27 RX ADMIN — Medication 10 ML: at 08:46

## 2024-11-27 RX ADMIN — MEMANTINE 5 MG: 10 TABLET ORAL at 08:46

## 2024-11-27 RX ADMIN — CEPHALEXIN 500 MG: 250 CAPSULE ORAL at 05:33

## 2024-11-27 RX ADMIN — HEPARIN SODIUM 5000 UNITS: 5000 INJECTION INTRAVENOUS; SUBCUTANEOUS at 05:33

## 2024-11-27 NOTE — CASE MANAGEMENT/SOCIAL WORK
Case Management Discharge Note      Final Note: Spoke to [facility] liaison who has bed for patient today on [facility]. Per hospitalist patient ready to discharge. Spoke with patient at bedside to advise who was reluctant to go today but decided he would and is agreeable to plan and Medical Van. Per pt ability with therapy called [facility] who has scheduled transport today at 1330. Patient plan is to discharge to [facility] today via [facility] scheduled for 1330. NUrsing to call report to [phone]. [facility] liaison will pull d/c summary from [facility].         Selected Continued Care - Admitted Since 11/24/2024       Destination Coordination complete.      Service Provider Services Address Phone Fax Patient Preferred    [facility] [address] [address] [phone] --              Durable Medical Equipment    No services have been selected for the patient.                Dialysis/Infusion    No services have been selected for the patient.                Home Medical Care    No services have been selected for the patient.                Therapy    No services have been selected for the patient.                Community Resources    No services have been selected for the patient.                Community & DME    No services have been selected for the patient.                         Final Discharge Disposition Code: 62 - inpatient rehab facility

## 2024-11-27 NOTE — DISCHARGE SUMMARY
[facility] Medicine Services  TRANSFER SUMMARY    Patient Name: Jhoan Gagnon  : 1940  MRN: [id number]    Date of Admission: 2024  Date of Discharge:  2024  Length of Stay: 3  Primary Care Physician: Wilmer Andres MD    Consults       No orders found from 10/26/2024 to 2024.            Hospital Course     Presenting Problem:   Weakness [R53.1]    Active Hospital Problems    Diagnosis  POA    **Weakness [R53.1]  Yes    Elevated serum creatinine [R79.89]  Unknown    Hypokalemia [E87.6]  Unknown    Bilateral carotid artery stenosis [I65.23]  Yes    Hyperlipidemia [E78.5]  Yes    Essential hypertension [I10]  Yes    Parkinson's disease [G20.A1]  Yes    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          Hospital Course:  Jhoan Gagnon is a 84 y.o. male  with past medical history significant for Parkinson's disease status post neurostimulator, prostate cancer, hypertension, first-degree block, was admitted for evaluation of weakness after family found him down at home.  Patient noted to have a UTI, started on antibiotics.  His symptoms likely due to progression of his underlying Parkinson's disease.     Weakness/found down  Multiple falls in recent past  - pt lives alone with family nearby, has been falling a lot recently- concern for patient safety given how many times he has fallen  - PT/OT consult, recommended inpatient rehab on discharge.  May likely need to consider transition to long term care facility  - Respiratory Viral Panel negative   -due to now reporting pain with urination, s/p antibiotics    --transferring to [facility] for rehab today.     Elevated Creatinine  - likely prerenal due to decreased PO intake  -CK wnl   -IVF resuscitation, holding nephrotoxic agents  -Creatinine mildly improved after IV fluids.  Yesterday 1.4.  May likely be new baseline.  Monitor periodically at  outpatient facility.     Parkinson's s/p neurostimulator   - continue home meds  - follows with Neurology outpatient   --Recommend follow-up with his primary neurologist at [facility] first available on discharge.     H/o First degree AVB with ? Episode of Vtach (August 2022)   -- declined PPM in 2022 when he was seen by [facility]iology (Dr. Rodrigues) for the same.  Follow-up as needed.  He is currently declining further treatment.     Pt was seen resting back in bed in no acute distress.  Awake and alert.  Hemodynamically stable and afebrile.  Cleared for transfer to [facility] for rehab today.  Going on medications as below with follow-ups as noted.      Discharge Follow Up Recommendations for outpatient labs/diagnostics:  Pt is cleared for transfer to [facility] today.  Going on medications as below with follow-up as noted.    --To be seen by provider at [facility] on arrival, PCP 1 week of discharge  -- Follow-up with his primary neurologist at [facility] first available    Day of Discharge     HPI:   She was seen resting back in bed awake and alert.  No acute distress.  States he feels very weak and still having tremors.  Tolerating diet.  Agreeable to rehab.  Transferring today as planned.    Review of Systems  Gen- No fevers, chills  CV- No chest pain, palpitations  Resp- No cough, dyspnea  GI- No N/V/D, abd pain       Vital Signs:   Temp:  [97.7 °F (36.5 °C)-98.2 °F (36.8 °C)] 98.1 °F (36.7 °C)  Heart Rate:  [50-67] 50  Resp:  [16-18] 18  BP: (127-145)/(69-84) 145/84     Physical Exam:  Constitutional: No acute distress, awake, alert.  Resting back in bed.  Chronically ill and debilitated appearing elderly male.  HENT: NCAT, mucous membranes moist  Respiratory: Clear to auscultation bilaterally but slightly decreased at bases, respiratory effort normal   Cardiovascular: mild dae (stable/asymptomatic), no murmurs, rubs, or gallops  Gastrointestinal: Positive bowel sounds, soft, nontender, nondistended. Obese abdomen  Musculoskeletal: No  bilateral ankle edema. PARKINSON spont   Psychiatric: Appropriate affect, cooperative  Neurologic: Oriented x 3, strength symmetric in all extremities, Cranial Nerves grossly intact to confrontation, speech clear. Resting tremor. Follows commands   Skin: No rashes       Pertinent Results     Results from last 7 days   Lab Units 11/26/24  0508 11/25/24  0532 11/25/24  0017 11/24/24  1033   WBC 10*3/mm3 2.16* 2.55*  --  3.81   HEMOGLOBIN g/dL 13.4 13.2  --  14.2   HEMATOCRIT % 39.9 38.8  --  41.0   PLATELETS 10*3/mm3 114* 118*  --  134*   SODIUM mmol/L 138 136  --  136   POTASSIUM mmol/L 4.0 3.9 4.0 3.3*   CHLORIDE mmol/L 105 102  --  101   CO2 mmol/L 23.0 23.0  --  24.0   BUN mg/dL 12 14  --  16   CREATININE mg/dL 1.40* 1.57*  --  1.47*   GLUCOSE mg/dL 82 89  --  91   CALCIUM mg/dL 8.3* 8.3*  --  8.7     Results from last 7 days   Lab Units 11/24/24  1033   BILIRUBIN mg/dL 1.6*   ALK PHOS U/L 91   ALT (SGPT) U/L 10   AST (SGOT) U/L 22     Results from last 7 days   Lab Units 11/25/24  0532   CHOLESTEROL mg/dL 90   TRIGLYCERIDES mg/dL 69   HDL CHOL mg/dL 40     Results from last 7 days   Lab Units 11/25/24  0532   TSH uIU/mL 1.720   HEMOGLOBIN A1C % 5.20     Brief Urine Lab Results  (Last result in the past 365 days)        Color   Clarity   Blood   Leuk Est   Nitrite   Protein   CREAT   Urine HCG        11/24/24 1045 Orange   Clear   Negative   Small (1+)   Positive   30 mg/dL (1+)                   Microbiology Results Abnormal       None            Imaging Results (All)       Procedure Component Value Units Date/Time    CT Head Without Contrast [[id number]] Collected: 11/24/24 1155     Updated: 11/24/24 1201    Narrative:      CT HEAD WO CONTRAST    Date of Exam: 11/24/2024 11:40 AM EST    Indication: fall, head injury.    Comparison: CT head without contrast 7/9/2022    Technique: Axial CT images were obtained of the head without contrast administration.  Automated exposure control and iterative construction methods were  used.      Findings:  Bilateral deep brain stimulator leads are present terminating in the subthalamic nuclei with associated streak artifact. There is no evidence of acute territorial infarction.    There is no acute intracranial hemorrhage. There are no extra-axial collections.    Ventricles and CSF spaces are symmetrically prominent. No mass effect nor hydrocephalus.    Mild white matter hypodensities likely reflect the sequela of chronic microvascular ischemic disease. There is atheromatous calcification of the bilateral carotid siphons.     The paranasal sinuses are clear. Trace left mastoid fluid.     Osseous structures and orbits appear intact.      Impression:      Impression:  No acute intracranial abnormality.        Electronically Signed: Meera Mohan MD    11/24/2024 11:58 AM EST    Workstation ID: [id number]    XR Chest 1 View [[id number]] Collected: 11/24/24 1141     Updated: 11/24/24 1146    Narrative:      XR CHEST 1 VW    Date of Exam: 11/24/2024 11:23 AM EST    Indication: Weak/Dizzy/AMS triage protocol    Comparison: Chest radiograph 8/17/2022    Findings:  Cardiomegaly stable from prior. Aortic atherosclerotic disease. Lung volumes low without lobar consolidation, edema, effusion or pneumothorax. Contour deformity in the lateral aspect of right hemidiaphragm probably representing eventration. Right-sided   vagal nerve stimulator. Degenerative related osseous change. Radiographically stable appearance of the chest since 2022.      Impression:      Impression:  No active pulmonary disease. Radiographically stable chest since 2022.      Electronically Signed: Neo Schofield MD    11/24/2024 11:43 AM EST    Workstation ID: [id number]            Results for orders placed during the hospital encounter of 07/09/22    Adult Transthoracic Echo Complete W/ Cont if Necessary Per Protocol (With Agitated Saline)    Interpretation Summary  · Estimated left ventricular EF = 65%  · Left ventricular wall thickness  is consistent with mild concentric hypertrophy.  · Mild dilation of the ascending aorta is present.  · No hemodynamically significant valvular heart disease      Pending Labs       Order Current Status    Blood Culture - Blood, Arm, Left Preliminary result    Blood Culture - Blood, Arm, Right Preliminary result            Discharge Details        Discharge Medications        New Medications        Instructions Start Date   acetaminophen 325 MG tablet  Commonly known as: TYLENOL   650 mg, Oral, Every 4 Hours PRN      calcium carbonate 500 MG chewable tablet  Commonly known as: TUMS   2 tablets, Oral, 2 Times Daily PRN      cephalexin 500 MG capsule  Commonly known as: KEFLEX   500 mg, Oral, Every 8 Hours Scheduled      ipratropium-albuterol 0.5-2.5 mg/3 ml nebulizer  Commonly known as: DUO-NEB   3 mL, Nebulization, Every 4 Hours PRN      melatonin 5 MG tablet tablet   5 mg, Oral, Nightly PRN      polyethylene glycol 17 g packet  Commonly known as: MIRALAX   17 g, Oral, Daily PRN      sennosides-docusate 8.6-50 MG per tablet  Commonly known as: PERICOLACE   2 tablets, Oral, 2 Times Daily PRN             Changes to Medications        Instructions Start Date   memantine 5 MG tablet  Commonly known as: NAMENDA  What changed: when to take this   5 mg, Oral, Every 12 Hours Scheduled             Continue These Medications        Instructions Start Date   aspirin 81 MG EC tablet   81 mg, Oral, Daily      cyanocobalamin 1000 MCG/ML injection   1,000 mcg, Intramuscular, Every 28 Days      rosuvastatin 20 MG tablet  Commonly known as: CRESTOR   20 mg, Oral, Nightly             Stop These Medications      atropine 1 % ophthalmic solution     donepezil 10 MG tablet  Commonly known as: ARICEPT     rOPINIRole 2 MG tablet  Commonly known as: REQUIP              Allergies   Allergen Reactions    Donepezil Unknown - Low Severity    Ropinirole Unknown - Low Severity and Other (See Comments)         Discharge Disposition:  Rehab  Facility or Unit (DC - External)    Discharge Diet:  Diet Order   Procedures    Diet: Cardiac; Healthy Heart (2-3 Na+); Finger Foods, Feeding Assistance - Nursing; Texture: Soft to Chew (NDD 3); Soft to Chew: Chopped Meat; Fluid Consistency: Thin (IDDSI 0)       Discharge Activity:  Activity Instructions       Activity as Tolerated      Measure Blood Pressure      Up WIth Assist                CODE STATUS:    Code Status and Medical Interventions: No CPR (Do Not Attempt to Resuscitate); Limited Support; No cardioversion, No intubation (DNI), No vasopressors   Ordered at: 11/24/24 1347     Medical Intervention Limits:    No cardioversion    No intubation (DNI)    No vasopressors     Level Of Support Discussed With:    Patient     Code Status (Patient has no pulse and is not breathing):    No CPR (Do Not Attempt to Resuscitate)     Medical Interventions (Patient has pulse or is breathing):    Limited Support         No future appointments.    Additional Instructions for the Follow-ups that You Need to Schedule       Discharge Follow-up with PCP   As directed       Currently Documented PCP:    Wilmer Andres MD    PCP Phone Number:    [phone]     Follow Up Details: To be seen by provider at [facility] on arrival, PCP 1 week of discharge        Discharge Follow-up with Specialty: Follow-up with his primary neurologist at [facility] on discharge.  (Please schedule appointment first available prior to discharge today)   As directed      Specialty: Follow-up with his primary neurologist at [facility] on discharge.  (Please schedule appointment first available prior to discharge today)                Electronically signed by YONG Garcia, 11/27/24, 11:28 AM EST.      Time Spent on Discharge: I spent 40 minutes on this discharge activity which included: face-to-face encounter with the patient, reviewing the data in the system, coordination of the care with the nursing staff as well as consultants, documentation, and  entering orders.

## 2024-11-27 NOTE — DISCHARGE INSTRUCTIONS
-To be seen by provider at [facility] on arrival, PCP 1 week of discharge  -- Follow-up with his primary neurologist at [facility] first available

## 2024-11-27 NOTE — PLAN OF CARE
Goal Outcome Evaluation:      Report called to Bhakti Chaudhry at [facility].

## 2024-11-29 LAB
BACTERIA SPEC AEROBE CULT: NORMAL
BACTERIA SPEC AEROBE CULT: NORMAL

## 2024-12-09 ENCOUNTER — TRANSCRIBE ORDERS (OUTPATIENT)
Dept: ADMINISTRATIVE | Facility: HOSPITAL | Age: 84
End: 2024-12-09
Payer: MEDICARE

## 2024-12-09 DIAGNOSIS — R31.0 GROSS HEMATURIA: Primary | ICD-10-CM

## 2024-12-24 ENCOUNTER — HOSPITAL ENCOUNTER (OUTPATIENT)
Dept: CT IMAGING | Facility: HOSPITAL | Age: 84
Discharge: HOME | End: 2024-12-24
Admitting: NURSE PRACTITIONER
Payer: MEDICARE

## 2024-12-24 DIAGNOSIS — R31.0 GROSS HEMATURIA: ICD-10-CM

## 2024-12-24 PROCEDURE — 25510000001 IOPAMIDOL 61 % SOLUTION: Performed by: NURSE PRACTITIONER

## 2024-12-24 PROCEDURE — 74178 CT ABD&PLV WO CNTR FLWD CNTR: CPT

## 2024-12-24 RX ORDER — IOPAMIDOL 612 MG/ML
100 INJECTION, SOLUTION INTRAVASCULAR
Status: COMPLETED | OUTPATIENT
Start: 2024-12-24 | End: 2024-12-24

## 2024-12-24 RX ADMIN — IOPAMIDOL 100 ML: 612 INJECTION, SOLUTION INTRAVENOUS at 11:05

## 2025-08-05 ENCOUNTER — APPOINTMENT (OUTPATIENT)
Dept: CT IMAGING | Facility: HOSPITAL | Age: 85
End: 2025-08-05
Payer: MEDICARE

## 2025-08-05 ENCOUNTER — APPOINTMENT (OUTPATIENT)
Dept: GENERAL RADIOLOGY | Facility: HOSPITAL | Age: 85
End: 2025-08-05
Payer: MEDICARE

## 2025-08-05 ENCOUNTER — HOSPITAL ENCOUNTER (EMERGENCY)
Facility: HOSPITAL | Age: 85
Discharge: HOME | End: 2025-08-05
Attending: EMERGENCY MEDICINE
Payer: MEDICARE

## 2025-08-05 VITALS
BODY MASS INDEX: 32.47 KG/M2 | HEIGHT: 66 IN | SYSTOLIC BLOOD PRESSURE: 181 MMHG | DIASTOLIC BLOOD PRESSURE: 92 MMHG | HEART RATE: 54 BPM | TEMPERATURE: 98.8 F | WEIGHT: 202 LBS | RESPIRATION RATE: 20 BRPM | OXYGEN SATURATION: 97 %

## 2025-08-05 DIAGNOSIS — G20.A1 PARKINSON'S DISEASE, UNSPECIFIED WHETHER DYSKINESIA PRESENT, UNSPECIFIED WHETHER MANIFESTATIONS FLUCTUATE: ICD-10-CM

## 2025-08-05 DIAGNOSIS — S09.90XA INJURY OF HEAD, INITIAL ENCOUNTER: ICD-10-CM

## 2025-08-05 DIAGNOSIS — R29.6 FREQUENT FALLS: Primary | ICD-10-CM

## 2025-08-05 DIAGNOSIS — Z96.89 S/P DEEP BRAIN STIMULATOR PLACEMENT: ICD-10-CM

## 2025-08-05 LAB
ALBUMIN SERPL-MCNC: 3.9 G/DL (ref 3.5–5.2)
ALBUMIN/GLOB SERPL: 1.4 G/DL
ALP SERPL-CCNC: 127 U/L (ref 39–117)
ALT SERPL W P-5'-P-CCNC: 15 U/L (ref 1–41)
ANION GAP SERPL CALCULATED.3IONS-SCNC: 11 MMOL/L (ref 5–15)
AST SERPL-CCNC: 31 U/L (ref 1–40)
BASOPHILS # BLD AUTO: 0.04 10*3/MM3 (ref 0–0.2)
BASOPHILS NFR BLD AUTO: 0.8 % (ref 0–1.5)
BILIRUB SERPL-MCNC: 2.6 MG/DL (ref 0–1.2)
BILIRUB UR QL STRIP: NEGATIVE
BUN SERPL-MCNC: 23.9 MG/DL (ref 8–23)
BUN/CREAT SERPL: 15.3 (ref 7–25)
CALCIUM SPEC-SCNC: 9 MG/DL (ref 8.6–10.5)
CHLORIDE SERPL-SCNC: 106 MMOL/L (ref 98–107)
CLARITY UR: CLEAR
CO2 SERPL-SCNC: 25 MMOL/L (ref 22–29)
COLOR UR: YELLOW
CREAT SERPL-MCNC: 1.56 MG/DL (ref 0.76–1.27)
DEPRECATED RDW RBC AUTO: 46.5 FL (ref 37–54)
EGFRCR SERPLBLD CKD-EPI 2021: 42.7 ML/MIN/1.73
EOSINOPHIL # BLD AUTO: 0.11 10*3/MM3 (ref 0–0.4)
EOSINOPHIL NFR BLD AUTO: 2.1 % (ref 0.3–6.2)
ERYTHROCYTE [DISTWIDTH] IN BLOOD BY AUTOMATED COUNT: 13.8 % (ref 12.3–15.4)
FLUAV RNA RESP QL NAA+PROBE: NOT DETECTED
FLUBV RNA RESP QL NAA+PROBE: NOT DETECTED
GEN 5 1HR TROPONIN T REFLEX: 38 NG/L
GLOBULIN UR ELPH-MCNC: 2.8 GM/DL
GLUCOSE SERPL-MCNC: 91 MG/DL (ref 65–99)
GLUCOSE UR STRIP-MCNC: NEGATIVE MG/DL
HCT VFR BLD AUTO: 40.5 % (ref 37.5–51)
HGB BLD-MCNC: 13.9 G/DL (ref 13–17.7)
HGB UR QL STRIP.AUTO: NEGATIVE
IMM GRANULOCYTES # BLD AUTO: 0.01 10*3/MM3 (ref 0–0.05)
IMM GRANULOCYTES NFR BLD AUTO: 0.2 % (ref 0–0.5)
KETONES UR QL STRIP: NEGATIVE
LEUKOCYTE ESTERASE UR QL STRIP.AUTO: NEGATIVE
LYMPHOCYTES # BLD AUTO: 1.41 10*3/MM3 (ref 0.7–3.1)
LYMPHOCYTES NFR BLD AUTO: 27 % (ref 19.6–45.3)
MAGNESIUM SERPL-MCNC: 2.3 MG/DL (ref 1.6–2.4)
MCH RBC QN AUTO: 31.5 PG (ref 26.6–33)
MCHC RBC AUTO-ENTMCNC: 34.3 G/DL (ref 31.5–35.7)
MCV RBC AUTO: 91.8 FL (ref 79–97)
MONOCYTES # BLD AUTO: 0.57 10*3/MM3 (ref 0.1–0.9)
MONOCYTES NFR BLD AUTO: 10.9 % (ref 5–12)
NEUTROPHILS NFR BLD AUTO: 3.09 10*3/MM3 (ref 1.7–7)
NEUTROPHILS NFR BLD AUTO: 59 % (ref 42.7–76)
NITRITE UR QL STRIP: NEGATIVE
NRBC BLD AUTO-RTO: 0 /100 WBC (ref 0–0.2)
NT-PROBNP SERPL-MCNC: 2375 PG/ML (ref 0–1800)
PH UR STRIP.AUTO: 5.5 [PH] (ref 5–8)
PLATELET # BLD AUTO: 168 10*3/MM3 (ref 140–450)
PMV BLD AUTO: 9.2 FL (ref 6–12)
POTASSIUM SERPL-SCNC: 4.3 MMOL/L (ref 3.5–5.2)
PROT SERPL-MCNC: 6.7 G/DL (ref 6–8.5)
PROT UR QL STRIP: ABNORMAL
RBC # BLD AUTO: 4.41 10*6/MM3 (ref 4.14–5.8)
SARS-COV-2 RNA RESP QL NAA+PROBE: NOT DETECTED
SODIUM SERPL-SCNC: 142 MMOL/L (ref 136–145)
SP GR UR STRIP: 1.02 (ref 1–1.03)
TROPONIN T % DELTA: 3
TROPONIN T NUMERIC DELTA: 1 NG/L
TROPONIN T SERPL HS-MCNC: 37 NG/L
TSH SERPL DL<=0.05 MIU/L-ACNC: 2.5 UIU/ML (ref 0.27–4.2)
UROBILINOGEN UR QL STRIP: ABNORMAL
WBC NRBC COR # BLD AUTO: 5.23 10*3/MM3 (ref 3.4–10.8)

## 2025-08-05 PROCEDURE — 87636 SARSCOV2 & INF A&B AMP PRB: CPT | Performed by: EMERGENCY MEDICINE

## 2025-08-05 PROCEDURE — 99284 EMERGENCY DEPT VISIT MOD MDM: CPT

## 2025-08-05 PROCEDURE — 36415 COLL VENOUS BLD VENIPUNCTURE: CPT

## 2025-08-05 PROCEDURE — 84484 ASSAY OF TROPONIN QUANT: CPT | Performed by: EMERGENCY MEDICINE

## 2025-08-05 PROCEDURE — 72125 CT NECK SPINE W/O DYE: CPT

## 2025-08-05 PROCEDURE — 83735 ASSAY OF MAGNESIUM: CPT | Performed by: EMERGENCY MEDICINE

## 2025-08-05 PROCEDURE — 93005 ELECTROCARDIOGRAM TRACING: CPT | Performed by: EMERGENCY MEDICINE

## 2025-08-05 PROCEDURE — 85025 COMPLETE CBC W/AUTO DIFF WBC: CPT | Performed by: EMERGENCY MEDICINE

## 2025-08-05 PROCEDURE — 70450 CT HEAD/BRAIN W/O DYE: CPT

## 2025-08-05 PROCEDURE — 81003 URINALYSIS AUTO W/O SCOPE: CPT | Performed by: EMERGENCY MEDICINE

## 2025-08-05 PROCEDURE — 84443 ASSAY THYROID STIM HORMONE: CPT | Performed by: EMERGENCY MEDICINE

## 2025-08-05 PROCEDURE — 80053 COMPREHEN METABOLIC PANEL: CPT | Performed by: EMERGENCY MEDICINE

## 2025-08-05 PROCEDURE — 71045 X-RAY EXAM CHEST 1 VIEW: CPT

## 2025-08-05 PROCEDURE — 83880 ASSAY OF NATRIURETIC PEPTIDE: CPT | Performed by: EMERGENCY MEDICINE

## 2025-08-05 PROCEDURE — P9612 CATHETERIZE FOR URINE SPEC: HCPCS

## 2025-08-05 RX ORDER — DIPHENOXYLATE HYDROCHLORIDE AND ATROPINE SULFATE 2.5; .025 MG/1; MG/1
1 TABLET ORAL DAILY
COMMUNITY

## 2025-08-05 RX ORDER — ACETAMINOPHEN 500 MG
1000 TABLET ORAL EVERY 6 HOURS
COMMUNITY

## 2025-08-05 RX ORDER — OLMESARTAN MEDOXOMIL 20 MG/1
10 TABLET ORAL
COMMUNITY

## 2025-08-05 RX ORDER — OLMESARTAN MEDOXOMIL 20 MG/1
20 TABLET ORAL EVERY MORNING
COMMUNITY

## 2025-08-05 RX ORDER — AMANTADINE HYDROCHLORIDE 100 MG/1
100 TABLET ORAL 3 TIMES DAILY
COMMUNITY

## 2025-08-05 RX ORDER — IBUPROFEN 600 MG/1
600 TABLET, FILM COATED ORAL EVERY 6 HOURS PRN
COMMUNITY

## 2025-08-06 LAB
QT INTERVAL: 410 MS
QTC INTERVAL: 373 MS

## 2025-08-19 ENCOUNTER — APPOINTMENT (OUTPATIENT)
Dept: GENERAL RADIOLOGY | Facility: HOSPITAL | Age: 85
End: 2025-08-19
Payer: MEDICARE

## 2025-08-19 ENCOUNTER — HOSPITAL ENCOUNTER (INPATIENT)
Facility: HOSPITAL | Age: 85
LOS: 6 days | Discharge: NURSING, REHAB OR LONG-TERM CARE | End: 2025-08-25
Attending: EMERGENCY MEDICINE | Admitting: INTERNAL MEDICINE
Payer: MEDICARE

## 2025-08-19 ENCOUNTER — APPOINTMENT (OUTPATIENT)
Dept: CT IMAGING | Facility: HOSPITAL | Age: 85
End: 2025-08-19
Payer: MEDICARE

## 2025-08-19 DIAGNOSIS — R47.1 DYSARTHRIA: ICD-10-CM

## 2025-08-19 DIAGNOSIS — R13.11 ORAL PHASE DYSPHAGIA: ICD-10-CM

## 2025-08-19 DIAGNOSIS — I26.99 BILATERAL PULMONARY EMBOLISM: ICD-10-CM

## 2025-08-19 DIAGNOSIS — R41.841 COGNITIVE COMMUNICATION DEFICIT: ICD-10-CM

## 2025-08-19 DIAGNOSIS — A41.9 SEPSIS DUE TO UNDETERMINED ORGANISM: Primary | ICD-10-CM

## 2025-08-19 LAB
ALBUMIN SERPL-MCNC: 3.4 G/DL (ref 3.5–5.2)
ALBUMIN/GLOB SERPL: 1.1 G/DL
ALP SERPL-CCNC: 198 U/L (ref 39–117)
ALT SERPL W P-5'-P-CCNC: 31 U/L (ref 1–41)
ANION GAP SERPL CALCULATED.3IONS-SCNC: 13 MMOL/L (ref 5–15)
APTT PPP: 31.3 SECONDS (ref 60–90)
AST SERPL-CCNC: 44 U/L (ref 1–40)
B PARAPERT DNA SPEC QL NAA+PROBE: NOT DETECTED
B PERT DNA SPEC QL NAA+PROBE: NOT DETECTED
BACTERIA UR QL AUTO: ABNORMAL /HPF
BASOPHILS # BLD AUTO: 0.02 10*3/MM3 (ref 0–0.2)
BASOPHILS NFR BLD AUTO: 0.2 % (ref 0–1.5)
BILIRUB SERPL-MCNC: 1.7 MG/DL (ref 0–1.2)
BILIRUB UR QL STRIP: ABNORMAL
BUN SERPL-MCNC: 43.3 MG/DL (ref 8–23)
BUN/CREAT SERPL: 30.5 (ref 7–25)
C PNEUM DNA NPH QL NAA+NON-PROBE: NOT DETECTED
CALCIUM SPEC-SCNC: 8.7 MG/DL (ref 8.6–10.5)
CHLORIDE SERPL-SCNC: 103 MMOL/L (ref 98–107)
CLARITY UR: CLEAR
CO2 SERPL-SCNC: 21 MMOL/L (ref 22–29)
COLOR UR: ABNORMAL
CREAT SERPL-MCNC: 1.42 MG/DL (ref 0.76–1.27)
D-LACTATE SERPL-SCNC: 1.4 MMOL/L (ref 0.5–2)
D-LACTATE SERPL-SCNC: 2.4 MMOL/L (ref 0.5–2)
DEPRECATED RDW RBC AUTO: 44.9 FL (ref 37–54)
EGFRCR SERPLBLD CKD-EPI 2021: 47.8 ML/MIN/1.73
EOSINOPHIL # BLD AUTO: 0.03 10*3/MM3 (ref 0–0.4)
EOSINOPHIL NFR BLD AUTO: 0.4 % (ref 0.3–6.2)
ERYTHROCYTE [DISTWIDTH] IN BLOOD BY AUTOMATED COUNT: 13.2 % (ref 12.3–15.4)
FLUAV SUBTYP SPEC NAA+PROBE: NOT DETECTED
FLUBV RNA NPH QL NAA+NON-PROBE: NOT DETECTED
GLOBULIN UR ELPH-MCNC: 3 GM/DL
GLUCOSE SERPL-MCNC: 124 MG/DL (ref 65–99)
GLUCOSE UR STRIP-MCNC: NEGATIVE MG/DL
HADV DNA SPEC NAA+PROBE: NOT DETECTED
HCOV 229E RNA SPEC QL NAA+PROBE: NOT DETECTED
HCOV HKU1 RNA SPEC QL NAA+PROBE: NOT DETECTED
HCOV NL63 RNA SPEC QL NAA+PROBE: NOT DETECTED
HCOV OC43 RNA SPEC QL NAA+PROBE: NOT DETECTED
HCT VFR BLD AUTO: 40.3 % (ref 37.5–51)
HGB BLD-MCNC: 13.5 G/DL (ref 13–17.7)
HGB UR QL STRIP.AUTO: NEGATIVE
HMPV RNA NPH QL NAA+NON-PROBE: NOT DETECTED
HPIV1 RNA ISLT QL NAA+PROBE: NOT DETECTED
HPIV2 RNA SPEC QL NAA+PROBE: NOT DETECTED
HPIV3 RNA NPH QL NAA+PROBE: NOT DETECTED
HPIV4 P GENE NPH QL NAA+PROBE: NOT DETECTED
HYALINE CASTS UR QL AUTO: ABNORMAL /LPF
IMM GRANULOCYTES # BLD AUTO: 0.06 10*3/MM3 (ref 0–0.05)
IMM GRANULOCYTES NFR BLD AUTO: 0.7 % (ref 0–0.5)
INR PPP: 1.13 (ref 0.89–1.12)
KETONES UR QL STRIP: NEGATIVE
LEUKOCYTE ESTERASE UR QL STRIP.AUTO: ABNORMAL
LYMPHOCYTES # BLD AUTO: 0.36 10*3/MM3 (ref 0.7–3.1)
LYMPHOCYTES NFR BLD AUTO: 4.4 % (ref 19.6–45.3)
M PNEUMO IGG SER IA-ACNC: NOT DETECTED
MAGNESIUM SERPL-MCNC: 2.3 MG/DL (ref 1.6–2.4)
MCH RBC QN AUTO: 30.7 PG (ref 26.6–33)
MCHC RBC AUTO-ENTMCNC: 33.5 G/DL (ref 31.5–35.7)
MCV RBC AUTO: 91.6 FL (ref 79–97)
MONOCYTES # BLD AUTO: 0.62 10*3/MM3 (ref 0.1–0.9)
MONOCYTES NFR BLD AUTO: 7.5 % (ref 5–12)
NEUTROPHILS NFR BLD AUTO: 7.17 10*3/MM3 (ref 1.7–7)
NEUTROPHILS NFR BLD AUTO: 86.8 % (ref 42.7–76)
NITRITE UR QL STRIP: NEGATIVE
NRBC BLD AUTO-RTO: 0 /100 WBC (ref 0–0.2)
PH UR STRIP.AUTO: <=5 [PH] (ref 5–8)
PLATELET # BLD AUTO: 247 10*3/MM3 (ref 140–450)
PMV BLD AUTO: 9 FL (ref 6–12)
POTASSIUM SERPL-SCNC: 4.1 MMOL/L (ref 3.5–5.2)
PROCALCITONIN SERPL-MCNC: 2.22 NG/ML (ref 0–0.25)
PROT SERPL-MCNC: 6.4 G/DL (ref 6–8.5)
PROT UR QL STRIP: ABNORMAL
PROTHROMBIN TIME: 15.2 SECONDS (ref 12.2–15.3)
QT INTERVAL: 340 MS
QT INTERVAL: 356 MS
QTC INTERVAL: 434 MS
QTC INTERVAL: 440 MS
RBC # BLD AUTO: 4.4 10*6/MM3 (ref 4.14–5.8)
RBC # UR STRIP: ABNORMAL /HPF
REF LAB TEST METHOD: ABNORMAL
RHINOVIRUS RNA SPEC NAA+PROBE: NOT DETECTED
RSV RNA NPH QL NAA+NON-PROBE: NOT DETECTED
SARS-COV-2 RNA RESP QL NAA+PROBE: NOT DETECTED
SODIUM SERPL-SCNC: 137 MMOL/L (ref 136–145)
SP GR UR STRIP: 1.02 (ref 1–1.03)
SQUAMOUS #/AREA URNS HPF: ABNORMAL /HPF
UROBILINOGEN UR QL STRIP: ABNORMAL
WAXY CASTS #/AREA URNS LPF: ABNORMAL /LPF
WBC # UR STRIP: ABNORMAL /HPF
WBC NRBC COR # BLD AUTO: 8.26 10*3/MM3 (ref 3.4–10.8)

## 2025-08-19 PROCEDURE — 87040 BLOOD CULTURE FOR BACTERIA: CPT | Performed by: EMERGENCY MEDICINE

## 2025-08-19 PROCEDURE — 25010000002 ENOXAPARIN PER 10 MG: Performed by: EMERGENCY MEDICINE

## 2025-08-19 PROCEDURE — 25010000002 PIPERACILLIN SOD-TAZOBACTAM PER 1 G: Performed by: EMERGENCY MEDICINE

## 2025-08-19 PROCEDURE — 0202U NFCT DS 22 TRGT SARS-COV-2: CPT | Performed by: EMERGENCY MEDICINE

## 2025-08-19 PROCEDURE — 71045 X-RAY EXAM CHEST 1 VIEW: CPT

## 2025-08-19 PROCEDURE — 81001 URINALYSIS AUTO W/SCOPE: CPT | Performed by: EMERGENCY MEDICINE

## 2025-08-19 PROCEDURE — 85730 THROMBOPLASTIN TIME PARTIAL: CPT | Performed by: EMERGENCY MEDICINE

## 2025-08-19 PROCEDURE — 74177 CT ABD & PELVIS W/CONTRAST: CPT

## 2025-08-19 PROCEDURE — 71275 CT ANGIOGRAPHY CHEST: CPT

## 2025-08-19 PROCEDURE — 84145 PROCALCITONIN (PCT): CPT | Performed by: EMERGENCY MEDICINE

## 2025-08-19 PROCEDURE — 25510000001 IOPAMIDOL 61 % SOLUTION: Performed by: EMERGENCY MEDICINE

## 2025-08-19 PROCEDURE — 36415 COLL VENOUS BLD VENIPUNCTURE: CPT

## 2025-08-19 PROCEDURE — 80053 COMPREHEN METABOLIC PANEL: CPT | Performed by: EMERGENCY MEDICINE

## 2025-08-19 PROCEDURE — 99222 1ST HOSP IP/OBS MODERATE 55: CPT | Performed by: FAMILY MEDICINE

## 2025-08-19 PROCEDURE — 99291 CRITICAL CARE FIRST HOUR: CPT

## 2025-08-19 PROCEDURE — 25510000001 IOPAMIDOL PER 1 ML: Performed by: EMERGENCY MEDICINE

## 2025-08-19 PROCEDURE — 85025 COMPLETE CBC W/AUTO DIFF WBC: CPT | Performed by: EMERGENCY MEDICINE

## 2025-08-19 PROCEDURE — P9612 CATHETERIZE FOR URINE SPEC: HCPCS

## 2025-08-19 PROCEDURE — 83605 ASSAY OF LACTIC ACID: CPT | Performed by: EMERGENCY MEDICINE

## 2025-08-19 PROCEDURE — 85610 PROTHROMBIN TIME: CPT | Performed by: EMERGENCY MEDICINE

## 2025-08-19 PROCEDURE — 93005 ELECTROCARDIOGRAM TRACING: CPT | Performed by: EMERGENCY MEDICINE

## 2025-08-19 PROCEDURE — 83735 ASSAY OF MAGNESIUM: CPT | Performed by: EMERGENCY MEDICINE

## 2025-08-19 RX ORDER — SODIUM CHLORIDE 9 MG/ML
40 INJECTION, SOLUTION INTRAVENOUS AS NEEDED
Status: DISCONTINUED | OUTPATIENT
Start: 2025-08-19 | End: 2025-08-26 | Stop reason: HOSPADM

## 2025-08-19 RX ORDER — SODIUM CHLORIDE 0.9 % (FLUSH) 0.9 %
10 SYRINGE (ML) INJECTION AS NEEDED
Status: DISCONTINUED | OUTPATIENT
Start: 2025-08-19 | End: 2025-08-26 | Stop reason: HOSPADM

## 2025-08-19 RX ORDER — SODIUM CHLORIDE 9 MG/ML
100 INJECTION, SOLUTION INTRAVENOUS CONTINUOUS
Status: ACTIVE | OUTPATIENT
Start: 2025-08-19 | End: 2025-08-21

## 2025-08-19 RX ORDER — ATORVASTATIN CALCIUM 40 MG/1
40 TABLET, FILM COATED ORAL NIGHTLY
COMMUNITY
End: 2025-08-25 | Stop reason: HOSPADM

## 2025-08-19 RX ORDER — ENOXAPARIN SODIUM 100 MG/ML
1 INJECTION SUBCUTANEOUS ONCE
Status: COMPLETED | OUTPATIENT
Start: 2025-08-19 | End: 2025-08-19

## 2025-08-19 RX ORDER — VANCOMYCIN 1.75 GRAM/500 ML IN 0.9 % SODIUM CHLORIDE INTRAVENOUS
20 ONCE
Status: COMPLETED | OUTPATIENT
Start: 2025-08-19 | End: 2025-08-19

## 2025-08-19 RX ORDER — SODIUM CHLORIDE 0.9 % (FLUSH) 0.9 %
10 SYRINGE (ML) INJECTION EVERY 12 HOURS SCHEDULED
Status: DISCONTINUED | OUTPATIENT
Start: 2025-08-19 | End: 2025-08-26 | Stop reason: HOSPADM

## 2025-08-19 RX ORDER — IOPAMIDOL 755 MG/ML
100 INJECTION, SOLUTION INTRAVASCULAR
Status: COMPLETED | OUTPATIENT
Start: 2025-08-19 | End: 2025-08-19

## 2025-08-19 RX ORDER — POLYETHYLENE GLYCOL 3350 17 G/17G
17 POWDER, FOR SOLUTION ORAL DAILY PRN
Status: DISCONTINUED | OUTPATIENT
Start: 2025-08-19 | End: 2025-08-26 | Stop reason: HOSPADM

## 2025-08-19 RX ORDER — ISOSORBIDE MONONITRATE 30 MG/1
15 TABLET, EXTENDED RELEASE ORAL NIGHTLY
COMMUNITY

## 2025-08-19 RX ORDER — NITROGLYCERIN 0.4 MG/1
0.4 TABLET SUBLINGUAL
Status: DISCONTINUED | OUTPATIENT
Start: 2025-08-19 | End: 2025-08-26 | Stop reason: HOSPADM

## 2025-08-19 RX ORDER — BISACODYL 10 MG
10 SUPPOSITORY, RECTAL RECTAL DAILY PRN
Status: DISCONTINUED | OUTPATIENT
Start: 2025-08-19 | End: 2025-08-26 | Stop reason: HOSPADM

## 2025-08-19 RX ORDER — BISACODYL 5 MG/1
5 TABLET, DELAYED RELEASE ORAL DAILY PRN
Status: DISCONTINUED | OUTPATIENT
Start: 2025-08-19 | End: 2025-08-26 | Stop reason: HOSPADM

## 2025-08-19 RX ORDER — AMOXICILLIN 250 MG
2 CAPSULE ORAL 2 TIMES DAILY PRN
Status: DISCONTINUED | OUTPATIENT
Start: 2025-08-19 | End: 2025-08-26 | Stop reason: HOSPADM

## 2025-08-19 RX ORDER — LOSARTAN POTASSIUM 50 MG/1
50 TABLET ORAL 2 TIMES DAILY
COMMUNITY
End: 2025-08-25 | Stop reason: HOSPADM

## 2025-08-19 RX ORDER — IOPAMIDOL 612 MG/ML
85 INJECTION, SOLUTION INTRAVASCULAR
Status: COMPLETED | OUTPATIENT
Start: 2025-08-19 | End: 2025-08-19

## 2025-08-19 RX ORDER — ONDANSETRON 2 MG/ML
4 INJECTION INTRAMUSCULAR; INTRAVENOUS EVERY 6 HOURS PRN
Status: DISCONTINUED | OUTPATIENT
Start: 2025-08-19 | End: 2025-08-26 | Stop reason: HOSPADM

## 2025-08-19 RX ADMIN — ENOXAPARIN SODIUM 90 MG: 100 INJECTION SUBCUTANEOUS at 19:52

## 2025-08-19 RX ADMIN — Medication 1750 MG: at 16:21

## 2025-08-19 RX ADMIN — IOPAMIDOL 75 ML: 755 INJECTION, SOLUTION INTRAVENOUS at 18:44

## 2025-08-19 RX ADMIN — PIPERACILLIN AND TAZOBACTAM 3.38 G: 3; .375 INJECTION, POWDER, FOR SOLUTION INTRAVENOUS at 15:10

## 2025-08-19 RX ADMIN — SODIUM CHLORIDE, POTASSIUM CHLORIDE, SODIUM LACTATE AND CALCIUM CHLORIDE 2247 ML: 600; 310; 30; 20 INJECTION, SOLUTION INTRAVENOUS at 13:39

## 2025-08-19 RX ADMIN — Medication 10 ML: at 22:35

## 2025-08-19 RX ADMIN — IOPAMIDOL 85 ML: 612 INJECTION, SOLUTION INTRAVENOUS at 17:16

## 2025-08-20 LAB
ALBUMIN SERPL-MCNC: 3 G/DL (ref 3.5–5.2)
ALBUMIN/GLOB SERPL: 1.1 G/DL
ALP SERPL-CCNC: 151 U/L (ref 39–117)
ALT SERPL W P-5'-P-CCNC: 29 U/L (ref 1–41)
ANION GAP SERPL CALCULATED.3IONS-SCNC: 9.4 MMOL/L (ref 5–15)
AST SERPL-CCNC: 46 U/L (ref 1–40)
BASOPHILS # BLD AUTO: 0.03 10*3/MM3 (ref 0–0.2)
BASOPHILS NFR BLD AUTO: 0.5 % (ref 0–1.5)
BILIRUB SERPL-MCNC: 1.5 MG/DL (ref 0–1.2)
BUN SERPL-MCNC: 34.8 MG/DL (ref 8–23)
BUN/CREAT SERPL: 28.3 (ref 7–25)
CALCIUM SPEC-SCNC: 8.1 MG/DL (ref 8.6–10.5)
CHLORIDE SERPL-SCNC: 107 MMOL/L (ref 98–107)
CO2 SERPL-SCNC: 20.6 MMOL/L (ref 22–29)
CREAT SERPL-MCNC: 1.23 MG/DL (ref 0.76–1.27)
DEPRECATED RDW RBC AUTO: 45.1 FL (ref 37–54)
EGFRCR SERPLBLD CKD-EPI 2021: 56.8 ML/MIN/1.73
EOSINOPHIL # BLD AUTO: 0.09 10*3/MM3 (ref 0–0.4)
EOSINOPHIL NFR BLD AUTO: 1.4 % (ref 0.3–6.2)
ERYTHROCYTE [DISTWIDTH] IN BLOOD BY AUTOMATED COUNT: 13.4 % (ref 12.3–15.4)
GLOBULIN UR ELPH-MCNC: 2.7 GM/DL
GLUCOSE SERPL-MCNC: 101 MG/DL (ref 65–99)
HCT VFR BLD AUTO: 34.8 % (ref 37.5–51)
HGB BLD-MCNC: 11.6 G/DL (ref 13–17.7)
IMM GRANULOCYTES # BLD AUTO: 0.01 10*3/MM3 (ref 0–0.05)
IMM GRANULOCYTES NFR BLD AUTO: 0.2 % (ref 0–0.5)
LYMPHOCYTES # BLD AUTO: 1.53 10*3/MM3 (ref 0.7–3.1)
LYMPHOCYTES NFR BLD AUTO: 24.2 % (ref 19.6–45.3)
MCH RBC QN AUTO: 30.4 PG (ref 26.6–33)
MCHC RBC AUTO-ENTMCNC: 33.3 G/DL (ref 31.5–35.7)
MCV RBC AUTO: 91.3 FL (ref 79–97)
MONOCYTES # BLD AUTO: 0.65 10*3/MM3 (ref 0.1–0.9)
MONOCYTES NFR BLD AUTO: 10.3 % (ref 5–12)
NEUTROPHILS NFR BLD AUTO: 4 10*3/MM3 (ref 1.7–7)
NEUTROPHILS NFR BLD AUTO: 63.4 % (ref 42.7–76)
NRBC BLD AUTO-RTO: 0 /100 WBC (ref 0–0.2)
PLATELET # BLD AUTO: 224 10*3/MM3 (ref 140–450)
PMV BLD AUTO: 9.2 FL (ref 6–12)
POTASSIUM SERPL-SCNC: 3.7 MMOL/L (ref 3.5–5.2)
PROT SERPL-MCNC: 5.7 G/DL (ref 6–8.5)
RBC # BLD AUTO: 3.81 10*6/MM3 (ref 4.14–5.8)
SODIUM SERPL-SCNC: 137 MMOL/L (ref 136–145)
WBC NRBC COR # BLD AUTO: 6.31 10*3/MM3 (ref 3.4–10.8)

## 2025-08-20 PROCEDURE — 99232 SBSQ HOSP IP/OBS MODERATE 35: CPT | Performed by: FAMILY MEDICINE

## 2025-08-20 PROCEDURE — 85025 COMPLETE CBC W/AUTO DIFF WBC: CPT | Performed by: FAMILY MEDICINE

## 2025-08-20 PROCEDURE — 92610 EVALUATE SWALLOWING FUNCTION: CPT

## 2025-08-20 PROCEDURE — 25810000003 SODIUM CHLORIDE 0.9 % SOLUTION: Performed by: FAMILY MEDICINE

## 2025-08-20 PROCEDURE — 92523 SPEECH SOUND LANG COMPREHEN: CPT

## 2025-08-20 PROCEDURE — 80053 COMPREHEN METABOLIC PANEL: CPT | Performed by: FAMILY MEDICINE

## 2025-08-20 PROCEDURE — 25010000002 ENOXAPARIN PER 10 MG: Performed by: FAMILY MEDICINE

## 2025-08-20 RX ORDER — ROSUVASTATIN CALCIUM 20 MG/1
20 TABLET, COATED ORAL NIGHTLY
Status: DISCONTINUED | OUTPATIENT
Start: 2025-08-20 | End: 2025-08-26 | Stop reason: HOSPADM

## 2025-08-20 RX ORDER — LIDOCAINE 4 G/G
2 PATCH TOPICAL
Status: DISCONTINUED | OUTPATIENT
Start: 2025-08-20 | End: 2025-08-26 | Stop reason: HOSPADM

## 2025-08-20 RX ORDER — CASTOR OIL AND BALSAM, PERU 788; 87 MG/G; MG/G
1 OINTMENT TOPICAL EVERY 12 HOURS SCHEDULED
Status: DISCONTINUED | OUTPATIENT
Start: 2025-08-20 | End: 2025-08-26 | Stop reason: HOSPADM

## 2025-08-20 RX ORDER — ENOXAPARIN SODIUM 100 MG/ML
1 INJECTION SUBCUTANEOUS EVERY 12 HOURS SCHEDULED
Status: DISCONTINUED | OUTPATIENT
Start: 2025-08-20 | End: 2025-08-21

## 2025-08-20 RX ORDER — MEMANTINE HYDROCHLORIDE 10 MG/1
5 TABLET ORAL EVERY 12 HOURS SCHEDULED
Status: DISCONTINUED | OUTPATIENT
Start: 2025-08-20 | End: 2025-08-26 | Stop reason: HOSPADM

## 2025-08-20 RX ORDER — LOSARTAN POTASSIUM 50 MG/1
50 TABLET ORAL
Status: DISCONTINUED | OUTPATIENT
Start: 2025-08-20 | End: 2025-08-26 | Stop reason: HOSPADM

## 2025-08-20 RX ORDER — AMANTADINE HYDROCHLORIDE 100 MG/1
100 CAPSULE, GELATIN COATED ORAL 3 TIMES DAILY
Status: DISCONTINUED | OUTPATIENT
Start: 2025-08-20 | End: 2025-08-20 | Stop reason: ALTCHOICE

## 2025-08-20 RX ADMIN — Medication 2.5 MG: at 21:32

## 2025-08-20 RX ADMIN — Medication 1 APPLICATION: at 16:23

## 2025-08-20 RX ADMIN — AMANTADINE HYDROCHLORIDE 100 MG: 100 CAPSULE ORAL at 16:23

## 2025-08-20 RX ADMIN — Medication 1 APPLICATION: at 21:32

## 2025-08-20 RX ADMIN — MEMANTINE HYDROCHLORIDE 5 MG: 10 TABLET, FILM COATED ORAL at 21:32

## 2025-08-20 RX ADMIN — ROSUVASTATIN CALCIUM 20 MG: 20 TABLET, FILM COATED ORAL at 21:31

## 2025-08-20 RX ADMIN — LIDOCAINE 2 PATCH: 4 PATCH TOPICAL at 16:22

## 2025-08-20 RX ADMIN — SODIUM CHLORIDE 100 ML/HR: 9 INJECTION, SOLUTION INTRAVENOUS at 00:03

## 2025-08-20 RX ADMIN — ENOXAPARIN SODIUM 90 MG: 100 INJECTION SUBCUTANEOUS at 21:32

## 2025-08-20 RX ADMIN — ENOXAPARIN SODIUM 90 MG: 100 INJECTION SUBCUTANEOUS at 12:22

## 2025-08-20 RX ADMIN — MEMANTINE HYDROCHLORIDE 5 MG: 10 TABLET, FILM COATED ORAL at 12:22

## 2025-08-21 ENCOUNTER — APPOINTMENT (OUTPATIENT)
Dept: GENERAL RADIOLOGY | Facility: HOSPITAL | Age: 85
End: 2025-08-21
Payer: MEDICARE

## 2025-08-21 ENCOUNTER — APPOINTMENT (OUTPATIENT)
Dept: CARDIOLOGY | Facility: HOSPITAL | Age: 85
End: 2025-08-21
Payer: MEDICARE

## 2025-08-21 LAB
ALBUMIN SERPL-MCNC: 2.8 G/DL (ref 3.5–5.2)
ALBUMIN/GLOB SERPL: 1 G/DL
ALP SERPL-CCNC: 165 U/L (ref 39–117)
ALT SERPL W P-5'-P-CCNC: 23 U/L (ref 1–41)
ANION GAP SERPL CALCULATED.3IONS-SCNC: 10 MMOL/L (ref 5–15)
AORTIC DIMENSIONLESS INDEX: 0.82 (DI)
ASCENDING AORTA: 3.8 CM
AST SERPL-CCNC: 32 U/L (ref 1–40)
AV MEAN PRESS GRAD SYS DOP V1V2: 4 MMHG
AV VMAX SYS DOP: 137 CM/SEC
BASOPHILS # BLD AUTO: 0.03 10*3/MM3 (ref 0–0.2)
BASOPHILS NFR BLD AUTO: 0.5 % (ref 0–1.5)
BH CV ECHO MEAS - AO MAX PG: 7.5 MMHG
BH CV ECHO MEAS - AO ROOT AREA (BSA CORRECTED): 1.7 CM2
BH CV ECHO MEAS - AO ROOT DIAM: 3.7 CM
BH CV ECHO MEAS - AO V2 VTI: 29.3 CM
BH CV ECHO MEAS - AVA(I,D): 4 CM2
BH CV ECHO MEAS - EDV(CUBED): 103.8 ML
BH CV ECHO MEAS - EDV(MOD-SP2): 117 ML
BH CV ECHO MEAS - EDV(MOD-SP4): 135 ML
BH CV ECHO MEAS - EF(MOD-SP2): 71.1 %
BH CV ECHO MEAS - EF(MOD-SP4): 69.6 %
BH CV ECHO MEAS - ESV(CUBED): 35.9 ML
BH CV ECHO MEAS - ESV(MOD-SP2): 33.8 ML
BH CV ECHO MEAS - ESV(MOD-SP4): 41.1 ML
BH CV ECHO MEAS - FS: 29.8 %
BH CV ECHO MEAS - IVS/LVPW: 1 CM
BH CV ECHO MEAS - IVSD: 0.8 CM
BH CV ECHO MEAS - LA DIMENSION: 3.5 CM
BH CV ECHO MEAS - LAT PEAK E' VEL: 7 CM/SEC
BH CV ECHO MEAS - LV DIASTOLIC VOL/BSA (35-75): 67.2 CM2
BH CV ECHO MEAS - LV MASS(C)D: 122.3 GRAMS
BH CV ECHO MEAS - LV MAX PG: 6.2 MMHG
BH CV ECHO MEAS - LV MEAN PG: 2 MMHG
BH CV ECHO MEAS - LV SYSTOLIC VOL/BSA (12-30): 20.5 CM2
BH CV ECHO MEAS - LV V1 MAX: 124 CM/SEC
BH CV ECHO MEAS - LV V1 VTI: 24 CM
BH CV ECHO MEAS - LVIDD: 4.7 CM
BH CV ECHO MEAS - LVIDS: 3.3 CM
BH CV ECHO MEAS - LVOT AREA: 4.9 CM2
BH CV ECHO MEAS - LVOT DIAM: 2.5 CM
BH CV ECHO MEAS - LVPWD: 0.8 CM
BH CV ECHO MEAS - MED PEAK E' VEL: 5.6 CM/SEC
BH CV ECHO MEAS - MV A MAX VEL: 109 CM/SEC
BH CV ECHO MEAS - MV DEC SLOPE: 266 CM/SEC2
BH CV ECHO MEAS - MV DEC TIME: 0.3 SEC
BH CV ECHO MEAS - MV E MAX VEL: 79.7 CM/SEC
BH CV ECHO MEAS - MV E/A: 0.73
BH CV ECHO MEAS - MV MAX PG: 4.2 MMHG
BH CV ECHO MEAS - MV MEAN PG: 2 MMHG
BH CV ECHO MEAS - MV V2 VTI: 35 CM
BH CV ECHO MEAS - MVA(VTI): 3.4 CM2
BH CV ECHO MEAS - PA ACC TIME: 0.09 SEC
BH CV ECHO MEAS - PA V2 MAX: 110 CM/SEC
BH CV ECHO MEAS - RV MAX PG: 3.1 MMHG
BH CV ECHO MEAS - RV V1 MAX: 87.8 CM/SEC
BH CV ECHO MEAS - RV V1 VTI: 15.8 CM
BH CV ECHO MEAS - SV(LVOT): 117.8 ML
BH CV ECHO MEAS - SV(MOD-SP2): 83.2 ML
BH CV ECHO MEAS - SV(MOD-SP4): 93.9 ML
BH CV ECHO MEAS - SVI(LVOT): 58.7 ML/M2
BH CV ECHO MEAS - SVI(MOD-SP2): 41.4 ML/M2
BH CV ECHO MEAS - SVI(MOD-SP4): 46.7 ML/M2
BH CV ECHO MEAS - TAPSE (>1.6): 3.1 CM
BH CV ECHO MEASUREMENTS AVERAGE E/E' RATIO: 12.65
BH CV XLRA - RV BASE: 4 CM
BH CV XLRA - RV LENGTH: 8.9 CM
BH CV XLRA - RV MID: 3.9 CM
BH CV XLRA - TDI S': 13.8 CM/SEC
BILIRUB SERPL-MCNC: 1.1 MG/DL (ref 0–1.2)
BUN SERPL-MCNC: 24.3 MG/DL (ref 8–23)
BUN/CREAT SERPL: 23.6 (ref 7–25)
CALCIUM SPEC-SCNC: 8.1 MG/DL (ref 8.6–10.5)
CHLORIDE SERPL-SCNC: 110 MMOL/L (ref 98–107)
CO2 SERPL-SCNC: 21 MMOL/L (ref 22–29)
CREAT SERPL-MCNC: 1.03 MG/DL (ref 0.76–1.27)
DEPRECATED RDW RBC AUTO: 43.9 FL (ref 37–54)
EGFRCR SERPLBLD CKD-EPI 2021: 70.3 ML/MIN/1.73
EOSINOPHIL # BLD AUTO: 0.16 10*3/MM3 (ref 0–0.4)
EOSINOPHIL NFR BLD AUTO: 2.6 % (ref 0.3–6.2)
ERYTHROCYTE [DISTWIDTH] IN BLOOD BY AUTOMATED COUNT: 13.2 % (ref 12.3–15.4)
GLOBULIN UR ELPH-MCNC: 2.8 GM/DL
GLUCOSE SERPL-MCNC: 78 MG/DL (ref 65–99)
HCT VFR BLD AUTO: 34.1 % (ref 37.5–51)
HGB BLD-MCNC: 11.3 G/DL (ref 13–17.7)
IMM GRANULOCYTES # BLD AUTO: 0.01 10*3/MM3 (ref 0–0.05)
IMM GRANULOCYTES NFR BLD AUTO: 0.2 % (ref 0–0.5)
IVRT: 81 MS
LEFT ATRIUM VOLUME INDEX: 33.2 ML/M2
LV EF BIPLANE MOD: 69.7 %
LYMPHOCYTES # BLD AUTO: 1.34 10*3/MM3 (ref 0.7–3.1)
LYMPHOCYTES NFR BLD AUTO: 21.7 % (ref 19.6–45.3)
MCH RBC QN AUTO: 30.1 PG (ref 26.6–33)
MCHC RBC AUTO-ENTMCNC: 33.1 G/DL (ref 31.5–35.7)
MCV RBC AUTO: 90.7 FL (ref 79–97)
MONOCYTES # BLD AUTO: 0.63 10*3/MM3 (ref 0.1–0.9)
MONOCYTES NFR BLD AUTO: 10.2 % (ref 5–12)
NEUTROPHILS NFR BLD AUTO: 4.01 10*3/MM3 (ref 1.7–7)
NEUTROPHILS NFR BLD AUTO: 64.8 % (ref 42.7–76)
NRBC BLD AUTO-RTO: 0 /100 WBC (ref 0–0.2)
PLATELET # BLD AUTO: 243 10*3/MM3 (ref 140–450)
PMV BLD AUTO: 9.2 FL (ref 6–12)
POTASSIUM SERPL-SCNC: 3.8 MMOL/L (ref 3.5–5.2)
PROT SERPL-MCNC: 5.6 G/DL (ref 6–8.5)
RBC # BLD AUTO: 3.76 10*6/MM3 (ref 4.14–5.8)
SODIUM SERPL-SCNC: 141 MMOL/L (ref 136–145)
WBC NRBC COR # BLD AUTO: 6.18 10*3/MM3 (ref 3.4–10.8)

## 2025-08-21 PROCEDURE — 99232 SBSQ HOSP IP/OBS MODERATE 35: CPT | Performed by: FAMILY MEDICINE

## 2025-08-21 PROCEDURE — 97166 OT EVAL MOD COMPLEX 45 MIN: CPT

## 2025-08-21 PROCEDURE — 97162 PT EVAL MOD COMPLEX 30 MIN: CPT

## 2025-08-21 PROCEDURE — 74230 X-RAY XM SWLNG FUNCJ C+: CPT

## 2025-08-21 PROCEDURE — 93306 TTE W/DOPPLER COMPLETE: CPT

## 2025-08-21 PROCEDURE — 93306 TTE W/DOPPLER COMPLETE: CPT | Performed by: INTERNAL MEDICINE

## 2025-08-21 PROCEDURE — 25010000002 SULFUR HEXAFLUORIDE MICROSPH 60.7-25 MG RECONSTITUTED SUSPENSION: Performed by: FAMILY MEDICINE

## 2025-08-21 PROCEDURE — 85025 COMPLETE CBC W/AUTO DIFF WBC: CPT | Performed by: FAMILY MEDICINE

## 2025-08-21 PROCEDURE — 92611 MOTION FLUOROSCOPY/SWALLOW: CPT

## 2025-08-21 PROCEDURE — 80053 COMPREHEN METABOLIC PANEL: CPT | Performed by: FAMILY MEDICINE

## 2025-08-21 RX ADMIN — Medication 1 APPLICATION: at 21:15

## 2025-08-21 RX ADMIN — APIXABAN 10 MG: 5 TABLET, FILM COATED ORAL at 12:31

## 2025-08-21 RX ADMIN — MEMANTINE HYDROCHLORIDE 5 MG: 10 TABLET, FILM COATED ORAL at 21:15

## 2025-08-21 RX ADMIN — LIDOCAINE 2 PATCH: 4 PATCH TOPICAL at 08:29

## 2025-08-21 RX ADMIN — BARIUM SULFATE 20 ML: 400 PASTE ORAL at 11:06

## 2025-08-21 RX ADMIN — SULFUR HEXAFLUORIDE 2 ML: KIT at 13:32

## 2025-08-21 RX ADMIN — Medication 10 ML: at 08:29

## 2025-08-21 RX ADMIN — ROSUVASTATIN CALCIUM 20 MG: 20 TABLET, FILM COATED ORAL at 21:15

## 2025-08-21 RX ADMIN — BARIUM SULFATE 100 ML: 0.81 POWDER, FOR SUSPENSION ORAL at 11:06

## 2025-08-21 RX ADMIN — Medication 10 ML: at 21:50

## 2025-08-21 RX ADMIN — LOSARTAN POTASSIUM 50 MG: 50 TABLET, FILM COATED ORAL at 08:28

## 2025-08-21 RX ADMIN — APIXABAN 10 MG: 5 TABLET, FILM COATED ORAL at 21:15

## 2025-08-21 RX ADMIN — Medication 1 APPLICATION: at 08:29

## 2025-08-21 RX ADMIN — Medication 2.5 MG: at 21:15

## 2025-08-21 RX ADMIN — MEMANTINE HYDROCHLORIDE 5 MG: 10 TABLET, FILM COATED ORAL at 08:27

## 2025-08-22 LAB
ALBUMIN SERPL-MCNC: 3 G/DL (ref 3.5–5.2)
ALBUMIN/GLOB SERPL: 1 G/DL
ALP SERPL-CCNC: 158 U/L (ref 39–117)
ALT SERPL W P-5'-P-CCNC: 24 U/L (ref 1–41)
ANION GAP SERPL CALCULATED.3IONS-SCNC: 12.5 MMOL/L (ref 5–15)
AST SERPL-CCNC: 35 U/L (ref 1–40)
BASOPHILS # BLD AUTO: 0.03 10*3/MM3 (ref 0–0.2)
BASOPHILS NFR BLD AUTO: 0.6 % (ref 0–1.5)
BILIRUB SERPL-MCNC: 1.4 MG/DL (ref 0–1.2)
BUN SERPL-MCNC: 19.1 MG/DL (ref 8–23)
BUN/CREAT SERPL: 18.9 (ref 7–25)
CALCIUM SPEC-SCNC: 8.3 MG/DL (ref 8.6–10.5)
CHLORIDE SERPL-SCNC: 108 MMOL/L (ref 98–107)
CO2 SERPL-SCNC: 20.5 MMOL/L (ref 22–29)
CREAT SERPL-MCNC: 1.01 MG/DL (ref 0.76–1.27)
DEPRECATED RDW RBC AUTO: 43.7 FL (ref 37–54)
EGFRCR SERPLBLD CKD-EPI 2021: 72 ML/MIN/1.73
EOSINOPHIL # BLD AUTO: 0.19 10*3/MM3 (ref 0–0.4)
EOSINOPHIL NFR BLD AUTO: 3.5 % (ref 0.3–6.2)
ERYTHROCYTE [DISTWIDTH] IN BLOOD BY AUTOMATED COUNT: 13 % (ref 12.3–15.4)
GLOBULIN UR ELPH-MCNC: 2.9 GM/DL
GLUCOSE SERPL-MCNC: 79 MG/DL (ref 65–99)
HCT VFR BLD AUTO: 36.8 % (ref 37.5–51)
HGB BLD-MCNC: 12.2 G/DL (ref 13–17.7)
IMM GRANULOCYTES # BLD AUTO: 0.02 10*3/MM3 (ref 0–0.05)
IMM GRANULOCYTES NFR BLD AUTO: 0.4 % (ref 0–0.5)
LYMPHOCYTES # BLD AUTO: 1.1 10*3/MM3 (ref 0.7–3.1)
LYMPHOCYTES NFR BLD AUTO: 20.2 % (ref 19.6–45.3)
MCH RBC QN AUTO: 30.3 PG (ref 26.6–33)
MCHC RBC AUTO-ENTMCNC: 33.2 G/DL (ref 31.5–35.7)
MCV RBC AUTO: 91.3 FL (ref 79–97)
MONOCYTES # BLD AUTO: 0.52 10*3/MM3 (ref 0.1–0.9)
MONOCYTES NFR BLD AUTO: 9.6 % (ref 5–12)
NEUTROPHILS NFR BLD AUTO: 3.58 10*3/MM3 (ref 1.7–7)
NEUTROPHILS NFR BLD AUTO: 65.7 % (ref 42.7–76)
NRBC BLD AUTO-RTO: 0 /100 WBC (ref 0–0.2)
PLATELET # BLD AUTO: 250 10*3/MM3 (ref 140–450)
PMV BLD AUTO: 9.4 FL (ref 6–12)
POTASSIUM SERPL-SCNC: 3.5 MMOL/L (ref 3.5–5.2)
POTASSIUM SERPL-SCNC: 4.1 MMOL/L (ref 3.5–5.2)
PROT SERPL-MCNC: 5.9 G/DL (ref 6–8.5)
RBC # BLD AUTO: 4.03 10*6/MM3 (ref 4.14–5.8)
SODIUM SERPL-SCNC: 141 MMOL/L (ref 136–145)
WBC NRBC COR # BLD AUTO: 5.44 10*3/MM3 (ref 3.4–10.8)

## 2025-08-22 PROCEDURE — 99232 SBSQ HOSP IP/OBS MODERATE 35: CPT | Performed by: INTERNAL MEDICINE

## 2025-08-22 PROCEDURE — 80053 COMPREHEN METABOLIC PANEL: CPT | Performed by: FAMILY MEDICINE

## 2025-08-22 PROCEDURE — 84132 ASSAY OF SERUM POTASSIUM: CPT | Performed by: INTERNAL MEDICINE

## 2025-08-22 PROCEDURE — 85025 COMPLETE CBC W/AUTO DIFF WBC: CPT | Performed by: FAMILY MEDICINE

## 2025-08-22 RX ORDER — PANTOPRAZOLE SODIUM 40 MG/1
40 TABLET, DELAYED RELEASE ORAL
Status: DISCONTINUED | OUTPATIENT
Start: 2025-08-22 | End: 2025-08-26 | Stop reason: HOSPADM

## 2025-08-22 RX ORDER — POTASSIUM CHLORIDE 1500 MG/1
40 TABLET, EXTENDED RELEASE ORAL EVERY 4 HOURS
Status: COMPLETED | OUTPATIENT
Start: 2025-08-22 | End: 2025-08-22

## 2025-08-22 RX ADMIN — Medication 10 ML: at 21:15

## 2025-08-22 RX ADMIN — MEMANTINE HYDROCHLORIDE 5 MG: 10 TABLET, FILM COATED ORAL at 08:41

## 2025-08-22 RX ADMIN — POTASSIUM CHLORIDE 40 MEQ: 1500 TABLET, EXTENDED RELEASE ORAL at 08:41

## 2025-08-22 RX ADMIN — LIDOCAINE 2 PATCH: 4 PATCH TOPICAL at 08:41

## 2025-08-22 RX ADMIN — POTASSIUM CHLORIDE 40 MEQ: 1500 TABLET, EXTENDED RELEASE ORAL at 12:01

## 2025-08-22 RX ADMIN — PANTOPRAZOLE SODIUM 40 MG: 40 TABLET, DELAYED RELEASE ORAL at 08:41

## 2025-08-22 RX ADMIN — Medication 10 ML: at 08:42

## 2025-08-22 RX ADMIN — ROSUVASTATIN CALCIUM 20 MG: 20 TABLET, FILM COATED ORAL at 21:15

## 2025-08-22 RX ADMIN — Medication 2.5 MG: at 21:15

## 2025-08-22 RX ADMIN — LOSARTAN POTASSIUM 50 MG: 50 TABLET, FILM COATED ORAL at 08:41

## 2025-08-22 RX ADMIN — APIXABAN 10 MG: 5 TABLET, FILM COATED ORAL at 21:15

## 2025-08-22 RX ADMIN — MEMANTINE HYDROCHLORIDE 5 MG: 10 TABLET, FILM COATED ORAL at 21:15

## 2025-08-22 RX ADMIN — Medication 1 APPLICATION: at 08:42

## 2025-08-22 RX ADMIN — Medication 1 APPLICATION: at 21:15

## 2025-08-22 RX ADMIN — APIXABAN 10 MG: 5 TABLET, FILM COATED ORAL at 08:40

## 2025-08-23 PROCEDURE — 99232 SBSQ HOSP IP/OBS MODERATE 35: CPT | Performed by: INTERNAL MEDICINE

## 2025-08-23 RX ADMIN — Medication 1 APPLICATION: at 08:46

## 2025-08-23 RX ADMIN — APIXABAN 10 MG: 5 TABLET, FILM COATED ORAL at 08:45

## 2025-08-23 RX ADMIN — APIXABAN 10 MG: 5 TABLET, FILM COATED ORAL at 20:38

## 2025-08-23 RX ADMIN — MEMANTINE HYDROCHLORIDE 5 MG: 10 TABLET, FILM COATED ORAL at 08:45

## 2025-08-23 RX ADMIN — Medication 10 ML: at 20:38

## 2025-08-23 RX ADMIN — LOSARTAN POTASSIUM 50 MG: 50 TABLET, FILM COATED ORAL at 08:46

## 2025-08-23 RX ADMIN — Medication 10 ML: at 08:54

## 2025-08-23 RX ADMIN — PANTOPRAZOLE SODIUM 40 MG: 40 TABLET, DELAYED RELEASE ORAL at 05:45

## 2025-08-23 RX ADMIN — ROSUVASTATIN CALCIUM 20 MG: 20 TABLET, FILM COATED ORAL at 20:38

## 2025-08-23 RX ADMIN — MEMANTINE HYDROCHLORIDE 5 MG: 10 TABLET, FILM COATED ORAL at 20:38

## 2025-08-23 RX ADMIN — Medication 1 APPLICATION: at 20:39

## 2025-08-23 RX ADMIN — Medication 2.5 MG: at 20:38

## 2025-08-23 RX ADMIN — LIDOCAINE 2 PATCH: 4 PATCH TOPICAL at 12:40

## 2025-08-24 LAB
ANION GAP SERPL CALCULATED.3IONS-SCNC: 10.9 MMOL/L (ref 5–15)
BACTERIA SPEC AEROBE CULT: NORMAL
BACTERIA SPEC AEROBE CULT: NORMAL
BUN SERPL-MCNC: 17.1 MG/DL (ref 8–23)
BUN/CREAT SERPL: 16.8 (ref 7–25)
CALCIUM SPEC-SCNC: 8.7 MG/DL (ref 8.6–10.5)
CHLORIDE SERPL-SCNC: 106 MMOL/L (ref 98–107)
CO2 SERPL-SCNC: 22.1 MMOL/L (ref 22–29)
CREAT SERPL-MCNC: 1.02 MG/DL (ref 0.76–1.27)
EGFRCR SERPLBLD CKD-EPI 2021: 71.1 ML/MIN/1.73
GLUCOSE SERPL-MCNC: 91 MG/DL (ref 65–99)
MAGNESIUM SERPL-MCNC: 2.1 MG/DL (ref 1.6–2.4)
POTASSIUM SERPL-SCNC: 3.9 MMOL/L (ref 3.5–5.2)
SODIUM SERPL-SCNC: 139 MMOL/L (ref 136–145)

## 2025-08-24 PROCEDURE — 83735 ASSAY OF MAGNESIUM: CPT | Performed by: INTERNAL MEDICINE

## 2025-08-24 PROCEDURE — 99232 SBSQ HOSP IP/OBS MODERATE 35: CPT | Performed by: INTERNAL MEDICINE

## 2025-08-24 PROCEDURE — 80048 BASIC METABOLIC PNL TOTAL CA: CPT | Performed by: INTERNAL MEDICINE

## 2025-08-24 RX ADMIN — Medication 1 APPLICATION: at 08:59

## 2025-08-24 RX ADMIN — ROSUVASTATIN CALCIUM 20 MG: 20 TABLET, FILM COATED ORAL at 21:30

## 2025-08-24 RX ADMIN — Medication 1 APPLICATION: at 21:30

## 2025-08-24 RX ADMIN — APIXABAN 10 MG: 5 TABLET, FILM COATED ORAL at 08:58

## 2025-08-24 RX ADMIN — MEMANTINE HYDROCHLORIDE 5 MG: 10 TABLET, FILM COATED ORAL at 08:58

## 2025-08-24 RX ADMIN — MEMANTINE HYDROCHLORIDE 5 MG: 10 TABLET, FILM COATED ORAL at 21:30

## 2025-08-24 RX ADMIN — LIDOCAINE 2 PATCH: 4 PATCH TOPICAL at 08:59

## 2025-08-24 RX ADMIN — PANTOPRAZOLE SODIUM 40 MG: 40 TABLET, DELAYED RELEASE ORAL at 06:27

## 2025-08-24 RX ADMIN — Medication 2.5 MG: at 21:30

## 2025-08-24 RX ADMIN — APIXABAN 10 MG: 5 TABLET, FILM COATED ORAL at 21:31

## 2025-08-24 RX ADMIN — LOSARTAN POTASSIUM 50 MG: 50 TABLET, FILM COATED ORAL at 08:58

## 2025-08-24 RX ADMIN — Medication 10 ML: at 08:59

## 2025-08-25 VITALS
TEMPERATURE: 99.2 F | OXYGEN SATURATION: 100 % | HEART RATE: 79 BPM | BODY MASS INDEX: 32.6 KG/M2 | HEIGHT: 66 IN | DIASTOLIC BLOOD PRESSURE: 81 MMHG | SYSTOLIC BLOOD PRESSURE: 131 MMHG | RESPIRATION RATE: 18 BRPM | WEIGHT: 202.82 LBS

## 2025-08-25 PROCEDURE — 99239 HOSP IP/OBS DSCHRG MGMT >30: CPT | Performed by: INTERNAL MEDICINE

## 2025-08-25 PROCEDURE — 92526 ORAL FUNCTION THERAPY: CPT

## 2025-08-25 RX ORDER — ROSUVASTATIN CALCIUM 20 MG/1
20 TABLET, COATED ORAL NIGHTLY
Start: 2025-08-25

## 2025-08-25 RX ORDER — LOSARTAN POTASSIUM 50 MG/1
50 TABLET ORAL
Start: 2025-08-26

## 2025-08-25 RX ORDER — PANTOPRAZOLE SODIUM 40 MG/1
40 TABLET, DELAYED RELEASE ORAL
Start: 2025-08-26

## 2025-08-25 RX ADMIN — PANTOPRAZOLE SODIUM 40 MG: 40 TABLET, DELAYED RELEASE ORAL at 05:07

## 2025-08-25 RX ADMIN — LIDOCAINE 2 PATCH: 4 PATCH TOPICAL at 09:24

## 2025-08-25 RX ADMIN — APIXABAN 10 MG: 5 TABLET, FILM COATED ORAL at 21:37

## 2025-08-25 RX ADMIN — MEMANTINE HYDROCHLORIDE 5 MG: 10 TABLET, FILM COATED ORAL at 21:37

## 2025-08-25 RX ADMIN — Medication 1 APPLICATION: at 21:37

## 2025-08-25 RX ADMIN — Medication 1 APPLICATION: at 09:24

## 2025-08-25 RX ADMIN — ROSUVASTATIN CALCIUM 20 MG: 20 TABLET, FILM COATED ORAL at 21:37

## 2025-08-25 RX ADMIN — Medication 10 ML: at 09:24

## 2025-08-25 RX ADMIN — LOSARTAN POTASSIUM 50 MG: 50 TABLET, FILM COATED ORAL at 09:23

## 2025-08-25 RX ADMIN — APIXABAN 10 MG: 5 TABLET, FILM COATED ORAL at 09:23

## 2025-08-25 RX ADMIN — MEMANTINE HYDROCHLORIDE 5 MG: 10 TABLET, FILM COATED ORAL at 09:23

## 2025-08-26 LAB
QT INTERVAL: 410 MS
QTC INTERVAL: 373 MS